# Patient Record
Sex: MALE | Race: OTHER | HISPANIC OR LATINO | URBAN - METROPOLITAN AREA
[De-identification: names, ages, dates, MRNs, and addresses within clinical notes are randomized per-mention and may not be internally consistent; named-entity substitution may affect disease eponyms.]

---

## 2017-02-28 ENCOUNTER — EMERGENCY (EMERGENCY)
Facility: HOSPITAL | Age: 57
LOS: 1 days | Discharge: HOME | End: 2017-02-28
Admitting: EMERGENCY MEDICINE
Payer: SELF-PAY

## 2017-02-28 DIAGNOSIS — Z98.89 OTHER SPECIFIED POSTPROCEDURAL STATES: Chronic | ICD-10-CM

## 2017-02-28 PROCEDURE — 99285 EMERGENCY DEPT VISIT HI MDM: CPT | Mod: 25

## 2017-02-28 PROCEDURE — 99284 EMERGENCY DEPT VISIT MOD MDM: CPT

## 2017-02-28 PROCEDURE — 80307 DRUG TEST PRSMV CHEM ANLYZR: CPT

## 2017-02-28 PROCEDURE — 36415 COLL VENOUS BLD VENIPUNCTURE: CPT

## 2017-06-08 ENCOUNTER — EMERGENCY (EMERGENCY)
Facility: HOSPITAL | Age: 57
LOS: 1 days | Discharge: HOME | End: 2017-06-08
Attending: EMERGENCY MEDICINE | Admitting: EMERGENCY MEDICINE
Payer: SELF-PAY

## 2017-06-08 VITALS
HEART RATE: 61 BPM | SYSTOLIC BLOOD PRESSURE: 133 MMHG | DIASTOLIC BLOOD PRESSURE: 74 MMHG | RESPIRATION RATE: 20 BRPM | OXYGEN SATURATION: 96 %

## 2017-06-08 DIAGNOSIS — Z98.89 OTHER SPECIFIED POSTPROCEDURAL STATES: Chronic | ICD-10-CM

## 2017-06-08 LAB
ALBUMIN SERPL ELPH-MCNC: 4.1 G/DL — SIGNIFICANT CHANGE UP (ref 3.3–5)
ALP SERPL-CCNC: 65 U/L — SIGNIFICANT CHANGE UP (ref 40–120)
ALT FLD-CCNC: 69 U/L RC — HIGH (ref 10–45)
ANION GAP SERPL CALC-SCNC: 16 MMOL/L — SIGNIFICANT CHANGE UP (ref 5–17)
AST SERPL-CCNC: 118 U/L — HIGH (ref 10–40)
BASOPHILS # BLD AUTO: 0.1 K/UL — SIGNIFICANT CHANGE UP (ref 0–0.2)
BASOPHILS NFR BLD AUTO: 1.5 % — SIGNIFICANT CHANGE UP (ref 0–2)
BILIRUB SERPL-MCNC: 0.8 MG/DL — SIGNIFICANT CHANGE UP (ref 0.2–1.2)
BUN SERPL-MCNC: 12 MG/DL — SIGNIFICANT CHANGE UP (ref 7–23)
CALCIUM SERPL-MCNC: 8.4 MG/DL — SIGNIFICANT CHANGE UP (ref 8.4–10.5)
CHLORIDE SERPL-SCNC: 104 MMOL/L — SIGNIFICANT CHANGE UP (ref 96–108)
CO2 SERPL-SCNC: 25 MMOL/L — SIGNIFICANT CHANGE UP (ref 22–31)
CREAT SERPL-MCNC: 0.75 MG/DL — SIGNIFICANT CHANGE UP (ref 0.5–1.3)
EOSINOPHIL # BLD AUTO: 0.1 K/UL — SIGNIFICANT CHANGE UP (ref 0–0.5)
EOSINOPHIL NFR BLD AUTO: 1.2 % — SIGNIFICANT CHANGE UP (ref 0–6)
GLUCOSE SERPL-MCNC: 90 MG/DL — SIGNIFICANT CHANGE UP (ref 70–99)
HCT VFR BLD CALC: 38.5 % — LOW (ref 39–50)
HGB BLD-MCNC: 12.7 G/DL — LOW (ref 13–17)
LIDOCAIN IGE QN: 73 U/L — HIGH (ref 7–60)
LYMPHOCYTES # BLD AUTO: 2 K/UL — SIGNIFICANT CHANGE UP (ref 1–3.3)
LYMPHOCYTES # BLD AUTO: 33.8 % — SIGNIFICANT CHANGE UP (ref 13–44)
MCHC RBC-ENTMCNC: 33 GM/DL — SIGNIFICANT CHANGE UP (ref 32–36)
MCHC RBC-ENTMCNC: 35 PG — HIGH (ref 27–34)
MCV RBC AUTO: 106 FL — HIGH (ref 80–100)
MONOCYTES # BLD AUTO: 0.4 K/UL — SIGNIFICANT CHANGE UP (ref 0–0.9)
MONOCYTES NFR BLD AUTO: 7.2 % — SIGNIFICANT CHANGE UP (ref 2–14)
NEUTROPHILS # BLD AUTO: 3.4 K/UL — SIGNIFICANT CHANGE UP (ref 1.8–7.4)
NEUTROPHILS NFR BLD AUTO: 56.4 % — SIGNIFICANT CHANGE UP (ref 43–77)
PHOSPHATE SERPL-MCNC: 3.4 MG/DL — SIGNIFICANT CHANGE UP (ref 2.5–4.5)
PLATELET # BLD AUTO: 160 K/UL — SIGNIFICANT CHANGE UP (ref 150–400)
POTASSIUM SERPL-MCNC: 3.8 MMOL/L — SIGNIFICANT CHANGE UP (ref 3.5–5.3)
POTASSIUM SERPL-SCNC: 3.8 MMOL/L — SIGNIFICANT CHANGE UP (ref 3.5–5.3)
PROT SERPL-MCNC: 6.9 G/DL — SIGNIFICANT CHANGE UP (ref 6–8.3)
RBC # BLD: 3.63 M/UL — LOW (ref 4.2–5.8)
RBC # FLD: 12.5 % — SIGNIFICANT CHANGE UP (ref 10.3–14.5)
SODIUM SERPL-SCNC: 145 MMOL/L — SIGNIFICANT CHANGE UP (ref 135–145)
WBC # BLD: 6 K/UL — SIGNIFICANT CHANGE UP (ref 3.8–10.5)
WBC # FLD AUTO: 6 K/UL — SIGNIFICANT CHANGE UP (ref 3.8–10.5)

## 2017-06-08 PROCEDURE — 99285 EMERGENCY DEPT VISIT HI MDM: CPT | Mod: 25

## 2017-06-08 PROCEDURE — 80053 COMPREHEN METABOLIC PANEL: CPT

## 2017-06-08 PROCEDURE — 84100 ASSAY OF PHOSPHORUS: CPT

## 2017-06-08 PROCEDURE — 85027 COMPLETE CBC AUTOMATED: CPT

## 2017-06-08 PROCEDURE — 93010 ELECTROCARDIOGRAM REPORT: CPT

## 2017-06-08 PROCEDURE — 93005 ELECTROCARDIOGRAM TRACING: CPT

## 2017-06-08 PROCEDURE — 72125 CT NECK SPINE W/O DYE: CPT

## 2017-06-08 PROCEDURE — 71250 CT THORAX DX C-: CPT

## 2017-06-08 PROCEDURE — 83690 ASSAY OF LIPASE: CPT

## 2017-06-08 PROCEDURE — 70450 CT HEAD/BRAIN W/O DYE: CPT | Mod: 26

## 2017-06-08 PROCEDURE — 72125 CT NECK SPINE W/O DYE: CPT | Mod: 26

## 2017-06-08 PROCEDURE — 71250 CT THORAX DX C-: CPT | Mod: 26

## 2017-06-08 PROCEDURE — 70450 CT HEAD/BRAIN W/O DYE: CPT

## 2017-06-08 RX ORDER — LIDOCAINE 4 G/100G
1 CREAM TOPICAL ONCE
Qty: 0 | Refills: 0 | Status: COMPLETED | OUTPATIENT
Start: 2017-06-08 | End: 2017-06-08

## 2017-06-08 RX ORDER — ACETAMINOPHEN 500 MG
650 TABLET ORAL ONCE
Qty: 0 | Refills: 0 | Status: COMPLETED | OUTPATIENT
Start: 2017-06-08 | End: 2017-06-08

## 2017-06-08 RX ADMIN — LIDOCAINE 1 PATCH: 4 CREAM TOPICAL at 20:43

## 2017-06-08 RX ADMIN — Medication 650 MILLIGRAM(S): at 20:43

## 2017-06-08 NOTE — ED PROVIDER NOTE - PROGRESS NOTE DETAILS
pt tolerating po Pt AAAOX3, ambulating. states he has no money /way to get home. will wait for SW in the am Pt resting comfortably, no acute distress. Pt AAOX3, ambulatory. will d/c home Alfredo: Patient alert and oriented x 3. ambulating with no difficulty. Patient will wait in waiting room.

## 2017-06-08 NOTE — ED PROVIDER NOTE - ATTENDING CONTRIBUTION TO CARE
Agree with resident history  Patient is intoxicated  Denies convulsions, only complains of right sided rib pain  On exam, NAD, AVSS, NC AT head, no facial trauma, no c,t, or lspine ttp, cta bl, s1, s,2 rr, osft nt nd no r/g/r, TTP to right anterior ribs t4-7 with no ecchymosis or deformity, soft, nt nd abdomen,no r/g/t, FROm AROM and PROm of ue and LE, pelcvis stable with no pain on palpation.

## 2017-06-08 NOTE — ED PROVIDER NOTE - OBJECTIVE STATEMENT
56 y.o M intox bibems s/p found on bench  /o right sided rib pain. 56 y.o M intox bibems s/p found on bench  c/o right sided rib pain. As per patient, he reports falling 8 days ago and endores right rib pian. Denies any ha/neck pain, ab pain, n/. last drink was today. 56 y.o M intox bibems s/p found on bench  c/o right sided rib pain. As per patient, he reports falling 8 days ago and endorses right rib pian. Denies any ha/neck pain, ab pain, n/. last drink was today.

## 2017-06-08 NOTE — ED ADULT NURSE NOTE - OBJECTIVE STATEMENT
57 yo male presents to the ED via EMS from [employer] [facility] [employer] in [place]  c/o ETOH intoxication tonight as per EMS. EMS states patient was [place] [facility] [place] with alcohol. patient is AAOx4. lung sounds clear bilaterally. cap refill <3sec. patient states he fell 8days ago and is c/o right sided rib pain 7/10. no bruising, redness or swelling noted. skin is intact. patient has odor. patient denies fevers, chills, N/V/D, HA, cough, abdominal pain, chest pain, SOB. VSS. MD at the bedside.

## 2017-06-09 VITALS
RESPIRATION RATE: 17 BRPM | SYSTOLIC BLOOD PRESSURE: 123 MMHG | TEMPERATURE: 98 F | DIASTOLIC BLOOD PRESSURE: 73 MMHG | OXYGEN SATURATION: 98 % | HEART RATE: 88 BPM

## 2017-06-09 NOTE — ED ADULT NURSE REASSESSMENT NOTE - NS ED NURSE REASSESS COMMENT FT1
patient sleeping comfortably in bed in no acute distress and no s/s of pain. waiting for social work to see patient at 0700.

## 2017-06-09 NOTE — ED ADULT NURSE REASSESSMENT NOTE - NS ED NURSE REASSESS COMMENT FT1
patient sleeping comfortably in bed in no acute distress. no s/s of pain at this time. waiting for social work at 0700.

## 2017-06-28 ENCOUNTER — EMERGENCY (EMERGENCY)
Facility: HOSPITAL | Age: 57
LOS: 1 days | Discharge: HOME | End: 2017-06-28
Attending: PERSONAL EMERGENCY RESPONSE ATTENDANT | Admitting: PERSONAL EMERGENCY RESPONSE ATTENDANT
Payer: SELF-PAY

## 2017-06-28 VITALS
RESPIRATION RATE: 16 BRPM | OXYGEN SATURATION: 95 % | DIASTOLIC BLOOD PRESSURE: 63 MMHG | SYSTOLIC BLOOD PRESSURE: 107 MMHG | TEMPERATURE: 98 F | HEART RATE: 85 BPM

## 2017-06-28 DIAGNOSIS — Z98.89 OTHER SPECIFIED POSTPROCEDURAL STATES: Chronic | ICD-10-CM

## 2017-06-28 LAB
ALBUMIN SERPL ELPH-MCNC: 3.7 G/DL — SIGNIFICANT CHANGE UP (ref 3.3–5)
ALP SERPL-CCNC: 59 U/L — SIGNIFICANT CHANGE UP (ref 40–120)
ALT FLD-CCNC: 59 U/L RC — HIGH (ref 10–45)
ANION GAP SERPL CALC-SCNC: 16 MMOL/L — SIGNIFICANT CHANGE UP (ref 5–17)
AST SERPL-CCNC: 87 U/L — HIGH (ref 10–40)
BASE EXCESS BLDV CALC-SCNC: 2.4 MMOL/L — HIGH (ref -2–2)
BASOPHILS # BLD AUTO: 0.1 K/UL — SIGNIFICANT CHANGE UP (ref 0–0.2)
BASOPHILS NFR BLD AUTO: 1.7 % — SIGNIFICANT CHANGE UP (ref 0–2)
BILIRUB SERPL-MCNC: 0.6 MG/DL — SIGNIFICANT CHANGE UP (ref 0.2–1.2)
BUN SERPL-MCNC: 15 MG/DL — SIGNIFICANT CHANGE UP (ref 7–23)
CA-I SERPL-SCNC: 1.16 MMOL/L — SIGNIFICANT CHANGE UP (ref 1.12–1.3)
CALCIUM SERPL-MCNC: 9.3 MG/DL — SIGNIFICANT CHANGE UP (ref 8.4–10.5)
CHLORIDE BLDV-SCNC: 106 MMOL/L — SIGNIFICANT CHANGE UP (ref 96–108)
CHLORIDE SERPL-SCNC: 105 MMOL/L — SIGNIFICANT CHANGE UP (ref 96–108)
CO2 BLDV-SCNC: 29 MMOL/L — SIGNIFICANT CHANGE UP (ref 22–30)
CO2 SERPL-SCNC: 24 MMOL/L — SIGNIFICANT CHANGE UP (ref 22–31)
CREAT SERPL-MCNC: 0.85 MG/DL — SIGNIFICANT CHANGE UP (ref 0.5–1.3)
EOSINOPHIL # BLD AUTO: 0.2 K/UL — SIGNIFICANT CHANGE UP (ref 0–0.5)
EOSINOPHIL NFR BLD AUTO: 3.7 % — SIGNIFICANT CHANGE UP (ref 0–6)
GAS PNL BLDV: 146 MMOL/L — HIGH (ref 136–145)
GAS PNL BLDV: SIGNIFICANT CHANGE UP
GAS PNL BLDV: SIGNIFICANT CHANGE UP
GLUCOSE BLDV-MCNC: 153 MG/DL — HIGH (ref 70–99)
GLUCOSE SERPL-MCNC: 162 MG/DL — HIGH (ref 70–99)
HCO3 BLDV-SCNC: 27 MMOL/L — SIGNIFICANT CHANGE UP (ref 21–29)
HCT VFR BLD CALC: 33.6 % — LOW (ref 39–50)
HCT VFR BLDA CALC: 37 % — LOW (ref 39–50)
HGB BLD CALC-MCNC: 12 G/DL — LOW (ref 13–17)
HGB BLD-MCNC: 12 G/DL — LOW (ref 13–17)
LACTATE BLDV-MCNC: 2.4 MMOL/L — HIGH (ref 0.7–2)
LIDOCAIN IGE QN: 77 U/L — HIGH (ref 7–60)
LYMPHOCYTES # BLD AUTO: 2.1 K/UL — SIGNIFICANT CHANGE UP (ref 1–3.3)
LYMPHOCYTES # BLD AUTO: 30.9 % — SIGNIFICANT CHANGE UP (ref 13–44)
MCHC RBC-ENTMCNC: 35.5 GM/DL — SIGNIFICANT CHANGE UP (ref 32–36)
MCHC RBC-ENTMCNC: 37.7 PG — HIGH (ref 27–34)
MCV RBC AUTO: 106 FL — HIGH (ref 80–100)
MONOCYTES # BLD AUTO: 0.4 K/UL — SIGNIFICANT CHANGE UP (ref 0–0.9)
MONOCYTES NFR BLD AUTO: 6.2 % — SIGNIFICANT CHANGE UP (ref 2–14)
NEUTROPHILS # BLD AUTO: 3.8 K/UL — SIGNIFICANT CHANGE UP (ref 1.8–7.4)
NEUTROPHILS NFR BLD AUTO: 57.5 % — SIGNIFICANT CHANGE UP (ref 43–77)
PCO2 BLDV: 46 MMHG — SIGNIFICANT CHANGE UP (ref 35–50)
PH BLDV: 7.39 — SIGNIFICANT CHANGE UP (ref 7.35–7.45)
PLATELET # BLD AUTO: 324 K/UL — SIGNIFICANT CHANGE UP (ref 150–400)
PO2 BLDV: 43 MMHG — SIGNIFICANT CHANGE UP (ref 25–45)
POTASSIUM BLDV-SCNC: 3.4 MMOL/L — LOW (ref 3.5–5)
POTASSIUM SERPL-MCNC: 3.6 MMOL/L — SIGNIFICANT CHANGE UP (ref 3.5–5.3)
POTASSIUM SERPL-SCNC: 3.6 MMOL/L — SIGNIFICANT CHANGE UP (ref 3.5–5.3)
PROT SERPL-MCNC: 6.7 G/DL — SIGNIFICANT CHANGE UP (ref 6–8.3)
RBC # BLD: 3.17 M/UL — LOW (ref 4.2–5.8)
RBC # FLD: 12.2 % — SIGNIFICANT CHANGE UP (ref 10.3–14.5)
SAO2 % BLDV: 69 % — SIGNIFICANT CHANGE UP (ref 67–88)
SODIUM SERPL-SCNC: 145 MMOL/L — SIGNIFICANT CHANGE UP (ref 135–145)
WBC # BLD: 6.7 K/UL — SIGNIFICANT CHANGE UP (ref 3.8–10.5)
WBC # FLD AUTO: 6.7 K/UL — SIGNIFICANT CHANGE UP (ref 3.8–10.5)

## 2017-06-28 PROCEDURE — 71100 X-RAY EXAM RIBS UNI 2 VIEWS: CPT | Mod: 26,52

## 2017-06-28 PROCEDURE — 71010: CPT | Mod: 26,59

## 2017-06-28 PROCEDURE — 93010 ELECTROCARDIOGRAM REPORT: CPT

## 2017-06-28 PROCEDURE — 99285 EMERGENCY DEPT VISIT HI MDM: CPT | Mod: 25

## 2017-06-28 PROCEDURE — 99053 MED SERV 10PM-8AM 24 HR FAC: CPT

## 2017-06-28 RX ORDER — SODIUM CHLORIDE 9 MG/ML
1000 INJECTION INTRAMUSCULAR; INTRAVENOUS; SUBCUTANEOUS ONCE
Qty: 0 | Refills: 0 | Status: COMPLETED | OUTPATIENT
Start: 2017-06-28 | End: 2017-06-28

## 2017-06-28 RX ORDER — SODIUM CHLORIDE 9 MG/ML
1000 INJECTION, SOLUTION INTRAVENOUS
Qty: 0 | Refills: 0 | Status: DISCONTINUED | OUTPATIENT
Start: 2017-06-28 | End: 2017-07-02

## 2017-06-28 RX ADMIN — SODIUM CHLORIDE 2000 MILLILITER(S): 9 INJECTION INTRAMUSCULAR; INTRAVENOUS; SUBCUTANEOUS at 22:31

## 2017-06-28 RX ADMIN — SODIUM CHLORIDE 250 MILLILITER(S): 9 INJECTION, SOLUTION INTRAVENOUS at 23:40

## 2017-06-28 NOTE — ED PROVIDER NOTE - OBJECTIVE STATEMENT
Pt is a 56 yr old male presenting to ED with complaint of 'found drunk' seated on curb.  Pt had no apparent trauma.  Pt is intoxicated, malodorous.  Easily rousable.  No evidence of vomiting.  Moves all extremities.  Verbal.  Largely [place]an speaking.  When asked if he has pain he endorses R chest pain.  Holds R anterior chest.  Unclear if + hamilton's or R anterior/lateral CW ttp.  Pt is in no acute distress.  Equal breath sounds bilaterally.  Other than RUQ abdomen non-tender.  Again, unclear if RUQ is tender or R anterior chest wall.  No crepitus or palpable deformity.  Pt denies smoking hx.  Pt unable to give past medical hx but has been seen at this ED previously and records indicate only previous alcohol abuse hx.

## 2017-06-28 NOTE — ED PROVIDER NOTE - PLAN OF CARE
1) Please follow-up with your Primary Medical Doctor in 3-5 days. If you need to find a new physician, please call [phone].  2) Return to the Emergency Department if you experiences: fevers, chills, chest pain, shortness of breath, or symptoms that are new or recurrent.  3) If you have any questions or concerns, do not hesitate to contact us at [phone].  4) Follow-up with a Cardiologist to assess for the right chest pain

## 2017-06-28 NOTE — ED PROVIDER NOTE - MEDICAL DECISION MAKING DETAILS
ARMY:  Pt assessed prior to signout, imaging ordered for eval of anterolateral chest wall pain vs. RUQ pain.  No vomiting. Pt markedly intoxicated.  No report of trauma.  Labs ordered, troponins to be cycled for R chest pain however inconsistent with ACS given significant reproducible TTP over R anterolateral chest wall/RUQ.  Remains rousable and pending labs/imaging at time of signout to incoming team.

## 2017-06-28 NOTE — ED PROVIDER NOTE - ABDOMINAL EXAM
Mild RUQ TTP vs. R anterior/lateral chest wall TTP, no palpable masses/crepitus/equivocal hamilton's sign

## 2017-06-28 NOTE — ED PROVIDER NOTE - PROGRESS NOTE DETAILS
Rito Parsons MD (resident): patient reassessed after imaging and repeat labs. history confirmed with Latvian speaker Marizol Stone (). Pt showed good understanding of verbal discharge instructions. Results discussed w/ him. Pt speech normal, no slurred speech, improved pain of the R chest. However does not have any way to get home because no money for a cab, so will get SW to see the patient to help him get home to [facility].

## 2017-06-28 NOTE — ED PROVIDER NOTE - CARE PLAN
Principal Discharge DX:	Alcohol abuse Principal Discharge DX:	Alcohol abuse  Instructions for follow-up, activity and diet:	1) Please follow-up with your Primary Medical Doctor in 3-5 days. If you need to find a new physician, please call [phone].  2) Return to the Emergency Department if you experiences: fevers, chills, chest pain, shortness of breath, or symptoms that are new or recurrent.  3) If you have any questions or concerns, do not hesitate to contact us at [phone].  4) Follow-up with a Cardiologist to assess for the right chest pain

## 2017-06-29 VITALS
TEMPERATURE: 98 F | SYSTOLIC BLOOD PRESSURE: 132 MMHG | OXYGEN SATURATION: 98 % | RESPIRATION RATE: 18 BRPM | DIASTOLIC BLOOD PRESSURE: 76 MMHG | HEART RATE: 74 BPM

## 2017-06-29 LAB — TROPONIN T SERPL-MCNC: <0.01 NG/ML — SIGNIFICANT CHANGE UP (ref 0–0.06)

## 2017-06-29 PROCEDURE — 82435 ASSAY OF BLOOD CHLORIDE: CPT

## 2017-06-29 PROCEDURE — 84484 ASSAY OF TROPONIN QUANT: CPT

## 2017-06-29 PROCEDURE — 71045 X-RAY EXAM CHEST 1 VIEW: CPT

## 2017-06-29 PROCEDURE — 82947 ASSAY GLUCOSE BLOOD QUANT: CPT

## 2017-06-29 PROCEDURE — 82330 ASSAY OF CALCIUM: CPT

## 2017-06-29 PROCEDURE — 84295 ASSAY OF SERUM SODIUM: CPT

## 2017-06-29 PROCEDURE — 76705 ECHO EXAM OF ABDOMEN: CPT | Mod: 26,RT

## 2017-06-29 PROCEDURE — 76705 ECHO EXAM OF ABDOMEN: CPT

## 2017-06-29 PROCEDURE — 83690 ASSAY OF LIPASE: CPT

## 2017-06-29 PROCEDURE — 85014 HEMATOCRIT: CPT

## 2017-06-29 PROCEDURE — 71100 X-RAY EXAM RIBS UNI 2 VIEWS: CPT

## 2017-06-29 PROCEDURE — 93005 ELECTROCARDIOGRAM TRACING: CPT

## 2017-06-29 PROCEDURE — 82803 BLOOD GASES ANY COMBINATION: CPT

## 2017-06-29 PROCEDURE — 83605 ASSAY OF LACTIC ACID: CPT

## 2017-06-29 PROCEDURE — 84132 ASSAY OF SERUM POTASSIUM: CPT

## 2017-06-29 PROCEDURE — 80053 COMPREHEN METABOLIC PANEL: CPT

## 2017-06-29 PROCEDURE — 99285 EMERGENCY DEPT VISIT HI MDM: CPT | Mod: 25

## 2017-06-29 PROCEDURE — 85027 COMPLETE CBC AUTOMATED: CPT

## 2017-06-29 NOTE — ED ADULT NURSE REASSESSMENT NOTE - NS ED NURSE REASSESS COMMENT FT1
pt out to ultrasound.
0745 Pt reassessed Pt is awake C/O  ride sided Chest discomfort Gabi N/V/D Pt is awaiting for SW consult for possible discharge Pt not in acute distress Continue to monitor
Pt discharged steady gait SW arranged the transport Pt A&OX 3  Pt stable to go home
pt complaining of right sided chest pain. md beltran aware. pt denies sob/left arm pain/back pain/diaphoresis/n/v/numbness/tingling. pt laying comfortably in bed, given warm blankets. will continue to monitor.
pt resting comfortably in bed. pt denies chest pain/n/v/diaphoresis/sob/numbness/tingling at this time. safety maintained. waiting for repeat troponin to be drawn a 0500 as per md silver. will continue to monitor.
repeat troponin drawn as per md beltran. pt resting comfortably in bed. pt opens eyes to voice. pt AOX3. pt states "I am very tired". when asked a question pt answers and goes right back to sleep. md beltran aware. safety maintained. will continue to monitor.

## 2017-06-29 NOTE — ED ADULT NURSE NOTE - CHPI ED SYMPTOMS NEG
no vomiting/no abdominal distension/no chills/no fever/no nausea/no abdominal pain/no confusion/no weakness/no disorientation

## 2017-06-29 NOTE — ED ADULT NURSE NOTE - NS ED NURSE DC INFO COMPLEXITY
Patient asked questions/Returned Demonstration/Verbalized Understanding/Simple: Patient demonstrates quick and easy understanding

## 2017-06-29 NOTE — ED ADULT NURSE NOTE - OBJECTIVE STATEMENT
57 yo male pt presents to ed via ambulance complaining of "found drunk" sitting on a curb. pt complaining of right sided chest pain. pt is intoxicated, malodorous, easily rousable, answering questions appropriately. pt denies n/v/abd pain/sob. pt appears to be in no distress. breath sounds clear and equal bilaterally. no increased work of breathing. no cough present. abd nontender and nondistended. skin warm dry and intact. safety maintained. pt denies recent fall or trauma. no bruising/deformities/sign of trauma noted. pt undressed and placed in gown. pt resting comfortably in bed. safety maintained. pt unable to endorse what he drank/how much he drank/if he used drugs.

## 2017-10-17 ENCOUNTER — EMERGENCY (EMERGENCY)
Facility: HOSPITAL | Age: 57
LOS: 1 days | Discharge: HOME | End: 2017-10-17
Attending: EMERGENCY MEDICINE | Admitting: EMERGENCY MEDICINE
Payer: SELF-PAY

## 2017-10-17 VITALS
TEMPERATURE: 98 F | SYSTOLIC BLOOD PRESSURE: 126 MMHG | HEART RATE: 78 BPM | OXYGEN SATURATION: 98 % | RESPIRATION RATE: 18 BRPM | DIASTOLIC BLOOD PRESSURE: 78 MMHG

## 2017-10-17 DIAGNOSIS — Z98.89 OTHER SPECIFIED POSTPROCEDURAL STATES: Chronic | ICD-10-CM

## 2017-10-17 LAB
APPEARANCE UR: CLEAR — SIGNIFICANT CHANGE UP
APTT BLD: 29.3 SEC — SIGNIFICANT CHANGE UP (ref 27.5–37.4)
BASOPHILS # BLD AUTO: 0.1 K/UL — SIGNIFICANT CHANGE UP (ref 0–0.2)
BASOPHILS NFR BLD AUTO: 0.9 % — SIGNIFICANT CHANGE UP (ref 0–2)
BILIRUB UR-MCNC: NEGATIVE — SIGNIFICANT CHANGE UP
COLOR SPEC: YELLOW — SIGNIFICANT CHANGE UP
DIFF PNL FLD: NEGATIVE — SIGNIFICANT CHANGE UP
EOSINOPHIL # BLD AUTO: 0.1 K/UL — SIGNIFICANT CHANGE UP (ref 0–0.5)
EOSINOPHIL NFR BLD AUTO: 0.8 % — SIGNIFICANT CHANGE UP (ref 0–6)
EPI CELLS # UR: SIGNIFICANT CHANGE UP /HPF
ETHANOL SERPL-MCNC: 235 MG/DL — HIGH (ref 0–10)
GAS PNL BLDV: SIGNIFICANT CHANGE UP
GLUCOSE UR QL: NEGATIVE — SIGNIFICANT CHANGE UP
HCT VFR BLD CALC: 34.5 % — LOW (ref 39–50)
HGB BLD-MCNC: 12.3 G/DL — LOW (ref 13–17)
INR BLD: 0.87 RATIO — LOW (ref 0.88–1.16)
KETONES UR-MCNC: NEGATIVE — SIGNIFICANT CHANGE UP
LEUKOCYTE ESTERASE UR-ACNC: NEGATIVE — SIGNIFICANT CHANGE UP
LIDOCAIN IGE QN: 63 U/L — HIGH (ref 7–60)
LYMPHOCYTES # BLD AUTO: 1.6 K/UL — SIGNIFICANT CHANGE UP (ref 1–3.3)
LYMPHOCYTES # BLD AUTO: 23.6 % — SIGNIFICANT CHANGE UP (ref 13–44)
MAGNESIUM SERPL-MCNC: 1.8 MG/DL — SIGNIFICANT CHANGE UP (ref 1.6–2.6)
MCHC RBC-ENTMCNC: 35.7 GM/DL — SIGNIFICANT CHANGE UP (ref 32–36)
MCHC RBC-ENTMCNC: 37.9 PG — HIGH (ref 27–34)
MCV RBC AUTO: 106 FL — HIGH (ref 80–100)
MONOCYTES # BLD AUTO: 0.7 K/UL — SIGNIFICANT CHANGE UP (ref 0–0.9)
MONOCYTES NFR BLD AUTO: 9.7 % — SIGNIFICANT CHANGE UP (ref 2–14)
NEUTROPHILS # BLD AUTO: 4.5 K/UL — SIGNIFICANT CHANGE UP (ref 1.8–7.4)
NEUTROPHILS NFR BLD AUTO: 65 % — SIGNIFICANT CHANGE UP (ref 43–77)
NITRITE UR-MCNC: NEGATIVE — SIGNIFICANT CHANGE UP
PH UR: 6.5 — SIGNIFICANT CHANGE UP (ref 5–8)
PHOSPHATE SERPL-MCNC: 2 MG/DL — LOW (ref 2.5–4.5)
PLATELET # BLD AUTO: 320 K/UL — SIGNIFICANT CHANGE UP (ref 150–400)
PROT UR-MCNC: 30 MG/DL
PROTHROM AB SERPL-ACNC: 9.4 SEC — LOW (ref 9.8–12.7)
RBC # BLD: 3.25 M/UL — LOW (ref 4.2–5.8)
RBC # FLD: 11.8 % — SIGNIFICANT CHANGE UP (ref 10.3–14.5)
RBC CASTS # UR COMP ASSIST: SIGNIFICANT CHANGE UP /HPF (ref 0–2)
SP GR SPEC: 1.02 — SIGNIFICANT CHANGE UP (ref 1.01–1.02)
UROBILINOGEN FLD QL: 8
WBC # BLD: 6.9 K/UL — SIGNIFICANT CHANGE UP (ref 3.8–10.5)
WBC # FLD AUTO: 6.9 K/UL — SIGNIFICANT CHANGE UP (ref 3.8–10.5)
WBC UR QL: SIGNIFICANT CHANGE UP /HPF (ref 0–5)

## 2017-10-17 PROCEDURE — 83735 ASSAY OF MAGNESIUM: CPT

## 2017-10-17 PROCEDURE — 85730 THROMBOPLASTIN TIME PARTIAL: CPT

## 2017-10-17 PROCEDURE — 84295 ASSAY OF SERUM SODIUM: CPT

## 2017-10-17 PROCEDURE — 96372 THER/PROPH/DIAG INJ SC/IM: CPT | Mod: XU

## 2017-10-17 PROCEDURE — 72125 CT NECK SPINE W/O DYE: CPT

## 2017-10-17 PROCEDURE — 99285 EMERGENCY DEPT VISIT HI MDM: CPT | Mod: 25

## 2017-10-17 PROCEDURE — 80053 COMPREHEN METABOLIC PANEL: CPT

## 2017-10-17 PROCEDURE — 93005 ELECTROCARDIOGRAM TRACING: CPT

## 2017-10-17 PROCEDURE — 82330 ASSAY OF CALCIUM: CPT

## 2017-10-17 PROCEDURE — 85014 HEMATOCRIT: CPT

## 2017-10-17 PROCEDURE — 84132 ASSAY OF SERUM POTASSIUM: CPT

## 2017-10-17 PROCEDURE — 82962 GLUCOSE BLOOD TEST: CPT

## 2017-10-17 PROCEDURE — 82435 ASSAY OF BLOOD CHLORIDE: CPT

## 2017-10-17 PROCEDURE — 81001 URINALYSIS AUTO W/SCOPE: CPT

## 2017-10-17 PROCEDURE — 83605 ASSAY OF LACTIC ACID: CPT

## 2017-10-17 PROCEDURE — 72125 CT NECK SPINE W/O DYE: CPT | Mod: 26

## 2017-10-17 PROCEDURE — 85610 PROTHROMBIN TIME: CPT

## 2017-10-17 PROCEDURE — 70450 CT HEAD/BRAIN W/O DYE: CPT | Mod: 26

## 2017-10-17 PROCEDURE — 93010 ELECTROCARDIOGRAM REPORT: CPT

## 2017-10-17 PROCEDURE — 80307 DRUG TEST PRSMV CHEM ANLYZR: CPT

## 2017-10-17 PROCEDURE — 83690 ASSAY OF LIPASE: CPT

## 2017-10-17 PROCEDURE — 85027 COMPLETE CBC AUTOMATED: CPT

## 2017-10-17 PROCEDURE — 84100 ASSAY OF PHOSPHORUS: CPT

## 2017-10-17 PROCEDURE — 82803 BLOOD GASES ANY COMBINATION: CPT

## 2017-10-17 PROCEDURE — 82947 ASSAY GLUCOSE BLOOD QUANT: CPT

## 2017-10-17 PROCEDURE — 70450 CT HEAD/BRAIN W/O DYE: CPT

## 2017-10-17 PROCEDURE — 96374 THER/PROPH/DIAG INJ IV PUSH: CPT

## 2017-10-17 RX ORDER — THIAMINE MONONITRATE (VIT B1) 100 MG
100 TABLET ORAL ONCE
Qty: 0 | Refills: 0 | Status: COMPLETED | OUTPATIENT
Start: 2017-10-17 | End: 2017-10-17

## 2017-10-17 RX ORDER — SODIUM CHLORIDE 9 MG/ML
1000 INJECTION INTRAMUSCULAR; INTRAVENOUS; SUBCUTANEOUS ONCE
Qty: 0 | Refills: 0 | Status: COMPLETED | OUTPATIENT
Start: 2017-10-17 | End: 2017-10-17

## 2017-10-17 RX ORDER — ACETAMINOPHEN 500 MG
650 TABLET ORAL ONCE
Qty: 0 | Refills: 0 | Status: COMPLETED | OUTPATIENT
Start: 2017-10-17 | End: 2017-10-17

## 2017-10-17 RX ORDER — SODIUM CHLORIDE 9 MG/ML
1000 INJECTION, SOLUTION INTRAVENOUS
Qty: 0 | Refills: 0 | Status: DISCONTINUED | OUTPATIENT
Start: 2017-10-17 | End: 2017-10-21

## 2017-10-17 RX ADMIN — SODIUM CHLORIDE 120 MILLILITER(S): 9 INJECTION, SOLUTION INTRAVENOUS at 21:35

## 2017-10-17 RX ADMIN — SODIUM CHLORIDE 1000 MILLILITER(S): 9 INJECTION INTRAMUSCULAR; INTRAVENOUS; SUBCUTANEOUS at 20:33

## 2017-10-17 RX ADMIN — Medication 100 MILLIGRAM(S): at 21:34

## 2017-10-17 RX ADMIN — Medication 650 MILLIGRAM(S): at 21:35

## 2017-10-17 NOTE — ED ADULT NURSE NOTE - OBJECTIVE STATEMENT
57 yo M NOEMÍ. [place] Pt often comes to ED for Etoh intox.  Appears Intoxicated, 57 yo M BIBEMS. [place] Pt often comes to ED for Etoh intox.  Appears Intoxicated, responding to verbal stimuli. Reports epigastric pain. Denies CP dizziness weakness  Or SOB. Patient placed on 1:1 for safety. No combative behavior noted. No agitation or irritability. Denies Fall  No sign of injury. Labs drawn. FS and EKG performed.

## 2017-10-17 NOTE — ED PROVIDER NOTE - PROGRESS NOTE DETAILS
Patient is clinically sober, ambulating without difficulty, no tongue fasciculations or tremors, no signs of withdrawal. Will DC home. Linda Gregorio DO

## 2017-10-17 NOTE — ED PROVIDER NOTE - PHYSICAL EXAMINATION
Gen: Disheveled, appears intoxicated  HEENT: NCAT, EOMI with horizontal nystagmus, no nasal discharge, mucous membranes moist  CV: RRR, +S1/S2, no M/R/G  Resp: CTAB, no W/R/R  GI: Abdomen soft non-distended, NTTP, no masses  MSK: No open wounds, no bruising, no LE edema  Neuro: A&Ox0, however he is following commands, moving all four extremities spontaneously, PERRL with horizontal nystagmus,

## 2017-10-17 NOTE — ED PROVIDER NOTE - CARE PLAN
Principal Discharge DX:	Alcohol intoxication  Instructions for follow-up, activity and diet:	1. Follow up with your primary care physician within 2-3days for reevaluation.  2.  Return to the Emergency Department for worsening, progressive or any other concerning symptoms.

## 2017-10-17 NOTE — ED PROVIDER NOTE - OBJECTIVE STATEMENT
patient is a 56 year old male with unknown medical hx found down and responsive with no obvious trauma with suspected alcohol intoxication. He was seen on the 29th of june here with a similar presentation and at that time he was endorsing right sided chest pain. Today he is easily arousable but minimally verbally responsive in Ma[place]. He does endorse some left sided chest pain but will not comment on duration or severity. ROS limited 2/2 state.

## 2017-10-17 NOTE — ED PROVIDER NOTE - MEDICAL DECISION MAKING DETAILS
55 y/o male with history of alcohol abuse presents with possible alcohol intoxication. Pt is drowsy, alcohol on breath, positive nystagmus, mild epigastric tenderness. Will obtain bloodwork, IV hydration, reevaluation. ZR

## 2017-10-18 VITALS
SYSTOLIC BLOOD PRESSURE: 152 MMHG | TEMPERATURE: 100 F | RESPIRATION RATE: 18 BRPM | HEART RATE: 86 BPM | DIASTOLIC BLOOD PRESSURE: 86 MMHG | OXYGEN SATURATION: 96 %

## 2017-10-18 NOTE — ED ADULT NURSE REASSESSMENT NOTE - NS ED NURSE REASSESS COMMENT FT1
Pt aaox4 no acute distress. Dc plan reviewed. Food and beverage given. No complaints expressed. Pt ambulates with steady gait.

## 2017-10-24 ENCOUNTER — APPOINTMENT (OUTPATIENT)
Dept: INTERNAL MEDICINE | Facility: CLINIC | Age: 57
End: 2017-10-24

## 2017-12-18 ENCOUNTER — EMERGENCY (EMERGENCY)
Facility: HOSPITAL | Age: 57
LOS: 1 days | Discharge: HOME | End: 2017-12-18
Attending: EMERGENCY MEDICINE | Admitting: EMERGENCY MEDICINE
Payer: SELF-PAY

## 2017-12-18 VITALS
RESPIRATION RATE: 18 BRPM | SYSTOLIC BLOOD PRESSURE: 111 MMHG | OXYGEN SATURATION: 97 % | DIASTOLIC BLOOD PRESSURE: 72 MMHG | TEMPERATURE: 97 F | HEART RATE: 65 BPM

## 2017-12-18 DIAGNOSIS — Z98.89 OTHER SPECIFIED POSTPROCEDURAL STATES: Chronic | ICD-10-CM

## 2017-12-18 LAB
ANION GAP SERPL CALC-SCNC: 15 MMOL/L — SIGNIFICANT CHANGE UP (ref 5–17)
ANISOCYTOSIS BLD QL: SLIGHT — SIGNIFICANT CHANGE UP
BASOPHILS # BLD AUTO: 0.1 K/UL — SIGNIFICANT CHANGE UP (ref 0–0.2)
BASOPHILS NFR BLD AUTO: 1 % — SIGNIFICANT CHANGE UP (ref 0–2)
BUN SERPL-MCNC: 13 MG/DL — SIGNIFICANT CHANGE UP (ref 7–23)
CALCIUM SERPL-MCNC: 8.2 MG/DL — LOW (ref 8.4–10.5)
CHLORIDE SERPL-SCNC: 111 MMOL/L — HIGH (ref 96–108)
CO2 SERPL-SCNC: 24 MMOL/L — SIGNIFICANT CHANGE UP (ref 22–31)
CREAT SERPL-MCNC: 0.69 MG/DL — SIGNIFICANT CHANGE UP (ref 0.5–1.3)
DACRYOCYTES BLD QL SMEAR: SLIGHT — SIGNIFICANT CHANGE UP
EOSINOPHIL # BLD AUTO: 0.1 K/UL — SIGNIFICANT CHANGE UP (ref 0–0.5)
EOSINOPHIL NFR BLD AUTO: 1.8 % — SIGNIFICANT CHANGE UP (ref 0–6)
ETHANOL SERPL-MCNC: 361 MG/DL — HIGH (ref 0–10)
GLUCOSE SERPL-MCNC: 80 MG/DL — SIGNIFICANT CHANGE UP (ref 70–99)
HCT VFR BLD CALC: 36.4 % — LOW (ref 39–50)
HGB BLD-MCNC: 12.2 G/DL — LOW (ref 13–17)
LIDOCAIN IGE QN: 101 U/L — HIGH (ref 7–60)
LYMPHOCYTES # BLD AUTO: 2.2 K/UL — SIGNIFICANT CHANGE UP (ref 1–3.3)
LYMPHOCYTES # BLD AUTO: 36 % — SIGNIFICANT CHANGE UP (ref 13–44)
MACROCYTES BLD QL: SIGNIFICANT CHANGE UP
MCHC RBC-ENTMCNC: 33.5 GM/DL — SIGNIFICANT CHANGE UP (ref 32–36)
MCHC RBC-ENTMCNC: 36.7 PG — HIGH (ref 27–34)
MCV RBC AUTO: 110 FL — HIGH (ref 80–100)
MONOCYTES # BLD AUTO: 0.5 K/UL — SIGNIFICANT CHANGE UP (ref 0–0.9)
MONOCYTES NFR BLD AUTO: 7.4 % — SIGNIFICANT CHANGE UP (ref 2–14)
NEUTROPHILS # BLD AUTO: 3.3 K/UL — SIGNIFICANT CHANGE UP (ref 1.8–7.4)
NEUTROPHILS NFR BLD AUTO: 53.8 % — SIGNIFICANT CHANGE UP (ref 43–77)
PLAT MORPH BLD: NORMAL — SIGNIFICANT CHANGE UP
PLATELET # BLD AUTO: 297 K/UL — SIGNIFICANT CHANGE UP (ref 150–400)
POTASSIUM SERPL-MCNC: 3.4 MMOL/L — LOW (ref 3.5–5.3)
POTASSIUM SERPL-SCNC: 3.4 MMOL/L — LOW (ref 3.5–5.3)
RBC # BLD: 3.32 M/UL — LOW (ref 4.2–5.8)
RBC # FLD: 11.9 % — SIGNIFICANT CHANGE UP (ref 10.3–14.5)
RBC BLD AUTO: ABNORMAL
SODIUM SERPL-SCNC: 150 MMOL/L — HIGH (ref 135–145)
TARGETS BLD QL SMEAR: SLIGHT — SIGNIFICANT CHANGE UP
TROPONIN T SERPL-MCNC: <0.01 NG/ML — SIGNIFICANT CHANGE UP (ref 0–0.06)
WBC # BLD: 6.2 K/UL — SIGNIFICANT CHANGE UP (ref 3.8–10.5)
WBC # FLD AUTO: 6.2 K/UL — SIGNIFICANT CHANGE UP (ref 3.8–10.5)

## 2017-12-18 PROCEDURE — 71010: CPT | Mod: 26

## 2017-12-18 PROCEDURE — 99285 EMERGENCY DEPT VISIT HI MDM: CPT | Mod: 25

## 2017-12-18 PROCEDURE — 93010 ELECTROCARDIOGRAM REPORT: CPT

## 2017-12-18 RX ORDER — SODIUM CHLORIDE 9 MG/ML
1000 INJECTION, SOLUTION INTRAVENOUS
Qty: 0 | Refills: 0 | Status: DISCONTINUED | OUTPATIENT
Start: 2017-12-18 | End: 2017-12-22

## 2017-12-18 RX ADMIN — SODIUM CHLORIDE 200 MILLILITER(S): 9 INJECTION, SOLUTION INTRAVENOUS at 23:28

## 2017-12-18 NOTE — ED PROVIDER NOTE - MEDICAL DECISION MAKING DETAILS
58 y/o M pt with PMHx of ETOH abuse presents to the ED for intoxication. Pt appears to be in-domiciled, currently c/o CP. Unable to get clear hx, will get EKG and cardiac enzymes.

## 2017-12-18 NOTE — ED PROVIDER NOTE - OBJECTIVE STATEMENT
56 y/o M pt with PMHx of ETOH abuse presents to the ED for intoxication. Pt was found [place] the middle of the street. There was no signs of trauma. As per EMS, pt was in the ICU at [facility] weeks recently. Currently c/o of CP

## 2017-12-18 NOTE — ED ADULT NURSE NOTE - OBJECTIVE STATEMENT
57 y.o male bibems found in the street intoxicated, unknown medical history, dishevealed, with multiple layers of clothing and wet pants with urine, no signs of trauma noted, VS wnl. Patient awake and alert, Somali speaking, reports having pain in epigastric area, FSBS wnl.

## 2017-12-19 VITALS
TEMPERATURE: 98 F | HEART RATE: 74 BPM | SYSTOLIC BLOOD PRESSURE: 118 MMHG | OXYGEN SATURATION: 99 % | DIASTOLIC BLOOD PRESSURE: 72 MMHG | RESPIRATION RATE: 16 BRPM

## 2017-12-19 LAB
ANION GAP SERPL CALC-SCNC: 14 MMOL/L — SIGNIFICANT CHANGE UP (ref 5–17)
BUN SERPL-MCNC: 12 MG/DL — SIGNIFICANT CHANGE UP (ref 7–23)
CALCIUM SERPL-MCNC: 7.4 MG/DL — LOW (ref 8.4–10.5)
CHLORIDE SERPL-SCNC: 111 MMOL/L — HIGH (ref 96–108)
CO2 SERPL-SCNC: 24 MMOL/L — SIGNIFICANT CHANGE UP (ref 22–31)
CREAT SERPL-MCNC: 0.69 MG/DL — SIGNIFICANT CHANGE UP (ref 0.5–1.3)
GLUCOSE SERPL-MCNC: 105 MG/DL — HIGH (ref 70–99)
POTASSIUM SERPL-MCNC: 3 MMOL/L — LOW (ref 3.5–5.3)
POTASSIUM SERPL-SCNC: 3 MMOL/L — LOW (ref 3.5–5.3)
SODIUM SERPL-SCNC: 149 MMOL/L — HIGH (ref 135–145)

## 2017-12-19 PROCEDURE — 96374 THER/PROPH/DIAG INJ IV PUSH: CPT

## 2017-12-19 PROCEDURE — 99284 EMERGENCY DEPT VISIT MOD MDM: CPT | Mod: 25

## 2017-12-19 PROCEDURE — 85027 COMPLETE CBC AUTOMATED: CPT

## 2017-12-19 PROCEDURE — 80307 DRUG TEST PRSMV CHEM ANLYZR: CPT

## 2017-12-19 PROCEDURE — 83690 ASSAY OF LIPASE: CPT

## 2017-12-19 PROCEDURE — 71045 X-RAY EXAM CHEST 1 VIEW: CPT

## 2017-12-19 PROCEDURE — 93005 ELECTROCARDIOGRAM TRACING: CPT

## 2017-12-19 PROCEDURE — 82962 GLUCOSE BLOOD TEST: CPT

## 2017-12-19 PROCEDURE — 84484 ASSAY OF TROPONIN QUANT: CPT

## 2017-12-19 PROCEDURE — 96375 TX/PRO/DX INJ NEW DRUG ADDON: CPT

## 2017-12-19 PROCEDURE — 80048 BASIC METABOLIC PNL TOTAL CA: CPT

## 2017-12-19 PROCEDURE — 96376 TX/PRO/DX INJ SAME DRUG ADON: CPT

## 2017-12-19 RX ORDER — POTASSIUM CHLORIDE 20 MEQ
10 PACKET (EA) ORAL
Qty: 0 | Refills: 0 | Status: COMPLETED | OUTPATIENT
Start: 2017-12-19 | End: 2017-12-19

## 2017-12-19 RX ORDER — POTASSIUM CHLORIDE 20 MEQ
40 PACKET (EA) ORAL ONCE
Qty: 0 | Refills: 0 | Status: COMPLETED | OUTPATIENT
Start: 2017-12-19 | End: 2017-12-19

## 2017-12-19 RX ORDER — SODIUM CHLORIDE 9 MG/ML
2000 INJECTION INTRAMUSCULAR; INTRAVENOUS; SUBCUTANEOUS ONCE
Qty: 0 | Refills: 0 | Status: COMPLETED | OUTPATIENT
Start: 2017-12-19 | End: 2017-12-19

## 2017-12-19 RX ADMIN — Medication 40 MILLIEQUIVALENT(S): at 04:56

## 2017-12-19 RX ADMIN — Medication 100 MILLIEQUIVALENT(S): at 04:47

## 2017-12-19 RX ADMIN — Medication 100 MILLIEQUIVALENT(S): at 03:48

## 2017-12-19 RX ADMIN — SODIUM CHLORIDE 2000 MILLILITER(S): 9 INJECTION INTRAMUSCULAR; INTRAVENOUS; SUBCUTANEOUS at 02:35

## 2017-12-19 NOTE — ED ADULT NURSE REASSESSMENT NOTE - COMFORT CARE
po fluids offered/warm blanket provided/assisted to bathroom/wait time explained/meal provided
warm blanket provided/meal provided/po fluids offered/wait time explained

## 2017-12-19 NOTE — ED ADULT NURSE REASSESSMENT NOTE - NS ED NURSE REASSESS COMMENT FT1
Patient awake and alert with stable VS, no signs of distress pending sobriety and MD re-assessment.
Patient stable with normal VS, eat a dinner without any pain or nausea, able to walk steadily. Discharged home, advised to stop drinking alcohol and verbalized understanding.

## 2018-07-31 ENCOUNTER — EMERGENCY (EMERGENCY)
Facility: HOSPITAL | Age: 58
LOS: 1 days | Discharge: HOME | End: 2018-07-31
Attending: EMERGENCY MEDICINE
Payer: SELF-PAY

## 2018-07-31 VITALS
TEMPERATURE: 99 F | OXYGEN SATURATION: 100 % | RESPIRATION RATE: 16 BRPM | SYSTOLIC BLOOD PRESSURE: 111 MMHG | DIASTOLIC BLOOD PRESSURE: 63 MMHG | HEART RATE: 92 BPM

## 2018-07-31 DIAGNOSIS — Z98.89 OTHER SPECIFIED POSTPROCEDURAL STATES: Chronic | ICD-10-CM

## 2018-07-31 LAB
ALBUMIN SERPL ELPH-MCNC: 4 G/DL — SIGNIFICANT CHANGE UP (ref 3.3–5)
ALP SERPL-CCNC: 62 U/L — SIGNIFICANT CHANGE UP (ref 40–120)
ALT FLD-CCNC: 25 U/L — SIGNIFICANT CHANGE UP (ref 10–45)
ANION GAP SERPL CALC-SCNC: 12 MMOL/L — SIGNIFICANT CHANGE UP (ref 5–17)
APPEARANCE UR: CLEAR — SIGNIFICANT CHANGE UP
APTT BLD: 31.3 SEC — SIGNIFICANT CHANGE UP (ref 27.5–37.4)
AST SERPL-CCNC: 35 U/L — SIGNIFICANT CHANGE UP (ref 10–40)
BASOPHILS # BLD AUTO: 0.1 K/UL — SIGNIFICANT CHANGE UP (ref 0–0.2)
BASOPHILS NFR BLD AUTO: 1.4 % — SIGNIFICANT CHANGE UP (ref 0–2)
BILIRUB SERPL-MCNC: 0.6 MG/DL — SIGNIFICANT CHANGE UP (ref 0.2–1.2)
BILIRUB UR-MCNC: NEGATIVE — SIGNIFICANT CHANGE UP
BLD GP AB SCN SERPL QL: NEGATIVE — SIGNIFICANT CHANGE UP
BUN SERPL-MCNC: 13 MG/DL — SIGNIFICANT CHANGE UP (ref 7–23)
CALCIUM SERPL-MCNC: 8.7 MG/DL — SIGNIFICANT CHANGE UP (ref 8.4–10.5)
CHLORIDE SERPL-SCNC: 105 MMOL/L — SIGNIFICANT CHANGE UP (ref 96–108)
CK SERPL-CCNC: 138 U/L — SIGNIFICANT CHANGE UP (ref 30–200)
CO2 SERPL-SCNC: 24 MMOL/L — SIGNIFICANT CHANGE UP (ref 22–31)
COLOR SPEC: SIGNIFICANT CHANGE UP
CREAT SERPL-MCNC: 1.1 MG/DL — SIGNIFICANT CHANGE UP (ref 0.5–1.3)
DIFF PNL FLD: NEGATIVE — SIGNIFICANT CHANGE UP
EOSINOPHIL # BLD AUTO: 0.1 K/UL — SIGNIFICANT CHANGE UP (ref 0–0.5)
EOSINOPHIL NFR BLD AUTO: 1.2 % — SIGNIFICANT CHANGE UP (ref 0–6)
GAS PNL BLDV: SIGNIFICANT CHANGE UP
GLUCOSE SERPL-MCNC: 170 MG/DL — HIGH (ref 70–99)
GLUCOSE UR QL: NEGATIVE — SIGNIFICANT CHANGE UP
HCT VFR BLD CALC: 33.3 % — LOW (ref 39–50)
HGB BLD-MCNC: 10.6 G/DL — LOW (ref 13–17)
INR BLD: 0.93 RATIO — SIGNIFICANT CHANGE UP (ref 0.88–1.16)
KETONES UR-MCNC: NEGATIVE — SIGNIFICANT CHANGE UP
LEUKOCYTE ESTERASE UR-ACNC: NEGATIVE — SIGNIFICANT CHANGE UP
LIDOCAIN IGE QN: 98 U/L — HIGH (ref 7–60)
LYMPHOCYTES # BLD AUTO: 3 K/UL — SIGNIFICANT CHANGE UP (ref 1–3.3)
LYMPHOCYTES # BLD AUTO: 40.1 % — SIGNIFICANT CHANGE UP (ref 13–44)
MCHC RBC-ENTMCNC: 32 GM/DL — SIGNIFICANT CHANGE UP (ref 32–36)
MCHC RBC-ENTMCNC: 32.9 PG — SIGNIFICANT CHANGE UP (ref 27–34)
MCV RBC AUTO: 103 FL — HIGH (ref 80–100)
MONOCYTES # BLD AUTO: 0.6 K/UL — SIGNIFICANT CHANGE UP (ref 0–0.9)
MONOCYTES NFR BLD AUTO: 7.6 % — SIGNIFICANT CHANGE UP (ref 2–14)
NEUTROPHILS # BLD AUTO: 3.8 K/UL — SIGNIFICANT CHANGE UP (ref 1.8–7.4)
NEUTROPHILS NFR BLD AUTO: 49.7 % — SIGNIFICANT CHANGE UP (ref 43–77)
NITRITE UR-MCNC: NEGATIVE — SIGNIFICANT CHANGE UP
PH UR: 6.5 — SIGNIFICANT CHANGE UP (ref 5–8)
PLATELET # BLD AUTO: 407 K/UL — HIGH (ref 150–400)
POTASSIUM SERPL-MCNC: 3.3 MMOL/L — LOW (ref 3.5–5.3)
POTASSIUM SERPL-SCNC: 3.3 MMOL/L — LOW (ref 3.5–5.3)
PROT SERPL-MCNC: 6.9 G/DL — SIGNIFICANT CHANGE UP (ref 6–8.3)
PROT UR-MCNC: NEGATIVE — SIGNIFICANT CHANGE UP
PROTHROM AB SERPL-ACNC: 10 SEC — SIGNIFICANT CHANGE UP (ref 9.8–12.7)
RBC # BLD: 3.23 M/UL — LOW (ref 4.2–5.8)
RBC # FLD: 12.6 % — SIGNIFICANT CHANGE UP (ref 10.3–14.5)
RH IG SCN BLD-IMP: POSITIVE — SIGNIFICANT CHANGE UP
SODIUM SERPL-SCNC: 141 MMOL/L — SIGNIFICANT CHANGE UP (ref 135–145)
SP GR SPEC: 1.02 — SIGNIFICANT CHANGE UP (ref 1.01–1.02)
TROPONIN T, HIGH SENSITIVITY RESULT: 12 NG/L — SIGNIFICANT CHANGE UP (ref 0–51)
UROBILINOGEN FLD QL: NEGATIVE — SIGNIFICANT CHANGE UP
WBC # BLD: 7.6 K/UL — SIGNIFICANT CHANGE UP (ref 3.8–10.5)
WBC # FLD AUTO: 7.6 K/UL — SIGNIFICANT CHANGE UP (ref 3.8–10.5)

## 2018-07-31 PROCEDURE — 74177 CT ABD & PELVIS W/CONTRAST: CPT | Mod: 26

## 2018-07-31 PROCEDURE — 73070 X-RAY EXAM OF ELBOW: CPT | Mod: 26,RT

## 2018-07-31 PROCEDURE — 71045 X-RAY EXAM CHEST 1 VIEW: CPT | Mod: 26

## 2018-07-31 PROCEDURE — 73130 X-RAY EXAM OF HAND: CPT | Mod: 26,RT

## 2018-07-31 PROCEDURE — 73110 X-RAY EXAM OF WRIST: CPT | Mod: 26,RT

## 2018-07-31 PROCEDURE — 73030 X-RAY EXAM OF SHOULDER: CPT | Mod: 26,RT

## 2018-07-31 PROCEDURE — 70450 CT HEAD/BRAIN W/O DYE: CPT | Mod: 26

## 2018-07-31 PROCEDURE — 71260 CT THORAX DX C+: CPT | Mod: 26

## 2018-07-31 PROCEDURE — 73090 X-RAY EXAM OF FOREARM: CPT | Mod: 26,LT

## 2018-07-31 PROCEDURE — 72125 CT NECK SPINE W/O DYE: CPT | Mod: 26

## 2018-07-31 PROCEDURE — 70486 CT MAXILLOFACIAL W/O DYE: CPT | Mod: 26

## 2018-07-31 PROCEDURE — 93010 ELECTROCARDIOGRAM REPORT: CPT

## 2018-07-31 PROCEDURE — 99285 EMERGENCY DEPT VISIT HI MDM: CPT | Mod: 25

## 2018-07-31 PROCEDURE — 73060 X-RAY EXAM OF HUMERUS: CPT | Mod: 26,RT

## 2018-07-31 NOTE — CONSULT NOTE ADULT - SUBJECTIVE AND OBJECTIVE BOX
TRAUMA ACTIVATION LEVEL:     MECHANISM OF INJURY:      [] Blunt  	[] MVC	[] Fall	[] Pedestrian Struck	[] Motorcycle accident      [] Penetrating  	[] Gun Shot Wound 		[] Stab Wound    GCS: 	E: 4	V: 5	M: 6    57y Male       HR: 92 (31 Jul 2018 16:01) (92 - 92)  BP: 111/63 (31 Jul 2018 16:01) (111/63 - 111/63)  BP(mean): --  RR: 16 (31 Jul 2018 16:01) (16 - 16)  SpO2: 100% (31 Jul 2018 16:01) (100% - 100%)    Primary Survey  A -   B -   C -   D -   E - Exposure obtained      Secondary survey          PMH  Alcohol-induced chronic pancreatitis  Macrocytic anemia  ETOH abuse  Alcohol Abuse      PSH  History of left knee surgery      MEDS  MEDICATIONS  (STANDING):    MEDICATIONS  (PRN):      ALLERGIES  Allergies    No Known Allergies    Intolerances        SOCIAL Hx    LABS                        10.6   7.6   )-----------( 407      ( 31 Jul 2018 16:39 )             33.3     07-31    141  |  105  |  13  ----------------------------<  170<H>  3.3<L>   |  24  |  1.10    Ca    8.7      31 Jul 2018 16:39    TPro  6.9  /  Alb  4.0  /  TBili  0.6  /  DBili  x   /  AST  35  /  ALT  25  /  AlkPhos  62  07-31    PT/INR - ( 31 Jul 2018 16:39 )   PT: 10.0 sec;   INR: 0.93 ratio         PTT - ( 31 Jul 2018 16:39 )  PTT:31.3 sec          IMAGING    A&P:  57y Male TRAUMA ACTIVATION LEVEL:     MECHANISM OF INJURY:      [x] Blunt  	[] MVC	[x] Fall - found down	[] Pedestrian Struck	[] Motorcycle accident      [] Penetrating  	[] Gun Shot Wound 		[] Stab Wound    GCS: 	E: 4	V: 4	M: 6    57y Male - h/o longstanding alcohol abuse, with recurrent ED visits - BIBA after being found down for an indeterminate amount of time. Patient unable to ambulate at the scene. On presentation, patient is somnolent, complaining of nothing. Arrived in cervical collar.     T(C): 36.8 (18 @ 04:23), Max: 37.4 (18 @ 00:20)  HR: 75 (18 @ 04:23) (75 - 93)  BP: 163/94 (18 @ 04:23) (111/63 - 163/94)  RR: 18 (18 @ 04:23) (16 - 18)  SpO2: 97% (18 @ 04:23) (97% - 100%)    PRIMARY SURVEY  A - Intact. Vocalizing, oriented. No stridorous breathing or hoarseness. GCS: 14 (-1 for confusion)  B - Bilateral, symmetrical chest rise and breath sounds. SaO2 as above  C - Peripheral pulses palpable. BP as above. PIV access obtained.  D - Moving all extremities, no focal deficits.  E - Exposure obtained       SECONDARY SURVEY  GEN: NAD  HEENT: Small, 2 cm abrasion and induration below R eye. PERRLA, EOMI. No crepitus or tenderness over the facial bones. No blood or discharge in the naso/oropharynx. No scleral icterus. No malocclusion. Trachea is midline. No erythema or crepitus of cervical region.   CHEST: Atraumatic. Symmetrical chest rise, breath sounds bilaterally. No deformity or tenderness.   HEART: RRR, non-muffled heart sounds  ABD: Atraumatic. Soft, non-tender, non-distended  EXT/VASC: R shoulder abrasion and tenderness. Multiple skin tears to the dorsum of the R distal forearm. No edema/erythema or deformity. Warm, cap refill < 2 sec. Motor and sensory function intact. Peripheral pulses palpable.         PMH  Alcohol-induced chronic pancreatitis  Macrocytic anemia  ETOH abuse  Alcohol Abuse      PSH  History of left knee surgery      MEDS  MEDICATIONS  (STANDING):    MEDICATIONS  (PRN):      ALLERGIES  Allergies    No Known Allergies    Intolerances        SOCIAL Hx    LABS                        10.6   7.6   )-----------( 407      ( 2018 16:39 )             33.3         141  |  105  |  13  ----------------------------<  170<H>  3.3<L>   |  24  |  1.10    Ca    8.7      2018 16:39    TPro  6.9  /  Alb  4.0  /  TBili  0.6  /  DBili  x   /  AST  35  /  ALT  25  /  AlkPhos  62      PT/INR - ( 2018 16:39 )   PT: 10.0 sec;   INR: 0.93 ratio         PTT - ( 2018 16:39 )  PTT:31.3 sec  Urinalysis Basic - ( 2018 18:43 )    Color: PL Yellow / Appearance: Clear / S.020 / pH: x  Gluc: x / Ketone: Negative  / Bili: Negative / Urobili: Negative   Blood: x / Protein: Negative / Nitrite: Negative   Leuk Esterase: Negative / RBC: x / WBC x   Sq Epi: x / Non Sq Epi: x / Bacteria: x            IMAGING  < from: CT Head No Cont (18 @ 16:24) >  IMPRESSION: Slightly conspicuous involutional changes at the level of the   brain given the patient's age unchanged from 10/17/2017. Cervical spine   CT without evidence of acute fracture or subluxation.    < end of copied text >      < from: CT Chest w/ IV Cont (18 @ 16:30) >  IMPRESSION:   Acute minimally displaced left anterior sixth rib fracture.    No acute traumatic injury in the abdomen or pelvis.    < end of copied text >      A&P:  57y Male, found down by first responders, with isolated minimally displaced L sixth rib fracture. Recommend:  1) Incentive spirometry testing  2) Check CPK  3) Trial of ambulation and PO    No acute trauma surgery intervention indicated at this time. Disposition per ED.      Discussed with Attending, Harleen Brown  R4, General Surgery

## 2018-07-31 NOTE — ED ADULT NURSE REASSESSMENT NOTE - NS ED NURSE REASSESS COMMENT FT1
Pt now AAOx3, talking, no longer drowsy, following commands at this time, pt in NAD, resp nonlabored, resting comfortably in bed. Pt denies pain at this time. Pt denies headache, dizziness, chest pain, SOB, abd pain, n/v, fevers, chills at this time. Pt awaiting dispo. Safety maintained.

## 2018-07-31 NOTE — ED PROVIDER NOTE - OBJECTIVE STATEMENT
56yo m pmh etoh abuse bibems as level 2 trauma. found [place] park in bushes, intoxicated, unstead gait. more ams than usual per ems who know pt well. not behaving like himself 58yo m pmh etoh abuse bibems as level 2 trauma. found in park in bushes, intoxicated, unsteady gait. ems knows pt well & he was found to be more altered than usual & not behaving like himself. c/o L sided cp. 56yo m pmh etoh abuse bibems as level 2 trauma. found in park in bushes, intoxicated, unsteady gait. ems knows pt well & he was found to be more altered than usual & not behaving like himself. c/o L sided cp.    Attendinyo male well known to EMS presents after being found intoxicated in the bushes and more altered than usual.  pt has abrasions to the right face and right forearm.  per EMS, pt was veering off to one side when trying to ambulate and not as interactive as usual.  the EMS crew knows the patient well and states this is not ususal for him.  Level 2 trauma requested by EMS.

## 2018-07-31 NOTE — ED ADULT NURSE NOTE - NSIMPLEMENTINTERV_GEN_ALL_ED
Implemented All Fall Risk Interventions:  [place] to call system. Call bell, personal items and telephone within reach. Instruct patient to call for assistance. Room bathroom lighting operational. Non-slip footwear when patient is off stretcher. Physically safe environment: no spills, clutter or unnecessary equipment. Stretcher in lowest position, wheels locked, appropriate side rails in place. Provide visual cue, wrist band, yellow gown, etc. Monitor gait and stability. Monitor for mental status changes and reorient to person, place, and time. Review medications for side effects contributing to fall risk. Reinforce activity limits and safety measures with patient and family.

## 2018-07-31 NOTE — ED PROVIDER NOTE - PHYSICAL EXAMINATION
L infraorbital contusion, R shoulder abrasion, R wrist abrasion, full active & passive ROM in all extremeties  Airway intact, B/l wheezing, good proximal & distal pulses w/ normal BP (primary intact)

## 2018-07-31 NOTE — ED PROVIDER NOTE - MUSCULOSKELETAL MINIMAL EXAM
normal range of motion/L infraorbital contusion, R shoulder abrasion, R wrist abrasion, full active & passive ROM in all extremeties/TENDERNESS

## 2018-07-31 NOTE — ED PROVIDER NOTE - MEDICAL DECISION MAKING DETAILS
-pan scan shows L anterior rib 6th fx. xrays grossly wnl. will get repeat xry wrist since initial wasn't best study.   -plan: labs, EKG, UA, pan scan -trauma surgery on board

## 2018-07-31 NOTE — ED PROVIDER NOTE - PROGRESS NOTE DETAILS
GRIS Bennett: Patient was endorsed to me by Dr. Hernandez at the usual hour of signout to follow up results of trauma recs and dispo pending these results and re-evaluation. still awaiting recs . will call back Patient cleared by surgery. He is ambulating well without assistance and has no evidence for withdrawal. He has tolerated Po and stable for discharge.

## 2018-08-01 VITALS
RESPIRATION RATE: 18 BRPM | HEART RATE: 75 BPM | SYSTOLIC BLOOD PRESSURE: 163 MMHG | OXYGEN SATURATION: 97 % | TEMPERATURE: 98 F | DIASTOLIC BLOOD PRESSURE: 94 MMHG

## 2018-08-01 PROCEDURE — 71260 CT THORAX DX C+: CPT

## 2018-08-01 PROCEDURE — 84295 ASSAY OF SERUM SODIUM: CPT

## 2018-08-01 PROCEDURE — 74177 CT ABD & PELVIS W/CONTRAST: CPT

## 2018-08-01 PROCEDURE — 70450 CT HEAD/BRAIN W/O DYE: CPT

## 2018-08-01 PROCEDURE — 82803 BLOOD GASES ANY COMBINATION: CPT

## 2018-08-01 PROCEDURE — 93005 ELECTROCARDIOGRAM TRACING: CPT

## 2018-08-01 PROCEDURE — 82947 ASSAY GLUCOSE BLOOD QUANT: CPT

## 2018-08-01 PROCEDURE — 99285 EMERGENCY DEPT VISIT HI MDM: CPT | Mod: 25

## 2018-08-01 PROCEDURE — 82435 ASSAY OF BLOOD CHLORIDE: CPT

## 2018-08-01 PROCEDURE — 86850 RBC ANTIBODY SCREEN: CPT

## 2018-08-01 PROCEDURE — 73060 X-RAY EXAM OF HUMERUS: CPT

## 2018-08-01 PROCEDURE — 85014 HEMATOCRIT: CPT

## 2018-08-01 PROCEDURE — 85027 COMPLETE CBC AUTOMATED: CPT

## 2018-08-01 PROCEDURE — 82550 ASSAY OF CK (CPK): CPT

## 2018-08-01 PROCEDURE — 80307 DRUG TEST PRSMV CHEM ANLYZR: CPT

## 2018-08-01 PROCEDURE — 81003 URINALYSIS AUTO W/O SCOPE: CPT

## 2018-08-01 PROCEDURE — 70486 CT MAXILLOFACIAL W/O DYE: CPT

## 2018-08-01 PROCEDURE — 83605 ASSAY OF LACTIC ACID: CPT

## 2018-08-01 PROCEDURE — 71045 X-RAY EXAM CHEST 1 VIEW: CPT

## 2018-08-01 PROCEDURE — 84484 ASSAY OF TROPONIN QUANT: CPT

## 2018-08-01 PROCEDURE — 82962 GLUCOSE BLOOD TEST: CPT

## 2018-08-01 PROCEDURE — 73030 X-RAY EXAM OF SHOULDER: CPT

## 2018-08-01 PROCEDURE — 73130 X-RAY EXAM OF HAND: CPT

## 2018-08-01 PROCEDURE — 82330 ASSAY OF CALCIUM: CPT

## 2018-08-01 PROCEDURE — 84132 ASSAY OF SERUM POTASSIUM: CPT

## 2018-08-01 PROCEDURE — 80053 COMPREHEN METABOLIC PANEL: CPT

## 2018-08-01 PROCEDURE — 86900 BLOOD TYPING SEROLOGIC ABO: CPT

## 2018-08-01 PROCEDURE — 72125 CT NECK SPINE W/O DYE: CPT

## 2018-08-01 PROCEDURE — 85610 PROTHROMBIN TIME: CPT

## 2018-08-01 PROCEDURE — 73110 X-RAY EXAM OF WRIST: CPT

## 2018-08-01 PROCEDURE — 73070 X-RAY EXAM OF ELBOW: CPT

## 2018-08-01 PROCEDURE — 73090 X-RAY EXAM OF FOREARM: CPT

## 2018-08-01 PROCEDURE — 73120 X-RAY EXAM OF HAND: CPT

## 2018-08-01 PROCEDURE — 86901 BLOOD TYPING SEROLOGIC RH(D): CPT

## 2018-08-01 PROCEDURE — 83690 ASSAY OF LIPASE: CPT

## 2018-08-01 PROCEDURE — 85730 THROMBOPLASTIN TIME PARTIAL: CPT

## 2018-08-01 RX ORDER — SODIUM CHLORIDE 9 MG/ML
1000 INJECTION INTRAMUSCULAR; INTRAVENOUS; SUBCUTANEOUS ONCE
Qty: 0 | Refills: 0 | Status: COMPLETED | OUTPATIENT
Start: 2018-08-01 | End: 2018-08-01

## 2018-08-01 RX ADMIN — SODIUM CHLORIDE 1000 MILLILITER(S): 9 INJECTION INTRAMUSCULAR; INTRAVENOUS; SUBCUTANEOUS at 01:40

## 2018-08-01 NOTE — CHART NOTE - NSCHARTNOTEFT_GEN_A_CORE
EMERGENCY SOCIAL WORK: Pt is 57 year old, single, , Slovenian speaking, male presents with c/o unresponsive/ alcohol intox. Per ED chart pt BIB EMS [place]. Pt known to social work hx homelessness, etoh abuse. Per ED chart level 2, found in park in bushes, unsteady gait, intox. Pt cleared by surgery, ambulating without assistance no evidence of withdrawals. Pt cleared for discharge.    Pt discharged to ED waiting room since 3 am. Social work consulted by ED brynn Holm for discharge transport assistance. Social work met with pt in ED waiting area utilizing  line  #[id number], [place]. Pt presents disheveled, calm. Pt ambulating in ED waiting area independently. LMSW explored pt current demographics. Pt reports he is going to [place] train station. Pt is undomiciled. SW offered homeless shelter resources pt declined. LMSW explored pt current alcohol abuse. Pt ignored inquires and declined substance treatment resources. Pt reports police took his pants with his money and wallet. SW provided precinct contact phone. Pt with no insurance benefits. SW explored resources for food pantries, pt declined reports he has friends help him get food. [facility] provided pt with Medicaid office contact and provided metro card. Metro card# [id number]. Pt demonstrated understanding how to take public transit -bus to [place]. Social work made security aware. No social barriers to discharge at this time. Social work to remain available.

## 2018-09-28 ENCOUNTER — INPATIENT (INPATIENT)
Facility: HOSPITAL | Age: 58
LOS: 5 days | Discharge: HOME | DRG: 896 | End: 2018-10-04
Attending: INTERNAL MEDICINE | Admitting: INTERNAL MEDICINE
Payer: MEDICAID

## 2018-09-28 VITALS
TEMPERATURE: 98 F | OXYGEN SATURATION: 95 % | HEART RATE: 95 BPM | DIASTOLIC BLOOD PRESSURE: 67 MMHG | SYSTOLIC BLOOD PRESSURE: 116 MMHG | RESPIRATION RATE: 18 BRPM

## 2018-09-28 DIAGNOSIS — Z98.89 OTHER SPECIFIED POSTPROCEDURAL STATES: Chronic | ICD-10-CM

## 2018-09-28 PROCEDURE — 71045 X-RAY EXAM CHEST 1 VIEW: CPT | Mod: 26

## 2018-09-28 PROCEDURE — 99285 EMERGENCY DEPT VISIT HI MDM: CPT

## 2018-09-28 RX ORDER — DIAZEPAM 5 MG
10 TABLET ORAL ONCE
Qty: 0 | Refills: 0 | Status: DISCONTINUED | OUTPATIENT
Start: 2018-09-28 | End: 2018-09-28

## 2018-09-28 RX ORDER — THIAMINE MONONITRATE (VIT B1) 100 MG
100 TABLET ORAL ONCE
Qty: 0 | Refills: 0 | Status: COMPLETED | OUTPATIENT
Start: 2018-09-28 | End: 2018-09-28

## 2018-09-28 RX ORDER — FOLIC ACID 0.8 MG
1 TABLET ORAL ONCE
Qty: 0 | Refills: 0 | Status: COMPLETED | OUTPATIENT
Start: 2018-09-28 | End: 2018-09-28

## 2018-09-28 RX ORDER — DIAZEPAM 5 MG
20 TABLET ORAL ONCE
Qty: 0 | Refills: 0 | Status: DISCONTINUED | OUTPATIENT
Start: 2018-09-28 | End: 2018-09-28

## 2018-09-28 RX ORDER — ONDANSETRON 8 MG/1
4 TABLET, FILM COATED ORAL ONCE
Qty: 0 | Refills: 0 | Status: COMPLETED | OUTPATIENT
Start: 2018-09-28 | End: 2018-09-28

## 2018-09-28 RX ORDER — SODIUM CHLORIDE 9 MG/ML
1000 INJECTION INTRAMUSCULAR; INTRAVENOUS; SUBCUTANEOUS ONCE
Qty: 0 | Refills: 0 | Status: COMPLETED | OUTPATIENT
Start: 2018-09-28 | End: 2018-09-28

## 2018-09-28 RX ADMIN — Medication 10 MILLIGRAM(S): at 22:58

## 2018-09-28 RX ADMIN — ONDANSETRON 4 MILLIGRAM(S): 8 TABLET, FILM COATED ORAL at 22:46

## 2018-09-28 RX ADMIN — SODIUM CHLORIDE 1000 MILLILITER(S): 9 INJECTION INTRAMUSCULAR; INTRAVENOUS; SUBCUTANEOUS at 22:59

## 2018-09-28 RX ADMIN — Medication 2 MILLIGRAM(S): at 23:02

## 2018-09-28 NOTE — ED PROVIDER NOTE - PHYSICAL EXAMINATION
Pt's seen moving all extremities - putting blanket over his body and going to sleep.   Airway protected.   No obvious trauma or abrasion on face Pt's seen moving all extremities - putting blanket over his body and going to sleep.   Airway protected.   No obvious trauma or abrasion on face.  No bump or lac on the skull  No focal deficits.   Reproducible pain on the rt. chest. No bruise over skin.

## 2018-09-28 NOTE — ED PROVIDER NOTE - MEDICAL DECISION MAKING DETAILS
Impression:  ETOH Intox w/o evidence of trauma or other metabolic pathology.  Plan:  observe - no indication for labs, ETOH level, or CT head at this time.  If patient does not sober up clinically over the next several hours, then the patient will need labs/CT head.

## 2018-09-28 NOTE — ED ADULT NURSE NOTE - NSIMPLEMENTINTERV_GEN_ALL_ED
Implemented All Fall Risk Interventions:  [place] to call system. Call bell, personal items and telephone within reach. Instruct patient to call for assistance. Room bathroom lighting operational. Non-slip footwear when patient is off stretcher. Physically safe environment: no spills, clutter or unnecessary equipment. Stretcher in lowest position, wheels locked, appropriate side rails in place. Provide visual cue, wrist band, yellow gown, etc. Monitor gait and stability. Monitor for mental status changes and reorient to person, place, and time. Review medications for side effects contributing to fall risk. Reinforce activity limits and safety measures with patient and family.

## 2018-09-28 NOTE — ED ADULT NURSE NOTE - CAS TRG GEN SKIN COLOR
Spoke with pt's , she only had some mild myalgia with the Lipitor.  Would like to try the pravastatin. Advised him to call if she has any problems. Pravastatin 20 mg QHS sent to [facility] pharmacy per 's request.    Normal for race

## 2018-09-28 NOTE — ED PROVIDER NOTE - PROGRESS NOTE DETAILS
Attending MD Army.  Pt signed out to me in stable condition pending benzo, reas, TBDC  vs TBA - intox brought in drunk, no reported trauma, unclear why CXR was ordered, pt now denies falls/injuries, pt now tremulous, planned tx for withdrawal, opacity on CXR c/w prev poss mild worsening, no clinical ev of PNA currently. ATTENDING MD Althea: Pt sleeping comfortable, arousable. Denies trauma, denies pain. minimal R-chest wall tenderness. lungs clear, breathing unlabored. Unclear source of lucency on left lung, unlikely pneumonia, will refer patient for f/u ATTENDING MD Althea: Pt waking up, now showing some signs of withdrwal with tachycardia and nausea. will give zofran, benzos, fluids, vitamins, reassess. signed out to oncomign team. Attending MD Wright.  PT reassessed and altered despite Andorran translation.  PT endorses 'upper chest pain'. Will obtain labs, administer fluids, will also obtain EtOH level to correlate with mental status.  CP is R sided and reproducible on palpation. Attending MD Wright. Pt remains somnolent/confused, has R sided chest wall pain, negative imaging.  His CIWA score before benzos was 7.  Pt is not well enough to d/c and continue EtOH at this time.  Warrants admission to medicine.  Hospitalist paged.

## 2018-09-28 NOTE — ED PROVIDER NOTE - CARE PLAN
Principal Discharge DX:	ETOH abuse  Secondary Diagnosis:	Alcohol withdrawal syndrome without complication Principal Discharge DX:	ETOH abuse  Secondary Diagnosis:	Alcohol withdrawal syndrome without complication  Secondary Diagnosis:	Chest pain

## 2018-09-28 NOTE — ED PROVIDER NOTE - ATTENDING CONTRIBUTION TO CARE
MD Roman:  patient seen and evaluated with the resident.  I was present for key portions of the History & Physical, and I agree with the Impression & Plan.  MD Roman:  56 yo M, bib EMS for "ETOH intox."  Context: well known to [facility]; frequently BIB EMS ETOH intoxicated.  Associated Sx: unable to provide.  Better/worse: unable to provide.  VS  Physical Exam: adult M, disheveled appearing, smells of ETOH, NCAT, scalp thoroughly palpated w/o evidence of laceration or skull contour abnormality, PERRL, neck supple, RRR, CTA B, Abd: s/nd/nt, Ext: no gross deformities.  Impression:  ETOH Intox w/o evidence of trauma or other metabolic pathology.  Plan:  observe - no indication for labs, ETOH level, or CT head at this time.  If patient does not sober up clinically over the next several hours, then the patient will need labs/CT head.

## 2018-09-28 NOTE — ED PROVIDER NOTE - OBJECTIVE STATEMENT
57M BIBE for EtOH intoxication. Pt's well known to EMS, found on the street intoxicated. No report of fall, or trauma. No chest pain, back pain or abd pain. No fall endorsed.

## 2018-09-28 NOTE — ED ADULT NURSE NOTE - OBJECTIVE STATEMENT
57 year old male presents to the ED via EMS for ETOH intoxication. Pt. is well known to EMS. Dr. Roman at bedside assessing patient. Pt. is intoxicated, smells of alcohol and is a poor historian to events leading up to coming to hospital. Pt. shows no sign of trauma/hitting head.

## 2018-09-29 DIAGNOSIS — F10.239 ALCOHOL DEPENDENCE WITH WITHDRAWAL, UNSPECIFIED: ICD-10-CM

## 2018-09-29 DIAGNOSIS — F10.10 ALCOHOL ABUSE, UNCOMPLICATED: ICD-10-CM

## 2018-09-29 DIAGNOSIS — Z29.9 ENCOUNTER FOR PROPHYLACTIC MEASURES, UNSPECIFIED: ICD-10-CM

## 2018-09-29 DIAGNOSIS — D53.9 NUTRITIONAL ANEMIA, UNSPECIFIED: ICD-10-CM

## 2018-09-29 DIAGNOSIS — R07.81 PLEURODYNIA: ICD-10-CM

## 2018-09-29 DIAGNOSIS — E87.1 HYPO-OSMOLALITY AND HYPONATREMIA: ICD-10-CM

## 2018-09-29 DIAGNOSIS — G92 TOXIC ENCEPHALOPATHY: ICD-10-CM

## 2018-09-29 DIAGNOSIS — R63.8 OTHER SYMPTOMS AND SIGNS CONCERNING FOOD AND FLUID INTAKE: ICD-10-CM

## 2018-09-29 LAB
ALBUMIN SERPL ELPH-MCNC: 3.7 G/DL — SIGNIFICANT CHANGE UP (ref 3.3–5)
ALP SERPL-CCNC: 65 U/L — SIGNIFICANT CHANGE UP (ref 40–120)
ALT FLD-CCNC: 41 U/L — SIGNIFICANT CHANGE UP (ref 10–45)
ANION GAP SERPL CALC-SCNC: 14 MMOL/L — SIGNIFICANT CHANGE UP (ref 5–17)
APTT BLD: 26.8 SEC — LOW (ref 27.5–37.4)
AST SERPL-CCNC: 55 U/L — HIGH (ref 10–40)
BASOPHILS # BLD AUTO: 0 K/UL — SIGNIFICANT CHANGE UP (ref 0–0.2)
BASOPHILS NFR BLD AUTO: 0.3 % — SIGNIFICANT CHANGE UP (ref 0–2)
BILIRUB SERPL-MCNC: 1.5 MG/DL — HIGH (ref 0.2–1.2)
BUN SERPL-MCNC: 14 MG/DL — SIGNIFICANT CHANGE UP (ref 7–23)
CALCIUM SERPL-MCNC: 8.4 MG/DL — SIGNIFICANT CHANGE UP (ref 8.4–10.5)
CHLORIDE SERPL-SCNC: 98 MMOL/L — SIGNIFICANT CHANGE UP (ref 96–108)
CO2 SERPL-SCNC: 21 MMOL/L — LOW (ref 22–31)
CREAT SERPL-MCNC: 0.75 MG/DL — SIGNIFICANT CHANGE UP (ref 0.5–1.3)
EOSINOPHIL # BLD AUTO: 0 K/UL — SIGNIFICANT CHANGE UP (ref 0–0.5)
EOSINOPHIL NFR BLD AUTO: 0.5 % — SIGNIFICANT CHANGE UP (ref 0–6)
GLUCOSE SERPL-MCNC: 87 MG/DL — SIGNIFICANT CHANGE UP (ref 70–99)
HCT VFR BLD CALC: 35.7 % — LOW (ref 39–50)
HGB BLD-MCNC: 11.7 G/DL — LOW (ref 13–17)
INR BLD: 0.91 RATIO — SIGNIFICANT CHANGE UP (ref 0.88–1.16)
LYMPHOCYTES # BLD AUTO: 1.2 K/UL — SIGNIFICANT CHANGE UP (ref 1–3.3)
LYMPHOCYTES # BLD AUTO: 14.3 % — SIGNIFICANT CHANGE UP (ref 13–44)
MCHC RBC-ENTMCNC: 32.8 GM/DL — SIGNIFICANT CHANGE UP (ref 32–36)
MCHC RBC-ENTMCNC: 33.3 PG — SIGNIFICANT CHANGE UP (ref 27–34)
MCV RBC AUTO: 102 FL — HIGH (ref 80–100)
MONOCYTES # BLD AUTO: 0.6 K/UL — SIGNIFICANT CHANGE UP (ref 0–0.9)
MONOCYTES NFR BLD AUTO: 7.3 % — SIGNIFICANT CHANGE UP (ref 2–14)
NEUTROPHILS # BLD AUTO: 6.4 K/UL — SIGNIFICANT CHANGE UP (ref 1.8–7.4)
NEUTROPHILS NFR BLD AUTO: 77.6 % — HIGH (ref 43–77)
PLATELET # BLD AUTO: 325 K/UL — SIGNIFICANT CHANGE UP (ref 150–400)
POTASSIUM SERPL-MCNC: 4.6 MMOL/L — SIGNIFICANT CHANGE UP (ref 3.5–5.3)
POTASSIUM SERPL-SCNC: 4.6 MMOL/L — SIGNIFICANT CHANGE UP (ref 3.5–5.3)
PROT SERPL-MCNC: 6.5 G/DL — SIGNIFICANT CHANGE UP (ref 6–8.3)
PROTHROM AB SERPL-ACNC: 9.9 SEC — SIGNIFICANT CHANGE UP (ref 9.8–12.7)
RBC # BLD: 3.51 M/UL — LOW (ref 4.2–5.8)
RBC # FLD: 12.8 % — SIGNIFICANT CHANGE UP (ref 10.3–14.5)
SODIUM SERPL-SCNC: 133 MMOL/L — LOW (ref 135–145)
TROPONIN T, HIGH SENSITIVITY RESULT: 10 NG/L — SIGNIFICANT CHANGE UP (ref 0–51)
TROPONIN T, HIGH SENSITIVITY RESULT: 10 NG/L — SIGNIFICANT CHANGE UP (ref 0–51)
WBC # BLD: 8.2 K/UL — SIGNIFICANT CHANGE UP (ref 3.8–10.5)
WBC # FLD AUTO: 8.2 K/UL — SIGNIFICANT CHANGE UP (ref 3.8–10.5)

## 2018-09-29 PROCEDURE — 70450 CT HEAD/BRAIN W/O DYE: CPT | Mod: 26

## 2018-09-29 PROCEDURE — 72125 CT NECK SPINE W/O DYE: CPT | Mod: 26

## 2018-09-29 PROCEDURE — 99223 1ST HOSP IP/OBS HIGH 75: CPT

## 2018-09-29 RX ORDER — THIAMINE MONONITRATE (VIT B1) 100 MG
500 TABLET ORAL ONCE
Qty: 0 | Refills: 0 | Status: COMPLETED | OUTPATIENT
Start: 2018-09-29 | End: 2018-09-29

## 2018-09-29 RX ORDER — ACETAMINOPHEN 500 MG
650 TABLET ORAL EVERY 6 HOURS
Qty: 0 | Refills: 0 | Status: DISCONTINUED | OUTPATIENT
Start: 2018-09-29 | End: 2018-10-04

## 2018-09-29 RX ORDER — SODIUM CHLORIDE 9 MG/ML
1000 INJECTION, SOLUTION INTRAVENOUS
Qty: 0 | Refills: 0 | Status: DISCONTINUED | OUTPATIENT
Start: 2018-09-29 | End: 2018-09-30

## 2018-09-29 RX ORDER — ONDANSETRON 8 MG/1
4 TABLET, FILM COATED ORAL EVERY 8 HOURS
Qty: 0 | Refills: 0 | Status: DISCONTINUED | OUTPATIENT
Start: 2018-09-29 | End: 2018-10-04

## 2018-09-29 RX ORDER — SODIUM CHLORIDE 9 MG/ML
1000 INJECTION INTRAMUSCULAR; INTRAVENOUS; SUBCUTANEOUS ONCE
Qty: 0 | Refills: 0 | Status: COMPLETED | OUTPATIENT
Start: 2018-09-29 | End: 2018-09-29

## 2018-09-29 RX ORDER — INFLUENZA VIRUS VACCINE 15; 15; 15; 15 UG/.5ML; UG/.5ML; UG/.5ML; UG/.5ML
0.5 SUSPENSION INTRAMUSCULAR ONCE
Qty: 0 | Refills: 0 | Status: COMPLETED | OUTPATIENT
Start: 2018-09-29 | End: 2018-10-04

## 2018-09-29 RX ORDER — KETOROLAC TROMETHAMINE 30 MG/ML
15 SYRINGE (ML) INJECTION ONCE
Qty: 0 | Refills: 0 | Status: DISCONTINUED | OUTPATIENT
Start: 2018-09-29 | End: 2018-09-29

## 2018-09-29 RX ORDER — THIAMINE MONONITRATE (VIT B1) 100 MG
500 TABLET ORAL ONCE
Qty: 0 | Refills: 0 | Status: DISCONTINUED | OUTPATIENT
Start: 2018-09-29 | End: 2018-09-29

## 2018-09-29 RX ORDER — MORPHINE SULFATE 50 MG/1
4 CAPSULE, EXTENDED RELEASE ORAL ONCE
Qty: 0 | Refills: 0 | Status: DISCONTINUED | OUTPATIENT
Start: 2018-09-29 | End: 2018-09-29

## 2018-09-29 RX ORDER — HEPARIN SODIUM 5000 [USP'U]/ML
5000 INJECTION INTRAVENOUS; SUBCUTANEOUS EVERY 8 HOURS
Qty: 0 | Refills: 0 | Status: DISCONTINUED | OUTPATIENT
Start: 2018-09-29 | End: 2018-10-04

## 2018-09-29 RX ADMIN — HEPARIN SODIUM 5000 UNIT(S): 5000 INJECTION INTRAVENOUS; SUBCUTANEOUS at 21:45

## 2018-09-29 RX ADMIN — Medication 25 MILLIGRAM(S): at 12:19

## 2018-09-29 RX ADMIN — SODIUM CHLORIDE 50 MILLILITER(S): 9 INJECTION, SOLUTION INTRAVENOUS at 11:10

## 2018-09-29 RX ADMIN — HEPARIN SODIUM 5000 UNIT(S): 5000 INJECTION INTRAVENOUS; SUBCUTANEOUS at 15:56

## 2018-09-29 RX ADMIN — Medication 105 MILLIGRAM(S): at 05:27

## 2018-09-29 RX ADMIN — Medication 15 MILLIGRAM(S): at 11:30

## 2018-09-29 RX ADMIN — Medication 100 MILLIGRAM(S): at 00:27

## 2018-09-29 RX ADMIN — Medication 15 MILLIGRAM(S): at 11:08

## 2018-09-29 RX ADMIN — Medication 25 MILLIGRAM(S): at 22:23

## 2018-09-29 RX ADMIN — Medication 1 MILLIGRAM(S): at 00:28

## 2018-09-29 RX ADMIN — MORPHINE SULFATE 4 MILLIGRAM(S): 50 CAPSULE, EXTENDED RELEASE ORAL at 05:27

## 2018-09-29 RX ADMIN — SODIUM CHLORIDE 1000 MILLILITER(S): 9 INJECTION INTRAMUSCULAR; INTRAVENOUS; SUBCUTANEOUS at 01:52

## 2018-09-29 NOTE — H&P ADULT - MUSCULOSKELETAL COMMENTS
right rib pain pain on palpation of the right rib cage and 2 small bruises w/o abrasion on posterior aspect of rib cage overlying the area of pain

## 2018-09-29 NOTE — ED ADULT NURSE REASSESSMENT NOTE - NS ED NURSE REASSESS COMMENT FT1
1900: report received from aspen carpio. pt sleeping comfortably in bed, finger stick done 60 md potts aware states "give him apple juice and feed him." pt woken up, fed, pt tolerating po intake. pt denies n/v/abd pain/pain of any kind. pt now sleeping in bed. safety maintained. will continue to monitor.
Pt admitted. Will continue to monitor closely.
blood work drawn and sent to lab as per md fritz order. pt states "I have chest pain on my left side." pt states "it is from when I fell the other day." md frizt aware states "we will admit this patient."
pt assisted to bathroom, pt ambulated with steady gait. pt resting comfortably in bed. safety maintained. will continue to monitor.
pt placed in position of comfort, given blankets. repeat blood work drawn and sent to lab as per md fritz order. md fritz and md laughlin aware of ciwa and vital signs. safety maintained.
pt throwing up, pt given medication as per md fritz order.
pt throwing up, sheets changed, pt getting agitated ciwa score 7, md potts aware. md potts and md fritz made aware and at bedside. pt medicated as per md potts order.
Pt. resting in bed. ED Tech provided patient apple juice.

## 2018-09-29 NOTE — H&P ADULT - PROBLEM SELECTOR PLAN 3
CIWA was 3 for tremor when I saw him- only received 2mg IV ativan overnight 10 hour prior. However, per ED charts was intoxicated so now may be entering W/D phase  Librium 25mg PO Q8hrs  Ativan 2mg IV Q2hrs PRN CIWA > 8

## 2018-09-29 NOTE — H&P ADULT - NSHPLABSRESULTS_GEN_ALL_CORE
09-29    133<L>  |  98  |  14  ----------------------------<  87  4.6   |  21<L>  |  0.75    Ca    8.4      29 Sep 2018 02:14    TPro  6.5  /  Alb  3.7  /  TBili  1.5<H>  /  DBili  x   /  AST  55<H>  /  ALT  41  /  AlkPhos  65  09-29                            11.7   8.2   )-----------( 325      ( 29 Sep 2018 02:14 )             35.7             LIVER FUNCTIONS - ( 29 Sep 2018 02:14 )  Alb: 3.7 g/dL / Pro: 6.5 g/dL / ALK PHOS: 65 U/L / ALT: 41 U/L / AST: 55 U/L / GGT: x             PT/INR - ( 29 Sep 2018 02:14 )   PT: 9.9 sec;   INR: 0.91 ratio         PTT - ( 29 Sep 2018 02:14 )  PTT:26.8 sec      CXR: NO acute rib fractures, several old rib fractures.     IMPRESSION:    There is an old fracture of the right clavicle. There are old right-sided   rib fractures. No displaced acute rib fractures are identified on this   exam; however, the lower ribs were not imaged in their entirety.    The cardiomediastinal silhouette is stable. There is no pneumothorax.    There are opacities in the projection of the left lower lobe, progressed   since the prior study, which may represent atelectasis versus pneumonia.    Correlation with PA and lateral views of the chest is advised.    IMPRESSION:   CT Head:  Mild to moderate head motion and streak artifact.  No CT   evidence of acute intracranial hemorrhage, extra-axial collection, mass   effect or calvarial fracture.    CT Cervical Spine: Mild motion artifact at the skull base through C2 and   moderate to severe motion artifact at C3-C7.  No gross CT evidence of   displaced cervical spine fracture or traumatic malalignment however   subtle findings may not be detected due to motion artifact.  Repeat CT   scanning can be performed as clinically warranted.

## 2018-09-29 NOTE — H&P ADULT - ATTENDING COMMENTS
Yuliya Red DO  [facility] Hospitalist   [phone] Yuliya Red DO  Hospitalist   [phone]    Discussed plan with NP Beverly

## 2018-09-29 NOTE — H&P ADULT - PROBLEM SELECTOR PLAN 1
ETOH vs. underlying psychiatric disorder vs. delerium   No signs of infection- CXR shows old opacification but no coughing, fevers or chills. This could be potential source (aspiration) if pt spikes fever however  UA negative   Continue ETOH w/d ativan and librium taper and reassess mental status  Not aggressive.

## 2018-09-29 NOTE — H&P ADULT - PROBLEM SELECTOR PLAN 2
Xray shows old rib fractures and exam shows brusing that is mild, pt says he fell several days ago.  Toradol x 1 now for pain  Pantoprazole 40mg PO BID and Tylenol 650mg PO Q6hrs PRn pain. Can use Percocet 5/325mg Q6hrs PRN breakthrough pain

## 2018-09-29 NOTE — H&P ADULT - HISTORY OF PRESENT ILLNESS
57M pmhx of ETOH w/d and pancreatitis who presents yesterday after being found outside intoxicated.  He was in ED for several hours and prior to D/C was noted to have confusion and ETOH w/d.  He says that he is in pain on his right ribs and that he fell the other day.  His last drink was "day before yesterday" and when asked if he drank yesterday he says "only a little" but stilll says his last drink was day before yesterday on further questioning with a  phone.  He denies any headache, anxiety, nausea, halluciations at this time and says he does not w/d from ETOH but does have rib pain.  He is partially corporative but seems confused and unreliable in his history.  He denies any fevers or chills, says that he lives in [place], does not have any nearby family.  Only complaint at this time is rib pain on the right.     In Ed:  T 98.5  /70 RR 16 O2 100%

## 2018-09-29 NOTE — H&P ADULT - NSHPPHYSICALEXAM_GEN_ALL_CORE
ICU Vital Signs Last 24 Hrs  T(C): 36.6 (29 Sep 2018 08:08), Max: 37.2 (29 Sep 2018 05:25)  T(F): 97.9 (29 Sep 2018 08:08), Max: 98.9 (29 Sep 2018 05:25)  HR: 105 (29 Sep 2018 08:37) (95 - 120)  BP: 140/79 (29 Sep 2018 08:08) (116/67 - 148/83)  BP(mean): --  ABP: --  ABP(mean): --  RR: 16 (29 Sep 2018 08:08) (16 - 18)  SpO2: 100% (29 Sep 2018 07:46) (95% - 100%)

## 2018-09-30 LAB
ANION GAP SERPL CALC-SCNC: 8 MMOL/L — SIGNIFICANT CHANGE UP (ref 5–17)
BUN SERPL-MCNC: 10 MG/DL — SIGNIFICANT CHANGE UP (ref 7–23)
CALCIUM SERPL-MCNC: 8.8 MG/DL — SIGNIFICANT CHANGE UP (ref 8.4–10.5)
CHLORIDE SERPL-SCNC: 99 MMOL/L — SIGNIFICANT CHANGE UP (ref 96–108)
CO2 SERPL-SCNC: 27 MMOL/L — SIGNIFICANT CHANGE UP (ref 22–31)
CREAT SERPL-MCNC: 0.77 MG/DL — SIGNIFICANT CHANGE UP (ref 0.5–1.3)
GLUCOSE SERPL-MCNC: 95 MG/DL — SIGNIFICANT CHANGE UP (ref 70–99)
POTASSIUM SERPL-MCNC: 3.9 MMOL/L — SIGNIFICANT CHANGE UP (ref 3.5–5.3)
POTASSIUM SERPL-SCNC: 3.9 MMOL/L — SIGNIFICANT CHANGE UP (ref 3.5–5.3)
SODIUM SERPL-SCNC: 134 MMOL/L — LOW (ref 135–145)

## 2018-09-30 PROCEDURE — 99233 SBSQ HOSP IP/OBS HIGH 50: CPT

## 2018-09-30 RX ORDER — THIAMINE MONONITRATE (VIT B1) 100 MG
100 TABLET ORAL DAILY
Qty: 0 | Refills: 0 | Status: DISCONTINUED | OUTPATIENT
Start: 2018-09-30 | End: 2018-10-04

## 2018-09-30 RX ORDER — LIDOCAINE 4 G/100G
1 CREAM TOPICAL DAILY
Qty: 0 | Refills: 0 | Status: DISCONTINUED | OUTPATIENT
Start: 2018-09-30 | End: 2018-10-04

## 2018-09-30 RX ORDER — SODIUM CHLORIDE 9 MG/ML
1000 INJECTION, SOLUTION INTRAVENOUS
Qty: 0 | Refills: 0 | Status: COMPLETED | OUTPATIENT
Start: 2018-09-30 | End: 2018-10-01

## 2018-09-30 RX ORDER — MULTIVIT-MIN/FERROUS GLUCONATE 9 MG/15 ML
1 LIQUID (ML) ORAL DAILY
Qty: 0 | Refills: 0 | Status: DISCONTINUED | OUTPATIENT
Start: 2018-09-30 | End: 2018-10-04

## 2018-09-30 RX ORDER — FOLIC ACID 0.8 MG
1 TABLET ORAL DAILY
Qty: 0 | Refills: 0 | Status: DISCONTINUED | OUTPATIENT
Start: 2018-09-30 | End: 2018-10-04

## 2018-09-30 RX ADMIN — Medication 25 MILLIGRAM(S): at 06:21

## 2018-09-30 RX ADMIN — Medication 100 MILLIGRAM(S): at 18:00

## 2018-09-30 RX ADMIN — SODIUM CHLORIDE 50 MILLILITER(S): 9 INJECTION, SOLUTION INTRAVENOUS at 12:26

## 2018-09-30 RX ADMIN — Medication 650 MILLIGRAM(S): at 02:10

## 2018-09-30 RX ADMIN — HEPARIN SODIUM 5000 UNIT(S): 5000 INJECTION INTRAVENOUS; SUBCUTANEOUS at 06:21

## 2018-09-30 RX ADMIN — Medication 25 MILLIGRAM(S): at 14:47

## 2018-09-30 RX ADMIN — HEPARIN SODIUM 5000 UNIT(S): 5000 INJECTION INTRAVENOUS; SUBCUTANEOUS at 21:15

## 2018-09-30 RX ADMIN — Medication 650 MILLIGRAM(S): at 21:46

## 2018-09-30 RX ADMIN — Medication 650 MILLIGRAM(S): at 01:25

## 2018-09-30 RX ADMIN — Medication 1 MILLIGRAM(S): at 14:47

## 2018-09-30 RX ADMIN — Medication 650 MILLIGRAM(S): at 12:26

## 2018-09-30 RX ADMIN — Medication 650 MILLIGRAM(S): at 21:16

## 2018-09-30 RX ADMIN — HEPARIN SODIUM 5000 UNIT(S): 5000 INJECTION INTRAVENOUS; SUBCUTANEOUS at 14:51

## 2018-09-30 RX ADMIN — Medication 1 TABLET(S): at 18:00

## 2018-09-30 RX ADMIN — LIDOCAINE 1 PATCH: 4 CREAM TOPICAL at 12:25

## 2018-09-30 NOTE — PROGRESS NOTE ADULT - PROBLEM SELECTOR PLAN 3
CIWA was 2 Has not required ativan PRN. Per ED charts was intoxicated so currently not out of window  c/w Librium taper  Ativan 2mg IV Q2hrs PRN CIWA > 8  Banana bag, MVI, folate, thiamine  Social work consult

## 2018-10-01 LAB
ANION GAP SERPL CALC-SCNC: 8 MMOL/L — SIGNIFICANT CHANGE UP (ref 5–17)
BUN SERPL-MCNC: 12 MG/DL — SIGNIFICANT CHANGE UP (ref 7–23)
CALCIUM SERPL-MCNC: 8.7 MG/DL — SIGNIFICANT CHANGE UP (ref 8.4–10.5)
CHLORIDE SERPL-SCNC: 100 MMOL/L — SIGNIFICANT CHANGE UP (ref 96–108)
CO2 SERPL-SCNC: 28 MMOL/L — SIGNIFICANT CHANGE UP (ref 22–31)
CREAT SERPL-MCNC: 0.85 MG/DL — SIGNIFICANT CHANGE UP (ref 0.5–1.3)
GLUCOSE SERPL-MCNC: 138 MG/DL — HIGH (ref 70–99)
PHOSPHATE SERPL-MCNC: 4 MG/DL — SIGNIFICANT CHANGE UP (ref 2.5–4.5)
POTASSIUM SERPL-MCNC: 4 MMOL/L — SIGNIFICANT CHANGE UP (ref 3.5–5.3)
POTASSIUM SERPL-SCNC: 4 MMOL/L — SIGNIFICANT CHANGE UP (ref 3.5–5.3)
SODIUM SERPL-SCNC: 136 MMOL/L — SIGNIFICANT CHANGE UP (ref 135–145)

## 2018-10-01 PROCEDURE — 99232 SBSQ HOSP IP/OBS MODERATE 35: CPT

## 2018-10-01 RX ADMIN — Medication 1 MILLIGRAM(S): at 12:59

## 2018-10-01 RX ADMIN — Medication 25 MILLIGRAM(S): at 05:14

## 2018-10-01 RX ADMIN — Medication 650 MILLIGRAM(S): at 09:44

## 2018-10-01 RX ADMIN — HEPARIN SODIUM 5000 UNIT(S): 5000 INJECTION INTRAVENOUS; SUBCUTANEOUS at 05:15

## 2018-10-01 RX ADMIN — SODIUM CHLORIDE 50 MILLILITER(S): 9 INJECTION, SOLUTION INTRAVENOUS at 21:32

## 2018-10-01 RX ADMIN — HEPARIN SODIUM 5000 UNIT(S): 5000 INJECTION INTRAVENOUS; SUBCUTANEOUS at 21:31

## 2018-10-01 RX ADMIN — LIDOCAINE 1 PATCH: 4 CREAM TOPICAL at 13:00

## 2018-10-01 RX ADMIN — LIDOCAINE 1 PATCH: 4 CREAM TOPICAL at 00:00

## 2018-10-01 RX ADMIN — Medication 1 TABLET(S): at 13:00

## 2018-10-01 RX ADMIN — Medication 650 MILLIGRAM(S): at 10:45

## 2018-10-01 RX ADMIN — Medication 25 MILLIGRAM(S): at 18:09

## 2018-10-01 RX ADMIN — Medication 100 MILLIGRAM(S): at 12:59

## 2018-10-01 NOTE — PROGRESS NOTE ADULT - PROBLEM SELECTOR PLAN 3
CIWA of 0 at present. Has not required ativan PRN. Per ED charts was intoxicated so currently not out of window  c/w Librium taper  Ativan 2mg IV Q2hrs PRN CIWA > 8  Banana bag, MVI, folate, thiamine  Social work consult

## 2018-10-02 ENCOUNTER — TRANSCRIPTION ENCOUNTER (OUTPATIENT)
Age: 58
End: 2018-10-02

## 2018-10-02 DIAGNOSIS — M25.511 PAIN IN RIGHT SHOULDER: ICD-10-CM

## 2018-10-02 LAB
HCT VFR BLD CALC: 31.6 % — LOW (ref 39–50)
HGB BLD-MCNC: 10.7 G/DL — LOW (ref 13–17)
MCHC RBC-ENTMCNC: 33.2 PG — SIGNIFICANT CHANGE UP (ref 27–34)
MCHC RBC-ENTMCNC: 33.9 GM/DL — SIGNIFICANT CHANGE UP (ref 32–36)
MCV RBC AUTO: 98.1 FL — SIGNIFICANT CHANGE UP (ref 80–100)
PLATELET # BLD AUTO: 249 K/UL — SIGNIFICANT CHANGE UP (ref 150–400)
RBC # BLD: 3.22 M/UL — LOW (ref 4.2–5.8)
RBC # FLD: 13.5 % — SIGNIFICANT CHANGE UP (ref 10.3–14.5)
WBC # BLD: 4.31 K/UL — SIGNIFICANT CHANGE UP (ref 3.8–10.5)
WBC # FLD AUTO: 4.31 K/UL — SIGNIFICANT CHANGE UP (ref 3.8–10.5)

## 2018-10-02 PROCEDURE — 99232 SBSQ HOSP IP/OBS MODERATE 35: CPT

## 2018-10-02 RX ADMIN — Medication 650 MILLIGRAM(S): at 02:24

## 2018-10-02 RX ADMIN — Medication 1 TABLET(S): at 12:49

## 2018-10-02 RX ADMIN — HEPARIN SODIUM 5000 UNIT(S): 5000 INJECTION INTRAVENOUS; SUBCUTANEOUS at 14:03

## 2018-10-02 RX ADMIN — HEPARIN SODIUM 5000 UNIT(S): 5000 INJECTION INTRAVENOUS; SUBCUTANEOUS at 05:50

## 2018-10-02 RX ADMIN — LIDOCAINE 1 PATCH: 4 CREAM TOPICAL at 12:46

## 2018-10-02 RX ADMIN — Medication 25 MILLIGRAM(S): at 05:51

## 2018-10-02 RX ADMIN — HEPARIN SODIUM 5000 UNIT(S): 5000 INJECTION INTRAVENOUS; SUBCUTANEOUS at 21:19

## 2018-10-02 RX ADMIN — Medication 650 MILLIGRAM(S): at 21:18

## 2018-10-02 RX ADMIN — LIDOCAINE 1 PATCH: 4 CREAM TOPICAL at 00:38

## 2018-10-02 RX ADMIN — Medication 650 MILLIGRAM(S): at 22:18

## 2018-10-02 RX ADMIN — Medication 100 MILLIGRAM(S): at 12:49

## 2018-10-02 RX ADMIN — Medication 650 MILLIGRAM(S): at 01:00

## 2018-10-02 RX ADMIN — Medication 1 MILLIGRAM(S): at 12:49

## 2018-10-02 NOTE — DISCHARGE NOTE ADULT - PATIENT PORTAL LINK FT
You can access the [facility] Patient Portal, offered by [facility], by registering with the following website: [email or web address]

## 2018-10-02 NOTE — DISCHARGE NOTE ADULT - PROVIDER TOKENS
FREE:[LAST:[PCP /CLINIC],PHONE:[phone]],FAX:[(   [phone]    -],ADDRESS:[[address] [place] [address]]]

## 2018-10-02 NOTE — DISCHARGE NOTE ADULT - CARE PLAN
Principal Discharge DX:	Alcohol withdrawal syndrome without complication  Goal:	stable  Assessment and plan of treatment:	CIWA of 0 at present. Has not required ativan PRN.  - s/p librium taper  Secondary Diagnosis:	Hyponatremia  Assessment and plan of treatment:	likely related to decreased PO intake  Secondary Diagnosis:	Right shoulder pain, unspecified chronicity  Assessment and plan of treatment:	2/2 recent fall. Has full range of motion. Low suspicion for fracture or dislocation.   - continue tylenol 650mg po q6 hours prn  - would avoid nsaids due to risk of gastrointestinal effects with concomitant etoh abuse.  Secondary Diagnosis:	Toxic metabolic encephalopathy  Assessment and plan of treatment:	Likely 2/2 ETOH vs. underlying psychiatric disorder vs. delirium. Improved  No signs of infection- CXR shows old opacification but no coughing, fevers or chills. UA negative  Secondary Diagnosis:	Macrocytic anemia  Assessment and plan of treatment:	continue with folic acid   follow up with your PMD Principal Discharge DX:	Alcohol withdrawal syndrome without complication  Goal:	stable  Assessment and plan of treatment:	CIWA of 0 at present. Has not required ativan PRN.  - s/p librium taper  Secondary Diagnosis:	Hyponatremia  Assessment and plan of treatment:	likely related to decreased PO intake  Secondary Diagnosis:	Right shoulder pain, unspecified chronicity  Assessment and plan of treatment:	2/2 recent fall. Has full range of motion. Low suspicion for fracture or dislocation.   - continue tylenol 650mg po q6 hours prn  - would avoid nsaids due to risk of gastrointestinal effects with concomitant etoh abuse.  Secondary Diagnosis:	Toxic metabolic encephalopathy  Assessment and plan of treatment:	Likely 2/2 ETOH vs. underlying psychiatric disorder vs. delirium. Improved  No signs of infection- CXR shows old opacification but no coughing, fevers or chills. UA negative  Secondary Diagnosis:	Macrocytic anemia  Assessment and plan of treatment:	continue with folic acid   follow up with your PMD  Secondary Diagnosis:	Rib pain  Assessment and plan of treatment:	Xray shows old rib fractures and exam shows bruising that is mild, pt says he fell several days ago- likely 2/2 recent fall. However, CXR did not catch bottom ribs, so will order L rib xray to r/o acute fracture  - continue Tylenol 650mg PO Q6hrs PRn pain. Would refrain from using opioids for now  - NSAIDs if xray positive for fracture. - xray of rib revealed : Incompletely healed fracture of the lateral right 10th rib  can apply Lidoderm patch- over the counter( ex: Salonpas, icy-hot) Principal Discharge DX:	Alcohol withdrawal syndrome without complication  Goal:	stable  Assessment and plan of treatment:	CIWA of 0 at present. Has not required ativan PRN.  - s/p librium taper  Secondary Diagnosis:	Hyponatremia  Assessment and plan of treatment:	likely related to decreased PO intake  Secondary Diagnosis:	Right shoulder pain, unspecified chronicity  Assessment and plan of treatment:	2/2 recent fall. Has full range of motion. Low suspicion for fracture or dislocation.   - continue tylenol 650mg po q6 hours prn  Secondary Diagnosis:	Toxic metabolic encephalopathy  Assessment and plan of treatment:	Likely 2/2 ETOH vs. underlying psychiatric disorder vs. delirium. Improved  No signs of infection- CXR shows old opacification but no coughing, fevers or chills. UA negative  Secondary Diagnosis:	Macrocytic anemia  Assessment and plan of treatment:	continue with folic acid   follow up with your PMD  Secondary Diagnosis:	Rib pain  Assessment and plan of treatment:	Xray shows old rib fractures and exam shows bruising that is mild, pt says he fell several days ago- likely 2/2 recent fall. However, CXR did not catch bottom ribs, so will order L rib xray to r/o acute fracture  - continue Tylenol 650mg PO Q6hrs PRn pain. Would refrain from using opioids for now  - NSAIDs if xray positive for fracture. - xray of rib revealed : Incompletely healed fracture of the lateral right 10th rib  can apply Lidoderm patch- over the counter( ex: Salonpas, icy-hot)

## 2018-10-02 NOTE — DISCHARGE NOTE ADULT - SECONDARY DIAGNOSIS.
Hyponatremia Right shoulder pain, unspecified chronicity Toxic metabolic encephalopathy Macrocytic anemia Rib pain

## 2018-10-02 NOTE — DISCHARGE NOTE ADULT - MEDICATION SUMMARY - MEDICATIONS TO TAKE
I will START or STAY ON the medications listed below when I get home from the hospital:    rolling walker   -- Indication: For Ambulation     Motrin  mg oral tablet  -- 2 tab(s) by mouth 2 times a day   -- Do not take this drug if you are pregnant.  It is very important that you take or use this exactly as directed.  Do not skip doses or discontinue unless directed by your doctor.  May cause drowsiness or dizziness.  Obtain medical advice before taking any non-prescription drugs as some may affect the action of this medication.  Take with food or milk.    -- Indication: For Rib pain    acetaminophen 325 mg oral tablet  -- 2 tab(s) by mouth every 6 hours, As needed, Mild Pain (1 - 3), Moderate Pain (4 - 6)  -- Indication: For Rib pain    Multiple Vitamins with Minerals oral tablet  -- 1 tab(s) by mouth once a day  -- Indication: For Supplement     folic acid 1 mg oral tablet  -- 1 tab(s) by mouth once a day  -- Indication: For Supplement     thiamine 100 mg oral tablet  -- 1 tab(s) by mouth once a day  -- Indication: For Supplement

## 2018-10-02 NOTE — PROGRESS NOTE ADULT - PROBLEM SELECTOR PLAN 3
2/2 recent fall. Has full ROM without any TTP. Low suspicion for fracture or dislocation.   - continue tylenol 650mg po q6 hours prn  - would add ibuprofen 400mg po q8 hours prn pain x 5 days. 2/2 recent fall. Has full ROM without any TTP. Low suspicion for fracture or dislocation.   - continue tylenol 650mg po q6 hours prn  - would avoid nsaids due to risk of gastropathy with concomitant etoh abuse

## 2018-10-02 NOTE — PROGRESS NOTE ADULT - PROBLEM SELECTOR PLAN 8
Regular diet  Keep mg > 2 K >4  - dispo: to shelter in [place]. SW following.    Total discharge time: 36 minutes.

## 2018-10-02 NOTE — DISCHARGE NOTE ADULT - PLAN OF CARE
stable CIWA of 0 at present. Has not required ativan PRN.  - s/p librium taper likely related to decreased PO intake 2/2 recent fall. Has full range of motion. Low suspicion for fracture or dislocation.   - continue tylenol 650mg po q6 hours prn  - would avoid nsaids due to risk of gastrointestinal effects with concomitant etoh abuse. Likely 2/2 ETOH vs. underlying psychiatric disorder vs. delirium. Improved  No signs of infection- CXR shows old opacification but no coughing, fevers or chills. UA negative continue with folic acid   follow up with your PMD Xray shows old rib fractures and exam shows bruising that is mild, pt says he fell several days ago- likely 2/2 recent fall. However, CXR did not catch bottom ribs, so will order L rib xray to r/o acute fracture  - continue Tylenol 650mg PO Q6hrs PRn pain. Would refrain from using opioids for now  - NSAIDs if xray positive for fracture. - xray of rib revealed : Incompletely healed fracture of the lateral right 10th rib  can apply Lidoderm patch- over the counter( ex: Salonpas, icy-hot) 2/2 recent fall. Has full range of motion. Low suspicion for fracture or dislocation.   - continue tylenol 650mg po q6 hours prn

## 2018-10-02 NOTE — DISCHARGE NOTE ADULT - ADDITIONAL INSTRUCTIONS
Outpatient [facility]  for ETOH abuse;  Follow up with PMD with 5 days of discharge [facility]  for ETOH abuse;  Follow up with PMD/clinic  with 5 days of discharge

## 2018-10-02 NOTE — DISCHARGE NOTE ADULT - HOSPITAL COURSE
54M pmhx of ETOH abuse admitted for toxic metabolic encephalopathy 2/2 etoh intoxication, now s/p librium taper for alcohol withdrawal. CXR shows old opacification but no coughing, fevers or chills. UA negative . Xray shows old rib fractures and right Shoulder pain -Lidocaine patch and Tylenol 650mg PO Q6hrs PRn pain. Outpatient [facility]  for ETOH abuse; Patient stable for discharge  to shelter in [place].

## 2018-10-03 PROCEDURE — 71100 X-RAY EXAM RIBS UNI 2 VIEWS: CPT | Mod: 26

## 2018-10-03 RX ADMIN — Medication 250 MILLIGRAM(S): at 05:54

## 2018-10-03 RX ADMIN — HEPARIN SODIUM 5000 UNIT(S): 5000 INJECTION INTRAVENOUS; SUBCUTANEOUS at 13:21

## 2018-10-03 RX ADMIN — LIDOCAINE 1 PATCH: 4 CREAM TOPICAL at 13:21

## 2018-10-03 RX ADMIN — Medication 1 MILLIGRAM(S): at 13:20

## 2018-10-03 RX ADMIN — Medication 1 TABLET(S): at 13:20

## 2018-10-03 RX ADMIN — LIDOCAINE 1 PATCH: 4 CREAM TOPICAL at 02:00

## 2018-10-03 RX ADMIN — Medication 250 MILLIGRAM(S): at 04:54

## 2018-10-03 RX ADMIN — HEPARIN SODIUM 5000 UNIT(S): 5000 INJECTION INTRAVENOUS; SUBCUTANEOUS at 04:54

## 2018-10-03 RX ADMIN — Medication 100 MILLIGRAM(S): at 13:21

## 2018-10-03 NOTE — PHYSICAL THERAPY INITIAL EVALUATION ADULT - ACTIVE RANGE OF MOTION EXAMINATION, REHAB EVAL
Left UE Active ROM was WFL (within functional limits)/Right UE Active ROM was WFL (within functional limits)/R UE AROM limited 2/2 c/o rib pain/Right LE Active ROM was WFL (within functional limits)/Left LE Active ROM was WFL (within functional limits)

## 2018-10-03 NOTE — PHYSICAL THERAPY INITIAL EVALUATION ADULT - STRENGTHENING, PT EVAL
GOAL: Pt will improve bilateral LE strength by 1/2 grade on MMT, for increased limb stability, to improve gait and facilitate stair negotiation in 4 weeks.

## 2018-10-03 NOTE — PHYSICAL THERAPY INITIAL EVALUATION ADULT - ADDITIONAL COMMENTS
Pt is homeless, lives in [place]. Resides in a shelter from Dec-April. Was ambulating independently without use of an AD

## 2018-10-03 NOTE — PHYSICAL THERAPY INITIAL EVALUATION ADULT - PRECAUTIONS/LIMITATIONS, REHAB EVAL
fall precautions/seen in Left lower lung may represent atelectasis vs pneumonia. UA negative, Pt a/w toxic metabolic encephalopathy 2/2 ETOH intoxication

## 2018-10-03 NOTE — PHYSICAL THERAPY INITIAL EVALUATION ADULT - PERTINENT HX OF CURRENT PROBLEM, REHAB EVAL
57M pmhx of ETOH w/d and pancreatitis who presents after being found outside intoxicated.  He was in ED for several hours and prior to D/C was noted to have confusion and ETOH w/d.  He says that he is in pain on his right ribs and that he fell the other day.  His last drink was "day before yesterday" and when asked if he drank yesterday he says "only a little" CXr: (-) +old right clavicle fx, (-) acute rib fractures, several old rib fractures., however lower ribs not seen in entirety. +opacities

## 2018-10-03 NOTE — PHYSICAL THERAPY INITIAL EVALUATION ADULT - TRANSFER TRAINING, PT EVAL
GOAL: Pt will perform ALL transfers (I), w/use of appropriate assistive device as needed, in 4 weeks.

## 2018-10-03 NOTE — PROGRESS NOTE ADULT - PROBLEM SELECTOR PLAN 3
2/2 recent fall. Has full ROM without any TTP. Low suspicion for fracture or dislocation.   - continue tylenol 650mg po q6 hours prn  - would avoid nsaids due to risk of gastropathy with concomitant etoh abuse

## 2018-10-03 NOTE — PHYSICAL THERAPY INITIAL EVALUATION ADULT - GENERAL OBSERVATIONS, REHAB EVAL
Pt rec'd sitting in bedside chair in NAD, +chair alarm activated, A&Ox3, primarily Armenian speaking ( #[id number] used) agreeable to PT with encouragement

## 2018-10-04 VITALS
SYSTOLIC BLOOD PRESSURE: 134 MMHG | TEMPERATURE: 98 F | RESPIRATION RATE: 18 BRPM | OXYGEN SATURATION: 99 % | DIASTOLIC BLOOD PRESSURE: 72 MMHG | HEART RATE: 99 BPM

## 2018-10-04 PROCEDURE — 93005 ELECTROCARDIOGRAM TRACING: CPT

## 2018-10-04 PROCEDURE — 85610 PROTHROMBIN TIME: CPT

## 2018-10-04 PROCEDURE — 84484 ASSAY OF TROPONIN QUANT: CPT

## 2018-10-04 PROCEDURE — 70450 CT HEAD/BRAIN W/O DYE: CPT

## 2018-10-04 PROCEDURE — 85730 THROMBOPLASTIN TIME PARTIAL: CPT

## 2018-10-04 PROCEDURE — 71100 X-RAY EXAM RIBS UNI 2 VIEWS: CPT

## 2018-10-04 PROCEDURE — 80053 COMPREHEN METABOLIC PANEL: CPT

## 2018-10-04 PROCEDURE — 85027 COMPLETE CBC AUTOMATED: CPT

## 2018-10-04 PROCEDURE — 84100 ASSAY OF PHOSPHORUS: CPT

## 2018-10-04 PROCEDURE — 97161 PT EVAL LOW COMPLEX 20 MIN: CPT

## 2018-10-04 PROCEDURE — 99285 EMERGENCY DEPT VISIT HI MDM: CPT

## 2018-10-04 PROCEDURE — 82962 GLUCOSE BLOOD TEST: CPT

## 2018-10-04 PROCEDURE — 96375 TX/PRO/DX INJ NEW DRUG ADDON: CPT

## 2018-10-04 PROCEDURE — 71045 X-RAY EXAM CHEST 1 VIEW: CPT

## 2018-10-04 PROCEDURE — 80048 BASIC METABOLIC PNL TOTAL CA: CPT

## 2018-10-04 PROCEDURE — 90686 IIV4 VACC NO PRSV 0.5 ML IM: CPT

## 2018-10-04 PROCEDURE — 97530 THERAPEUTIC ACTIVITIES: CPT

## 2018-10-04 PROCEDURE — 96376 TX/PRO/DX INJ SAME DRUG ADON: CPT

## 2018-10-04 PROCEDURE — 96374 THER/PROPH/DIAG INJ IV PUSH: CPT

## 2018-10-04 PROCEDURE — 97116 GAIT TRAINING THERAPY: CPT

## 2018-10-04 PROCEDURE — 72125 CT NECK SPINE W/O DYE: CPT

## 2018-10-04 PROCEDURE — 99232 SBSQ HOSP IP/OBS MODERATE 35: CPT

## 2018-10-04 RX ORDER — IBUPROFEN 200 MG
2 TABLET ORAL
Qty: 20 | Refills: 0
Start: 2018-10-04 | End: 2018-10-08

## 2018-10-04 RX ORDER — ACETAMINOPHEN 500 MG
2 TABLET ORAL
Qty: 0 | Refills: 0 | DISCHARGE
Start: 2018-10-04

## 2018-10-04 RX ORDER — FOLIC ACID 0.8 MG
1 TABLET ORAL
Qty: 30 | Refills: 0
Start: 2018-10-04 | End: 2018-11-02

## 2018-10-04 RX ORDER — THIAMINE MONONITRATE (VIT B1) 100 MG
1 TABLET ORAL
Qty: 0 | Refills: 0 | DISCHARGE
Start: 2018-10-04

## 2018-10-04 RX ORDER — MULTIVIT-MIN/FERROUS GLUCONATE 9 MG/15 ML
1 LIQUID (ML) ORAL
Qty: 0 | Refills: 0 | DISCHARGE
Start: 2018-10-04

## 2018-10-04 RX ADMIN — LIDOCAINE 1 PATCH: 4 CREAM TOPICAL at 01:04

## 2018-10-04 RX ADMIN — Medication 100 MILLIGRAM(S): at 12:22

## 2018-10-04 RX ADMIN — LIDOCAINE 1 PATCH: 4 CREAM TOPICAL at 12:22

## 2018-10-04 RX ADMIN — INFLUENZA VIRUS VACCINE 0.5 MILLILITER(S): 15; 15; 15; 15 SUSPENSION INTRAMUSCULAR at 16:10

## 2018-10-04 RX ADMIN — Medication 1 TABLET(S): at 12:22

## 2018-10-04 RX ADMIN — Medication 1 MILLIGRAM(S): at 12:22

## 2018-10-04 NOTE — PROGRESS NOTE ADULT - SUBJECTIVE AND OBJECTIVE BOX
Patient is a 57y old  Male who presents with a chief complaint of ETOH w/d (30 Sep 2018 14:10)  Subjective: no acute events overnight. Pt reports R sided rib pain, stable. No nausea/vomiting, no tactile/auditory/visual hallucinations, no HA, no tremor or anxiety.    VITAL SIGNS:  Vital Signs Last 24 Hrs  T(C): 36.7 (01 Oct 2018 07:54), Max: 37.2 (01 Oct 2018 02:07)  T(F): 98.1 (01 Oct 2018 07:54), Max: 99 (01 Oct 2018 02:07)  HR: 72 (01 Oct 2018 07:54) (72 - 85)  BP: 136/81 (01 Oct 2018 07:54) (125/74 - 149/80)  BP(mean): --  RR: 18 (01 Oct 2018 07:54) (18 - 18)  SpO2: 98% (01 Oct 2018 07:54) (97% - 99%)      PHYSICAL EXAM:     GENERAL: no acute distress  HEENT: PERRLA, EOMI, moist oropharynx   RESPIRATORY: CTAB, no w/c   CARDIOVASCULAR: RRR, no murmurs, gallops, rubs  ABDOMINAL: soft, non-tender, non-distended, positive bowel sounds   EXTREMITIES: no clubbing, cyanosis, or edema  NEUROLOGICAL: alert and oriented x 3, non-focal  SKIN: no rashes or lesions   MUSCULOSKELETAL: no gross joint deformity  PSYCH: CIWA of 0      09-30    134<L>  |  99  |  10  ----------------------------<  95  3.9   |  27  |  0.77    Ca    8.8      30 Sep 2018 07:18    CAPILLARY BLOOD GLUCOSE    MEDICATIONS  (STANDING):  chlordiazePOXIDE 25 milliGRAM(s) Oral every 12 hours  chlordiazePOXIDE 25 milliGRAM(s) Oral once  chlordiazePOXIDE   Oral   folic acid 1 milliGRAM(s) Oral daily  heparin  Injectable 5000 Unit(s) SubCutaneous every 8 hours  influenza   Vaccine 0.5 milliLiter(s) IntraMuscular once  lidocaine   Patch 1 Patch Transdermal daily  multivitamin/minerals 1 Tablet(s) Oral daily  multivitamin/thiamine/folic acid in sodium chloride 0.9% 1000 milliLiter(s) (50 mL/Hr) IV Continuous <Continuous>  thiamine 100 milliGRAM(s) Oral daily    MEDICATIONS  (PRN):  acetaminophen   Tablet .. 650 milliGRAM(s) Oral every 6 hours PRN Mild Pain (1 - 3), Moderate Pain (4 - 6)  LORazepam   Injectable 2 milliGRAM(s) IV Push every 1 hour PRN Symptom-triggered: each CIWA -Ar score 8 or GREATER  ondansetron Injectable 4 milliGRAM(s) IV Push every 8 hours PRN Nausea and/or Vomiting
Brendon Wheeler MD  Division of Hospital Medicine  Pager [phone]      MADHAVI RAMOS  57y  Male      Patient is a 57y old  Male who presents with a chief complaint of ETOH w/d (29 Sep 2018 09:58)      INTERVAL HPI/OVERNIGHT EVENTS:  Seen at bedside. Only reporting right sided rib pain. No nausea/vomiting, diaphoresis, visual/tactile hallucinations      REVIEW OF SYSTEMS: 14 point ROS negative unless listed above    T(C): 36.7 (09-29-18 @ 22:00), Max: 37.1 (09-29-18 @ 15:46)  HR: 86 (09-29-18 @ 22:00) (86 - 96)  BP: 137/76 (09-29-18 @ 22:00) (134/76 - 137/76)  RR: 18 (09-29-18 @ 22:00) (18 - 18)  SpO2: 98% (09-29-18 @ 22:00) (96% - 98%)  Wt(kg): --Vital Signs Last 24 Hrs  T(C): 36.7 (29 Sep 2018 22:00), Max: 37.1 (29 Sep 2018 15:46)  T(F): 98 (29 Sep 2018 22:00), Max: 98.7 (29 Sep 2018 15:46)  HR: 86 (29 Sep 2018 22:00) (86 - 96)  BP: 137/76 (29 Sep 2018 22:00) (134/76 - 137/76)  BP(mean): --  RR: 18 (29 Sep 2018 22:00) (18 - 18)  SpO2: 98% (29 Sep 2018 22:00) (96% - 98%)    PHYSICAL EXAM:  GENERAL: NAD, well-groomed, well-developed  ENMT: No tongue fasiculations  NECK: Supple, No JVD, Normal thyroid  CHEST/LUNG: Clear to percussion bilaterally; No rales, rhonchi, wheezing, or rubs. Pain on palpation of the right rib cage and 2 small bruises w/o abrasion on posterior aspect of rib cage   HEART: Regular rate and rhythm; No murmurs, rubs, or gallops  ABDOMEN: Soft, Nontender, Nondistended; Bowel sounds present.  EXTREMITIES:  2+ Peripheral Pulses, No clubbing, cyanosis, or edema  PSYCH: Alert & Oriented x2    Consultant(s) Notes Reviewed:  [x ] YES  [ ] NO  Care Discussed with Consultants/Other Providers [ x] YES  [ ] NO    LABS:                        11.7   8.2   )-----------( 325      ( 29 Sep 2018 02:14 )             35.7     09-30    134<L>  |  99  |  10  ----------------------------<  95  3.9   |  27  |  0.77    Ca    8.8      30 Sep 2018 07:18    TPro  6.5  /  Alb  3.7  /  TBili  1.5<H>  /  DBili  x   /  AST  55<H>  /  ALT  41  /  AlkPhos  65  09-29    PT/INR - ( 29 Sep 2018 02:14 )   PT: 9.9 sec;   INR: 0.91 ratio         PTT - ( 29 Sep 2018 02:14 )  PTT:26.8 sec    CAPILLARY BLOOD GLUCOSE                RADIOLOGY & ADDITIONAL TESTS:    Imaging Personally Reviewed:  [ ] YES  [ ] NO
Patient is a 57y old  Male who presents with a chief complaint of ETOH w/d (01 Oct 2018 12:06)  Subjective: No acute events overnight. Pt reports ongoing R shoulder pain from recent fall, no weakness, no limitations to ROM. No anxiety, N/V, no HA/visual problems, no tactile/visual/auditory hallucinations.    VITAL SIGNS:  Vital Signs Last 24 Hrs  T(C): 36.9 (02 Oct 2018 06:32), Max: 36.9 (02 Oct 2018 06:32)  T(F): 98.4 (02 Oct 2018 06:32), Max: 98.4 (02 Oct 2018 06:32)  HR: 70 (02 Oct 2018 06:32) (70 - 90)  BP: 123/73 (02 Oct 2018 06:32) (112/72 - 148/84)  BP(mean): --  RR: 18 (02 Oct 2018 06:32) (18 - 18)  SpO2: 97% (02 Oct 2018 06:32) (96% - 98%)      PHYSICAL EXAM:     GENERAL: no acute distress  HEENT: PERRLA, EOMI, moist oropharynx   RESPIRATORY: CTAB, no w/c   CARDIOVASCULAR: RRR, no murmurs, gallops, rubs  ABDOMINAL: soft, non-tender, non-distended, positive bowel sounds   EXTREMITIES: no clubbing, cyanosis, or edema  NEUROLOGICAL: alert and oriented x 3, non-focal  SKIN: no rashes or lesions   MUSCULOSKELETAL: +good shoulder active/passive ROM bilaterally without pain, no R shoulder TTP, no joint deformity.                        10.7   4.31  )-----------( 249      ( 02 Oct 2018 07:55 )             31.6     10-01    136  |  100  |  12  ----------------------------<  138<H>  4.0   |  28  |  0.85    Ca    8.7      01 Oct 2018 14:53  Phos  4.0     10-01        CAPILLARY BLOOD GLUCOSE    MEDICATIONS  (STANDING):  folic acid 1 milliGRAM(s) Oral daily  heparin  Injectable 5000 Unit(s) SubCutaneous every 8 hours  influenza   Vaccine 0.5 milliLiter(s) IntraMuscular once  lidocaine   Patch 1 Patch Transdermal daily  multivitamin/minerals 1 Tablet(s) Oral daily  thiamine 100 milliGRAM(s) Oral daily    MEDICATIONS  (PRN):  acetaminophen   Tablet .. 650 milliGRAM(s) Oral every 6 hours PRN Mild Pain (1 - 3), Moderate Pain (4 - 6)  LORazepam   Injectable 2 milliGRAM(s) IV Push every 1 hour PRN Symptom-triggered: each CIWA -Ar score 8 or GREATER  ondansetron Injectable 4 milliGRAM(s) IV Push every 8 hours PRN Nausea and/or Vomiting
Patient is a 57y old  Male who presents with a chief complaint of ETOH w/d (02 Oct 2018 15:25)  Subjective: no acute events overnight. Today, patient reports ongoing R rib pain. No anxiety, headache. States he would not want to go to rehab if recommended by PT as he needs to look for his belongings, which were left in [place]. He feels that now that he has rested in the hospital, he will not fall. He also promises to not drink alcohol anymore so that he doesn't fall frequently.     VITAL SIGNS:  Vital Signs Last 24 Hrs  T(C): 36.9 (03 Oct 2018 08:30), Max: 36.9 (02 Oct 2018 15:41)  T(F): 98.4 (03 Oct 2018 08:30), Max: 98.5 (02 Oct 2018 15:41)  HR: 72 (03 Oct 2018 08:30) (72 - 96)  BP: 105/73 (03 Oct 2018 08:30) (105/68 - 134/83)  BP(mean): --  RR: 18 (03 Oct 2018 08:30) (18 - 18)  SpO2: 97% (03 Oct 2018 08:30) (96% - 97%)      PHYSICAL EXAM:     GENERAL: no acute distress  HEENT: PERRLA, EOMI, moist oropharynx   RESPIRATORY: CTAB, no w/c   CARDIOVASCULAR: RRR, no murmurs, gallops, rubs  ABDOMINAL: soft, non-tender, non-distended, positive bowel sounds   EXTREMITIES: no clubbing, cyanosis, or edema  NEUROLOGICAL: alert and oriented x 3, non-focal  SKIN: no rashes or lesions   MUSCULOSKELETAL: +R rib TTP                        10.7   4.31  )-----------( 249      ( 02 Oct 2018 07:55 )             31.6     10-01    136  |  100  |  12  ----------------------------<  138<H>  4.0   |  28  |  0.85    Ca    8.7      01 Oct 2018 14:53  Phos  4.0     10-01        CAPILLARY BLOOD GLUCOSE          MEDICATIONS  (STANDING):  folic acid 1 milliGRAM(s) Oral daily  heparin  Injectable 5000 Unit(s) SubCutaneous every 8 hours  influenza   Vaccine 0.5 milliLiter(s) IntraMuscular once  lidocaine   Patch 1 Patch Transdermal daily  multivitamin/minerals 1 Tablet(s) Oral daily  thiamine 100 milliGRAM(s) Oral daily    MEDICATIONS  (PRN):  acetaminophen   Tablet .. 650 milliGRAM(s) Oral every 6 hours PRN Mild Pain (1 - 3), Moderate Pain (4 - 6)  LORazepam   Injectable 2 milliGRAM(s) IV Push every 1 hour PRN Symptom-triggered: each CIWA -Ar score 8 or GREATER  ondansetron Injectable 4 milliGRAM(s) IV Push every 8 hours PRN Nausea and/or Vomiting
Patient is a 57y old  Male who presents with a chief complaint of ETOH w/d (03 Oct 2018 12:27)  Subjective: no acute events overnight. Patient feels well today. Amenable to using walker to aid in ambulation.    VITAL SIGNS:  Vital Signs Last 24 Hrs  T(C): 36.4 (04 Oct 2018 15:50), Max: 36.7 (04 Oct 2018 07:45)  T(F): 97.6 (04 Oct 2018 15:50), Max: 98 (04 Oct 2018 07:45)  HR: 99 (04 Oct 2018 15:50) (67 - 99)  BP: 134/72 (04 Oct 2018 15:50) (109/62 - 151/72)  BP(mean): --  RR: 18 (04 Oct 2018 15:50) (18 - 18)  SpO2: 99% (04 Oct 2018 15:50) (95% - 99%)    PHYSICAL EXAM:     GENERAL: no acute distress  HEENT: PERRLA, EOMI, moist oropharynx   RESPIRATORY: CTAB, no w/c   CARDIOVASCULAR: RRR, no murmurs, gallops, rubs  ABDOMINAL: soft, non-tender, non-distended, positive bowel sounds   EXTREMITIES: no clubbing, cyanosis, or edema  NEUROLOGICAL: alert and oriented x 3, non-focal  SKIN: no rashes or lesions   MUSCULOSKELETAL: +R rib pain on palpation    CAPILLARY BLOOD GLUCOSE    MEDICATIONS  (STANDING):  folic acid 1 milliGRAM(s) Oral daily  heparin  Injectable 5000 Unit(s) SubCutaneous every 8 hours  influenza   Vaccine 0.5 milliLiter(s) IntraMuscular once  lidocaine   Patch 1 Patch Transdermal daily  multivitamin/minerals 1 Tablet(s) Oral daily  thiamine 100 milliGRAM(s) Oral daily    MEDICATIONS  (PRN):  acetaminophen   Tablet .. 650 milliGRAM(s) Oral every 6 hours PRN Mild Pain (1 - 3), Moderate Pain (4 - 6)  LORazepam   Injectable 2 milliGRAM(s) IV Push every 1 hour PRN Symptom-triggered: each CIWA -Ar score 8 or GREATER  ondansetron Injectable 4 milliGRAM(s) IV Push every 8 hours PRN Nausea and/or Vomiting

## 2018-10-04 NOTE — PROGRESS NOTE ADULT - PROBLEM SELECTOR PROBLEM 1
Toxic metabolic encephalopathy

## 2018-10-04 NOTE — PROGRESS NOTE ADULT - PROBLEM SELECTOR PLAN 2
Xray shows old rib fractures and exam shows bruising that is mild, pt says he fell several days ago.  Continue Lidocaine patch  Tylenol 650mg PO Q6hrs PRn pain. Would refrain from using opioids for now
Xray shows old rib fractures and exam shows bruising that is mild, pt says he fell several days ago- likely 2/2 recent fall. However, CXR did not catch bottom ribs, so will order L rib xray to r/o acute fracture  - continue Tylenol 650mg PO Q6hrs PRn pain. Would refrain from using opioids for now  - NSAIDs if xray positive for fracture
Xray shows old rib fractures and exam shows bruising that is mild, pt says he fell several days ago- likely 2/2 recent fall. Rib xray positive for incompletely healed 10th R rib fracture  - continue Tylenol 650mg PO Q6hrs PRn pain. Would refrain from using opioids for now  - would give short course of ibuprofen 400mg po q12 hours prn pain- patient advised to not take it without food and to stay well hydrated (not with alcohol)
Xray shows old rib fractures and exam shows bruising that is mild, pt says he fell several days ago.  Continue Lidocaine patch  Tylenol 650mg PO Q6hrs PRn pain. Would refrain from using opioids for now
Xray shows old rib fractures and exam shows bruising that is mild, pt says he fell several days ago.  Start Lidocaine patch  Tylenol 650mg PO Q6hrs PRn pain. Would refrain from using opioids for now

## 2018-10-04 NOTE — PROGRESS NOTE ADULT - PROBLEM SELECTOR PLAN 1
Likely 2/2 ETOH vs. underlying psychiatric disorder vs. delirium. Improved  No signs of infection- CXR shows old opacification but no coughing, fevers or chills. UA negative   Continue ETOH ativan and librium taper and reassess mental status
Likely 2/2 ETOH vs. underlying psychiatric disorder vs. delirium   No signs of infection- CXR shows old opacification but no coughing, fevers or chills. UA negative   Continue ETOH ativan and librium taper and reassess mental status
Likely 2/2 ETOH vs. underlying psychiatric disorder vs. delirium. Improved  No signs of infection- CXR shows old opacification but no coughing, fevers or chills. UA negative   - s/p librium taper, no ativan PRNs required.
Likely 2/2 ETOH vs. underlying psychiatric disorder vs. delirium. Improved  No signs of infection- CXR shows old opacification but no coughing, fevers or chills. UA negative   - s/p librium taper, no ativan PRNs required.
Likely 2/2 ETOH vs. underlying psychiatric disorder vs. delirium. Improved  No signs of infection- CXR shows old opacification but no coughing, fevers or chills. UA negative   - s/p librium taper, no ativan PRNs required.  - alcohol cessation counseling provided with

## 2018-10-04 NOTE — PROGRESS NOTE ADULT - PROBLEM SELECTOR PROBLEM 3
Alcohol withdrawal
Alcohol withdrawal
Shoulder pain, right

## 2018-10-04 NOTE — PROGRESS NOTE ADULT - ASSESSMENT
54M pmhx of ETOH abuse admitted for toxic metabolic encephalopathy 2/2 etoh intoxication, now in alcohol withdrawal
54M pmhx of ETOH abuse admitted for toxic metabolic encephalopathy 2/2 etoh intoxication complicated by mechanical fall, now s/p librium taper for alcohol withdrawal
54M pmhx of ETOH abuse admitted for toxic metabolic encephalopathy 2/2 etoh intoxication, now s/p librium taper for alcohol withdrawal
54M pmhx of ETOH abuse admitted for toxic metabolic encephalopathy 2/2 etoh intoxication, now s/p librium taper for alcohol withdrawal
54M pmhx of ETOH abuse admitted for toxic metabolic encephalopathy.

## 2018-10-04 NOTE — PROGRESS NOTE ADULT - PROBLEM SELECTOR PLAN 3
2/2 recent fall. Has full ROM without any TTP. Low suspicion for fracture or dislocation.   - continue tylenol 650mg po q6 hours prn  - nsaids as above

## 2018-10-04 NOTE — PROGRESS NOTE ADULT - PROBLEM SELECTOR PLAN 5
Continue Folic acid 1mg PO daily and Thiamine and MV
Continue Folic acid 1mg PO daily and Thiamine and MV
Likely 2/2 poor solute intake. Encourage PO intake, improved.

## 2018-10-04 NOTE — PROGRESS NOTE ADULT - PROBLEM SELECTOR PROBLEM 6
Need for prophylactic measure
Macrocytic anemia
Need for prophylactic measure

## 2018-10-04 NOTE — PROGRESS NOTE ADULT - PROBLEM SELECTOR PLAN 4
Likely 2/2 poor solute intake. Encourage PO intake
CIWA of 0 at present. Has not required ativan PRN.  - s/p librium taper  Social work consult
Likely 2/2 poor solute intake. Encourage PO intake

## 2018-10-04 NOTE — PROGRESS NOTE ADULT - PROBLEM SELECTOR PLAN 8
Regular diet  Keep mg > 2 K >4  - dispo: Pt recommended [facility]. Patient refused, understands risk of falls but prefers to go back to [place] to retrive his things. Amenable to using walker to aid in ambulation. Outpatient PCP follow up provided at medicine resident clinic.    Total discharge time: 37 minutes.

## 2018-10-25 NOTE — ED PROVIDER NOTE - NS ED MD DISPO DISCHARGE
DATE OF SERVICE:  10/25/2018    SURGEON:  Jeffry Christopher MD    ASSISTANT:  Radha Kelsey PA-C    ANESTHESIOLOGIST:  Ranulfo Amaya MD    PREOPERATIVE DIAGNOSIS:  Right knee osteoarthritis.    POSTOPERATIVE DIAGNOSIS:  Right knee osteoarthritis.    PROCEDURE PERFORMED:  1.  Right total knee arthroplasty.  2.  Left knee intra-articular local anesthetic and corticosteroid injections.    INDICATIONS:  Treat right knee arthritis, improve pain, improve function.    ANESTHESIA:  General anesthesia with single shot adductor canal block.    HISTORY OF PRESENT ILLNESS:  The patient is a very pleasant and active   55-year-old male with progressively worsening right knee pain.  He was   diagnosed with osteoarthritis and exhausted nonoperative management.  We   discussed surgical intervention.  The patient has been n.p.o. since midnight.    He is medically cleared for surgery by the anesthesia team.    INFORMED CONSENT:  Patient was informed of the risks, benefits, alternatives   of planned operation.  Risks include but not limited to bleeding, infection,   neurovascular damage, pain, stiffness, DVT, PE, MI, stroke, and death.    Advanced directives were reviewed.  After answering all questions, the patient   agreed to proceed with planned operation and informed consent form was   signed.    IMPLANTS:  1.  Smith and Nephew size 7 posterior stabilized Oxinium Legion component.  2.  Smith and Nephew size 6 right Legion nonporous tibial baseplate.  3.  Gonzalez and Nephew size 6 Legion XLPE 9 mm polyethylene posterior stabilized   insert.  4.  Gonzalez and Nephew 35x7.5 mm patellar component.    DESCRIPTION OF PROCEDURE:  Patient was identified in the preoperative holding.    The correct procedural side and site were identified and marked.  Patient   was then brought to the operating room and transferred to the operating room   table where all bony prominences were well padded.  He received a single shot   adductor canal  block under ultrasound guidance by the anesthesia team followed   by general anesthesia.  Tourniquet was applied to the right upper thigh.    Examination of the right lower extremity demonstrated overall varus alignment,   the deformity was flexible, range of motion was 0-135.    I first cleaned the left knee with several alcohol-soaked gauzes and performed   an injection of local anesthetic and corticosteroid via lateral parapatellar   injection site.  The right lower extremity was then prepped and draped in the   normal standard sterile fashion.    Procedural pause was then performed by the operating room team.  The   procedure, the patient's identity, operative side, surgical site were all   verified.  Patient was given IV antibiotics prior to incision as well as   tranexamic acid.    The right lower extremity was elevated and exsanguinated and the tourniquet   inflated to 250 mmHg.  Anterior midline incision and medial parapatellar   arthrotomy was then performed.  Inspection of the knee joint demonstrated some   diffuse grade IV changes within the medial and patellofemoral compartments.    There were some grade III changes laterally.  The fat pad meniscus,   osteophytes and both cruciate ligaments were then excised.  I then turned my   attention to the distal femur.  Using an intramedullary guide set to 5 degrees   of valgus, the distal femoral resection was performed.  I then proceeded to   set the femoral rotation.  I double checked with the transepicondylar access   as well as Patricio's line.  It was essentially equivalent to the posterior   condylar axis with external rotation to about 3 degrees.  The femoral   component was then sized to a size 7.  The femoral cutting block was then   applied and additional chamfer cuts were performed.  Following the femoral   resection, the remaining posterior horn of the meniscus and posterior   osteophytes were then resected.  The tibia was then subluxed anteriorly  and   externally rotated.  The proximal tibia resection was performed using the   extramedullary guide perpendicular to the mechanical axis.  The tibial side   was sized to a size 6 right.  The tibia was then repaired in a standard   fashion.  All trial components were then placed into the joint and a full   range of motion and excellent stability.  I then finished repairing the femur   with a box cut in anticipation of posterior stabilized component.  I then   turned my attention to the patella.  The initial thickness was only was about   21 mm.  I then resected to a thickness of about 14 mm.  I then placed a 35x7.5   mm patellar component with a trial component in place, the patellar tracked   nicely.  There was no need for formal lateral release.    All instruments and components were then removed.  The proximal tibia was   again exposed.  The knee was copiously irrigated with pulse saline lavage and   dried.  Prior to placing the implant, I then injected the capsular tissue with   a solution of ropivacaine, Toradol, and epinephrine.  The real components   were then carefully cemented into position using antibiotic impregnated   cement.  The knee was then brought into full extension and a Betadine solution   used to bathe all components during hardening.  The tourniquet was then   released with a total tourniquet time of 71 minutes.  Meticulous hemostasis   obtained.  I then trialed the polyethylene and noted excellent stability with   a 9 mm implant.  I noted range of motion was from 0-135 and excellent   stability of varus and valgus at 0 and 30.  There was also excellent anterior   and posterior stability at 90 degrees of flexion.  The real 9 mm posterior   stabilized polyethylene was then inserted.    Wound was then copiously irrigated and the arthrotomy was closed with   interrupted 2-0 Vicryl suture.  The remaining local anesthetic was then   injected in the soft tissues including the extensor mechanism.   The skin was   then closed in a layered fashion with interrupted 2-0 Monocryl, followed by   staples and a sterile silver dressing.    Needle and sponge counts were correct at the end of the procedure by the   circulating nurse.  The patient tolerated this without complications.  The   patient was then transferred off the operating room table onto a regular   hospital bed.  He was extubated by the anesthesia team.    ESTIMATED BLOOD LOSS:  100 mL.    COMPLICATIONS:  None.    TOURNIQUET TIME:  71 minutes.    SPECIMENS:  None.    WOUND TYPE:  Type 1 clean.    POSTOPERATIVE PLAN:  The patient will be transferred back to the postoperative   unit.  I expect he will be admitted to the hospital for overnight   observation.  He will continue to receive perioperative antibiotics.  Patient   will be on mechanical and pharmacological DVT prophylaxis -- enteric-coated   aspirin 325 mg twice daily.  We will begin physical therapy later today.  I   expect patient will be discharged also later tomorrow morning once mobilizing   safely and tolerating all medications.       ____________________________________     MD HARISH Rust / RUY    DD:  10/25/2018 09:11:52  DT:  10/25/2018 10:17:20    D#:  [id number]  Job#:  [id number]     Home

## 2018-11-19 ENCOUNTER — EMERGENCY (EMERGENCY)
Facility: HOSPITAL | Age: 58
LOS: 1 days | Discharge: HOME | End: 2018-11-19
Attending: EMERGENCY MEDICINE
Payer: MEDICAID

## 2018-11-19 VITALS
SYSTOLIC BLOOD PRESSURE: 132 MMHG | HEART RATE: 92 BPM | DIASTOLIC BLOOD PRESSURE: 88 MMHG | RESPIRATION RATE: 16 BRPM | OXYGEN SATURATION: 96 %

## 2018-11-19 DIAGNOSIS — Z98.89 OTHER SPECIFIED POSTPROCEDURAL STATES: Chronic | ICD-10-CM

## 2018-11-19 LAB
ALBUMIN SERPL ELPH-MCNC: 4.1 G/DL — SIGNIFICANT CHANGE UP (ref 3.3–5)
ALP SERPL-CCNC: 64 U/L — SIGNIFICANT CHANGE UP (ref 40–120)
ALT FLD-CCNC: 46 U/L — HIGH (ref 10–45)
ANION GAP SERPL CALC-SCNC: 18 MMOL/L — HIGH (ref 5–17)
AST SERPL-CCNC: 67 U/L — HIGH (ref 10–40)
BASOPHILS # BLD AUTO: 0.1 K/UL — SIGNIFICANT CHANGE UP (ref 0–0.2)
BASOPHILS NFR BLD AUTO: 1.2 % — SIGNIFICANT CHANGE UP (ref 0–2)
BILIRUB SERPL-MCNC: 1.2 MG/DL — SIGNIFICANT CHANGE UP (ref 0.2–1.2)
BUN SERPL-MCNC: 16 MG/DL — SIGNIFICANT CHANGE UP (ref 7–23)
CALCIUM SERPL-MCNC: 8.4 MG/DL — SIGNIFICANT CHANGE UP (ref 8.4–10.5)
CHLORIDE SERPL-SCNC: 99 MMOL/L — SIGNIFICANT CHANGE UP (ref 96–108)
CO2 SERPL-SCNC: 22 MMOL/L — SIGNIFICANT CHANGE UP (ref 22–31)
CREAT SERPL-MCNC: 0.93 MG/DL — SIGNIFICANT CHANGE UP (ref 0.5–1.3)
EOSINOPHIL # BLD AUTO: 0 K/UL — SIGNIFICANT CHANGE UP (ref 0–0.5)
EOSINOPHIL NFR BLD AUTO: 0 % — SIGNIFICANT CHANGE UP (ref 0–6)
GLUCOSE SERPL-MCNC: 96 MG/DL — SIGNIFICANT CHANGE UP (ref 70–99)
HCT VFR BLD CALC: 34.5 % — LOW (ref 39–50)
HGB BLD-MCNC: 11.6 G/DL — LOW (ref 13–17)
LIDOCAIN IGE QN: 28 U/L — SIGNIFICANT CHANGE UP (ref 7–60)
LYMPHOCYTES # BLD AUTO: 1.6 K/UL — SIGNIFICANT CHANGE UP (ref 1–3.3)
LYMPHOCYTES # BLD AUTO: 24.7 % — SIGNIFICANT CHANGE UP (ref 13–44)
MCHC RBC-ENTMCNC: 33.8 GM/DL — SIGNIFICANT CHANGE UP (ref 32–36)
MCHC RBC-ENTMCNC: 34 PG — SIGNIFICANT CHANGE UP (ref 27–34)
MCV RBC AUTO: 101 FL — HIGH (ref 80–100)
MONOCYTES # BLD AUTO: 0.6 K/UL — SIGNIFICANT CHANGE UP (ref 0–0.9)
MONOCYTES NFR BLD AUTO: 9.2 % — SIGNIFICANT CHANGE UP (ref 2–14)
NEUTROPHILS # BLD AUTO: 4.1 K/UL — SIGNIFICANT CHANGE UP (ref 1.8–7.4)
NEUTROPHILS NFR BLD AUTO: 64.9 % — SIGNIFICANT CHANGE UP (ref 43–77)
PLATELET # BLD AUTO: 307 K/UL — SIGNIFICANT CHANGE UP (ref 150–400)
POTASSIUM SERPL-MCNC: 3.9 MMOL/L — SIGNIFICANT CHANGE UP (ref 3.5–5.3)
POTASSIUM SERPL-SCNC: 3.9 MMOL/L — SIGNIFICANT CHANGE UP (ref 3.5–5.3)
PROT SERPL-MCNC: 6.7 G/DL — SIGNIFICANT CHANGE UP (ref 6–8.3)
RBC # BLD: 3.42 M/UL — LOW (ref 4.2–5.8)
RBC # FLD: 12.6 % — SIGNIFICANT CHANGE UP (ref 10.3–14.5)
SODIUM SERPL-SCNC: 139 MMOL/L — SIGNIFICANT CHANGE UP (ref 135–145)
WBC # BLD: 6.3 K/UL — SIGNIFICANT CHANGE UP (ref 3.8–10.5)
WBC # FLD AUTO: 6.3 K/UL — SIGNIFICANT CHANGE UP (ref 3.8–10.5)

## 2018-11-19 PROCEDURE — 99284 EMERGENCY DEPT VISIT MOD MDM: CPT

## 2018-11-19 RX ORDER — SODIUM CHLORIDE 9 MG/ML
3 INJECTION INTRAMUSCULAR; INTRAVENOUS; SUBCUTANEOUS ONCE
Qty: 0 | Refills: 0 | Status: COMPLETED | OUTPATIENT
Start: 2018-11-19 | End: 2018-11-19

## 2018-11-19 RX ADMIN — SODIUM CHLORIDE 3 MILLILITER(S): 9 INJECTION INTRAMUSCULAR; INTRAVENOUS; SUBCUTANEOUS at 23:11

## 2018-11-19 NOTE — ED PROVIDER NOTE - OBJECTIVE STATEMENT
Pt BIBEMS for intoxication.  Denying complaints, requesting something to eat.  Known history of EtOH abuse. Pt BIBEMS for intoxication.  Denying complaints, requesting something to eat.  Known history of EtOH abuse.  History very limited secondary to intoxication

## 2018-11-19 NOTE — ED PROVIDER NOTE - MEDICAL DECISION MAKING DETAILS
Patient presenting with EtOH intoxication, no noted trauma on exa Patient presenting with EtOH intoxication, no noted trauma on exam, will monitor for sobriety/withdrawl symptoms

## 2018-11-19 NOTE — ED PROVIDER NOTE - CARE PLAN
Principal Discharge DX:	Alcohol abuse  Assessment and plan of treatment:	1) Please follow-up with your primary care doctor within the next 2-3 days.  Please call today for an appointment.   2) If you have any worsening of symptoms or any other concerns please return to the ED immediately.

## 2018-11-19 NOTE — ED ADULT NURSE REASSESSMENT NOTE - NS ED NURSE REASSESS COMMENT FT1
19:15. Received report from DERIK Palma. Pt sleeping comfortably in stretcher at this time. MD Sumit Menendez aware of heart rate of 107. 19:15. Received report from DERIK Plama. Pt sleeping comfortably in stretcher at this time. MD Sumit Menendez aware of heart rate of 107. Pt provided with water as per MD Sumit Menendez.

## 2018-11-19 NOTE — ED PROVIDER NOTE - PLAN OF CARE
1) Please follow-up with your primary care doctor within the next 2-3 days.  Please call today for an appointment.   2) If you have any worsening of symptoms or any other concerns please return to the ED immediately.
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
## 2018-11-19 NOTE — ED ADULT NURSE NOTE - NSIMPLEMENTINTERV_GEN_ALL_ED
Implemented All Fall with Harm Risk Interventions:  [place] to call system. Call bell, personal items and telephone within reach. Instruct patient to call for assistance. Room bathroom lighting operational. Non-slip footwear when patient is off stretcher. Physically safe environment: no spills, clutter or unnecessary equipment. Stretcher in lowest position, wheels locked, appropriate side rails in place. Provide visual cue, wrist band, yellow gown, etc. Monitor gait and stability. Monitor for mental status changes and reorient to person, place, and time. Review medications for side effects contributing to fall risk. Reinforce activity limits and safety measures with patient and family. Provide visual clues: red socks.

## 2018-11-20 VITALS
RESPIRATION RATE: 16 BRPM | DIASTOLIC BLOOD PRESSURE: 81 MMHG | OXYGEN SATURATION: 96 % | HEART RATE: 88 BPM | TEMPERATURE: 98 F | SYSTOLIC BLOOD PRESSURE: 149 MMHG

## 2018-11-20 PROCEDURE — 85027 COMPLETE CBC AUTOMATED: CPT

## 2018-11-20 PROCEDURE — 99285 EMERGENCY DEPT VISIT HI MDM: CPT

## 2018-11-20 PROCEDURE — 80053 COMPREHEN METABOLIC PANEL: CPT

## 2018-11-20 PROCEDURE — 83690 ASSAY OF LIPASE: CPT

## 2018-11-20 RX ORDER — ONDANSETRON 8 MG/1
4 TABLET, FILM COATED ORAL ONCE
Qty: 0 | Refills: 0 | Status: COMPLETED | OUTPATIENT
Start: 2018-11-20 | End: 2018-11-20

## 2018-11-20 RX ADMIN — Medication 25 MILLIGRAM(S): at 04:29

## 2018-11-20 RX ADMIN — ONDANSETRON 4 MILLIGRAM(S): 8 TABLET, FILM COATED ORAL at 04:29

## 2018-11-20 NOTE — ED ADULT NURSE REASSESSMENT NOTE - NS ED NURSE REASSESS COMMENT FT1
02:15. Pt resting comfortably in stretcher at this time. pt denies CP, SOB, n/v, weakness, abdominal pain, auditory/tactile/visual hallucinations, tremors, or headache at this time. Safety & comfort measures maintained. Will continue to reassess.

## 2018-11-20 NOTE — ED ADULT NURSE REASSESSMENT NOTE - NS ED NURSE REASSESS COMMENT FT1
00:00. Pt sleeping comfortably in stretcher at this time. NAD. Safety & comfort measures maintained.

## 2018-12-11 ENCOUNTER — EMERGENCY (EMERGENCY)
Facility: HOSPITAL | Age: 58
LOS: 1 days | Discharge: HOME | End: 2018-12-11
Attending: EMERGENCY MEDICINE
Payer: MEDICAID

## 2018-12-11 VITALS — DIASTOLIC BLOOD PRESSURE: 88 MMHG | HEART RATE: 92 BPM | RESPIRATION RATE: 16 BRPM | SYSTOLIC BLOOD PRESSURE: 134 MMHG

## 2018-12-11 DIAGNOSIS — Z98.89 OTHER SPECIFIED POSTPROCEDURAL STATES: Chronic | ICD-10-CM

## 2018-12-11 PROCEDURE — 99285 EMERGENCY DEPT VISIT HI MDM: CPT

## 2018-12-11 PROCEDURE — 99284 EMERGENCY DEPT VISIT MOD MDM: CPT

## 2018-12-11 PROCEDURE — 71045 X-RAY EXAM CHEST 1 VIEW: CPT

## 2018-12-11 PROCEDURE — 71045 X-RAY EXAM CHEST 1 VIEW: CPT | Mod: 26

## 2018-12-11 PROCEDURE — 82962 GLUCOSE BLOOD TEST: CPT

## 2018-12-11 NOTE — ED ADULT TRIAGE NOTE - NS ED NURSE BANDS TYPE
Name band; difficulty chewing/difficulty swallowing/Pending speech and swallow eval/difficulty feeding self

## 2018-12-11 NOTE — ED PROVIDER NOTE - OBJECTIVE STATEMENT
57 y/o Burkinan speaking male with hx of etoh abuse and chronic pancreatitis BIBEMS for intoxication. Well known to ED. Patient denies alcohol use today. Will not say when his last alcoholic beverage was. Endorses chest pain and nausea. Endorses diffuse headache and bilateral neck soreness. Denies fevers, chills. Denies cough. Denies abdominal pain. Patient alert and intermittently agitated.

## 2018-12-11 NOTE — ED PROVIDER NOTE - MEDICAL DECISION MAKING DETAILS
59 y/o Lao speaking male with hx of etoh abuse and chronic pancreatitis BIBEMS for intoxication. No concern for acute pathology at this time. With monitor patient and re-assess.

## 2018-12-11 NOTE — ED PROVIDER NOTE - PROGRESS NOTE DETAILS
Alfredo: Patient signed out to f/u reassessment. patient ambulating and awake and alert. tolerating po. Patient has stable gait. will d/c

## 2018-12-11 NOTE — ED PROVIDER NOTE - ATTENDING CONTRIBUTION TO CARE
59 y/o Norwegian speaking male with hx of etoh abuse and chronic pancreatitis BIBEMS for intoxication. Well known to ED. denying etoh but smells like alcohol, slightly tachy, has no complaints, awaiting clinical sobriety.
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
## 2018-12-12 VITALS
RESPIRATION RATE: 18 BRPM | TEMPERATURE: 99 F | DIASTOLIC BLOOD PRESSURE: 84 MMHG | SYSTOLIC BLOOD PRESSURE: 150 MMHG | HEART RATE: 94 BPM | OXYGEN SATURATION: 97 %

## 2019-06-13 ENCOUNTER — INPATIENT (INPATIENT)
Facility: HOSPITAL | Age: 59
LOS: 18 days | Discharge: HOME | DRG: 897 | End: 2019-07-02
Attending: HOSPITALIST | Admitting: STUDENT IN AN ORGANIZED HEALTH CARE EDUCATION/TRAINING PROGRAM
Payer: MEDICAID

## 2019-06-13 VITALS
SYSTOLIC BLOOD PRESSURE: 103 MMHG | DIASTOLIC BLOOD PRESSURE: 70 MMHG | RESPIRATION RATE: 16 BRPM | HEART RATE: 70 BPM | OXYGEN SATURATION: 96 % | TEMPERATURE: 98 F

## 2019-06-13 DIAGNOSIS — Z98.89 OTHER SPECIFIED POSTPROCEDURAL STATES: Chronic | ICD-10-CM

## 2019-06-13 LAB
ALBUMIN SERPL ELPH-MCNC: 4.1 G/DL — SIGNIFICANT CHANGE UP (ref 3.3–5)
ALP SERPL-CCNC: 76 U/L — SIGNIFICANT CHANGE UP (ref 40–120)
ALT FLD-CCNC: 64 U/L — HIGH (ref 10–45)
ANION GAP SERPL CALC-SCNC: 14 MMOL/L — SIGNIFICANT CHANGE UP (ref 5–17)
ANION GAP SERPL CALC-SCNC: 15 MMOL/L — SIGNIFICANT CHANGE UP (ref 5–17)
ANION GAP SERPL CALC-SCNC: 19 MMOL/L — HIGH (ref 5–17)
APAP SERPL-MCNC: <15 UG/ML — SIGNIFICANT CHANGE UP (ref 10–30)
AST SERPL-CCNC: 160 U/L — HIGH (ref 10–40)
BASOPHILS # BLD AUTO: 0 K/UL — SIGNIFICANT CHANGE UP (ref 0–0.2)
BASOPHILS NFR BLD AUTO: 0.4 % — SIGNIFICANT CHANGE UP (ref 0–2)
BILIRUB SERPL-MCNC: 0.9 MG/DL — SIGNIFICANT CHANGE UP (ref 0.2–1.2)
BUN SERPL-MCNC: 10 MG/DL — SIGNIFICANT CHANGE UP (ref 7–23)
BUN SERPL-MCNC: 10 MG/DL — SIGNIFICANT CHANGE UP (ref 7–23)
BUN SERPL-MCNC: 9 MG/DL — SIGNIFICANT CHANGE UP (ref 7–23)
CALCIUM SERPL-MCNC: 8.1 MG/DL — LOW (ref 8.4–10.5)
CALCIUM SERPL-MCNC: 8.5 MG/DL — SIGNIFICANT CHANGE UP (ref 8.4–10.5)
CALCIUM SERPL-MCNC: 8.8 MG/DL — SIGNIFICANT CHANGE UP (ref 8.4–10.5)
CHLORIDE SERPL-SCNC: 102 MMOL/L — SIGNIFICANT CHANGE UP (ref 96–108)
CHLORIDE SERPL-SCNC: 102 MMOL/L — SIGNIFICANT CHANGE UP (ref 96–108)
CHLORIDE SERPL-SCNC: 99 MMOL/L — SIGNIFICANT CHANGE UP (ref 96–108)
CO2 SERPL-SCNC: 21 MMOL/L — LOW (ref 22–31)
CO2 SERPL-SCNC: 21 MMOL/L — LOW (ref 22–31)
CO2 SERPL-SCNC: 23 MMOL/L — SIGNIFICANT CHANGE UP (ref 22–31)
CREAT SERPL-MCNC: 0.81 MG/DL — SIGNIFICANT CHANGE UP (ref 0.5–1.3)
EOSINOPHIL # BLD AUTO: 0.1 K/UL — SIGNIFICANT CHANGE UP (ref 0–0.5)
EOSINOPHIL NFR BLD AUTO: 1 % — SIGNIFICANT CHANGE UP (ref 0–6)
ETHANOL SERPL-MCNC: 379 MG/DL — HIGH (ref 0–10)
GLUCOSE SERPL-MCNC: 111 MG/DL — HIGH (ref 70–99)
GLUCOSE SERPL-MCNC: 93 MG/DL — SIGNIFICANT CHANGE UP (ref 70–99)
GLUCOSE SERPL-MCNC: 96 MG/DL — SIGNIFICANT CHANGE UP (ref 70–99)
HCT VFR BLD CALC: 37.8 % — LOW (ref 39–50)
HGB BLD-MCNC: 12.5 G/DL — LOW (ref 13–17)
LYMPHOCYTES # BLD AUTO: 1.6 K/UL — SIGNIFICANT CHANGE UP (ref 1–3.3)
LYMPHOCYTES # BLD AUTO: 29.1 % — SIGNIFICANT CHANGE UP (ref 13–44)
MCHC RBC-ENTMCNC: 33.2 GM/DL — SIGNIFICANT CHANGE UP (ref 32–36)
MCHC RBC-ENTMCNC: 34.2 PG — HIGH (ref 27–34)
MCV RBC AUTO: 103 FL — HIGH (ref 80–100)
MONOCYTES # BLD AUTO: 0.3 K/UL — SIGNIFICANT CHANGE UP (ref 0–0.9)
MONOCYTES NFR BLD AUTO: 4.8 % — SIGNIFICANT CHANGE UP (ref 2–14)
NEUTROPHILS # BLD AUTO: 3.5 K/UL — SIGNIFICANT CHANGE UP (ref 1.8–7.4)
NEUTROPHILS NFR BLD AUTO: 64.7 % — SIGNIFICANT CHANGE UP (ref 43–77)
PLATELET # BLD AUTO: 271 K/UL — SIGNIFICANT CHANGE UP (ref 150–400)
POTASSIUM SERPL-MCNC: 4.1 MMOL/L — SIGNIFICANT CHANGE UP (ref 3.5–5.3)
POTASSIUM SERPL-MCNC: 6.5 MMOL/L — CRITICAL HIGH (ref 3.5–5.3)
POTASSIUM SERPL-MCNC: 6.8 MMOL/L — CRITICAL HIGH (ref 3.5–5.3)
POTASSIUM SERPL-SCNC: 4.1 MMOL/L — SIGNIFICANT CHANGE UP (ref 3.5–5.3)
POTASSIUM SERPL-SCNC: 6.5 MMOL/L — CRITICAL HIGH (ref 3.5–5.3)
POTASSIUM SERPL-SCNC: 6.8 MMOL/L — CRITICAL HIGH (ref 3.5–5.3)
PROT SERPL-MCNC: 8.2 G/DL — SIGNIFICANT CHANGE UP (ref 6–8.3)
RBC # BLD: 3.66 M/UL — LOW (ref 4.2–5.8)
RBC # FLD: 12.4 % — SIGNIFICANT CHANGE UP (ref 10.3–14.5)
SODIUM SERPL-SCNC: 137 MMOL/L — SIGNIFICANT CHANGE UP (ref 135–145)
SODIUM SERPL-SCNC: 139 MMOL/L — SIGNIFICANT CHANGE UP (ref 135–145)
SODIUM SERPL-SCNC: 140 MMOL/L — SIGNIFICANT CHANGE UP (ref 135–145)
WBC # BLD: 5.4 K/UL — SIGNIFICANT CHANGE UP (ref 3.8–10.5)
WBC # FLD AUTO: 5.4 K/UL — SIGNIFICANT CHANGE UP (ref 3.8–10.5)

## 2019-06-13 PROCEDURE — 99284 EMERGENCY DEPT VISIT MOD MDM: CPT

## 2019-06-13 PROCEDURE — 70450 CT HEAD/BRAIN W/O DYE: CPT | Mod: 26

## 2019-06-13 PROCEDURE — 70486 CT MAXILLOFACIAL W/O DYE: CPT | Mod: 26

## 2019-06-13 PROCEDURE — 72100 X-RAY EXAM L-S SPINE 2/3 VWS: CPT | Mod: 26

## 2019-06-13 PROCEDURE — 72125 CT NECK SPINE W/O DYE: CPT | Mod: 26

## 2019-06-13 PROCEDURE — 71045 X-RAY EXAM CHEST 1 VIEW: CPT | Mod: 26

## 2019-06-13 RX ORDER — SODIUM CHLORIDE 9 MG/ML
1000 INJECTION, SOLUTION INTRAVENOUS
Refills: 0 | Status: DISCONTINUED | OUTPATIENT
Start: 2019-06-13 | End: 2019-06-14

## 2019-06-13 RX ORDER — SODIUM CHLORIDE 9 MG/ML
1000 INJECTION INTRAMUSCULAR; INTRAVENOUS; SUBCUTANEOUS ONCE
Refills: 0 | Status: COMPLETED | OUTPATIENT
Start: 2019-06-13 | End: 2019-06-13

## 2019-06-13 RX ADMIN — SODIUM CHLORIDE 1000 MILLILITER(S): 9 INJECTION INTRAMUSCULAR; INTRAVENOUS; SUBCUTANEOUS at 19:52

## 2019-06-13 RX ADMIN — SODIUM CHLORIDE 300 MILLILITER(S): 9 INJECTION, SOLUTION INTRAVENOUS at 20:18
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
## 2019-06-13 NOTE — ED PROVIDER NOTE - PROGRESS NOTE DETAILS
patient heart rate increasing throughout the night, now with tongue fasciculations. will admit for withdrawal. - resident Christofer Ellsworth

## 2019-06-13 NOTE — ED PROVIDER NOTE - PHYSICAL EXAMINATION
General: intoxicated appearing male, no acute distress   HeENt: PERRL, dried blood below right orbit  respiratory: normal work of breathing, lungs clear to auscultation bilaterally   cardiac: regular rate and rhythm   abdomen: soft, non-tender   msk: no midline spinal tenderness to palpation   skin: no rashes   neuro: awake, alert

## 2019-06-13 NOTE — ED PROVIDER NOTE - CROS ED CONS ALL NEG
CHIEF COMPLAINT    Chief Complaint   Patient presents with   • Cough    Cough      HISTORY    The patient is a 57 year old male who is here with complaint of cough past 3 weeks. Cough is nonproductive. He said wheezing. His been hoarse. Said some pleuritic chest pain at times with coughing bilateral chest no palpitation no edema no leg pain. He ran out of his Qvar 3 weeks ago. He said some decrease in cough with Ventolin HFA. No fever chills. There is been some throat clearing. Said nasal congestion with yellow rhinorrhea. No posterior pharyngeal drainage. No myalgias no sore throat no facial pain no vomiting no diarrhea no abdominal pain. He still smoking 12 cigarettes per day down from 1 pack a day. No hemoptysis. No palpitations. No peripheral edema. He has been otherwise compliant with her other medications.      MEDICATIONS  Current Outpatient Prescriptions   Medication Sig   • gabapentin (NEURONTIN) 300 MG capsule Take 1 capsule by mouth three times daily   • sildenafil (VIAGRA) 100 MG tablet Take one-half to one tablet by mouth one-half hour prior to sex   • beclomethasone diprop (QVAR) 80 MCG/ACT inhaler Inhale 1 puff into the lungs 2 times daily.   • albuterol (VENTOLIN HFA) 108 (90 Base) MCG/ACT inhaler Inhale 2 puffs into the lungs every 4 hours as needed for Shortness of Breath or Wheezing.   • cefdinir (OMNICEF) 300 MG capsule Take 1 capsule by mouth 2 times daily. X 10 days     No current facility-administered medications for this visit.        ALLERGIES  Allergies as of 08/10/2017 - Reviewed 08/10/2017   Allergen Reaction Noted   • Codeine PRURITUS 05/21/2014       HISTORIES  Past Medical History:   Diagnosis Date   • Anxiety state, unspecified 5/21/2014   • Benign neoplasm of skin, site unspecified 5/21/2014   • ED (erectile dysfunction) 5/21/2014   • Nasal septal deviation 5/21/2014   • Personal history of tobacco use, presenting hazards to health 5/21/2014       Past Surgical History:   Procedure  Laterality Date   • COLONOSCOPY W/ POLYPECTOMY  2/5/2013       Family history positive heart disease that his mellitus precancer hyperlipidemia    Social History     Occupational History   • Not on file.     Social History Main Topics   • Smoking status: Current Every Day Smoker     Packs/day: 1.00     Years: 25.00     Types: Cigarettes   • Smokeless tobacco: Never Used      Comment: patient has quit smoking information   • Alcohol use Not on file   • Drug use: Unknown   • Sexual activity: Not on file       REVIEW OF SYSTEMS    Constitutional: No change in weight no night sweats  Eyes: No eye redness no eye drainage  HENT: See history of present illness  Respiratory: See history of present illness  Cardiovascular:  See history of present illness  GI:  See history of present illness  Musculoskeletal: No myalgia  Neurologic: No headache   Lymphatic: No adenopathy       PHYSICAL EXAM  Vitals:  Blood pressure 112/78, pulse 62, temperature 98.1 °F (36.7 °C), temperature source Oral, resp. rate 18, height 5' 9\" (1.753 m), weight 94.2 kg, SpO2 96 %.   Pleasant overweight coughing white male alert oriented ×3 no respiratory distress  Skin: No suspicious lesions: No rash: No pallor: No cyanosis no jaundice  HEENT: TMs normal bilaterally. sclera nonicteric, conjunctiva without pallor no lid lag or proptosis ; ethmoid sinus tenderness present with pain anterior head flexion PERRLA EOMI. septal deviation present.. nares with congestion with yellow rhinorrhea , . Oropharynx well hydrated clear without lesions or hyperemia. Tongue midline. Palate and uvula elevates symmetrically. Smile and forehead wrinkle symmetric. Normal parotids bilaterally.   Neck: Supple a few small anterior cervical nontender adenopathy without thyromegaly, no thyroid tenderness no thyroid nodule, no carotid bruits no JVD, nontender   Lymph nodes: No supraclavicular nodes no axillary nodes   Lungs: Scattered rhonchi and rales at right lower lobe to  auscultation ,equal unlabored breath sounds, without , wheeze or retraction   Heart: Regular rate and rhythm normal S1-S2 without murmur; PMI normal   Abdomen: Bowel sounds normoactive soft nontender nondistended; no guarding, no rebound , no mass, no hepatosplenomegaly, no abdominal aortic enlargement, no hernia, no bruit. Overweight    Extremities: No clubbing ,cyanosis, edema or deformity, no synovial thickening; pulses are 2+ diffusely ; no lesions no coldness. Homans sign negative.   Neurological: Cranial nerves II through XII intact; no tremor; motor strength 5 / 5 diffusely;  sensory intact , DTRs 2+ diffusely;  gait normal   Mental status: Not anxious, not depressed      peak flow 500  O2 sat 96% room air    ASSESSMENT/PLAN      ICD-10-CM   1. Cough. Rule out pneumonia. Check chest x-ray. Omnicef 300 mg b.i.d. for 10 days. Use Ricola lozenges as needed  R05   2. Acute rhinosinusitis. Treat with Afrin nose spray, saline nose spray as needed as directed  J01.90   3. Mild intermittent asthma without complication restart Qvar 80 µg 1 inhalation b.i.d. Use albuterol HFA q.4 hours as needed   J45.20   4. Personal history of tobacco use, presenting hazards to health risk strongly urge he wean off tobacco. Nicorette lozenges as directed  Z87.891   5. Nasal septal deviation J34.2     Tylenol for pain or fever  Call if not improved within 5 days           negative...

## 2019-06-13 NOTE — ED ADULT NURSE NOTE - CHPI ED NUR SYMPTOMS NEG
no vomiting/no disorientation/no fever/no abdominal distension/no weakness/no abdominal pain/no chills/no nausea

## 2019-06-13 NOTE — ED ADULT NURSE NOTE - NSIMPLEMENTINTERV_GEN_ALL_ED
Implemented All Fall Risk Interventions:  [place] to call system. Call bell, personal items and telephone within reach. Instruct patient to call for assistance. Room bathroom lighting operational. Non-slip footwear when patient is off stretcher. Physically safe environment: no spills, clutter or unnecessary equipment. Stretcher in lowest position, wheels locked, appropriate side rails in place. Provide visual cue, wrist band, yellow gown, etc. Monitor gait and stability. Monitor for mental status changes and reorient to person, place, and time. Review medications for side effects contributing to fall risk. Reinforce activity limits and safety measures with patient and family.

## 2019-06-13 NOTE — ED PROVIDER NOTE - CLINICAL SUMMARY MEDICAL DECISION MAKING FREE TEXT BOX
58M brought in for intoxication. known h/o alcohol abuse. patient giving minimal history. dried blood on face. concern for trauma. plan for labs, ct head/neck, cxr, xray lumbar spine. will reassess. 58M brought in for intoxication. known h/o alcohol abuse. patient giving minimal history. dried blood on face. concern for trauma. plan for labs, ct head/neck, cxr, xray lumbar spine. will reassess.  Attending Statement: Agree with the above.  Intox, h/o chronic pancreatitis and etoh abuse/withdrawal.  Unable to provide meaninful hx 2/2 presumptive intoxication.  Evidence of trauma to R side of face necessitates imaging.  Labs, fluids, reassess.  --BMM

## 2019-06-14 DIAGNOSIS — F10.10 ALCOHOL ABUSE, UNCOMPLICATED: ICD-10-CM

## 2019-06-14 DIAGNOSIS — F10.232 ALCOHOL DEPENDENCE WITH WITHDRAWAL WITH PERCEPTUAL DISTURBANCE: ICD-10-CM

## 2019-06-14 DIAGNOSIS — M54.5 LOW BACK PAIN: ICD-10-CM

## 2019-06-14 DIAGNOSIS — F10.239 ALCOHOL DEPENDENCE WITH WITHDRAWAL, UNSPECIFIED: ICD-10-CM

## 2019-06-14 LAB
ALBUMIN SERPL ELPH-MCNC: 3.7 G/DL — SIGNIFICANT CHANGE UP (ref 3.3–5)
ALP SERPL-CCNC: 69 U/L — SIGNIFICANT CHANGE UP (ref 40–120)
ALT FLD-CCNC: 49 U/L — HIGH (ref 10–45)
ANION GAP SERPL CALC-SCNC: 15 MMOL/L — SIGNIFICANT CHANGE UP (ref 5–17)
AST SERPL-CCNC: 95 U/L — HIGH (ref 10–40)
BILIRUB DIRECT SERPL-MCNC: 0.3 MG/DL — HIGH (ref 0–0.2)
BILIRUB INDIRECT FLD-MCNC: 0.7 MG/DL — SIGNIFICANT CHANGE UP (ref 0.2–1)
BILIRUB SERPL-MCNC: 1 MG/DL — SIGNIFICANT CHANGE UP (ref 0.2–1.2)
BUN SERPL-MCNC: 11 MG/DL — SIGNIFICANT CHANGE UP (ref 7–23)
CALCIUM SERPL-MCNC: 7.9 MG/DL — LOW (ref 8.4–10.5)
CHLORIDE SERPL-SCNC: 104 MMOL/L — SIGNIFICANT CHANGE UP (ref 96–108)
CO2 SERPL-SCNC: 21 MMOL/L — LOW (ref 22–31)
CREAT SERPL-MCNC: 0.9 MG/DL — SIGNIFICANT CHANGE UP (ref 0.5–1.3)
GLUCOSE SERPL-MCNC: 165 MG/DL — HIGH (ref 70–99)
MAGNESIUM SERPL-MCNC: 1.2 MG/DL — LOW (ref 1.6–2.6)
PHOSPHATE SERPL-MCNC: 3.6 MG/DL — SIGNIFICANT CHANGE UP (ref 2.5–4.5)
POTASSIUM SERPL-MCNC: 3.9 MMOL/L — SIGNIFICANT CHANGE UP (ref 3.5–5.3)
POTASSIUM SERPL-SCNC: 3.9 MMOL/L — SIGNIFICANT CHANGE UP (ref 3.5–5.3)
PROT SERPL-MCNC: 6.6 G/DL — SIGNIFICANT CHANGE UP (ref 6–8.3)
SODIUM SERPL-SCNC: 140 MMOL/L — SIGNIFICANT CHANGE UP (ref 135–145)

## 2019-06-14 PROCEDURE — 76377 3D RENDER W/INTRP POSTPROCES: CPT | Mod: 26

## 2019-06-14 PROCEDURE — 99223 1ST HOSP IP/OBS HIGH 75: CPT

## 2019-06-14 PROCEDURE — 72131 CT LUMBAR SPINE W/O DYE: CPT | Mod: 26

## 2019-06-14 RX ORDER — MAGNESIUM SULFATE 500 MG/ML
1 VIAL (ML) INJECTION ONCE
Refills: 0 | Status: COMPLETED | OUTPATIENT
Start: 2019-06-14 | End: 2019-06-14

## 2019-06-14 RX ORDER — SODIUM CHLORIDE 9 MG/ML
1000 INJECTION, SOLUTION INTRAVENOUS
Refills: 0 | Status: DISCONTINUED | OUTPATIENT
Start: 2019-06-14 | End: 2019-06-17

## 2019-06-14 RX ORDER — ACETAMINOPHEN 500 MG
650 TABLET ORAL EVERY 6 HOURS
Refills: 0 | Status: DISCONTINUED | OUTPATIENT
Start: 2019-06-14 | End: 2019-07-02

## 2019-06-14 RX ORDER — HEPARIN SODIUM 5000 [USP'U]/ML
5000 INJECTION INTRAVENOUS; SUBCUTANEOUS EVERY 8 HOURS
Refills: 0 | Status: DISCONTINUED | OUTPATIENT
Start: 2019-06-14 | End: 2019-07-02

## 2019-06-14 RX ORDER — PANTOPRAZOLE SODIUM 20 MG/1
40 TABLET, DELAYED RELEASE ORAL
Refills: 0 | Status: DISCONTINUED | OUTPATIENT
Start: 2019-06-14 | End: 2019-07-02

## 2019-06-14 RX ORDER — IBUPROFEN 200 MG
400 TABLET ORAL THREE TIMES A DAY
Refills: 0 | Status: COMPLETED | OUTPATIENT
Start: 2019-06-14 | End: 2019-06-16

## 2019-06-14 RX ADMIN — Medication 400 MILLIGRAM(S): at 17:23

## 2019-06-14 RX ADMIN — Medication 100 GRAM(S): at 08:42

## 2019-06-14 RX ADMIN — Medication 2 MILLIGRAM(S): at 07:04

## 2019-06-14 RX ADMIN — SODIUM CHLORIDE 100 MILLILITER(S): 9 INJECTION, SOLUTION INTRAVENOUS at 13:12

## 2019-06-14 RX ADMIN — Medication 400 MILLIGRAM(S): at 22:35

## 2019-06-14 RX ADMIN — Medication 50 MILLIGRAM(S): at 23:36

## 2019-06-14 RX ADMIN — Medication 50 MILLIGRAM(S): at 00:26

## 2019-06-14 RX ADMIN — Medication 50 MILLIGRAM(S): at 11:54

## 2019-06-14 RX ADMIN — Medication 400 MILLIGRAM(S): at 21:43

## 2019-06-14 RX ADMIN — Medication 50 MILLIGRAM(S): at 17:43

## 2019-06-14 RX ADMIN — HEPARIN SODIUM 5000 UNIT(S): 5000 INJECTION INTRAVENOUS; SUBCUTANEOUS at 13:11

## 2019-06-14 RX ADMIN — Medication 50 MILLIGRAM(S): at 08:42

## 2019-06-14 RX ADMIN — HEPARIN SODIUM 5000 UNIT(S): 5000 INJECTION INTRAVENOUS; SUBCUTANEOUS at 21:42

## 2019-06-14 RX ADMIN — Medication 400 MILLIGRAM(S): at 13:11

## 2019-06-14 NOTE — H&P ADULT - PROBLEM SELECTOR PLAN 2
BAL was 379 on arrival to ED  - Benzodiazepine taper in CIWA protocol  - DVT prophylaxis  - SW eval for possible inpatient Alcohol Rehab

## 2019-06-14 NOTE — H&P ADULT - ATTENDING COMMENTS
As he gets more lucid from ETOH intoxication will get more info regarding his social aspect, medications, alcohol abuse and treatment plan
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
## 2019-06-14 NOTE — H&P ADULT - PROBLEM SELECTOR PLAN 3
Fall precautions  - prn analgesics with PPI  - PT eval X-ray L/S spine showed ag indeterminate L1, L2, L5 compression deformities  - Will do CT L/S spine  - prn analgesics with PPI  - fall precautions  - PT eval
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
## 2019-06-14 NOTE — H&P ADULT - PROBLEM SELECTOR PLAN 1
Vitals are stable on Tele now, Not tachycardic. No N/V or abdominal pain.  on arrival, intoxicated. He received Librium and Ativan in ED.  - Given his high risk of ETOH withdrawal will c/w Tele for 24 hrs, IV Banana Bag ( IVF with NS mixed with thiamine folate, MVI), Librium taper with CIWA protocol- prn ativan  - SW eval

## 2019-06-14 NOTE — H&P ADULT - GASTROINTESTINAL DETAILS
nontender/no organomegaly/no rigidity/soft/no guarding/no masses palpable/bowel sounds normal/no distention

## 2019-06-14 NOTE — H&P ADULT - NSICDXPASTMEDICALHX_GEN_ALL_CORE_FT
PAST MEDICAL HISTORY:  Alcohol Abuse     Alcohol-induced chronic pancreatitis     ETOH abuse     Macrocytic anemia

## 2019-06-15 LAB
ALBUMIN SERPL ELPH-MCNC: 3.7 G/DL — SIGNIFICANT CHANGE UP (ref 3.3–5)
ALP SERPL-CCNC: 69 U/L — SIGNIFICANT CHANGE UP (ref 40–120)
ALT FLD-CCNC: 39 U/L — SIGNIFICANT CHANGE UP (ref 10–45)
ANION GAP SERPL CALC-SCNC: 15 MMOL/L — SIGNIFICANT CHANGE UP (ref 5–17)
AST SERPL-CCNC: 54 U/L — HIGH (ref 10–40)
BASOPHILS # BLD AUTO: 0.1 K/UL — SIGNIFICANT CHANGE UP (ref 0–0.2)
BASOPHILS NFR BLD AUTO: 1 % — SIGNIFICANT CHANGE UP (ref 0–2)
BILIRUB SERPL-MCNC: 1.5 MG/DL — HIGH (ref 0.2–1.2)
BUN SERPL-MCNC: 20 MG/DL — SIGNIFICANT CHANGE UP (ref 7–23)
CALCIUM SERPL-MCNC: 8.6 MG/DL — SIGNIFICANT CHANGE UP (ref 8.4–10.5)
CHLORIDE SERPL-SCNC: 101 MMOL/L — SIGNIFICANT CHANGE UP (ref 96–108)
CO2 SERPL-SCNC: 21 MMOL/L — LOW (ref 22–31)
CREAT SERPL-MCNC: 0.92 MG/DL — SIGNIFICANT CHANGE UP (ref 0.5–1.3)
EOSINOPHIL # BLD AUTO: 0.2 K/UL — SIGNIFICANT CHANGE UP (ref 0–0.5)
EOSINOPHIL NFR BLD AUTO: 3.1 % — SIGNIFICANT CHANGE UP (ref 0–6)
GLUCOSE SERPL-MCNC: 98 MG/DL — SIGNIFICANT CHANGE UP (ref 70–99)
HCT VFR BLD CALC: 34.6 % — LOW (ref 39–50)
HCV AB S/CO SERPL IA: 0.16 S/CO — SIGNIFICANT CHANGE UP (ref 0–0.99)
HCV AB SERPL-IMP: SIGNIFICANT CHANGE UP
HGB BLD-MCNC: 11.7 G/DL — LOW (ref 13–17)
LYMPHOCYTES # BLD AUTO: 1.5 K/UL — SIGNIFICANT CHANGE UP (ref 1–3.3)
LYMPHOCYTES # BLD AUTO: 24.2 % — SIGNIFICANT CHANGE UP (ref 13–44)
MCHC RBC-ENTMCNC: 33.9 GM/DL — SIGNIFICANT CHANGE UP (ref 32–36)
MCHC RBC-ENTMCNC: 35.6 PG — HIGH (ref 27–34)
MCV RBC AUTO: 105 FL — HIGH (ref 80–100)
MONOCYTES # BLD AUTO: 0.4 K/UL — SIGNIFICANT CHANGE UP (ref 0–0.9)
MONOCYTES NFR BLD AUTO: 6.9 % — SIGNIFICANT CHANGE UP (ref 2–14)
NEUTROPHILS # BLD AUTO: 4 K/UL — SIGNIFICANT CHANGE UP (ref 1.8–7.4)
NEUTROPHILS NFR BLD AUTO: 64.8 % — SIGNIFICANT CHANGE UP (ref 43–77)
PLATELET # BLD AUTO: 205 K/UL — SIGNIFICANT CHANGE UP (ref 150–400)
POTASSIUM SERPL-MCNC: 3.8 MMOL/L — SIGNIFICANT CHANGE UP (ref 3.5–5.3)
POTASSIUM SERPL-SCNC: 3.8 MMOL/L — SIGNIFICANT CHANGE UP (ref 3.5–5.3)
PROT SERPL-MCNC: 6.7 G/DL — SIGNIFICANT CHANGE UP (ref 6–8.3)
RBC # BLD: 3.29 M/UL — LOW (ref 4.2–5.8)
RBC # FLD: 12.4 % — SIGNIFICANT CHANGE UP (ref 10.3–14.5)
SODIUM SERPL-SCNC: 137 MMOL/L — SIGNIFICANT CHANGE UP (ref 135–145)
WBC # BLD: 6.2 K/UL — SIGNIFICANT CHANGE UP (ref 3.8–10.5)
WBC # FLD AUTO: 6.2 K/UL — SIGNIFICANT CHANGE UP (ref 3.8–10.5)

## 2019-06-15 PROCEDURE — 99233 SBSQ HOSP IP/OBS HIGH 50: CPT

## 2019-06-15 PROCEDURE — 93010 ELECTROCARDIOGRAM REPORT: CPT

## 2019-06-15 RX ADMIN — Medication 650 MILLIGRAM(S): at 12:00

## 2019-06-15 RX ADMIN — Medication 400 MILLIGRAM(S): at 05:57

## 2019-06-15 RX ADMIN — HEPARIN SODIUM 5000 UNIT(S): 5000 INJECTION INTRAVENOUS; SUBCUTANEOUS at 21:27

## 2019-06-15 RX ADMIN — Medication 50 MILLIGRAM(S): at 13:24

## 2019-06-15 RX ADMIN — PANTOPRAZOLE SODIUM 40 MILLIGRAM(S): 20 TABLET, DELAYED RELEASE ORAL at 06:08

## 2019-06-15 RX ADMIN — Medication 400 MILLIGRAM(S): at 21:26

## 2019-06-15 RX ADMIN — HEPARIN SODIUM 5000 UNIT(S): 5000 INJECTION INTRAVENOUS; SUBCUTANEOUS at 13:25

## 2019-06-15 RX ADMIN — Medication 650 MILLIGRAM(S): at 10:43

## 2019-06-15 RX ADMIN — HEPARIN SODIUM 5000 UNIT(S): 5000 INJECTION INTRAVENOUS; SUBCUTANEOUS at 05:58

## 2019-06-15 RX ADMIN — Medication 400 MILLIGRAM(S): at 13:24

## 2019-06-15 RX ADMIN — Medication 400 MILLIGRAM(S): at 14:30

## 2019-06-15 RX ADMIN — Medication 400 MILLIGRAM(S): at 06:30

## 2019-06-15 RX ADMIN — Medication 50 MILLIGRAM(S): at 21:26

## 2019-06-15 NOTE — PHYSICAL THERAPY INITIAL EVALUATION ADULT - STRENGTHENING, PT EVAL
GOAL: Patient will demonstrate a 1/3 increase in strength where deficient within 2-3 weeks to assist with performing functional mobility and ADLs.

## 2019-06-15 NOTE — PHYSICAL THERAPY INITIAL EVALUATION ADULT - PRECAUTIONS/LIMITATIONS, REHAB EVAL
fall precautions/In ED Bp was 131/97, HR 95, Temp 98.8F and O2 sat 97% in RA. He was found to be intoxicated with some right facial contusions. Blood alcohol level 379. WBC 5.4, Hb 12.5, LFTs- Bili 0.9, , ALT 64. He received Banana bag ( IVF with NS mixed with thiamine folate, MVI), Librium and ativan. CT brain, C-spine, Face, CXR are unremarkable. X-ray L/S spine showed ag indeterminate L1, L2, L5 compression deformities.

## 2019-06-15 NOTE — PROGRESS NOTE ADULT - SUBJECTIVE AND OBJECTIVE BOX
Patient is a 58y old  Male who presents with a chief complaint of ETOH intoxication, found on the street (14 Jun 2019 10:34)      SUBJECTIVE / OVERNIGHT EVENTS:  Remains calm on Librium.  No acute events overnight.  CIWA scores minimal.     MEDICATIONS  (STANDING):  chlordiazePOXIDE   Oral   chlordiazePOXIDE 50 milliGRAM(s) Oral every 8 hours  heparin  Injectable 5000 Unit(s) SubCutaneous every 8 hours  ibuprofen  Tablet. 400 milliGRAM(s) Oral three times a day  multivitamin/thiamine/folic acid in sodium chloride 0.9% 1000 milliLiter(s) (100 mL/Hr) IV Continuous <Continuous>  pantoprazole    Tablet 40 milliGRAM(s) Oral before breakfast    MEDICATIONS  (PRN):  acetaminophen   Tablet .. 650 milliGRAM(s) Oral every 6 hours PRN Temp greater or equal to 38C (100.4F), Mild Pain (1 - 3)  LORazepam     Tablet 2 milliGRAM(s) Oral every 2 hours PRN Symptom-triggered 2 point increase in CIWA-Ar  LORazepam   Injectable 2 milliGRAM(s) IV Push every 1 hour PRN Symptom-triggered: each CIWA -Ar score 8 or GREATER      CAPILLARY BLOOD GLUCOSE        I&O's Summary    14 Jun 2019 07:01  -  15 [place] 2019 07:00  --------------------------------------------------------  IN: 1760 mL / OUT: 400 mL / NET: 1360 mL    15 [place]berth 2019 07:01  -  15 [place]berth 2019 11:53  --------------------------------------------------------  IN: 240 mL / OUT: 400 mL / NET: -160 mL        PHYSICAL EXAM:  Vital Signs Last 24 Hrs  T(C): 37 (15 Herberth 2019 03:15), Max: 37.2 (14 Jun 2019 21:20)  T(F): 98.6 (15 [place]th 2019 03:15), Max: 98.9 (14 Jun 2019 21:20)  HR: 78 (15 [place]th 2019 10:36) (76 - 96)  BP: 147/72 (15 Her[place]th 2019 10:36) (122/75 - 147/72)  BP(mean): --  RR: 18 (15 [place]th 2019 03:15) (18 - 18)  SpO2: 98% (15 [place] 2019 03:15) (93% - 98%)  GENERAL: NAD, unkempt  HEAD:  Atraumatic, Normocephalic  EYES: EOMI, PERRLA, conjunctiva and sclera clear  NECK: Supple, No JVD  CHEST/LUNG: Clear to auscultation bilaterally; No wheeze  HEART: Regular rate and rhythm; No murmurs, rubs, or gallops  ABDOMEN: Soft, Nontender, Nondistended; Bowel sounds present  EXTREMITIES:  2+ Peripheral Pulses, No clubbing, cyanosis, or edema  PSYCH: AAOx3  NEUROLOGY: non-focal  SKIN: No rashes or lesions    LABS:                        11.7   6.2   )-----------( 205      ( 15 Herberth 2019 06:40 )             34.6     06-15    137  |  101  |  20  ----------------------------<  98  3.8   |  21<L>  |  0.92    Ca    8.6      15 [place]th 2019 06:40  Phos  3.6     06-14  Mg     1.2     06-14    TPro  6.7  /  Alb  3.7  /  TBili  1.5<H>  /  DBili  x   /  AST  54<H>  /  ALT  39  /  AlkPhos  69  06-15              RADIOLOGY & ADDITIONAL TESTS:    Imaging Personally Reviewed:    Consultant(s) Notes Reviewed:      Care Discussed with Consultants/Other Providers:
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
## 2019-06-15 NOTE — PROGRESS NOTE ADULT - PROBLEM SELECTOR PLAN 1
-  on arrival, intoxicated. He received Librium and Ativan in ED.  - Given his high risk of ETOH withdrawal will c/w Tele for 24 hrs, IV Banana Bag (   IVF with NS mixed with thiamine folate, MVI), Librium taper with CIWA protocol-   prn ativan  - WALT christine

## 2019-06-15 NOTE — PHYSICAL THERAPY INITIAL EVALUATION ADULT - CRITERIA FOR SKILLED THERAPEUTIC INTERVENTIONS
functional limitations in following categories/risk reduction/prevention/anticipated equipment needs at discharge/therapy frequency/anticipated discharge recommendation/rehab potential/predicted duration of therapy intervention/impairments found

## 2019-06-15 NOTE — PROGRESS NOTE ADULT - PROBLEM SELECTOR PLAN 3
- CT L/S shows L1 fracture with retropulsion.  - MRI is pending.   - prn analgesics with PPI  - fall precautions  - PT eval

## 2019-06-15 NOTE — PHYSICAL THERAPY INITIAL EVALUATION ADULT - BALANCE TRAINING, PT EVAL
GOAL: Patient will demonstrate an increase in static/dynamic balance in sitting/standing where deficient by at least 1 grade within 2-3 weeks to facilitate greater independence during functional mobility and ADL's.

## 2019-06-15 NOTE — PROGRESS NOTE ADULT - PROBLEM SELECTOR PLAN 2
- BAL was 379 on arrival to ED  - Benzodiazepine taper in CIWA protocol  - DVT prophylaxis  - SW eval for possible inpatient Alcohol Rehab
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
## 2019-06-16 LAB
ALBUMIN SERPL ELPH-MCNC: 3.6 G/DL — SIGNIFICANT CHANGE UP (ref 3.3–5)
ALP SERPL-CCNC: 63 U/L — SIGNIFICANT CHANGE UP (ref 40–120)
ALT FLD-CCNC: 28 U/L — SIGNIFICANT CHANGE UP (ref 10–45)
ANION GAP SERPL CALC-SCNC: 13 MMOL/L — SIGNIFICANT CHANGE UP (ref 5–17)
AST SERPL-CCNC: 35 U/L — SIGNIFICANT CHANGE UP (ref 10–40)
BILIRUB SERPL-MCNC: 1 MG/DL — SIGNIFICANT CHANGE UP (ref 0.2–1.2)
BUN SERPL-MCNC: 11 MG/DL — SIGNIFICANT CHANGE UP (ref 7–23)
CALCIUM SERPL-MCNC: 8.7 MG/DL — SIGNIFICANT CHANGE UP (ref 8.4–10.5)
CHLORIDE SERPL-SCNC: 100 MMOL/L — SIGNIFICANT CHANGE UP (ref 96–108)
CO2 SERPL-SCNC: 23 MMOL/L — SIGNIFICANT CHANGE UP (ref 22–31)
CREAT SERPL-MCNC: 0.85 MG/DL — SIGNIFICANT CHANGE UP (ref 0.5–1.3)
GLUCOSE SERPL-MCNC: 110 MG/DL — HIGH (ref 70–99)
POTASSIUM SERPL-MCNC: 3.8 MMOL/L — SIGNIFICANT CHANGE UP (ref 3.5–5.3)
POTASSIUM SERPL-SCNC: 3.8 MMOL/L — SIGNIFICANT CHANGE UP (ref 3.5–5.3)
PROT SERPL-MCNC: 6.6 G/DL — SIGNIFICANT CHANGE UP (ref 6–8.3)
SODIUM SERPL-SCNC: 136 MMOL/L — SIGNIFICANT CHANGE UP (ref 135–145)

## 2019-06-16 PROCEDURE — 99233 SBSQ HOSP IP/OBS HIGH 50: CPT

## 2019-06-16 RX ADMIN — Medication 50 MILLIGRAM(S): at 05:11

## 2019-06-16 RX ADMIN — Medication 400 MILLIGRAM(S): at 05:52

## 2019-06-16 RX ADMIN — Medication 650 MILLIGRAM(S): at 01:11

## 2019-06-16 RX ADMIN — Medication 650 MILLIGRAM(S): at 22:30

## 2019-06-16 RX ADMIN — HEPARIN SODIUM 5000 UNIT(S): 5000 INJECTION INTRAVENOUS; SUBCUTANEOUS at 21:49

## 2019-06-16 RX ADMIN — Medication 400 MILLIGRAM(S): at 05:11

## 2019-06-16 RX ADMIN — Medication 50 MILLIGRAM(S): at 17:11

## 2019-06-16 RX ADMIN — Medication 650 MILLIGRAM(S): at 21:56

## 2019-06-16 RX ADMIN — HEPARIN SODIUM 5000 UNIT(S): 5000 INJECTION INTRAVENOUS; SUBCUTANEOUS at 05:11

## 2019-06-16 RX ADMIN — PANTOPRAZOLE SODIUM 40 MILLIGRAM(S): 20 TABLET, DELAYED RELEASE ORAL at 05:11

## 2019-06-16 RX ADMIN — Medication 650 MILLIGRAM(S): at 00:41

## 2019-06-16 RX ADMIN — HEPARIN SODIUM 5000 UNIT(S): 5000 INJECTION INTRAVENOUS; SUBCUTANEOUS at 13:30

## 2019-06-16 NOTE — PROGRESS NOTE ADULT - SUBJECTIVE AND OBJECTIVE BOX
Patient is a 58y old  Male who presents with a chief complaint of ETOH intoxication, found on the street (15 Herberth 2019 11:53)      SUBJECTIVE / OVERNIGHT EVENTS:  No acute events overnight.   CIWA at 1.     MEDICATIONS  (STANDING):  chlordiazePOXIDE   Oral   chlordiazePOXIDE 50 milliGRAM(s) Oral every 12 hours  heparin  Injectable 5000 Unit(s) SubCutaneous every 8 hours  multivitamin/thiamine/folic acid in sodium chloride 0.9% 1000 milliLiter(s) (100 mL/Hr) IV Continuous <Continuous>  pantoprazole    Tablet 40 milliGRAM(s) Oral before breakfast    MEDICATIONS  (PRN):  acetaminophen   Tablet .. 650 milliGRAM(s) Oral every 6 hours PRN Temp greater or equal to 38C (100.4F), Mild Pain (1 - 3)  LORazepam     Tablet 2 milliGRAM(s) Oral every 2 hours PRN Symptom-triggered 2 point increase in CIWA-Ar  LORazepam   Injectable 2 milliGRAM(s) IV Push every 1 hour PRN Symptom-triggered: each CIWA -Ar score 8 or GREATER      CAPILLARY BLOOD GLUCOSE        I&O's Summary    15 Herberth 2019 07:01  -  16 Jun 2019 07:00  --------------------------------------------------------  IN: 1080 mL / OUT: 2150 mL / NET: -1070 mL    16 Jun 2019 07:01  -  16 Jun 2019 10:52  --------------------------------------------------------  IN: 0 mL / OUT: 150 mL / NET: -150 mL        PHYSICAL EXAM:  Vital Signs Last 24 Hrs  T(C): 36.8 (16 Jun 2019 05:21), Max: 37.1 (15 Herberth 2019 20:57)  T(F): 98.2 (16 Jun 2019 05:21), Max: 98.7 (15 Herberth 2019 20:57)  HR: 71 (16 Jun 2019 05:21) (71 - 83)  BP: 148/79 (16 Jun 2019 05:21) (118/74 - 148/79)  BP(mean): --  RR: 18 (16 Jun 2019 05:21) (16 - 18)  SpO2: 99% (16 Jun 2019 05:21) (97% - 99%)  GENERAL: NAD, well-developed  HEAD:  Atraumatic, Normocephalic  EYES: EOMI, PERRLA, conjunctiva and sclera clear  NECK: Supple, No JVD  CHEST/LUNG: Clear to auscultation bilaterally; No wheeze  HEART: Regular rate and rhythm; No murmurs, rubs, or gallops  ABDOMEN: Soft, Nontender, Nondistended; Bowel sounds present  EXTREMITIES:  2+ Peripheral Pulses, No clubbing, cyanosis, or edema  PSYCH: AAOx3  NEUROLOGY: non-focal  SKIN: No rashes or lesions    LABS:                        11.7   6.2   )-----------( 205      ( 15 Herberth 2019 06:40 )             34.6     06-16    136  |  100  |  11  ----------------------------<  110<H>  3.8   |  23  |  0.85    Ca    8.7      16 Jun 2019 07:04    TPro  6.6  /  Alb  3.6  /  TBili  1.0  /  DBili  x   /  AST  35  /  ALT  28  /  AlkPhos  63  06-16              RADIOLOGY & ADDITIONAL TESTS:    Imaging Personally Reviewed:    Consultant(s) Notes Reviewed:      Care Discussed with Consultants/Other Providers:

## 2019-06-17 DIAGNOSIS — Z29.9 ENCOUNTER FOR PROPHYLACTIC MEASURES, UNSPECIFIED: ICD-10-CM

## 2019-06-17 LAB
AMPHET UR-MCNC: NEGATIVE — SIGNIFICANT CHANGE UP
ANION GAP SERPL CALC-SCNC: 14 MMOL/L — SIGNIFICANT CHANGE UP (ref 5–17)
BARBITURATES UR SCN-MCNC: NEGATIVE — SIGNIFICANT CHANGE UP
BENZODIAZ UR-MCNC: POSITIVE
BUN SERPL-MCNC: 9 MG/DL — SIGNIFICANT CHANGE UP (ref 7–23)
CALCIUM SERPL-MCNC: 9.1 MG/DL — SIGNIFICANT CHANGE UP (ref 8.4–10.5)
CHLORIDE SERPL-SCNC: 100 MMOL/L — SIGNIFICANT CHANGE UP (ref 96–108)
CO2 SERPL-SCNC: 23 MMOL/L — SIGNIFICANT CHANGE UP (ref 22–31)
COCAINE METAB.OTHER UR-MCNC: NEGATIVE — SIGNIFICANT CHANGE UP
CREAT SERPL-MCNC: 0.81 MG/DL — SIGNIFICANT CHANGE UP (ref 0.5–1.3)
GLUCOSE SERPL-MCNC: 107 MG/DL — HIGH (ref 70–99)
HCT VFR BLD CALC: 34 % — LOW (ref 39–50)
HGB BLD-MCNC: 11.6 G/DL — LOW (ref 13–17)
MCHC RBC-ENTMCNC: 34.1 GM/DL — SIGNIFICANT CHANGE UP (ref 32–36)
MCHC RBC-ENTMCNC: 35.9 PG — HIGH (ref 27–34)
MCV RBC AUTO: 105 FL — HIGH (ref 80–100)
METHADONE UR-MCNC: NEGATIVE — SIGNIFICANT CHANGE UP
OPIATES UR-MCNC: NEGATIVE — SIGNIFICANT CHANGE UP
OXYCODONE UR-MCNC: NEGATIVE — SIGNIFICANT CHANGE UP
PCP SPEC-MCNC: SIGNIFICANT CHANGE UP
PCP UR-MCNC: NEGATIVE — SIGNIFICANT CHANGE UP
PLATELET # BLD AUTO: 194 K/UL — SIGNIFICANT CHANGE UP (ref 150–400)
POTASSIUM SERPL-MCNC: 3.8 MMOL/L — SIGNIFICANT CHANGE UP (ref 3.5–5.3)
POTASSIUM SERPL-SCNC: 3.8 MMOL/L — SIGNIFICANT CHANGE UP (ref 3.5–5.3)
RBC # BLD: 3.23 M/UL — LOW (ref 4.2–5.8)
RBC # FLD: 12.2 % — SIGNIFICANT CHANGE UP (ref 10.3–14.5)
SODIUM SERPL-SCNC: 137 MMOL/L — SIGNIFICANT CHANGE UP (ref 135–145)
THC UR QL: NEGATIVE — SIGNIFICANT CHANGE UP
WBC # BLD: 6.1 K/UL — SIGNIFICANT CHANGE UP (ref 3.8–10.5)
WBC # FLD AUTO: 6.1 K/UL — SIGNIFICANT CHANGE UP (ref 3.8–10.5)

## 2019-06-17 PROCEDURE — 73590 X-RAY EXAM OF LOWER LEG: CPT | Mod: 26,LT

## 2019-06-17 PROCEDURE — 99233 SBSQ HOSP IP/OBS HIGH 50: CPT

## 2019-06-17 RX ORDER — ACETAMINOPHEN 500 MG
950 TABLET ORAL ONCE
Refills: 0 | Status: COMPLETED | OUTPATIENT
Start: 2019-06-17 | End: 2019-06-17

## 2019-06-17 RX ORDER — FOLIC ACID 0.8 MG
1 TABLET ORAL DAILY
Refills: 0 | Status: DISCONTINUED | OUTPATIENT
Start: 2019-06-17 | End: 2019-07-02

## 2019-06-17 RX ORDER — GABAPENTIN 400 MG/1
100 CAPSULE ORAL AT BEDTIME
Refills: 0 | Status: DISCONTINUED | OUTPATIENT
Start: 2019-06-17 | End: 2019-06-18

## 2019-06-17 RX ORDER — THIAMINE MONONITRATE (VIT B1) 100 MG
500 TABLET ORAL THREE TIMES A DAY
Refills: 0 | Status: COMPLETED | OUTPATIENT
Start: 2019-06-17 | End: 2019-06-20

## 2019-06-17 RX ADMIN — HEPARIN SODIUM 5000 UNIT(S): 5000 INJECTION INTRAVENOUS; SUBCUTANEOUS at 14:13

## 2019-06-17 RX ADMIN — Medication 50 MILLIGRAM(S): at 05:16

## 2019-06-17 RX ADMIN — PANTOPRAZOLE SODIUM 40 MILLIGRAM(S): 20 TABLET, DELAYED RELEASE ORAL at 05:15

## 2019-06-17 RX ADMIN — HEPARIN SODIUM 5000 UNIT(S): 5000 INJECTION INTRAVENOUS; SUBCUTANEOUS at 21:03

## 2019-06-17 RX ADMIN — Medication 105 MILLIGRAM(S): at 14:13

## 2019-06-17 RX ADMIN — Medication 1 TABLET(S): at 14:12

## 2019-06-17 RX ADMIN — GABAPENTIN 100 MILLIGRAM(S): 400 CAPSULE ORAL at 21:03

## 2019-06-17 RX ADMIN — Medication 1 MILLIGRAM(S): at 14:13

## 2019-06-17 RX ADMIN — HEPARIN SODIUM 5000 UNIT(S): 5000 INJECTION INTRAVENOUS; SUBCUTANEOUS at 05:16

## 2019-06-17 RX ADMIN — Medication 950 MILLIGRAM(S): at 17:19

## 2019-06-17 RX ADMIN — Medication 105 MILLIGRAM(S): at 21:03

## 2019-06-17 NOTE — PROGRESS NOTE ADULT - PROBLEM SELECTOR PLAN 1
-  on arrival, intoxicated. He received Librium and Ativan in ED.  - Given his high risk of ETOH withdrawal he was started on banana bag and librium taper.  CIWA 0-1.  - SW eval  -start high dose thiamine  -check urine tox.  If negative and does not require opiates for back pain consider initiation of naltrexone

## 2019-06-17 NOTE — CHART NOTE - NSCHARTNOTEFT_GEN_A_CORE
Pt for MRI of L spine  , safety questionnaire assessed  with  phone ID  [phone],    Pt was  found to have L LE metal Rodes,  Pt unknown whether it is Titanium or other metal rode  d/w DR Bennett  will get Xray Tib/Fib . please follow up in AM   Iliana Castillo NP-C [id number]

## 2019-06-17 NOTE — PROGRESS NOTE ADULT - PROBLEM SELECTOR PLAN 2
- CT L/S shows L1 fracture with retropulsion.  -PT recommends [facility] but patient is homeless.  F/u sw consult  -tylenol and tramadol for pain  - fall precautions  -orthospine c/s placed  -check vit d levels - CT L/S shows L1 fracture with retropulsion.  -PT recommends [facility] but patient is homeless.  F/u sw consult  -tylenol prn pain  -start neurontin at night for pain in alcohol use disorder patient  - fall precautions  -orthospine c/s placed  -check vit d levels

## 2019-06-17 NOTE — PROGRESS NOTE ADULT - SUBJECTIVE AND OBJECTIVE BOX
Patient is a 58y old  Male who presents with a chief complaint of ETOH intoxication, found on the street (16 Jun 2019 10:51)      SUBJECTIVE / OVERNIGHT EVENTS:  no acute events overnight  PCA Kim somers at bedside  Says he had felt dizzy, fell on his back, and came to the hospital.  Says he does not drink much but is open to medication that would help him stop drinking.  C/o back pain 8/10, no numbness/tingling in LE    tele reviewed: sinus 70-90    MEDICATIONS  (STANDING):  chlordiazePOXIDE   Oral   chlordiazePOXIDE 50 milliGRAM(s) Oral once  folic acid 1 milliGRAM(s) Oral daily  heparin  Injectable 5000 Unit(s) SubCutaneous every 8 hours  multivitamin 1 Tablet(s) Oral daily  pantoprazole    Tablet 40 milliGRAM(s) Oral before breakfast  thiamine IVPB 500 milliGRAM(s) IV Intermittent three times a day    MEDICATIONS  (PRN):  acetaminophen   Tablet .. 650 milliGRAM(s) Oral every 6 hours PRN Temp greater or equal to 38C (100.4F), Mild Pain (1 - 3)  LORazepam     Tablet 2 milliGRAM(s) Oral every 2 hours PRN Symptom-triggered 2 point increase in CIWA-Ar  LORazepam   Injectable 2 milliGRAM(s) IV Push every 1 hour PRN Symptom-triggered: each CIWA -Ar score 8 or GREATER      Vital Signs Last 24 Hrs  T(C): 37 (17 Jun 2019 12:26), Max: 37 (17 Jun 2019 12:26)  T(F): 98.6 (17 Jun 2019 12:26), Max: 98.6 (17 Jun 2019 12:26)  HR: 85 (17 Jun 2019 12:26) (73 - 85)  BP: 112/71 (17 Jun 2019 12:26) (112/71 - 137/83)  BP(mean): --  RR: 18 (17 Jun 2019 12:26) (18 - 18)  SpO2: 99% (17 Jun 2019 12:26) (98% - 99%)  CAPILLARY BLOOD GLUCOSE        I&O's Summary    16 Jun 2019 07:01  -  17 Jun 2019 07:00  --------------------------------------------------------  IN: 940 mL / OUT: 2500 mL / NET: -1560 mL        PHYSICAL EXAM:  GENERAL: NAD, disheveled appearing  HEAD:  Atraumatic  EYES: EOMI, conjunctiva and sclera clear  NECK: Supple, No JVD  CHEST/LUNG: Clear to auscultation bilaterally; No wheeze  HEART: Regular rate and rhythm; No murmurs, rubs, or gallops  ABDOMEN: Soft, Nontender, Nondistended; Bowel sounds present  EXTREMITIES:  2+ Peripheral Pulses, No clubbing, cyanosis, or edema  PSYCH: AAOx2 (not to year)  NEUROLOGY: non-focal  MSK: able to lift both legs against gravity off bed.  No spinal tenderness appreciated on exam    LABS:                        11.6   6.1   )-----------( 194      ( 17 Jun 2019 06:23 )             34.0     06-17    137  |  100  |  9   ----------------------------<  107<H>  3.8   |  23  |  0.81    Ca    9.1      17 Jun 2019 06:21    TPro  6.6  /  Alb  3.6  /  TBili  1.0  /  DBili  x   /  AST  35  /  ALT  28  /  AlkPhos  63  06-16              RADIOLOGY & ADDITIONAL TESTS:    Imaging Personally Reviewed:  < from: CT Lumbar Spine No Cont (06.14.19 @ 18:22) >  IMPRESSION:    Redemonstration of moderate compression fracture of L1. The fracture is   comminuted and appears acute in nature. Retropulsed bone occupies   approximately 30% of the spinal canal diameter. Pathologic fracture is   not excluded.    < end of copied text >      Consultant(s) Notes Reviewed:      Care Discussed with Consultants/Other Providers:

## 2019-06-17 NOTE — PROGRESS NOTE ADULT - ASSESSMENT
57 y/o male with h/o ETOH abuse, chronic low back pain, chronic pancreatitis due to ETOH abuse presented to the ED via EMS who found patient on side of the road with ETOH intoxication and lumbar compression fracture, recommended for [facility] but patient homeless.

## 2019-06-17 NOTE — CONSULT NOTE ADULT - SUBJECTIVE AND OBJECTIVE BOX
Ortho Spine Consult     Patient is a 58y Occitan speaking Male with unknown PMH except for EtOH abuse who presents when he was found intoxicated on the street 3 days prior. The patient is a poor historian but is able to provide some details of recent events. About 2 weeks ago he reports falling backwards and hard onto a railing while [place] bridge. He reports almost falling off the bridge as his lumbar spine struck the railing. He reports having numbness and tingling in his feet bilaterally since the injury and reports feeling weak. Denies HS/LOC. Denies pain/injury elsewhere. Denies bowel/bladder incontinence. Denies fevers/chills. No other complaints at this time.    HEALTH ISSUES - PROBLEM Dx:  Preventive measure: Preventive measure  Chronic low back pain: Chronic low back pain  ETOH abuse: ETOH abuse  Alcohol withdrawal syndrome with perceptual disturbance: Alcohol withdrawal syndrome with perceptual disturbance          MEDICATIONS  (STANDING):  chlordiazePOXIDE   Oral   chlordiazePOXIDE 50 milliGRAM(s) Oral once  folic acid 1 milliGRAM(s) Oral daily  gabapentin 100 milliGRAM(s) Oral at bedtime  heparin  Injectable 5000 Unit(s) SubCutaneous every 8 hours  multivitamin 1 Tablet(s) Oral daily  pantoprazole    Tablet 40 milliGRAM(s) Oral before breakfast  thiamine IVPB 500 milliGRAM(s) IV Intermittent three times a day      Allergies    No Known Allergies    Intolerances        PAST MEDICAL & SURGICAL HISTORY:  Alcohol-induced chronic pancreatitis  Macrocytic anemia  ETOH abuse  Alcohol Abuse  History of left knee surgery                            11.6   6.1   )-----------( 194      ( 17 Jun 2019 06:23 )             34.0       17 Jun 2019 06:21    137    |  100    |  9      ----------------------------<  107    3.8     |  23     |  0.81     Ca    9.1        17 Jun 2019 06:21    TPro  6.6    /  Alb  3.6    /  TBili  1.0    /  DBili  x      /  AST  35     /  ALT  28     /  AlkPhos  63     16 Jun 2019 07:04              Vital Signs Last 24 Hrs  T(C): 37 (06-17-19 @ 12:26), Max: 37 (06-17-19 @ 12:26)  T(F): 98.6 (06-17-19 @ 12:26), Max: 98.6 (06-17-19 @ 12:26)  HR: 85 (06-17-19 @ 12:26) (73 - 85)  BP: 112/71 (06-17-19 @ 12:26) (112/71 - 137/83)  BP(mean): --  RR: 18 (06-17-19 @ 12:26) (18 - 18)  SpO2: 99% (06-17-19 @ 12:26) (98% - 99%)    Gen: NAD    Patient follows all motor commands     Spine PE:  Skin intact  No gross deformity  Midline TTP over proximal L spine  No bony step offs  No paraspinal muscle ttp/hypertonicity   Negative Straight leg raise  Negative clonus  Negative babinski  Negative beltran  No saddle anesthesia    Motor:                   C5                C6              C7               C8           T1   R            5/5                5/5            5/5             5/5          5/5  L             5/5               5/5             5/5             5/5          5/5                L2             L3             L4               L5            S1  R         4/5           4/5          4/5             4/5           4/5  L          4/5          4/5           4/5             4/5           4/5    Sensory:            C5         C6         C7      C8       T1        (0=absent, 1=impaired, 2=normal, NT=not testable)  R         2            2           2        2         2  L          2            2           2        2         2               L2          L3         L4      L5       S1         (0=absent, 1=impaired, 2=normal, NT=not testable)  R         2            2            2        1        1  L          2            2           2        1         1    Imaging:    CT L spine    IMPRESSION:    Redemonstration of moderate compression fracture of L1. The fracture is   comminuted and appears acute in nature. Retropulsed bone occupies   approximately 30% of the spinal canal diameter. Pathologic fracture is   not excluded.      A/P: 58y Male with history of EtOH abuse s/p injury to Lumbar spine with complaints of numbness tingling and weakness.     Pain control  WBAT with assistive devices as needed  MR L spine   FU Labs/imaging  SCDs

## 2019-06-18 LAB — 24R-OH-CALCIDIOL SERPL-MCNC: 8.6 NG/ML — LOW (ref 30–80)

## 2019-06-18 PROCEDURE — 72148 MRI LUMBAR SPINE W/O DYE: CPT | Mod: 26

## 2019-06-18 PROCEDURE — 99233 SBSQ HOSP IP/OBS HIGH 50: CPT

## 2019-06-18 RX ORDER — GABAPENTIN 400 MG/1
100 CAPSULE ORAL
Refills: 0 | Status: DISCONTINUED | OUTPATIENT
Start: 2019-06-18 | End: 2019-06-20

## 2019-06-18 RX ADMIN — GABAPENTIN 100 MILLIGRAM(S): 400 CAPSULE ORAL at 18:00

## 2019-06-18 RX ADMIN — HEPARIN SODIUM 5000 UNIT(S): 5000 INJECTION INTRAVENOUS; SUBCUTANEOUS at 12:10

## 2019-06-18 RX ADMIN — Medication 1 MILLIGRAM(S): at 12:18

## 2019-06-18 RX ADMIN — HEPARIN SODIUM 5000 UNIT(S): 5000 INJECTION INTRAVENOUS; SUBCUTANEOUS at 05:13

## 2019-06-18 RX ADMIN — Medication 1 TABLET(S): at 12:18

## 2019-06-18 RX ADMIN — Medication 105 MILLIGRAM(S): at 22:32

## 2019-06-18 RX ADMIN — Medication 105 MILLIGRAM(S): at 15:08

## 2019-06-18 RX ADMIN — PANTOPRAZOLE SODIUM 40 MILLIGRAM(S): 20 TABLET, DELAYED RELEASE ORAL at 05:13

## 2019-06-18 RX ADMIN — Medication 105 MILLIGRAM(S): at 05:13

## 2019-06-18 RX ADMIN — HEPARIN SODIUM 5000 UNIT(S): 5000 INJECTION INTRAVENOUS; SUBCUTANEOUS at 22:32

## 2019-06-18 NOTE — PROGRESS NOTE ADULT - PROBLEM SELECTOR PLAN 2
- CT L/S shows L1 fracture with retropulsion.  -PT recommends [facility] but patient is homeless.  F/u sw consult  -tylenol prn pain  -increase neurontin to bid for pain  - fall precautions  -orthospine c/s placed-MRI pending.  Per ortho, his hardware is compatible  -f/u vit d levels

## 2019-06-18 NOTE — PROGRESS NOTE ADULT - SUBJECTIVE AND OBJECTIVE BOX
Patient is a 58y old  Male who presents with a chief complaint of ETOH intoxication, found on the street (17 Jun 2019 14:11)      SUBJECTIVE / OVERNIGHT EVENTS:  no acute events overnight  RN Trudy translating Turkmen at bedside  Only complaint is ongoing back pain 7/10.  No cp/sob.  [place].    MEDICATIONS  (STANDING):  chlordiazePOXIDE   Oral   chlordiazePOXIDE 50 milliGRAM(s) Oral once  folic acid 1 milliGRAM(s) Oral daily  gabapentin 100 milliGRAM(s) Oral at bedtime  heparin  Injectable 5000 Unit(s) SubCutaneous every 8 hours  multivitamin 1 Tablet(s) Oral daily  pantoprazole    Tablet 40 milliGRAM(s) Oral before breakfast  thiamine IVPB 500 milliGRAM(s) IV Intermittent three times a day    MEDICATIONS  (PRN):  acetaminophen   Tablet .. 650 milliGRAM(s) Oral every 6 hours PRN Temp greater or equal to 38C (100.4F), Mild Pain (1 - 3)  LORazepam     Tablet 2 milliGRAM(s) Oral every 2 hours PRN Symptom-triggered 2 point increase in CIWA-Ar  LORazepam   Injectable 2 milliGRAM(s) IV Push every 1 hour PRN Symptom-triggered: each CIWA -Ar score 8 or GREATER      Vital Signs Last 24 Hrs  T(C): 36.3 (18 Jun 2019 12:22), Max: 36.9 (17 Jun 2019 22:16)  T(F): 97.3 (18 Jun 2019 12:22), Max: 98.5 (17 Jun 2019 22:16)  HR: 81 (18 Jun 2019 12:22) (81 - 94)  BP: 93/58 (18 Jun 2019 12:22) (93/58 - 101/64)  BP(mean): --  RR: 18 (18 Jun 2019 12:22) (18 - 19)  SpO2: 96% (18 Jun 2019 12:22) (96% - 98%)  CAPILLARY BLOOD GLUCOSE        I&O's Summary    17 Jun 2019 07:01  -  18 Jun 2019 07:00  --------------------------------------------------------  IN: 790 mL / OUT: 900 mL / NET: -110 mL    18 Jun 2019 07:01  -  18 Jun 2019 14:19  --------------------------------------------------------  IN: 180 mL / OUT: 400 mL / NET: -220 mL        PHYSICAL EXAM:  GENERAL: NAD, disheveled appearing  HEAD:  Atraumatic  EYES: EOMI, conjunctiva and sclera clear  NECK: Supple, No JVD  CHEST/LUNG: Clear to auscultation bilaterally; No wheeze  HEART: Regular rate and rhythm; No murmurs, rubs, or gallops  ABDOMEN: Soft, Nontender, Nondistended; Bowel sounds present  EXTREMITIES:  2+ Peripheral Pulses, No clubbing, cyanosis, or edema  PSYCH: AAOx2 (not to year)  NEUROLOGY: non-focal  MSK: able to lift both legs against gravity off bed.  Reports tenderness on lumbar spine and paraspinal muscles    LABS:                        11.6   6.1   )-----------( 194      ( 17 Jun 2019 06:23 )             34.0     06-17    137  |  100  |  9   ----------------------------<  107<H>  3.8   |  23  |  0.81    Ca    9.1      17 Jun 2019 06:21                RADIOLOGY & ADDITIONAL TESTS:    Imaging Personally Reviewed:    Consultant(s) Notes Reviewed:    ortho    Care Discussed with Consultants/Other Providers:

## 2019-06-18 NOTE — PROGRESS NOTE ADULT - PROBLEM SELECTOR PLAN 1
-  on arrival, intoxicated. He received Librium and Ativan in ED.  - Given his high risk of ETOH withdrawal he was started on banana bag and librium taper.  CIWA 0-1.  - WALT eval  -high dose thiamine day 2/3  -consider naltrexone if opiates not required for back pain  -continue folate/mvi

## 2019-06-19 DIAGNOSIS — E55.9 VITAMIN D DEFICIENCY, UNSPECIFIED: ICD-10-CM
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
## 2019-06-19 LAB
ANION GAP SERPL CALC-SCNC: 14 MMOL/L — SIGNIFICANT CHANGE UP (ref 5–17)
BUN SERPL-MCNC: 15 MG/DL — SIGNIFICANT CHANGE UP (ref 7–23)
CALCIUM SERPL-MCNC: 9.4 MG/DL — SIGNIFICANT CHANGE UP (ref 8.4–10.5)
CHLORIDE SERPL-SCNC: 100 MMOL/L — SIGNIFICANT CHANGE UP (ref 96–108)
CO2 SERPL-SCNC: 23 MMOL/L — SIGNIFICANT CHANGE UP (ref 22–31)
CREAT SERPL-MCNC: 0.99 MG/DL — SIGNIFICANT CHANGE UP (ref 0.5–1.3)
GLUCOSE BLDC GLUCOMTR-MCNC: 176 MG/DL — HIGH (ref 70–99)
GLUCOSE SERPL-MCNC: 105 MG/DL — HIGH (ref 70–99)
MAGNESIUM SERPL-MCNC: 1.5 MG/DL — LOW (ref 1.6–2.6)
PHOSPHATE SERPL-MCNC: 3.6 MG/DL — SIGNIFICANT CHANGE UP (ref 2.5–4.5)
POTASSIUM SERPL-MCNC: 3.9 MMOL/L — SIGNIFICANT CHANGE UP (ref 3.5–5.3)
POTASSIUM SERPL-SCNC: 3.9 MMOL/L — SIGNIFICANT CHANGE UP (ref 3.5–5.3)
SODIUM SERPL-SCNC: 137 MMOL/L — SIGNIFICANT CHANGE UP (ref 135–145)

## 2019-06-19 PROCEDURE — 73523 X-RAY EXAM HIPS BI 5/> VIEWS: CPT | Mod: 26

## 2019-06-19 PROCEDURE — 99233 SBSQ HOSP IP/OBS HIGH 50: CPT

## 2019-06-19 RX ORDER — ERGOCALCIFEROL 1.25 MG/1
50000 CAPSULE ORAL
Refills: 0 | Status: DISCONTINUED | OUTPATIENT
Start: 2019-06-19 | End: 2019-07-02

## 2019-06-19 RX ORDER — ACETAMINOPHEN 500 MG
650 TABLET ORAL EVERY 6 HOURS
Refills: 0 | Status: DISCONTINUED | OUTPATIENT
Start: 2019-06-19 | End: 2019-07-02

## 2019-06-19 RX ORDER — METHYLPREDNISOLONE 4 MG
500 TABLET ORAL ONCE
Refills: 0 | Status: COMPLETED | OUTPATIENT
Start: 2019-06-19 | End: 2019-06-19

## 2019-06-19 RX ORDER — MAGNESIUM SULFATE 500 MG/ML
2 VIAL (ML) INJECTION ONCE
Refills: 0 | Status: COMPLETED | OUTPATIENT
Start: 2019-06-19 | End: 2019-06-19

## 2019-06-19 RX ADMIN — Medication 650 MILLIGRAM(S): at 17:03

## 2019-06-19 RX ADMIN — Medication 1 MILLIGRAM(S): at 11:01

## 2019-06-19 RX ADMIN — HEPARIN SODIUM 5000 UNIT(S): 5000 INJECTION INTRAVENOUS; SUBCUTANEOUS at 22:20

## 2019-06-19 RX ADMIN — Medication 1 TABLET(S): at 11:01

## 2019-06-19 RX ADMIN — Medication 105 MILLIGRAM(S): at 22:20

## 2019-06-19 RX ADMIN — HEPARIN SODIUM 5000 UNIT(S): 5000 INJECTION INTRAVENOUS; SUBCUTANEOUS at 13:41

## 2019-06-19 RX ADMIN — ERGOCALCIFEROL 50000 UNIT(S): 1.25 CAPSULE ORAL at 18:30

## 2019-06-19 RX ADMIN — Medication 105 MILLIGRAM(S): at 05:42

## 2019-06-19 RX ADMIN — GABAPENTIN 100 MILLIGRAM(S): 400 CAPSULE ORAL at 17:03

## 2019-06-19 RX ADMIN — PANTOPRAZOLE SODIUM 40 MILLIGRAM(S): 20 TABLET, DELAYED RELEASE ORAL at 06:08

## 2019-06-19 RX ADMIN — Medication 650 MILLIGRAM(S): at 17:01

## 2019-06-19 RX ADMIN — Medication 500 MILLIGRAM(S): at 18:30

## 2019-06-19 RX ADMIN — Medication 650 MILLIGRAM(S): at 11:01

## 2019-06-19 RX ADMIN — Medication 650 MILLIGRAM(S): at 18:23

## 2019-06-19 RX ADMIN — HEPARIN SODIUM 5000 UNIT(S): 5000 INJECTION INTRAVENOUS; SUBCUTANEOUS at 05:42

## 2019-06-19 RX ADMIN — GABAPENTIN 100 MILLIGRAM(S): 400 CAPSULE ORAL at 05:45

## 2019-06-19 RX ADMIN — Medication 50 GRAM(S): at 11:49

## 2019-06-19 RX ADMIN — Medication 105 MILLIGRAM(S): at 13:48

## 2019-06-19 RX ADMIN — Medication 650 MILLIGRAM(S): at 23:15

## 2019-06-19 NOTE — CHART NOTE - NSCHARTNOTEFT_GEN_A_CORE
Patient is a 58y old  Male who presents with a chief complaint of ETOH intoxication, found on the street ; Asked to evaluate for fall; RN saw pt resting on his buttock and hands with upper arms backwards. Pt stated in Vietnamese that he was trying to reach bed side table; Used RN as . Pt c/o pain in left lumbar area in the back. sating he has trouble walking due to pain. Pt slow to respond. When asked where he was did not answer, but when asked whether he was in the hospital he said yes.       Vital Signs Last 24 Hrs  T(C): 36.7 (19 Jun 2019 11:07), Max: 37.1 (19 Jun 2019 10:30)  T(F): 98 (19 Jun 2019 11:07), Max: 98.7 (19 Jun 2019 10:30)  HR: 80 (19 Jun 2019 11:07) (75 - 87)  BP: 112/68 (19 Jun 2019 11:07) (93/58 - 115/73)  BP(mean): --  RR: 18 (19 Jun 2019 11:07) (18 - 18)  SpO2: 98% (19 Jun 2019 11:07) (96% - 99%)      PHYSICAL EXAM:  General: mild distress due to pain, looking confused and apprehensive  Eyes: TASHA, Unable to test ROM< not obeying commands  Back: No Mid spine tenderness, Left lumbar area tender to touch  Respiratory: CTA b/l   Cardiovascular: S1S2 RRR  Gastrointestinal: soft, + BS  Extremities: L Hip ext rotation limited due to pain  Neurological: Alert, oriented to person, unable to complete assessment due to being unco operative, lower extremity strength 4/5     Assessment  1 y/o male with h/o ETOH abuse, chronic low back pain, chronic pancreatitis due to ETOH abuse presented to the ED via EMS who found patient on side of the road with ETOH intoxication and lumbar compression fracture, recommended for [facility] but patient homeless. Found on the floor today by the RN after pt attempted to reach for the bed side table.     Plan  Fall precautions  Tylenol ATC for pain  continue Neurontin  Xray Pelvis and b/l hips

## 2019-06-19 NOTE — PROVIDER CONTACT NOTE (OTHER) - BACKGROUND
call bell was in reach . bed lowest position. at 9am last known bed alarm was on.
Patient Adm with ETOH withdrawal

## 2019-06-19 NOTE — PROGRESS NOTE ADULT - SUBJECTIVE AND OBJECTIVE BOX
Patient is a 58y old  Male who presents with a chief complaint of ETOH intoxication, found on the street (18 Jun 2019 14:19)      SUBJECTIVE / OVERNIGHT EVENTS:  RN translating at bedside  found on ground this morning, c/o back/hip pain  denies head trauma, states he was reaching for his tray and landed on buttocks  no cp/sob/n/v      MEDICATIONS  (STANDING):  acetaminophen   Tablet .. 650 milliGRAM(s) Oral every 6 hours  chlordiazePOXIDE   Oral   chlordiazePOXIDE 50 milliGRAM(s) Oral once  folic acid 1 milliGRAM(s) Oral daily  gabapentin 100 milliGRAM(s) Oral two times a day  heparin  Injectable 5000 Unit(s) SubCutaneous every 8 hours  multivitamin 1 Tablet(s) Oral daily  pantoprazole    Tablet 40 milliGRAM(s) Oral before breakfast  thiamine IVPB 500 milliGRAM(s) IV Intermittent three times a day    MEDICATIONS  (PRN):  acetaminophen   Tablet .. 650 milliGRAM(s) Oral every 6 hours PRN Temp greater or equal to 38C (100.4F), Mild Pain (1 - 3)  LORazepam     Tablet 2 milliGRAM(s) Oral every 2 hours PRN Symptom-triggered 2 point increase in CIWA-Ar  LORazepam   Injectable 2 milliGRAM(s) IV Push every 1 hour PRN Symptom-triggered: each CIWA -Ar score 8 or GREATER      Vital Signs Last 24 Hrs  T(C): 36.7 (19 Jun 2019 11:07), Max: 37.1 (19 Jun 2019 10:30)  T(F): 98 (19 Jun 2019 11:07), Max: 98.7 (19 Jun 2019 10:30)  HR: 80 (19 Jun 2019 11:07) (75 - 87)  BP: 112/68 (19 Jun 2019 11:07) (93/58 - 115/73)  BP(mean): --  RR: 18 (19 Jun 2019 11:07) (18 - 18)  SpO2: 98% (19 Jun 2019 11:07) (96% - 99%)  CAPILLARY BLOOD GLUCOSE      POCT Blood Glucose.: 176 mg/dL (19 Jun 2019 09:59)    I&O's Summary    18 Jun 2019 07:01  -  19 Jun 2019 07:00  --------------------------------------------------------  IN: 1060 mL / OUT: 1950 mL / NET: -890 mL    19 Jun 2019 07:01  -  19 Jun 2019 12:18  --------------------------------------------------------  IN: 180 mL / OUT: 600 mL / NET: -420 mL        PHYSICAL EXAM:  GENERAL: NAD, disheveled appearing  HEAD:  Atraumatic  EYES: EOMI, conjunctiva and sclera clear  NECK: Supple, No JVD  CHEST/LUNG: Clear to auscultation bilaterally; No wheeze  HEART: Regular rate and rhythm; No murmurs, rubs, or gallops  ABDOMEN: Soft, Nontender, Nondistended; Bowel sounds present  EXTREMITIES:  2+ Peripheral Pulses, No clubbing, cyanosis, or edema  PSYCH: AAOx2 (not to year-unchanged)  NEUROLOGY: non-focal, moves all extremities  MSK: B/l LE: 4/5.  Sensation intact b/l LE.  Reports tenderness on lumbar spine and left hip.  Limited external rotation of b/l hips due to pain.      LABS:    06-19    137  |  100  |  15  ----------------------------<  105<H>  3.9   |  23  |  0.99    Ca    9.4      19 Jun 2019 06:29  Phos  3.6     06-19  Mg     1.5     06-19                RADIOLOGY & ADDITIONAL TESTS:    Imaging Personally Reviewed:  < from: MR Lumbar Spine No Cont (06.18.19 @ 21:17) >  Impression:    Acute L1 vertebral body compression fracture with mild retropulsion of   the posterior cortex. No compression of the conus.  Multilevel degenerative changes. L4-5 left parasagittal disc herniation.          < end of copied text >      Consultant(s) Notes Reviewed:      Care Discussed with Consultants/Other Providers:

## 2019-06-19 NOTE — PROGRESS NOTE ADULT - PROBLEM SELECTOR PLAN 1
-  on arrival, intoxicated. He received Librium and Ativan in ED.  - Given his high risk of ETOH withdrawal he completed librium taper.  CIWA 0.  - SW eval  -high dose thiamine day 3/3  -consider naltrexone if opiates not required for back pain  -continue folate/mvi

## 2019-06-19 NOTE — PROGRESS NOTE ADULT - PROBLEM SELECTOR PLAN 2
- CT L/S shows L1 fracture with retropulsion.  -PT recommends [facility] but patient is homeless.  F/u sw consult  -tylenol standing  -continue neurontin bid for pain  - fall precautions  -needs daily PT as he cannot go to rehab

## 2019-06-19 NOTE — PROGRESS NOTE ADULT - ASSESSMENT
59 y/o male with h/o ETOH abuse, chronic low back pain, chronic pancreatitis due to ETOH abuse presented to the ED via EMS who found patient on side of the road with ETOH intoxication and lumbar compression fracture, recommended for [facility] but patient homeless.

## 2019-06-20 LAB
ANION GAP SERPL CALC-SCNC: 11 MMOL/L — SIGNIFICANT CHANGE UP (ref 5–17)
BUN SERPL-MCNC: 14 MG/DL — SIGNIFICANT CHANGE UP (ref 7–23)
CALCIUM SERPL-MCNC: 9.2 MG/DL — SIGNIFICANT CHANGE UP (ref 8.4–10.5)
CHLORIDE SERPL-SCNC: 101 MMOL/L — SIGNIFICANT CHANGE UP (ref 96–108)
CO2 SERPL-SCNC: 25 MMOL/L — SIGNIFICANT CHANGE UP (ref 22–31)
CREAT SERPL-MCNC: 0.99 MG/DL — SIGNIFICANT CHANGE UP (ref 0.5–1.3)
FOLATE SERPL-MCNC: 15.9 NG/ML — SIGNIFICANT CHANGE UP
GLUCOSE SERPL-MCNC: 122 MG/DL — HIGH (ref 70–99)
MAGNESIUM SERPL-MCNC: 1.8 MG/DL — SIGNIFICANT CHANGE UP (ref 1.6–2.6)
PHOSPHATE SERPL-MCNC: 3.6 MG/DL — SIGNIFICANT CHANGE UP (ref 2.5–4.5)
POTASSIUM SERPL-MCNC: 4.2 MMOL/L — SIGNIFICANT CHANGE UP (ref 3.5–5.3)
POTASSIUM SERPL-SCNC: 4.2 MMOL/L — SIGNIFICANT CHANGE UP (ref 3.5–5.3)
SODIUM SERPL-SCNC: 137 MMOL/L — SIGNIFICANT CHANGE UP (ref 135–145)
VIT B12 SERPL-MCNC: 670 PG/ML — SIGNIFICANT CHANGE UP (ref 232–1245)

## 2019-06-20 PROCEDURE — 99232 SBSQ HOSP IP/OBS MODERATE 35: CPT

## 2019-06-20 RX ORDER — SODIUM CHLORIDE 9 MG/ML
1000 INJECTION INTRAMUSCULAR; INTRAVENOUS; SUBCUTANEOUS ONCE
Refills: 0 | Status: COMPLETED | OUTPATIENT
Start: 2019-06-20 | End: 2019-06-20

## 2019-06-20 RX ORDER — IBUPROFEN 200 MG
600 TABLET ORAL EVERY 6 HOURS
Refills: 0 | Status: DISCONTINUED | OUTPATIENT
Start: 2019-06-20 | End: 2019-06-24

## 2019-06-20 RX ORDER — GABAPENTIN 400 MG/1
200 CAPSULE ORAL
Refills: 0 | Status: DISCONTINUED | OUTPATIENT
Start: 2019-06-20 | End: 2019-06-21

## 2019-06-20 RX ADMIN — Medication 1 TABLET(S): at 11:42

## 2019-06-20 RX ADMIN — GABAPENTIN 200 MILLIGRAM(S): 400 CAPSULE ORAL at 18:11

## 2019-06-20 RX ADMIN — Medication 600 MILLIGRAM(S): at 18:12

## 2019-06-20 RX ADMIN — Medication 650 MILLIGRAM(S): at 11:42

## 2019-06-20 RX ADMIN — Medication 650 MILLIGRAM(S): at 05:01

## 2019-06-20 RX ADMIN — Medication 650 MILLIGRAM(S): at 07:51

## 2019-06-20 RX ADMIN — Medication 650 MILLIGRAM(S): at 18:42

## 2019-06-20 RX ADMIN — HEPARIN SODIUM 5000 UNIT(S): 5000 INJECTION INTRAVENOUS; SUBCUTANEOUS at 21:11

## 2019-06-20 RX ADMIN — PANTOPRAZOLE SODIUM 40 MILLIGRAM(S): 20 TABLET, DELAYED RELEASE ORAL at 06:23

## 2019-06-20 RX ADMIN — SODIUM CHLORIDE 500 MILLILITER(S): 9 INJECTION INTRAMUSCULAR; INTRAVENOUS; SUBCUTANEOUS at 15:35

## 2019-06-20 RX ADMIN — Medication 600 MILLIGRAM(S): at 18:42

## 2019-06-20 RX ADMIN — Medication 105 MILLIGRAM(S): at 05:01

## 2019-06-20 RX ADMIN — Medication 650 MILLIGRAM(S): at 18:12

## 2019-06-20 RX ADMIN — Medication 650 MILLIGRAM(S): at 08:34

## 2019-06-20 RX ADMIN — Medication 650 MILLIGRAM(S): at 06:00

## 2019-06-20 RX ADMIN — Medication 650 MILLIGRAM(S): at 00:00

## 2019-06-20 RX ADMIN — GABAPENTIN 100 MILLIGRAM(S): 400 CAPSULE ORAL at 05:01

## 2019-06-20 RX ADMIN — HEPARIN SODIUM 5000 UNIT(S): 5000 INJECTION INTRAVENOUS; SUBCUTANEOUS at 05:01

## 2019-06-20 RX ADMIN — Medication 600 MILLIGRAM(S): at 10:03

## 2019-06-20 RX ADMIN — Medication 1 MILLIGRAM(S): at 11:42

## 2019-06-20 NOTE — CDI QUERY NOTE - NSCDIOTHERTXTBX_GEN_ALL_CORE_HH
Documentation in the medical record indicates that this patient was admitted 2/2 alcohol intoxication with withdrawal.    The following lab values were noted:  On admission patient with K level: 6.5- 6.8   Calcium level: 8.1 (6/13) and 7.9 (6/14)  Magnesium level 1.2 (6/14) and 1.5 (6/19)    Could you please clarify and document the diagnosis associated with the above laboratory findings.    Thank you  Florian

## 2019-06-20 NOTE — PROGRESS NOTE ADULT - PROBLEM SELECTOR PLAN 2
- CT L/S and MRI show L1 fracture with retropulsion.  -PT recommends [facility] but patient is homeless.  F/u sw consult  -tylenol standing.  Start ibuprofen standing  -increase neurontin bid for pain  - fall precautions  -needs daily PT as he cannot go to rehab  -f/u TLSO brace (consult placed by ortho)  -check b12/folate

## 2019-06-20 NOTE — DIETITIAN INITIAL EVALUATION ADULT. - PROBLEM SELECTOR PLAN 3
X-ray L/S spine showed ag indeterminate L1, L2, L5 compression deformities  - Will do CT L/S spine  - prn analgesics with PPI  - fall precautions  - PT eval

## 2019-06-20 NOTE — DIETITIAN INITIAL EVALUATION ADULT. - PERTINENT MEDS FT
acetaminophen   Tablet .. 650 PRN  acetaminophen   Tablet .. 650  ergocalciferol 55544  folic acid 1  gabapentin 100  heparin  Injectable 5000  ibuprofen  Tablet. 600  LORazepam     Tablet 2 PRN  LORazepam   Injectable 2 PRN  multivitamin 1  pantoprazole    Tablet 40

## 2019-06-20 NOTE — PROGRESS NOTE ADULT - SUBJECTIVE AND OBJECTIVE BOX
Patient is a 58y old  Male who presents with a chief complaint of ETOH intoxication, found on the street (20 Jun 2019 09:31)      SUBJECTIVE / OVERNIGHT EVENTS:  no acute events overnight  RN Yuliya somers at bedside.  Complains of ongoing pain in his back 7/10.  Cant ambulate due to the pain.  Also reports dizziness with standing.  No n/v.  Last BM yesterday.  No cp.  +sob because he says back pain is worse if he takes a deep breath.  Reports numbness on back.  Refusing all services offered by     MEDICATIONS  (STANDING):  acetaminophen   Tablet .. 650 milliGRAM(s) Oral every 6 hours  ergocalciferol 47897 Unit(s) Oral every week  folic acid 1 milliGRAM(s) Oral daily  gabapentin 200 milliGRAM(s) Oral two times a day  heparin  Injectable 5000 Unit(s) SubCutaneous every 8 hours  ibuprofen  Tablet. 600 milliGRAM(s) Oral every 6 hours  multivitamin 1 Tablet(s) Oral daily  pantoprazole    Tablet 40 milliGRAM(s) Oral before breakfast    MEDICATIONS  (PRN):  acetaminophen   Tablet .. 650 milliGRAM(s) Oral every 6 hours PRN Temp greater or equal to 38C (100.4F), Mild Pain (1 - 3)      Vital Signs Last 24 Hrs  T(C): 36.7 (20 Jun 2019 04:48), Max: 36.8 (19 Jun 2019 12:20)  T(F): 98 (20 Jun 2019 04:48), Max: 98.2 (19 Jun 2019 12:20)  HR: 70 (20 Jun 2019 04:48) (70 - 80)  BP: 117/71 (20 Jun 2019 04:48) (93/59 - 117/71)  BP(mean): --  RR: 18 (20 Jun 2019 04:48) (18 - 18)  SpO2: 99% (20 Jun 2019 04:48) (97% - 99%)  CAPILLARY BLOOD GLUCOSE        I&O's Summary    19 Jun 2019 07:01  -  20 Jun 2019 07:00  --------------------------------------------------------  IN: 1200 mL / OUT: 2100 mL / NET: -900 mL    20 Jun 2019 07:01  -  20 Jun 2019 12:13  --------------------------------------------------------  IN: 180 mL / OUT: 400 mL / NET: -220 mL        PHYSICAL EXAM:  GENERAL: NAD, disheveled appearing  HEAD:  Atraumatic  EYES: EOMI, conjunctiva and sclera clear  NECK: Supple, No JVD  CHEST/LUNG: Clear to auscultation bilaterally; No wheeze  HEART: Regular rate and rhythm; No murmurs, rubs, or gallops  ABDOMEN: Soft, Nontender, Nondistended; Bowel sounds present  EXTREMITIES:  2+ Peripheral Pulses, No clubbing, cyanosis, or edema  PSYCH: AAOx2 (not to year-unchanged)  NEUROLOGY: non-focal, moves all extremities.  Reports decreased sensation on mid/lower back.  No sensation deficits on abdomen  MSK: B/l LE: 4/5.  Sensation intact b/l LE.  No tenderness on lumbar spine/paraspinal palpation      LABS:    06-20    137  |  101  |  14  ----------------------------<  122<H>  4.2   |  25  |  0.99    Ca    9.2      20 Jun 2019 06:07  Phos  3.6     06-20  Mg     1.8     06-20                RADIOLOGY & ADDITIONAL TESTS:    Imaging Personally Reviewed:    Consultant(s) Notes Reviewed:      Care Discussed with Consultants/Other Providers: ortho Dr Taylor-aware of sensory deficits.  Placed eval for TLSO brace

## 2019-06-20 NOTE — PROGRESS NOTE ADULT - PROBLEM SELECTOR PLAN 1
-  on arrival, intoxicated. He received Librium and Ativan in ED.  - Given his high risk of ETOH withdrawal he completed librium taper.  CIWA 0.  D/c ciwa protocol  - [facility] christine appreciated-declining all services  -Completed 3 days of high dose thiamine.  Change to po thiamine.  -continue folate/mvi  -psych consult.  He is declining all services but we are unable to discharge him to street not ambulatory.  ? capacity.

## 2019-06-20 NOTE — DIETITIAN INITIAL EVALUATION ADULT. - OTHER INFO
seen for length of stay, Reason for Admission: ETOH intoxication, found on the street, consumed 100% of breakfast today-observed at time of visit. denies difficulty chewing /swallow. last BM yesterday. NKFA. IBW +/- 10%= 136pounds. pt does not weight himself PTA because he is homeless do he would not say if he had any weight changes PTA.

## 2019-06-20 NOTE — CHART NOTE - NSCHARTNOTEFT_GEN_A_CORE
Measure and deliver [place] custom fit LSO. Left bedside to be worn when out of bed. Reviewed application skin precautions and care. Written instructions and contact information given. To notify office with any issues questions or concerns.  Kadeem LOWE  [facility]  [phone]

## 2019-06-20 NOTE — PROGRESS NOTE ADULT - SUBJECTIVE AND OBJECTIVE BOX
Patient seen and examined.  No acute events overnight.  Reportedly having difficult ambulating due to back pain.     Vital Signs Last 24 Hrs  T(C): 36.7 (20 Jun 2019 04:48), Max: 37.1 (19 Jun 2019 10:30)  T(F): 98 (20 Jun 2019 04:48), Max: 98.7 (19 Jun 2019 10:30)  HR: 70 (20 Jun 2019 04:48) (70 - 87)  BP: 117/71 (20 Jun 2019 04:48) (93/59 - 117/71)  BP(mean): --  RR: 18 (20 Jun 2019 04:48) (18 - 18)  SpO2: 99% (20 Jun 2019 04:48) (97% - 99%)    06-19 @ 07:01  -  06-20 @ 07:00  --------------------------------------------------------  IN: 1200 mL / OUT: 2100 mL / NET: -900 mL      06-20    137  |  101  |  14  ----------------------------<  122<H>  4.2   |  25  |  0.99    Ca    9.2      20 Jun 2019 06:07  Phos  3.6     06-20  Mg     1.8     06-20        Spine PE:  Gen: NAD    Spine PE:  Skin intact  No gross deformity  midline TTP L spine  No bony step offs  No paraspinal muscle ttp/hypertonicity   Negative Straight leg raise  Negative clonus  Negative babinski  Negative beltran  No saddle anesthesia      Motor:                   C5                C6              C7               C8           T1   R            5/5                5/5            5/5             5/5          5/5  L             5/5               5/5             5/5             5/5          5/5                L2             L3             L4               L5            S1  R         4/5           4/5          4/5             4/5           4/5  L          4/5          4/5           4/5             4/5           4/5    Sensory:            C5         C6         C7      C8       T1        (0=absent, 1=impaired, 2=normal, NT=not testable)  R         2            2           2        2         2  L          2            2           2        2         2               L2          L3         L4      L5       S1         (0=absent, 1=impaired, 2=normal, NT=not testable)  R         2            2            2        2        2  L          2            2           2        2         2        Imaging:  MRI L spine  Impression:    Acute L1 vertebral body compression fracture with mild retropulsion of   the posterior cortex. No compression of the conus.  Multilevel degenerative changes. L4-5 left parasagittal disc herniation.          57 yo M admitted with EtOH intoxication found to have L1 VCF. Low concern for cord compression at this time.     Pain control  WBAT with assistive devices as needed  MR L spine   FU Labs/imaging  TLSO ordered  SCDs  No acute orthopedic intervention at this time

## 2019-06-21 DIAGNOSIS — I95.1 ORTHOSTATIC HYPOTENSION: ICD-10-CM

## 2019-06-21 DIAGNOSIS — F10.10 ALCOHOL ABUSE, UNCOMPLICATED: ICD-10-CM

## 2019-06-21 LAB
HIV 1+2 AB+HIV1 P24 AG SERPL QL IA: SIGNIFICANT CHANGE UP
T PALLIDUM AB TITR SER: NEGATIVE — SIGNIFICANT CHANGE UP
TSH SERPL-MCNC: 4.56 UIU/ML — HIGH (ref 0.27–4.2)

## 2019-06-21 PROCEDURE — 99232 SBSQ HOSP IP/OBS MODERATE 35: CPT

## 2019-06-21 PROCEDURE — 99222 1ST HOSP IP/OBS MODERATE 55: CPT

## 2019-06-21 RX ORDER — THIAMINE MONONITRATE (VIT B1) 100 MG
100 TABLET ORAL DAILY
Refills: 0 | Status: DISCONTINUED | OUTPATIENT
Start: 2019-06-21 | End: 2019-07-02

## 2019-06-21 RX ORDER — GABAPENTIN 400 MG/1
200 CAPSULE ORAL THREE TIMES A DAY
Refills: 0 | Status: DISCONTINUED | OUTPATIENT
Start: 2019-06-21 | End: 2019-07-02

## 2019-06-21 RX ADMIN — Medication 650 MILLIGRAM(S): at 13:15

## 2019-06-21 RX ADMIN — Medication 650 MILLIGRAM(S): at 23:37

## 2019-06-21 RX ADMIN — HEPARIN SODIUM 5000 UNIT(S): 5000 INJECTION INTRAVENOUS; SUBCUTANEOUS at 05:14

## 2019-06-21 RX ADMIN — Medication 600 MILLIGRAM(S): at 13:15

## 2019-06-21 RX ADMIN — Medication 650 MILLIGRAM(S): at 12:45

## 2019-06-21 RX ADMIN — Medication 600 MILLIGRAM(S): at 05:13

## 2019-06-21 RX ADMIN — GABAPENTIN 200 MILLIGRAM(S): 400 CAPSULE ORAL at 05:14

## 2019-06-21 RX ADMIN — Medication 650 MILLIGRAM(S): at 17:36

## 2019-06-21 RX ADMIN — Medication 600 MILLIGRAM(S): at 00:56

## 2019-06-21 RX ADMIN — Medication 1 TABLET(S): at 12:45

## 2019-06-21 RX ADMIN — Medication 650 MILLIGRAM(S): at 05:14

## 2019-06-21 RX ADMIN — Medication 600 MILLIGRAM(S): at 23:36

## 2019-06-21 RX ADMIN — GABAPENTIN 200 MILLIGRAM(S): 400 CAPSULE ORAL at 14:10

## 2019-06-21 RX ADMIN — Medication 600 MILLIGRAM(S): at 19:00

## 2019-06-21 RX ADMIN — GABAPENTIN 200 MILLIGRAM(S): 400 CAPSULE ORAL at 21:15

## 2019-06-21 RX ADMIN — Medication 600 MILLIGRAM(S): at 17:36

## 2019-06-21 RX ADMIN — Medication 1 MILLIGRAM(S): at 12:45

## 2019-06-21 RX ADMIN — HEPARIN SODIUM 5000 UNIT(S): 5000 INJECTION INTRAVENOUS; SUBCUTANEOUS at 14:10

## 2019-06-21 RX ADMIN — PANTOPRAZOLE SODIUM 40 MILLIGRAM(S): 20 TABLET, DELAYED RELEASE ORAL at 05:14

## 2019-06-21 RX ADMIN — Medication 650 MILLIGRAM(S): at 00:00

## 2019-06-21 RX ADMIN — Medication 650 MILLIGRAM(S): at 23:07

## 2019-06-21 RX ADMIN — Medication 600 MILLIGRAM(S): at 23:06

## 2019-06-21 RX ADMIN — Medication 650 MILLIGRAM(S): at 00:56

## 2019-06-21 RX ADMIN — Medication 600 MILLIGRAM(S): at 12:44

## 2019-06-21 RX ADMIN — HEPARIN SODIUM 5000 UNIT(S): 5000 INJECTION INTRAVENOUS; SUBCUTANEOUS at 21:15

## 2019-06-21 RX ADMIN — Medication 100 MILLIGRAM(S): at 17:37

## 2019-06-21 NOTE — BEHAVIORAL HEALTH ASSESSMENT NOTE - SUMMARY
This is a 59 y/o homeless Tristanian-speaking  man with a history of EtOH abuse, PMH includes alcohol intoxication (s/p completed taper in hospital), L1 vertebral compression fracture, and chronic pancreatitis who was brought to the ED on 14JUN2019 by EMS after being found on the side of the road. Psychiatry was consulted for evaluation of capacity for d/c planning as the patient was reportedly previously refusing all services but is in need for rehabilitation due to inability to ambulate.    Pt has a hx of EtOH abuse. He states he is having difficulty eating, trouble sleeping, and is unable to walk due to the pain in his back. He also has pain in his arm. He states that he understands he cannot walk, understands the need to go to rehabilitation in order to improve his ambulatory function. He understands this will involve multiple hours a day of PT for several weeks. He denies consumption of any type alcohol, and denies use of any other substance. He denies previous history of psychiatric illness. Denies depression, cathi, psychosis, substance use, SI/HI, access to weapons. Pt demonstrated understanding of situation and is amenable to receiving rehabilitation services in order to regain ambulatory ability.

## 2019-06-21 NOTE — PROGRESS NOTE ADULT - PROBLEM SELECTOR PLAN 1
-  on arrival, intoxicated. He received Librium and Ativan in ED.  - Given his high risk of ETOH withdrawal he completed librium taper.    -seen by psychiatry, now agreeable to shelter for PT services  - [facility] christine appreciated-f/u regarding shelter placement  -Completed 3 days of high dose thiamine  -continue folate/mvi/thiamine

## 2019-06-21 NOTE — BEHAVIORAL HEALTH ASSESSMENT NOTE - NSBHCHARTREVIEWVS_PSY_A_CORE FT
Vital Signs Last 24 Hrs  T(C): 37 (21 Jun 2019 04:00), Max: 37 (21 Jun 2019 04:00)  T(F): 98.6 (21 Jun 2019 04:00), Max: 98.6 (21 Jun 2019 04:00)  HR: 75 (21 Jun 2019 04:00) (75 - 94)  BP: 117/67 (21 Jun 2019 04:00) (92/61 - 117/67)  BP(mean): --  RR: 18 (21 Jun 2019 04:00) (18 - 18)  SpO2: 97% (21 Jun 2019 04:00) (96% - 98%)

## 2019-06-21 NOTE — BEHAVIORAL HEALTH ASSESSMENT NOTE - NSBHCHARTREVIEWIMAGING_PSY_A_CORE FT
< from: CT Head No Cont (06.13.19 @ 20:49) >    FINDINGS:  Evaluation at the skull base and of the posterior fossa is slightly   limited by overlying streak artifacts.    No acute intracranial hemorrhage, mass effect or midline shift.  No CT evidence of acute large territory vascular infarct.  The ventricles and cortical sulci are prominent reflecting parenchymal   volume loss.  Scattered hypodensities in the periventricular white matter are   nonspecific, but likely sequela of small vessel ischemic disease.    Moderate mucosal thickening bilateral maxillary sinuses. The mastoid air   cells are well aerated.  No displaced calvarial fracture.    IMPRESSION:  No acute intracranial hemorrhage or mass effect.      < end of copied text >

## 2019-06-21 NOTE — PROGRESS NOTE ADULT - PROBLEM SELECTOR PLAN 2
- CT L/S and MRI show L1 fracture with retropulsion.  -PT recommends [facility] but patient is homeless.  F/u sw consult  -tylenol/ibuprofen standing  -increase neurontin to tid for pain  - fall precautions  -needs daily PT as he cannot go to rehab  -place TLSO brace

## 2019-06-21 NOTE — PROGRESS NOTE ADULT - ATTENDING COMMENTS
Beeper: [phone]    discharge pending ability to walk with PT for d/c to shelter    for CDI query: Patient was NOT hyperkalemic.  Elevated potassium was due to hemolysis.  He did have hypomagnesium due to ETOH abuse, now resolved.

## 2019-06-21 NOTE — PROGRESS NOTE ADULT - SUBJECTIVE AND OBJECTIVE BOX
Patient is a 58y old  Male who presents with a chief complaint of ETOH intoxication, found on the street (20 Jun 2019 12:13)      SUBJECTIVE / OVERNIGHT EVENTS:  DERIK somers at bedside  reports improved pain with ibuprofen  no n/v  less lightheaded after fluid bolus for orthostatics yesterday    MEDICATIONS  (STANDING):  acetaminophen   Tablet .. 650 milliGRAM(s) Oral every 6 hours  ergocalciferol 22989 Unit(s) Oral every week  folic acid 1 milliGRAM(s) Oral daily  gabapentin 200 milliGRAM(s) Oral two times a day  heparin  Injectable 5000 Unit(s) SubCutaneous every 8 hours  ibuprofen  Tablet. 600 milliGRAM(s) Oral every 6 hours  multivitamin 1 Tablet(s) Oral daily  pantoprazole    Tablet 40 milliGRAM(s) Oral before breakfast    MEDICATIONS  (PRN):  acetaminophen   Tablet .. 650 milliGRAM(s) Oral every 6 hours PRN Temp greater or equal to 38C (100.4F), Mild Pain (1 - 3)      Vital Signs Last 24 Hrs  T(C): 37 (21 Jun 2019 04:00), Max: 37 (21 Jun 2019 04:00)  T(F): 98.6 (21 Jun 2019 04:00), Max: 98.6 (21 Jun 2019 04:00)  HR: 75 (21 Jun 2019 04:00) (75 - 94)  BP: 117/67 (21 Jun 2019 04:00) (92/61 - 117/67)  BP(mean): --  RR: 18 (21 Jun 2019 04:00) (18 - 18)  SpO2: 97% (21 Jun 2019 04:00) (96% - 98%)  CAPILLARY BLOOD GLUCOSE        I&O's Summary    20 Jun 2019 07:01  -  21 Jun 2019 07:00  --------------------------------------------------------  IN: 780 mL / OUT: 1825 mL / NET: -1045 mL    21 Jun 2019 07:01  -  21 Jun 2019 12:22  --------------------------------------------------------  IN: 240 mL / OUT: 100 mL / NET: 140 mL        PHYSICAL EXAM:  GENERAL: NAD, disheveled appearing  HEAD:  Atraumatic  EYES: EOMI, conjunctiva and sclera clear  NECK: Supple, No JVD  CHEST/LUNG: Clear to auscultation bilaterally; No wheeze  HEART: Regular rate and rhythm; No murmurs, rubs, or gallops  ABDOMEN: Soft, Nontender, Nondistended; Bowel sounds present  EXTREMITIES:  2+ Peripheral Pulses, No clubbing, cyanosis, or edema  PSYCH: AAOx2 (not to year-unchanged)  NEUROLOGY: non-focal, moves all extremities.  Reports decreased sensation on mid/lower back.  No sensation deficits on abdomen  MSK: B/l LE: 4/5.  Sensation intact b/l LE.  No tenderness on lumbar spine/paraspinal palpation    LABS:    06-20    137  |  101  |  14  ----------------------------<  122<H>  4.2   |  25  |  0.99    Ca    9.2      20 Jun 2019 06:07  Phos  3.6     06-20  Mg     1.8     06-20                RADIOLOGY & ADDITIONAL TESTS:    Imaging Personally Reviewed:    Consultant(s) Notes Reviewed:  psychiatry    Care Discussed with Consultants/Other Providers:

## 2019-06-21 NOTE — BEHAVIORAL HEALTH ASSESSMENT NOTE - NSBHCHARTREVIEWINVESTIGATE_PSY_A_CORE FT
< from: 12 Lead ECG (06.15.19 @ 07:21) >      Ventricular Rate 72 BPM    Atrial Rate 72 BPM    P-R Interval 140 ms    QRS Duration 92 ms    Q-T Interval 416 ms    QTC Calculation(Bezet) 455 ms    P Axis 30 degrees    R Axis 3 degrees    T Axis 18 degrees    Diagnosis Line NORMAL SINUS RHYTHM  NORMAL ECG    Confirmed by JUAN IBARRA MD ([id number]) on 6/15/2019 10:57:52 AM    < end of copied text >

## 2019-06-21 NOTE — BEHAVIORAL HEALTH ASSESSMENT NOTE - NSBHCHARTREVIEWLAB_PSY_A_CORE FT
06-20    137  |  101  |  14  ----------------------------<  122<H>  4.2   |  25  |  0.99    Ca    9.2      20 Jun 2019 06:07  Phos  3.6     06-20  Mg     1.8     06-20

## 2019-06-21 NOTE — BEHAVIORAL HEALTH ASSESSMENT NOTE - HPI (INCLUDE ILLNESS QUALITY, SEVERITY, DURATION, TIMING, CONTEXT, MODIFYING FACTORS, ASSOCIATED SIGNS AND SYMPTOMS)
This is a 59 y/o homeless Frisian-speaking  man with a history of EtOH abuse, PMH includes alcohol intoxication (s/p completed taper in hospital), L1 vertebral compression fracture, and chronic pancreatitis who was brought to the ED on 14JUN2019 by EMS after being found on the side of the road. Psychiatry was consulted for evaluation of capacity for d/c planning as the patient was reportedly previously refusing all services but is in need for rehabilitation due to inability to ambulate.    On evaluation, patient appears slightly sluggish, but is awake and alert, cooperative and able to answer and ask questions. He knows that he is in the hospital for his back problem which renders him incapabl;e of walking. He states that he is homeless, living on the street in [place]. He is originally from [place] but [place] the [place]. He does not have any family in the [place] or in [place]. He states he is having difficulty eating, trouble sleeping, and is unable to walk due to the pain in his back. He also has pain in his arm. He states that he understands he cannot walk, understands the need to go to rehabilitation for several weeks where he will receive PT in order to improve his ambulatory function. He understands this will involve multiple hours a day of PT for several weeks. He denies consumption of any type alcohol, and denies use of any other substance. He denies previous history of psychiatric illness. Denies depression, cathi, psychosis, substance use, SI/HI, access to weapons.

## 2019-06-21 NOTE — PROGRESS NOTE ADULT - ASSESSMENT
59 y/o male with h/o ETOH abuse, chronic low back pain, chronic pancreatitis due to ETOH abuse presented to the ED via EMS who found patient on side of the road with ETOH intoxication and lumbar compression fracture, recommended for [facility] but patient homeless, plan for d/c to shelter with PT services.

## 2019-06-21 NOTE — BEHAVIORAL HEALTH ASSESSMENT NOTE - RISK ASSESSMENT
Risk factors include patient's homelessness and substance use disorder, while protective factor is future oriented toward regaining walking ability. Overall moderately elevated risk for dangerous behaviors.
Yes

## 2019-06-21 NOTE — BEHAVIORAL HEALTH ASSESSMENT NOTE - NSBHCONSULTRECOMMENDOTHER_PSY_A_CORE FT
At present time, patient appears to have a basic understanding of his condition and the need for PT (Rehab). He is not opposing this plan, appears to have decisional capacity to participate in disposition planning at this time.

## 2019-06-21 NOTE — BEHAVIORAL HEALTH ASSESSMENT NOTE - NSBHREFERDETAILS_PSY_A_CORE_FT
Homeless, alcohol intox - finished taper, L1 fracture  Capacity for d/c planning, refusing rehab for ambulation

## 2019-06-22 LAB — GLUCOSE BLDC GLUCOMTR-MCNC: 104 MG/DL — HIGH (ref 70–99)

## 2019-06-22 PROCEDURE — 99232 SBSQ HOSP IP/OBS MODERATE 35: CPT

## 2019-06-22 RX ADMIN — Medication 650 MILLIGRAM(S): at 05:03

## 2019-06-22 RX ADMIN — Medication 600 MILLIGRAM(S): at 19:00

## 2019-06-22 RX ADMIN — GABAPENTIN 200 MILLIGRAM(S): 400 CAPSULE ORAL at 14:22

## 2019-06-22 RX ADMIN — Medication 1 MILLIGRAM(S): at 12:41

## 2019-06-22 RX ADMIN — Medication 650 MILLIGRAM(S): at 17:10

## 2019-06-22 RX ADMIN — GABAPENTIN 200 MILLIGRAM(S): 400 CAPSULE ORAL at 05:03

## 2019-06-22 RX ADMIN — Medication 650 MILLIGRAM(S): at 23:02

## 2019-06-22 RX ADMIN — Medication 1 TABLET(S): at 12:41

## 2019-06-22 RX ADMIN — Medication 600 MILLIGRAM(S): at 23:32

## 2019-06-22 RX ADMIN — HEPARIN SODIUM 5000 UNIT(S): 5000 INJECTION INTRAVENOUS; SUBCUTANEOUS at 21:14

## 2019-06-22 RX ADMIN — Medication 100 MILLIGRAM(S): at 12:41

## 2019-06-22 RX ADMIN — Medication 600 MILLIGRAM(S): at 13:10

## 2019-06-22 RX ADMIN — HEPARIN SODIUM 5000 UNIT(S): 5000 INJECTION INTRAVENOUS; SUBCUTANEOUS at 05:02

## 2019-06-22 RX ADMIN — PANTOPRAZOLE SODIUM 40 MILLIGRAM(S): 20 TABLET, DELAYED RELEASE ORAL at 05:03

## 2019-06-22 RX ADMIN — Medication 600 MILLIGRAM(S): at 17:10

## 2019-06-22 RX ADMIN — Medication 600 MILLIGRAM(S): at 12:40

## 2019-06-22 RX ADMIN — Medication 600 MILLIGRAM(S): at 05:03

## 2019-06-22 RX ADMIN — Medication 600 MILLIGRAM(S): at 05:33

## 2019-06-22 RX ADMIN — HEPARIN SODIUM 5000 UNIT(S): 5000 INJECTION INTRAVENOUS; SUBCUTANEOUS at 14:22

## 2019-06-22 RX ADMIN — Medication 650 MILLIGRAM(S): at 12:41

## 2019-06-22 RX ADMIN — Medication 650 MILLIGRAM(S): at 19:00

## 2019-06-22 RX ADMIN — Medication 600 MILLIGRAM(S): at 23:02

## 2019-06-22 RX ADMIN — GABAPENTIN 200 MILLIGRAM(S): 400 CAPSULE ORAL at 21:13

## 2019-06-22 RX ADMIN — Medication 650 MILLIGRAM(S): at 13:11

## 2019-06-22 RX ADMIN — Medication 650 MILLIGRAM(S): at 05:33

## 2019-06-22 NOTE — PROGRESS NOTE ADULT - ATTENDING COMMENTS
Andre Reyes, M.D.  [facility] Attending  Office: [phone]   Pager: [phone]

## 2019-06-22 NOTE — PROGRESS NOTE ADULT - PROBLEM SELECTOR PLAN 1
- resolved  - Given his high risk of ETOH withdrawal he completed librium taper.    -seen by psychiatry, now agreeable to shelter for PT services  - WALT christine appreciated-f/u regarding shelter placement  -Completed 3 days of high dose thiamine  -continue folate/mvi/thiamine

## 2019-06-22 NOTE — PROGRESS NOTE ADULT - SUBJECTIVE AND OBJECTIVE BOX
Patient is a 58y old  Male who presents with a chief complaint of ETOH intoxication, found on the street (22 Jun 2019 10:14)      SUBJECTIVE / OVERNIGHT EVENTS:    I spoke to the patient in our mutually shared language of Mauritanian  No acute overnight events.  pt states that he was able to walk a longer distance today but is still having significant back pain.    ROS: ( - ) Fever, ( - )Chills,  ( - )Nausea/Vomiting, ( - ) Cough, ( - )Shortness of breath, ( - )Chest Pain    MEDICATIONS  (STANDING):  acetaminophen   Tablet .. 650 milliGRAM(s) Oral every 6 hours  ergocalciferol 08243 Unit(s) Oral every week  folic acid 1 milliGRAM(s) Oral daily  gabapentin 200 milliGRAM(s) Oral three times a day  heparin  Injectable 5000 Unit(s) SubCutaneous every 8 hours  ibuprofen  Tablet. 600 milliGRAM(s) Oral every 6 hours  multivitamin 1 Tablet(s) Oral daily  pantoprazole    Tablet 40 milliGRAM(s) Oral before breakfast  thiamine 100 milliGRAM(s) Oral daily    MEDICATIONS  (PRN):  acetaminophen   Tablet .. 650 milliGRAM(s) Oral every 6 hours PRN Temp greater or equal to 38C (100.4F), Mild Pain (1 - 3)      T(C): 36.7 (06-22 @ 12:29), Max: 36.7 (06-21 @ 20:29)   HR: 82   BP: 90/61   RR: 17   SpO2: 97%    PHYSICAL EXAM:  GENERAL: NAD, disheveled appearing  EYES: EOMI, conjunctiva and sclera clear  CHEST/LUNG: Clear to auscultation bilaterally; No wheeze  HEART: Regular rate and rhythm; No murmurs, rubs, or gallops  ABDOMEN: Soft, Nontender, Nondistended; Bowel sounds present. TLSO brace in place  EXTREMITIES:  2+ Peripheral Pulses, No clubbing, cyanosis, or edema  NEUROLOGY: non-focal, moves all extremities.   MSK: B/l LE: 4/5.  Sensation intact b/l LE.  No tenderness on lumbar spine/paraspinal palpation    LABS:            CAPILLARY BLOOD GLUCOSE      POCT Blood Glucose.: 104 mg/dL (21 Jun 2019 23:54)      RADIOLOGY & ADDITIONAL TESTS:    Imaging Personally Reviewed:  Consultant(s) Notes Reviewed:    Care Discussed with Consultants/Other Providers:

## 2019-06-22 NOTE — PROGRESS NOTE ADULT - PROBLEM SELECTOR PLAN 2
- CT L/S and MRI show L1 fracture with retropulsion.  -PT recommends [facility] but patient is homeless.  F/u sw consult  -tylenol/ibuprofen standing  -increase neurontin to tid for pain  - fall precautions  -needs daily PT as he cannot go to rehab  -c/w TLSO brace

## 2019-06-23 PROCEDURE — 99232 SBSQ HOSP IP/OBS MODERATE 35: CPT

## 2019-06-23 RX ADMIN — GABAPENTIN 200 MILLIGRAM(S): 400 CAPSULE ORAL at 05:12

## 2019-06-23 RX ADMIN — Medication 650 MILLIGRAM(S): at 19:00

## 2019-06-23 RX ADMIN — Medication 600 MILLIGRAM(S): at 23:33

## 2019-06-23 RX ADMIN — Medication 100 MILLIGRAM(S): at 13:21

## 2019-06-23 RX ADMIN — HEPARIN SODIUM 5000 UNIT(S): 5000 INJECTION INTRAVENOUS; SUBCUTANEOUS at 21:05

## 2019-06-23 RX ADMIN — PANTOPRAZOLE SODIUM 40 MILLIGRAM(S): 20 TABLET, DELAYED RELEASE ORAL at 05:13

## 2019-06-23 RX ADMIN — Medication 650 MILLIGRAM(S): at 13:22

## 2019-06-23 RX ADMIN — Medication 650 MILLIGRAM(S): at 15:35

## 2019-06-23 RX ADMIN — Medication 650 MILLIGRAM(S): at 05:43

## 2019-06-23 RX ADMIN — HEPARIN SODIUM 5000 UNIT(S): 5000 INJECTION INTRAVENOUS; SUBCUTANEOUS at 05:13

## 2019-06-23 RX ADMIN — Medication 600 MILLIGRAM(S): at 05:43

## 2019-06-23 RX ADMIN — GABAPENTIN 200 MILLIGRAM(S): 400 CAPSULE ORAL at 13:21

## 2019-06-23 RX ADMIN — Medication 650 MILLIGRAM(S): at 05:13

## 2019-06-23 RX ADMIN — Medication 650 MILLIGRAM(S): at 23:33

## 2019-06-23 RX ADMIN — Medication 1 TABLET(S): at 13:21

## 2019-06-23 RX ADMIN — Medication 650 MILLIGRAM(S): at 17:40

## 2019-06-23 RX ADMIN — Medication 600 MILLIGRAM(S): at 19:00

## 2019-06-23 RX ADMIN — GABAPENTIN 200 MILLIGRAM(S): 400 CAPSULE ORAL at 21:05

## 2019-06-23 RX ADMIN — HEPARIN SODIUM 5000 UNIT(S): 5000 INJECTION INTRAVENOUS; SUBCUTANEOUS at 13:22

## 2019-06-23 RX ADMIN — Medication 600 MILLIGRAM(S): at 05:13

## 2019-06-23 RX ADMIN — Medication 600 MILLIGRAM(S): at 13:21

## 2019-06-23 RX ADMIN — Medication 600 MILLIGRAM(S): at 17:41

## 2019-06-23 RX ADMIN — Medication 1 MILLIGRAM(S): at 13:21

## 2019-06-23 RX ADMIN — Medication 600 MILLIGRAM(S): at 15:35

## 2019-06-23 NOTE — PROGRESS NOTE ADULT - ATTENDING COMMENTS
Andre Reyes, M.D.  [facility] Medicine Attending  Office: [phone]   Pager: [phone]

## 2019-06-23 NOTE — PROGRESS NOTE ADULT - SUBJECTIVE AND OBJECTIVE BOX
Patient is a 58y old  Male who presents with a chief complaint of ETOH intoxication, found on the street (22 Jun 2019 10:14)      SUBJECTIVE / OVERNIGHT EVENTS:    I spoke with patient in our mutually shared language of Finnish  No acute overnight events.  pt states that is back pain is a 4/10 with his current pain regimen.    ROS: ( - ) Fever, ( - )Chills,  ( - )Nausea/Vomiting, ( - ) Cough, ( - )Shortness of breath, ( - )Chest Pain    MEDICATIONS  (STANDING):  acetaminophen   Tablet .. 650 milliGRAM(s) Oral every 6 hours  ergocalciferol 63300 Unit(s) Oral every week  folic acid 1 milliGRAM(s) Oral daily  gabapentin 200 milliGRAM(s) Oral three times a day  heparin  Injectable 5000 Unit(s) SubCutaneous every 8 hours  ibuprofen  Tablet. 600 milliGRAM(s) Oral every 6 hours  multivitamin 1 Tablet(s) Oral daily  pantoprazole    Tablet 40 milliGRAM(s) Oral before breakfast  thiamine 100 milliGRAM(s) Oral daily    MEDICATIONS  (PRN):  acetaminophen   Tablet .. 650 milliGRAM(s) Oral every 6 hours PRN Temp greater or equal to 38C (100.4F), Mild Pain (1 - 3)      T(C): 36.4 (06-23 @ 12:45), Max: 36.9 (06-22 @ 19:52)   HR: 66   BP: 103/66   RR: 18   SpO2: 97%    PHYSICAL EXAM:  GENERAL: NAD, clean shaven today  EYES: EOMI, conjunctiva and sclera clear  CHEST/LUNG: Clear to auscultation bilaterally; No wheeze  HEART: Regular rate and rhythm; No murmurs, rubs, or gallops  ABDOMEN: Soft, Nontender, Nondistended; Bowel sounds present. TLSO brace in place  EXTREMITIES:  2+ Peripheral Pulses, No clubbing, cyanosis, or edema  NEUROLOGY: non-focal, moves all extremities.   MSK: B/l LE: 4/5.  Sensation intact b/l LE.  No tenderness on lumbar spine/paraspinal palpation      LABS:              CAPILLARY BLOOD GLUCOSE          RADIOLOGY & ADDITIONAL TESTS:    Imaging Personally Reviewed:  Consultant(s) Notes Reviewed:    Care Discussed with Consultants/Other Providers:

## 2019-06-24 ENCOUNTER — TRANSCRIPTION ENCOUNTER (OUTPATIENT)
Age: 59
End: 2019-06-24

## 2019-06-24 LAB — T4 FREE SERPL-MCNC: 0.8 NG/DL — LOW (ref 0.9–1.8)

## 2019-06-24 PROCEDURE — 99232 SBSQ HOSP IP/OBS MODERATE 35: CPT

## 2019-06-24 RX ORDER — GABAPENTIN 400 MG/1
2 CAPSULE ORAL
Qty: 180 | Refills: 0
Start: 2019-06-24 | End: 2019-07-23

## 2019-06-24 RX ORDER — IBUPROFEN 200 MG
1 TABLET ORAL
Qty: 20 | Refills: 0
Start: 2019-06-24 | End: 2019-06-28

## 2019-06-24 RX ORDER — THIAMINE MONONITRATE (VIT B1) 100 MG
1 TABLET ORAL
Qty: 30 | Refills: 0
Start: 2019-06-24 | End: 2019-07-23

## 2019-06-24 RX ORDER — FOLIC ACID 0.8 MG
1 TABLET ORAL
Qty: 30 | Refills: 0
Start: 2019-06-24 | End: 2019-07-23

## 2019-06-24 RX ORDER — PANTOPRAZOLE SODIUM 20 MG/1
1 TABLET, DELAYED RELEASE ORAL
Qty: 30 | Refills: 0
Start: 2019-06-24 | End: 2019-07-23

## 2019-06-24 RX ORDER — ERGOCALCIFEROL 1.25 MG/1
3 CAPSULE ORAL
Qty: 90 | Refills: 0
Start: 2019-06-24 | End: 2019-07-23

## 2019-06-24 RX ORDER — IBUPROFEN 200 MG
400 TABLET ORAL THREE TIMES A DAY
Refills: 0 | Status: DISCONTINUED | OUTPATIENT
Start: 2019-06-24 | End: 2019-07-02

## 2019-06-24 RX ADMIN — HEPARIN SODIUM 5000 UNIT(S): 5000 INJECTION INTRAVENOUS; SUBCUTANEOUS at 21:16

## 2019-06-24 RX ADMIN — Medication 650 MILLIGRAM(S): at 23:42

## 2019-06-24 RX ADMIN — Medication 600 MILLIGRAM(S): at 13:02

## 2019-06-24 RX ADMIN — Medication 650 MILLIGRAM(S): at 17:43

## 2019-06-24 RX ADMIN — HEPARIN SODIUM 5000 UNIT(S): 5000 INJECTION INTRAVENOUS; SUBCUTANEOUS at 17:43

## 2019-06-24 RX ADMIN — PANTOPRAZOLE SODIUM 40 MILLIGRAM(S): 20 TABLET, DELAYED RELEASE ORAL at 05:39

## 2019-06-24 RX ADMIN — Medication 650 MILLIGRAM(S): at 12:56

## 2019-06-24 RX ADMIN — GABAPENTIN 200 MILLIGRAM(S): 400 CAPSULE ORAL at 21:16

## 2019-06-24 RX ADMIN — Medication 1 MILLIGRAM(S): at 12:56

## 2019-06-24 RX ADMIN — HEPARIN SODIUM 5000 UNIT(S): 5000 INJECTION INTRAVENOUS; SUBCUTANEOUS at 05:39

## 2019-06-24 RX ADMIN — Medication 1 TABLET(S): at 12:57

## 2019-06-24 RX ADMIN — Medication 650 MILLIGRAM(S): at 05:38

## 2019-06-24 RX ADMIN — Medication 600 MILLIGRAM(S): at 12:57

## 2019-06-24 RX ADMIN — Medication 650 MILLIGRAM(S): at 13:02

## 2019-06-24 RX ADMIN — GABAPENTIN 200 MILLIGRAM(S): 400 CAPSULE ORAL at 05:38

## 2019-06-24 RX ADMIN — Medication 600 MILLIGRAM(S): at 00:03

## 2019-06-24 RX ADMIN — Medication 600 MILLIGRAM(S): at 06:08

## 2019-06-24 RX ADMIN — Medication 100 MILLIGRAM(S): at 12:57

## 2019-06-24 RX ADMIN — GABAPENTIN 200 MILLIGRAM(S): 400 CAPSULE ORAL at 13:02

## 2019-06-24 RX ADMIN — Medication 600 MILLIGRAM(S): at 17:45

## 2019-06-24 RX ADMIN — Medication 650 MILLIGRAM(S): at 17:45

## 2019-06-24 RX ADMIN — Medication 600 MILLIGRAM(S): at 05:38

## 2019-06-24 RX ADMIN — Medication 650 MILLIGRAM(S): at 00:03

## 2019-06-24 RX ADMIN — Medication 650 MILLIGRAM(S): at 06:08

## 2019-06-24 RX ADMIN — Medication 600 MILLIGRAM(S): at 17:43

## 2019-06-24 NOTE — DISCHARGE NOTE PROVIDER - NSDCFUADDAPPT_GEN_ALL_CORE_FT
Follow Up in the [facility] on July 24, 2019 8:30am.  The clinic is located at [address] [place] [address], [address] [place] [address] - [phone].

## 2019-06-24 NOTE — DISCHARGE NOTE PROVIDER - HOSPITAL COURSE
To be done. 59 y/o/o male with h/o ETOH abuse, chronic low back pain, chronic pancreatitis due to ETOH abuse presented to the ED via EMS who found patient on side of the road with ETOH intoxication and lumbar compression fracture, recommended for [facility] but patient homeless, plan for d/c to shelter with PT services.     Chronic low back pain: - CT L/S and MRI show L1 fracture with retropulsion.    -PT recommends [facility] but patient is homeless.  F/u sw consult    -tylenol/ibuprofen standing    -increase neurontin to tid for pain    - fall precautions    -needs daily PT as he cannot go to rehab    -c/w TLSO brace.      Alcohol withdrawal syndrome with perceptual disturbance: - resolved    - Given his high risk of ETOH withdrawal he completed librium taper.      -seen by psychiatry, now agreeable to shelter for PT services    - WALT christine appreciated-f/u regarding shelter placement    -Completed 3 days of high dose thiamine    -continue folate/mvi/thiamine.      Fall, initial encounte: hip xrays negative for fracture    OOB to chair    daily PT.      Vitamin D deficiency: vitamin d supplementation.      Orthostatic hypotension. Plan: resolved with IVF.    Pt seen by CSW and Psych.  He is refusing to go to a medical shelter.  He want to remain in the streets.  He has been instructed to follow up in the Medical Clinic in 2 weeks. 57 y/o male with h/o ETOH abuse, chronic low back pain, chronic pancreatitis due to ETOH abuse presented to the ED via EMS who found patient on side of the road with ETOH intoxication and lumbar compression fracture (CT L/S and MRI show L1 fracture with retropulsion), completed a librium taper.  PT recommended [facility] but patient homeless and refusing placement in shelter functional status improved, [place].  Patient does not want to go to a shelter until the winter.  His family is all in [facility].  WALT Pop reassessed him as well.  Has baseline back pain but able to ambulate and per PT even did stairs.  F/u appointment made and meds provided.

## 2019-06-24 NOTE — DISCHARGE NOTE NURSING/CASE MANAGEMENT/SOCIAL WORK - NSDCFUADDAPPT_GEN_ALL_CORE_FT
[facility] at [address], [address], [place] [address]- [phone]. Appointment July 24, 2019 8:30am. [facility] at [address], [address], [place] [facility] [address]- [phone]. Appointment July 15, @ 3:30pm with Dr Briggs.

## 2019-06-24 NOTE — PROGRESS NOTE ADULT - PROBLEM SELECTOR PLAN 4
hip xrays negative for fracture  OOB to chair  daily PT  tsh mildly elevated - poor follow up - will send Free T4 to evaluate for hypothyroidism.

## 2019-06-24 NOTE — DISCHARGE NOTE NURSING/CASE MANAGEMENT/SOCIAL WORK - SOCIAL WORKER'S NAME
Hallie Rust, [facility] [phone] Janice Quintero, [facility] [phone]

## 2019-06-24 NOTE — PROGRESS NOTE ADULT - ATTENDING COMMENTS
discharge time - 40 minutes  Dr. M. Luke  [facility] Hospitalist  [phone]

## 2019-06-24 NOTE — DISCHARGE NOTE PROVIDER - NSDCCPCAREPLAN_GEN_ALL_CORE_FT
PRINCIPAL DISCHARGE DIAGNOSIS  Diagnosis: Withdrawal symptoms, alcohol  Assessment and Plan of Treatment: Refrain from Alcohol.   Follow up in the Medical Clinic in 1 week.      SECONDARY DISCHARGE DIAGNOSES  Diagnosis: Chronic low back pain  Assessment and Plan of Treatment: Chronic low back pain  Continue Neurontin as needed.   Continue Motrin as needed.    Diagnosis: Orthostatic hypotension  Assessment and Plan of Treatment: Orthostatic hypotension  Follow up in the medical clinic in 1 week. PRINCIPAL DISCHARGE DIAGNOSIS  Diagnosis: Withdrawal symptoms, alcohol  Assessment and Plan of Treatment: Refrain from Alcohol.   Follow up in the Medical Clinic in 1 week.      SECONDARY DISCHARGE DIAGNOSES  Diagnosis: Vitamin D deficiency  Assessment and Plan of Treatment: Vitamin D deficiency  Vitamin D level 8.6  Take Vitamin D 6000 iu daily.    Diagnosis: Chronic low back pain  Assessment and Plan of Treatment: Chronic low back pain  Continue Neurontin as needed.   Continue Motrin as needed.    Diagnosis: Orthostatic hypotension  Assessment and Plan of Treatment: Orthostatic hypotension  Follow up in the medical clinic in 1 week. PRINCIPAL DISCHARGE DIAGNOSIS  Diagnosis: Chronic low back pain  Assessment and Plan of Treatment: Wear TLSO brace at all times when you are sitting up or walking.  Continue taking Neurontin as prescribed and Motrin as needed for pain.  Follow up regularly in the Medicine Clinic.      SECONDARY DISCHARGE DIAGNOSES  Diagnosis: Alcohol withdrawal syndrome with perceptual disturbance  Assessment and Plan of Treatment: With treatment and care, your alcohol dependence and withdrawal may be controlled, and your quality of life improved.  Keep all follow up appointments. Write down any questions you may have. This way you will remember to ask these questions during your next visit.  Follow up regularly in the Medicine Clinic.    Diagnosis: Orthostatic hypotension  Assessment and Plan of Treatment: Orthostatic hypotension has resolved.  Ensure that you are eating and drinking enough to stay well hydrated.  Follow up regularly in the Medicine Clinic.    Diagnosis: Acquired hypothyroidism  Assessment and Plan of Treatment: Take synthroid as prescribed.  Follow up regularly in the Medicine Clinic.    Diagnosis: Vitamin D deficiency  Assessment and Plan of Treatment: Vitamin D level 8.6  Take Vitamin D 6000 iu daily.    Diagnosis: Chronic low back pain  Assessment and Plan of Treatment: Continue Neurontin as needed.   Continue Motrin as needed.

## 2019-06-24 NOTE — PROGRESS NOTE ADULT - PROBLEM SELECTOR PLAN 2
- resolved  - Given his high risk of ETOH withdrawal he completed librium taper.    -seen by psychiatry, refusing placement in shelter for PT services - information given to patient in [place] but patient still refusing  - WALT christine appreciated  -Completed 3 days of high dose thiamine  -continue folate/mvi/thiamine

## 2019-06-24 NOTE — DISCHARGE NOTE NURSING/CASE MANAGEMENT/SOCIAL WORK - NSDCDPATPORTLINK_GEN_ALL_CORE
You can access the [facility] Patient Portal, offered by [facility], by registering with the following website: [email or web address]

## 2019-06-24 NOTE — DISCHARGE NOTE NURSING/CASE MANAGEMENT/SOCIAL WORK - NSDCVIVACCINE_GEN_ALL_CORE_FT
Influenza , 2018/10/4 16:10 , Saniya Hodges (RN)  Td , 2016/2/1 06:50 , Melaine Moralez (RN)  Tdap , 2016/3/30 13:53 , Chrystal Jung (RN) Influenza , 2018/10/4 16:10 , Saniya Hodges (RN)  Td , 2016/2/1 06:50 , Melanie Moralez (RN)  Tdap , 2016/3/30 13:53 , Chrystal Jung (RN)

## 2019-06-24 NOTE — PROGRESS NOTE ADULT - SUBJECTIVE AND OBJECTIVE BOX
Patient is a 58y old  Male who presents with a chief complaint of ETOH intoxication, found on the street (24 Jun 2019 14:05)        SUBJECTIVE / OVERNIGHT EVENTS: discussed with patient need to wear brace. patient refusing shelter placement at this time      MEDICATIONS  (STANDING):  acetaminophen   Tablet .. 650 milliGRAM(s) Oral every 6 hours  ergocalciferol 59789 Unit(s) Oral every week  folic acid 1 milliGRAM(s) Oral daily  gabapentin 200 milliGRAM(s) Oral three times a day  heparin  Injectable 5000 Unit(s) SubCutaneous every 8 hours  ibuprofen  Tablet. 600 milliGRAM(s) Oral every 6 hours  multivitamin 1 Tablet(s) Oral daily  pantoprazole    Tablet 40 milliGRAM(s) Oral before breakfast  thiamine 100 milliGRAM(s) Oral daily    MEDICATIONS  (PRN):  acetaminophen   Tablet .. 650 milliGRAM(s) Oral every 6 hours PRN Temp greater or equal to 38C (100.4F), Mild Pain (1 - 3)      Vital Signs Last 24 Hrs  T(C): 36.4 (24 Jun 2019 12:22), Max: 36.6 (23 Jun 2019 21:04)  T(F): 97.6 (24 Jun 2019 12:22), Max: 97.9 (23 Jun 2019 21:04)  HR: 68 (24 Jun 2019 12:22) (61 - 68)  BP: 120/73 (24 Jun 2019 12:22) (120/73 - 134/70)  BP(mean): --  RR: 18 (24 Jun 2019 12:22) (18 - 18)  SpO2: 99% (24 Jun 2019 12:22) (97% - 99%)  CAPILLARY BLOOD GLUCOSE        I&O's Summary    23 Jun 2019 07:01  -  24 Jun 2019 07:00  --------------------------------------------------------  IN: 1160 mL / OUT: 2800 mL / NET: -1640 mL    24 Jun 2019 07:01  -  24 Jun 2019 16:19  --------------------------------------------------------  IN: 660 mL / OUT: 600 mL / NET: 60 mL      PHYSICAL EXAM:  GENERAL: NAD, clean shaven today  EYES: EOMI, conjunctiva and sclera clear  CHEST/LUNG: Clear to auscultation bilaterally; No wheeze  HEART: Regular rate and rhythm; No murmurs, rubs, or gallops  ABDOMEN: Soft, Nontender, Nondistended; Bowel sounds present. TLSO brace at bedside   EXTREMITIES:  2+ Peripheral Pulses, No clubbing, cyanosis, or edema  NEUROLOGY: non-focal, moves all extremities.       LABS:      TSH 4.56              RADIOLOGY & ADDITIONAL TESTS:    Imaging Personally Reviewed:   Consultant(s) Notes Reviewed:    Care Discussed with Consultants/Other Providers: d/w WALT Sterling regarding discharge planning

## 2019-06-25 PROCEDURE — 99232 SBSQ HOSP IP/OBS MODERATE 35: CPT

## 2019-06-25 RX ORDER — LEVOTHYROXINE SODIUM 125 MCG
50 TABLET ORAL DAILY
Refills: 0 | Status: DISCONTINUED | OUTPATIENT
Start: 2019-06-25 | End: 2019-07-02

## 2019-06-25 RX ADMIN — Medication 1 MILLIGRAM(S): at 12:55

## 2019-06-25 RX ADMIN — HEPARIN SODIUM 5000 UNIT(S): 5000 INJECTION INTRAVENOUS; SUBCUTANEOUS at 21:00

## 2019-06-25 RX ADMIN — GABAPENTIN 200 MILLIGRAM(S): 400 CAPSULE ORAL at 21:00

## 2019-06-25 RX ADMIN — Medication 1 TABLET(S): at 12:55

## 2019-06-25 RX ADMIN — Medication 400 MILLIGRAM(S): at 06:22

## 2019-06-25 RX ADMIN — Medication 650 MILLIGRAM(S): at 10:23

## 2019-06-25 RX ADMIN — Medication 650 MILLIGRAM(S): at 06:52

## 2019-06-25 RX ADMIN — Medication 400 MILLIGRAM(S): at 06:53

## 2019-06-25 RX ADMIN — HEPARIN SODIUM 5000 UNIT(S): 5000 INJECTION INTRAVENOUS; SUBCUTANEOUS at 12:56

## 2019-06-25 RX ADMIN — Medication 650 MILLIGRAM(S): at 09:53

## 2019-06-25 RX ADMIN — HEPARIN SODIUM 5000 UNIT(S): 5000 INJECTION INTRAVENOUS; SUBCUTANEOUS at 06:20

## 2019-06-25 RX ADMIN — PANTOPRAZOLE SODIUM 40 MILLIGRAM(S): 20 TABLET, DELAYED RELEASE ORAL at 06:20

## 2019-06-25 RX ADMIN — Medication 650 MILLIGRAM(S): at 17:34

## 2019-06-25 RX ADMIN — GABAPENTIN 200 MILLIGRAM(S): 400 CAPSULE ORAL at 06:19

## 2019-06-25 RX ADMIN — GABAPENTIN 200 MILLIGRAM(S): 400 CAPSULE ORAL at 12:56

## 2019-06-25 RX ADMIN — Medication 650 MILLIGRAM(S): at 06:19

## 2019-06-25 RX ADMIN — Medication 650 MILLIGRAM(S): at 00:20

## 2019-06-25 RX ADMIN — Medication 400 MILLIGRAM(S): at 21:30

## 2019-06-25 RX ADMIN — Medication 650 MILLIGRAM(S): at 13:26

## 2019-06-25 RX ADMIN — Medication 400 MILLIGRAM(S): at 12:55

## 2019-06-25 RX ADMIN — Medication 400 MILLIGRAM(S): at 13:25

## 2019-06-25 RX ADMIN — Medication 100 MILLIGRAM(S): at 12:55

## 2019-06-25 RX ADMIN — Medication 650 MILLIGRAM(S): at 12:54

## 2019-06-25 RX ADMIN — Medication 400 MILLIGRAM(S): at 21:00

## 2019-06-25 RX ADMIN — Medication 50 MICROGRAM(S): at 09:53

## 2019-06-25 RX ADMIN — Medication 650 MILLIGRAM(S): at 17:06

## 2019-06-25 NOTE — PROGRESS NOTE ADULT - PROBLEM SELECTOR PLAN 1
- CT L/S and MRI show L1 fracture with retropulsion.  -PT recommends [facility] but patient is homeless.  F/u sw consult  -tylenol/ibuprofen standing  -increase neurontin to tid for pain  - fall precautions  -needs daily PT as he cannot go to rehab  -c/w TLSO brace - CT L/S and MRI show L1 fracture with retropulsion  no surgical intervention per ortho   TLSO brace with ambulation  -PT recommends [facility] but patient is homeless and refusing shelter placement. patient understands he will not get home PT services if refusing shelter placement.   -ibuprofen Q8 with food  -neurontin to tid for pain  - fall precautions  -needs daily PT as he cannot go to rehab
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
## 2019-06-25 NOTE — PROGRESS NOTE ADULT - SUBJECTIVE AND OBJECTIVE BOX
Patient is a 58y old  Male who presents with a chief complaint of ETOH intoxication, found on the street (24 Jun 2019 16:19)        SUBJECTIVE / OVERNIGHT EVENTS:      MEDICATIONS  (STANDING):  acetaminophen   Tablet .. 650 milliGRAM(s) Oral every 6 hours  ergocalciferol 07794 Unit(s) Oral every week  folic acid 1 milliGRAM(s) Oral daily  gabapentin 200 milliGRAM(s) Oral three times a day  heparin  Injectable 5000 Unit(s) SubCutaneous every 8 hours  ibuprofen  Tablet. 400 milliGRAM(s) Oral three times a day  levothyroxine 50 MICROGram(s) Oral daily  multivitamin 1 Tablet(s) Oral daily  pantoprazole    Tablet 40 milliGRAM(s) Oral before breakfast  thiamine 100 milliGRAM(s) Oral daily    MEDICATIONS  (PRN):  acetaminophen   Tablet .. 650 milliGRAM(s) Oral every 6 hours PRN Temp greater or equal to 38C (100.4F), Mild Pain (1 - 3)      Vital Signs Last 24 Hrs  T(C): 36.9 (25 Jun 2019 14:05), Max: 36.9 (24 Jun 2019 20:09)  T(F): 98.4 (25 Jun 2019 14:05), Max: 98.5 (24 Jun 2019 20:09)  HR: 58 (25 Jun 2019 14:05) (58 - 74)  BP: 111/68 (25 Jun 2019 14:05) (111/68 - 134/71)  BP(mean): --  RR: 19 (25 Jun 2019 14:05) (18 - 19)  SpO2: 97% (25 Jun 2019 14:05) (97% - 99%)  CAPILLARY BLOOD GLUCOSE        I&O's Summary    24 Jun 2019 07:01  -  25 Jun 2019 07:00  --------------------------------------------------------  IN: 900 mL / OUT: 2400 mL / NET: -1500 mL    25 Jun 2019 07:01  -  25 Jun 2019 16:10  --------------------------------------------------------  IN: 960 mL / OUT: 300 mL / NET: 660 mL        PHYSICAL EXAM:  GENERAL: NAD  HEAD:  Atraumatic, Normocephalic  EYES: conjunctiva and sclera clear  NECK: No JVD  CHEST/LUNG: CTA b/l  HEART: S1 S2 RRR  ABDOMEN: +BS Soft, NT/ND  EXTREMITIES:  2+ DP Pulses, No c/c/e  NEUROLOGY: AAOx3, no focal deficits   SKIN: No rashes or lesions    LABS:                    RADIOLOGY & ADDITIONAL TESTS:    Imaging Personally Reviewed:  Consultant(s) Notes Reviewed:    Care Discussed with Consultants/Other Providers: Patient is a 58y old  Male who presents with a chief complaint of ETOH intoxication, found on the street (24 Jun 2019 16:19)        SUBJECTIVE / OVERNIGHT EVENTS: c/o back pain    MEDICATIONS  (STANDING):  acetaminophen   Tablet .. 650 milliGRAM(s) Oral every 6 hours  ergocalciferol 09235 Unit(s) Oral every week  folic acid 1 milliGRAM(s) Oral daily  gabapentin 200 milliGRAM(s) Oral three times a day  heparin  Injectable 5000 Unit(s) SubCutaneous every 8 hours  ibuprofen  Tablet. 400 milliGRAM(s) Oral three times a day  levothyroxine 50 MICROGram(s) Oral daily  multivitamin 1 Tablet(s) Oral daily  pantoprazole    Tablet 40 milliGRAM(s) Oral before breakfast  thiamine 100 milliGRAM(s) Oral daily    MEDICATIONS  (PRN):  acetaminophen   Tablet .. 650 milliGRAM(s) Oral every 6 hours PRN Temp greater or equal to 38C (100.4F), Mild Pain (1 - 3)      Vital Signs Last 24 Hrs  T(C): 36.9 (25 Jun 2019 14:05), Max: 36.9 (24 Jun 2019 20:09)  T(F): 98.4 (25 Jun 2019 14:05), Max: 98.5 (24 Jun 2019 20:09)  HR: 58 (25 Jun 2019 14:05) (58 - 74)  BP: 111/68 (25 Jun 2019 14:05) (111/68 - 134/71)  BP(mean): --  RR: 19 (25 Jun 2019 14:05) (18 - 19)  SpO2: 97% (25 Jun 2019 14:05) (97% - 99%)  CAPILLARY BLOOD GLUCOSE        I&O's Summary    24 Jun 2019 07:01  -  25 Jun 2019 07:00  --------------------------------------------------------  IN: 900 mL / OUT: 2400 mL / NET: -1500 mL    25 Jun 2019 07:01  -  25 Jun 2019 16:10  --------------------------------------------------------  IN: 960 mL / OUT: 300 mL / NET: 660 mL        PHYSICAL EXAM:  GENERAL: NAD  HEAD:  Atraumatic, Normocephalic  EYES: conjunctiva and sclera clear  NECK: No JVD  CHEST/LUNG: CTA b/l  HEART: S1 S2 RRR  ABDOMEN: +BS Soft, NT/ND  EXTREMITIES:  2+ DP Pulses, No c/c/e  NEUROLOGY: AAOx3, no focal deficits   SKIN: No rashes or lesions    LABS:                    RADIOLOGY & ADDITIONAL TESTS:    Imaging Personally Reviewed:  Consultant(s) Notes Reviewed:    Care Discussed with Consultants/Other Providers:

## 2019-06-25 NOTE — PROGRESS NOTE ADULT - PROBLEM SELECTOR PLAN 3
sqh  check hiv/rpr per psychiatry --> both are negative sqh  hiv/rpr neg per psychiatry --> both are negative

## 2019-06-25 NOTE — PROGRESS NOTE ADULT - PROBLEM SELECTOR PLAN 2
- resolved  - Given his high risk of ETOH withdrawal he completed librium taper.    -seen by psychiatry, refusing placement in shelter for PT services - information given to patient in [place] but patient still refusing  - WALT christine appreciated  -Completed 3 days of high dose thiamine  -continue folate/mvi/thiamine

## 2019-06-25 NOTE — PROGRESS NOTE ADULT - ATTENDING COMMENTS
discharge time - 40 minutes  Dr. M. Luke  [facility] Hospitalist  [phone] Dr. [facility]  [phone]

## 2019-06-25 NOTE — PROGRESS NOTE ADULT - PROBLEM SELECTOR PLAN 4
hip xrays negative for fracture  OOB to chair  daily PT  tsh mildly elevated - poor follow up - will send Free T4 to evaluate for hypothyroidism. hip xrays negative for fracture  OOB to chair  daily PT  tsh mildly elevated - slightly low FT4 - will start low dose synthroid for hypothyroidism. will make follow up clinic appointment on discharge  patient still unsteady on feet and cannot be discharged at this time.

## 2019-06-26 DIAGNOSIS — E03.9 HYPOTHYROIDISM, UNSPECIFIED: ICD-10-CM

## 2019-06-26 PROCEDURE — 99232 SBSQ HOSP IP/OBS MODERATE 35: CPT

## 2019-06-26 RX ADMIN — Medication 400 MILLIGRAM(S): at 22:38

## 2019-06-26 RX ADMIN — Medication 1 TABLET(S): at 12:07

## 2019-06-26 RX ADMIN — Medication 400 MILLIGRAM(S): at 22:08

## 2019-06-26 RX ADMIN — ERGOCALCIFEROL 50000 UNIT(S): 1.25 CAPSULE ORAL at 12:06

## 2019-06-26 RX ADMIN — HEPARIN SODIUM 5000 UNIT(S): 5000 INJECTION INTRAVENOUS; SUBCUTANEOUS at 05:28

## 2019-06-26 RX ADMIN — Medication 650 MILLIGRAM(S): at 17:41

## 2019-06-26 RX ADMIN — Medication 400 MILLIGRAM(S): at 06:31

## 2019-06-26 RX ADMIN — HEPARIN SODIUM 5000 UNIT(S): 5000 INJECTION INTRAVENOUS; SUBCUTANEOUS at 22:09

## 2019-06-26 RX ADMIN — Medication 400 MILLIGRAM(S): at 15:28

## 2019-06-26 RX ADMIN — HEPARIN SODIUM 5000 UNIT(S): 5000 INJECTION INTRAVENOUS; SUBCUTANEOUS at 12:07

## 2019-06-26 RX ADMIN — Medication 650 MILLIGRAM(S): at 06:32

## 2019-06-26 RX ADMIN — Medication 400 MILLIGRAM(S): at 15:23

## 2019-06-26 RX ADMIN — Medication 650 MILLIGRAM(S): at 07:03

## 2019-06-26 RX ADMIN — Medication 650 MILLIGRAM(S): at 17:50

## 2019-06-26 RX ADMIN — Medication 100 MILLIGRAM(S): at 12:07

## 2019-06-26 RX ADMIN — GABAPENTIN 200 MILLIGRAM(S): 400 CAPSULE ORAL at 12:06

## 2019-06-26 RX ADMIN — PANTOPRAZOLE SODIUM 40 MILLIGRAM(S): 20 TABLET, DELAYED RELEASE ORAL at 06:31

## 2019-06-26 RX ADMIN — Medication 650 MILLIGRAM(S): at 12:36

## 2019-06-26 RX ADMIN — Medication 50 MICROGRAM(S): at 05:27

## 2019-06-26 RX ADMIN — GABAPENTIN 200 MILLIGRAM(S): 400 CAPSULE ORAL at 22:08

## 2019-06-26 RX ADMIN — Medication 400 MILLIGRAM(S): at 07:04

## 2019-06-26 RX ADMIN — GABAPENTIN 200 MILLIGRAM(S): 400 CAPSULE ORAL at 06:31

## 2019-06-26 RX ADMIN — Medication 1 MILLIGRAM(S): at 12:06

## 2019-06-26 RX ADMIN — Medication 650 MILLIGRAM(S): at 12:06
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
## 2019-06-26 NOTE — PROGRESS NOTE ADULT - SUBJECTIVE AND OBJECTIVE BOX
Patient is a 58y old  Male who presents with a chief complaint of ETOH intoxication, found on the street (25 Jun 2019 16:10)        SUBJECTIVE / OVERNIGHT EVENTS:      MEDICATIONS  (STANDING):  acetaminophen   Tablet .. 650 milliGRAM(s) Oral every 6 hours  ergocalciferol 34546 Unit(s) Oral every week  folic acid 1 milliGRAM(s) Oral daily  gabapentin 200 milliGRAM(s) Oral three times a day  heparin  Injectable 5000 Unit(s) SubCutaneous every 8 hours  ibuprofen  Tablet. 400 milliGRAM(s) Oral three times a day  levothyroxine 50 MICROGram(s) Oral daily  multivitamin 1 Tablet(s) Oral daily  pantoprazole    Tablet 40 milliGRAM(s) Oral before breakfast  thiamine 100 milliGRAM(s) Oral daily    MEDICATIONS  (PRN):  acetaminophen   Tablet .. 650 milliGRAM(s) Oral every 6 hours PRN Temp greater or equal to 38C (100.4F), Mild Pain (1 - 3)      Vital Signs Last 24 Hrs  T(C): 36.9 (26 Jun 2019 12:55), Max: 36.9 (26 Jun 2019 12:55)  T(F): 98.5 (26 Jun 2019 12:55), Max: 98.5 (26 Jun 2019 12:55)  HR: 81 (26 Jun 2019 12:55) (59 - 81)  BP: 103/67 (26 Jun 2019 12:55) (103/67 - 132/78)  BP(mean): --  RR: 18 (26 Jun 2019 12:55) (18 - 18)  SpO2: 98% (26 Jun 2019 12:55) (96% - 98%)  CAPILLARY BLOOD GLUCOSE        I&O's Summary    25 Jun 2019 07:01  -  26 Jun 2019 07:00  --------------------------------------------------------  IN: 1440 mL / OUT: 2200 mL / NET: -760 mL    26 Jun 2019 07:01  -  26 Jun 2019 18:04  --------------------------------------------------------  IN: 660 mL / OUT: 550 mL / NET: 110 mL          PHYSICAL EXAM:  GENERAL: NAD, clean shaven today  EYES: EOMI, conjunctiva and sclera clear  CHEST/LUNG: Clear to auscultation bilaterally; No wheeze  HEART: Regular rate and rhythm; No murmurs, rubs, or gallops  ABDOMEN: Soft, Nontender, Nondistended; Bowel sounds present. TLSO brace at bedside   EXTREMITIES:  2+ Peripheral Pulses, No clubbing, cyanosis, or edema  NEUROLOGY: non-focal, moves all extremities.     LABS:                    RADIOLOGY & ADDITIONAL TESTS:    Imaging Personally Reviewed:  Consultant(s) Notes Reviewed:    Care Discussed with Consultants/Other Providers: Patient is a 58y old  Male who presents with a chief complaint of ETOH intoxication, found on the street (25 Jun 2019 16:10)        SUBJECTIVE / OVERNIGHT EVENTS: c/o persistent back pain. again reiterated importance of wearing back brace with movement.       MEDICATIONS  (STANDING):  acetaminophen   Tablet .. 650 milliGRAM(s) Oral every 6 hours  ergocalciferol 96539 Unit(s) Oral every week  folic acid 1 milliGRAM(s) Oral daily  gabapentin 200 milliGRAM(s) Oral three times a day  heparin  Injectable 5000 Unit(s) SubCutaneous every 8 hours  ibuprofen  Tablet. 400 milliGRAM(s) Oral three times a day  levothyroxine 50 MICROGram(s) Oral daily  multivitamin 1 Tablet(s) Oral daily  pantoprazole    Tablet 40 milliGRAM(s) Oral before breakfast  thiamine 100 milliGRAM(s) Oral daily    MEDICATIONS  (PRN):  acetaminophen   Tablet .. 650 milliGRAM(s) Oral every 6 hours PRN Temp greater or equal to 38C (100.4F), Mild Pain (1 - 3)      Vital Signs Last 24 Hrs  T(C): 36.9 (26 Jun 2019 12:55), Max: 36.9 (26 Jun 2019 12:55)  T(F): 98.5 (26 Jun 2019 12:55), Max: 98.5 (26 Jun 2019 12:55)  HR: 81 (26 Jun 2019 12:55) (59 - 81)  BP: 103/67 (26 Jun 2019 12:55) (103/67 - 132/78)  BP(mean): --  RR: 18 (26 Jun 2019 12:55) (18 - 18)  SpO2: 98% (26 Jun 2019 12:55) (96% - 98%)  CAPILLARY BLOOD GLUCOSE        I&O's Summary    25 Jun 2019 07:01  -  26 Jun 2019 07:00  --------------------------------------------------------  IN: 1440 mL / OUT: 2200 mL / NET: -760 mL    26 Jun 2019 07:01  -  26 Jun 2019 18:04  --------------------------------------------------------  IN: 660 mL / OUT: 550 mL / NET: 110 mL          PHYSICAL EXAM:  GENERAL: NAD, clean shaven today  EYES: EOMI, conjunctiva and sclera clear  CHEST/LUNG: Clear to auscultation bilaterally; No wheeze  HEART: Regular rate and rhythm; No murmurs, rubs, or gallops  ABDOMEN: Soft, Nontender, Nondistended; Bowel sounds present. TLSO brace at bedside   EXTREMITIES:  2+ Peripheral Pulses, No clubbing, cyanosis, or edema  NEUROLOGY: non-focal, moves all extremities.     LABS:                    RADIOLOGY & ADDITIONAL TESTS:    Imaging Personally Reviewed:   Consultant(s) Notes Reviewed:    Care Discussed with Consultants/Other Providers:

## 2019-06-26 NOTE — PROGRESS NOTE ADULT - PROBLEM SELECTOR PLAN 1
- CT L/S and MRI show L1 fracture with retropulsion  no surgical intervention per ortho   TLSO brace with ambulation  -PT recommends [facility] but patient is homeless and refusing shelter placement. patient understands he will not get home PT services if refusing shelter placement.   -ibuprofen Q8 with food  -neurontin to tid for pain  - fall precautions  -needs daily PT as he cannot go to rehab

## 2019-06-26 NOTE — PROGRESS NOTE ADULT - ATTENDING COMMENTS
[facility]  [phone] discharge planning when functional status improves for safe discharge  Dr. ELISE Cedeno  [facility] Hospitalist  [phone]

## 2019-06-26 NOTE — PROGRESS NOTE ADULT - PROBLEM SELECTOR PLAN 5
vitamin d supplementation hip xrays negative for fracture  OOB to chair  daily PT  tsh mildly elevated - slightly low FT4 - started low dose synthroid  patient still unsteady on feet and cannot be discharged at this time.

## 2019-06-26 NOTE — PROGRESS NOTE ADULT - PROBLEM SELECTOR PLAN 3
sqh  hiv/rpr neg per psychiatry --> both are negative tsh mildly elevated - slightly low FT4 - started low dose synthroid for hypothyroidism. will make follow up clinic appointment on discharge - pt will need repeat TSH in 4-6 weeks.

## 2019-06-26 NOTE — PROGRESS NOTE ADULT - PROBLEM SELECTOR PLAN 4
hip xrays negative for fracture  OOB to chair  daily PT  tsh mildly elevated - slightly low FT4 - will start low dose synthroid for hypothyroidism. will make follow up clinic appointment on discharge  patient still unsteady on feet and cannot be discharged at this time. sqh  hiv/rpr neg per psychiatry --> both are negative

## 2019-06-27 PROCEDURE — 99232 SBSQ HOSP IP/OBS MODERATE 35: CPT

## 2019-06-27 RX ADMIN — Medication 650 MILLIGRAM(S): at 00:12

## 2019-06-27 RX ADMIN — Medication 650 MILLIGRAM(S): at 05:19

## 2019-06-27 RX ADMIN — HEPARIN SODIUM 5000 UNIT(S): 5000 INJECTION INTRAVENOUS; SUBCUTANEOUS at 21:44

## 2019-06-27 RX ADMIN — Medication 650 MILLIGRAM(S): at 00:42

## 2019-06-27 RX ADMIN — Medication 1 TABLET(S): at 11:56

## 2019-06-27 RX ADMIN — GABAPENTIN 200 MILLIGRAM(S): 400 CAPSULE ORAL at 13:30

## 2019-06-27 RX ADMIN — HEPARIN SODIUM 5000 UNIT(S): 5000 INJECTION INTRAVENOUS; SUBCUTANEOUS at 13:31

## 2019-06-27 RX ADMIN — Medication 400 MILLIGRAM(S): at 05:19

## 2019-06-27 RX ADMIN — Medication 650 MILLIGRAM(S): at 07:05

## 2019-06-27 RX ADMIN — PANTOPRAZOLE SODIUM 40 MILLIGRAM(S): 20 TABLET, DELAYED RELEASE ORAL at 05:23

## 2019-06-27 RX ADMIN — Medication 1 MILLIGRAM(S): at 11:56

## 2019-06-27 RX ADMIN — Medication 100 MILLIGRAM(S): at 11:56

## 2019-06-27 RX ADMIN — Medication 650 MILLIGRAM(S): at 17:56

## 2019-06-27 RX ADMIN — Medication 400 MILLIGRAM(S): at 21:44

## 2019-06-27 RX ADMIN — Medication 650 MILLIGRAM(S): at 12:40

## 2019-06-27 RX ADMIN — GABAPENTIN 200 MILLIGRAM(S): 400 CAPSULE ORAL at 05:19

## 2019-06-27 RX ADMIN — Medication 400 MILLIGRAM(S): at 22:15

## 2019-06-27 RX ADMIN — GABAPENTIN 200 MILLIGRAM(S): 400 CAPSULE ORAL at 21:45

## 2019-06-27 RX ADMIN — Medication 650 MILLIGRAM(S): at 11:55

## 2019-06-27 RX ADMIN — HEPARIN SODIUM 5000 UNIT(S): 5000 INJECTION INTRAVENOUS; SUBCUTANEOUS at 05:18

## 2019-06-27 RX ADMIN — Medication 400 MILLIGRAM(S): at 14:20

## 2019-06-27 RX ADMIN — Medication 400 MILLIGRAM(S): at 13:31

## 2019-06-27 RX ADMIN — Medication 50 MICROGRAM(S): at 05:18

## 2019-06-27 RX ADMIN — Medication 650 MILLIGRAM(S): at 18:42

## 2019-06-27 RX ADMIN — Medication 400 MILLIGRAM(S): at 07:05

## 2019-06-27 NOTE — PROGRESS NOTE ADULT - PROBLEM SELECTOR PLAN 5
hip xrays negative for fracture  OOB to chair  daily PT  tsh mildly elevated - slightly low FT4 - started low dose synthroid  patient still unsteady on feet and cannot be discharged at this time.

## 2019-06-27 NOTE — PROGRESS NOTE ADULT - SUBJECTIVE AND OBJECTIVE BOX
Patient is a 58y old  Male who presents with a chief complaint of ETOH intoxication, found on the street (26 Jun 2019 18:04)        SUBJECTIVE / OVERNIGHT EVENTS: back pain - improved      MEDICATIONS  (STANDING):  acetaminophen   Tablet .. 650 milliGRAM(s) Oral every 6 hours  ergocalciferol 01248 Unit(s) Oral every week  folic acid 1 milliGRAM(s) Oral daily  gabapentin 200 milliGRAM(s) Oral three times a day  heparin  Injectable 5000 Unit(s) SubCutaneous every 8 hours  ibuprofen  Tablet. 400 milliGRAM(s) Oral three times a day  levothyroxine 50 MICROGram(s) Oral daily  multivitamin 1 Tablet(s) Oral daily  pantoprazole    Tablet 40 milliGRAM(s) Oral before breakfast  thiamine 100 milliGRAM(s) Oral daily    MEDICATIONS  (PRN):  acetaminophen   Tablet .. 650 milliGRAM(s) Oral every 6 hours PRN Temp greater or equal to 38C (100.4F), Mild Pain (1 - 3)      Vital Signs Last 24 Hrs  T(C): 36.3 (27 Jun 2019 12:51), Max: 36.8 (26 Jun 2019 20:49)  T(F): 97.3 (27 Jun 2019 12:51), Max: 98.2 (26 Jun 2019 20:49)  HR: 61 (27 Jun 2019 12:51) (61 - 68)  BP: 122/73 (27 Jun 2019 12:51) (105/65 - 122/73)  BP(mean): --  RR: 18 (27 Jun 2019 12:51) (18 - 18)  SpO2: 97% (27 Jun 2019 12:51) (97% - 97%)  CAPILLARY BLOOD GLUCOSE        I&O's Summary    26 Jun 2019 07:01  -  27 Jun 2019 07:00  --------------------------------------------------------  IN: 1020 mL / OUT: 1450 mL / NET: -430 mL    27 Jun 2019 07:01  -  27 Jun 2019 13:42  --------------------------------------------------------  IN: 240 mL / OUT: 1025 mL / NET: -785 mL        PHYSICAL EXAM:  GENERAL: NAD, clean shaven today  EYES: EOMI, conjunctiva and sclera clear  CHEST/LUNG: Clear to auscultation bilaterally; No wheeze  HEART: Regular rate and rhythm; No murmurs, rubs, or gallops  ABDOMEN: Soft, Nontender, Nondistended; Bowel sounds present. TLSO brace at bedside   EXTREMITIES:  2+ Peripheral Pulses, No clubbing, cyanosis, or edema  NEUROLOGY: non-focal, moves all extremities.     LABS:                    RADIOLOGY & ADDITIONAL TESTS:    Imaging Personally Reviewed:  Consultant(s) Notes Reviewed:    Care Discussed with Consultants/Other Providers:
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
## 2019-06-27 NOTE — PROGRESS NOTE ADULT - PROBLEM SELECTOR PLAN 2
- resolved  - Given his high risk of ETOH withdrawal he completed librium taper.    -seen by psychiatry, refusing placement in shelter for PT services - information given to patient in Dutch but patient still refusing  - WALT christine appreciated  -Completed 3 days of high dose thiamine  -continue folate/mvi/thiamine

## 2019-06-27 NOTE — PROGRESS NOTE ADULT - PROBLEM SELECTOR PLAN 3
tsh mildly elevated - slightly low FT4 - started low dose synthroid for hypothyroidism. will make follow up clinic appointment on discharge - pt will need repeat TSH in 4-6 weeks.

## 2019-06-27 NOTE — PROGRESS NOTE ADULT - ATTENDING COMMENTS
discharge planning when functional status improves for safe discharge  Dr. ELISE Cedeno  [facility] Hospitalist  [phone]

## 2019-06-28 PROCEDURE — 99232 SBSQ HOSP IP/OBS MODERATE 35: CPT

## 2019-06-28 RX ADMIN — Medication 650 MILLIGRAM(S): at 18:00

## 2019-06-28 RX ADMIN — Medication 650 MILLIGRAM(S): at 05:27

## 2019-06-28 RX ADMIN — GABAPENTIN 200 MILLIGRAM(S): 400 CAPSULE ORAL at 13:42

## 2019-06-28 RX ADMIN — Medication 400 MILLIGRAM(S): at 06:00

## 2019-06-28 RX ADMIN — Medication 400 MILLIGRAM(S): at 13:42

## 2019-06-28 RX ADMIN — Medication 650 MILLIGRAM(S): at 20:54

## 2019-06-28 RX ADMIN — Medication 650 MILLIGRAM(S): at 23:49

## 2019-06-28 RX ADMIN — HEPARIN SODIUM 5000 UNIT(S): 5000 INJECTION INTRAVENOUS; SUBCUTANEOUS at 21:29

## 2019-06-28 RX ADMIN — Medication 650 MILLIGRAM(S): at 17:16

## 2019-06-28 RX ADMIN — HEPARIN SODIUM 5000 UNIT(S): 5000 INJECTION INTRAVENOUS; SUBCUTANEOUS at 13:42

## 2019-06-28 RX ADMIN — Medication 650 MILLIGRAM(S): at 00:35

## 2019-06-28 RX ADMIN — HEPARIN SODIUM 5000 UNIT(S): 5000 INJECTION INTRAVENOUS; SUBCUTANEOUS at 05:26

## 2019-06-28 RX ADMIN — Medication 400 MILLIGRAM(S): at 05:29

## 2019-06-28 RX ADMIN — Medication 1 MILLIGRAM(S): at 12:32

## 2019-06-28 RX ADMIN — Medication 650 MILLIGRAM(S): at 00:05

## 2019-06-28 RX ADMIN — Medication 1 TABLET(S): at 12:32

## 2019-06-28 RX ADMIN — Medication 650 MILLIGRAM(S): at 06:00

## 2019-06-28 RX ADMIN — Medication 400 MILLIGRAM(S): at 21:59

## 2019-06-28 RX ADMIN — Medication 650 MILLIGRAM(S): at 12:32

## 2019-06-28 RX ADMIN — Medication 400 MILLIGRAM(S): at 14:50

## 2019-06-28 RX ADMIN — Medication 400 MILLIGRAM(S): at 21:29

## 2019-06-28 RX ADMIN — Medication 50 MICROGRAM(S): at 05:29

## 2019-06-28 RX ADMIN — PANTOPRAZOLE SODIUM 40 MILLIGRAM(S): 20 TABLET, DELAYED RELEASE ORAL at 05:28

## 2019-06-28 RX ADMIN — GABAPENTIN 200 MILLIGRAM(S): 400 CAPSULE ORAL at 05:29

## 2019-06-28 RX ADMIN — Medication 650 MILLIGRAM(S): at 20:24

## 2019-06-28 RX ADMIN — Medication 100 MILLIGRAM(S): at 12:32

## 2019-06-28 RX ADMIN — GABAPENTIN 200 MILLIGRAM(S): 400 CAPSULE ORAL at 21:29

## 2019-06-28 RX ADMIN — Medication 650 MILLIGRAM(S): at 13:40

## 2019-06-28 NOTE — PROGRESS NOTE ADULT - PROBLEM SELECTOR PLAN 2
- resolved  - Given his high risk of ETOH withdrawal he completed librium taper.    -seen by psychiatry, refusing placement in shelter for PT services - information given to patient in Finnish but patient still refusing  - WALT christine appreciated  -Completed 3 days of high dose thiamine  -continue folate/mvi/thiamine

## 2019-06-28 NOTE — PROGRESS NOTE ADULT - ATTENDING COMMENTS
discharge planning when functional status improves for safe discharge  Dr. ELISE Cedeno  [facility] Hospitalist  [phone]

## 2019-06-28 NOTE — PROGRESS NOTE ADULT - SUBJECTIVE AND OBJECTIVE BOX
Patient is a 58y old  Male who presents with a chief complaint of ETOH intoxication, found on the street (27 Jun 2019 13:34)        SUBJECTIVE / OVERNIGHT EVENTS: patient denies pain while wearing brace      MEDICATIONS  (STANDING):  acetaminophen   Tablet .. 650 milliGRAM(s) Oral every 6 hours  ergocalciferol 77817 Unit(s) Oral every week  folic acid 1 milliGRAM(s) Oral daily  gabapentin 200 milliGRAM(s) Oral three times a day  heparin  Injectable 5000 Unit(s) SubCutaneous every 8 hours  ibuprofen  Tablet. 400 milliGRAM(s) Oral three times a day  levothyroxine 50 MICROGram(s) Oral daily  multivitamin 1 Tablet(s) Oral daily  pantoprazole    Tablet 40 milliGRAM(s) Oral before breakfast  thiamine 100 milliGRAM(s) Oral daily    MEDICATIONS  (PRN):  acetaminophen   Tablet .. 650 milliGRAM(s) Oral every 6 hours PRN Temp greater or equal to 38C (100.4F), Mild Pain (1 - 3)      Vital Signs Last 24 Hrs  T(C): 36.4 (28 Jun 2019 12:26), Max: 36.9 (27 Jun 2019 21:31)  T(F): 97.5 (28 Jun 2019 12:26), Max: 98.4 (27 Jun 2019 21:31)  HR: 67 (28 Jun 2019 12:26) (58 - 67)  BP: 111/73 (28 Jun 2019 12:26) (107/63 - 116/68)  BP(mean): --  RR: 16 (28 Jun 2019 12:26) (16 - 18)  SpO2: 97% (28 Jun 2019 12:26) (97% - 98%)  CAPILLARY BLOOD GLUCOSE        I&O's Summary    27 Jun 2019 07:01  -  28 Jun 2019 07:00  --------------------------------------------------------  IN: 820 mL / OUT: 3975 mL / NET: -3155 mL    28 Jun 2019 07:01  -  28 Jun 2019 15:10  --------------------------------------------------------  IN: 840 mL / OUT: 300 mL / NET: 540 mL        PHYSICAL EXAM:  GENERAL: NAD, clean shaven today  EYES: EOMI, conjunctiva and sclera clear  CHEST/LUNG: Clear to auscultation bilaterally; No wheeze  HEART: Regular rate and rhythm; No murmurs, rubs, or gallops  ABDOMEN: Soft, Nontender, Nondistended; Bowel sounds present. TLSO brace at bedside   back: tenderness lower back midline  EXTREMITIES:  2+ Peripheral Pulses, No clubbing, cyanosis, or edema  NEUROLOGY: non-focal, moves all extremities.     LABS:                    RADIOLOGY & ADDITIONAL TESTS:    Imaging Personally Reviewed:  Consultant(s) Notes Reviewed:    Care Discussed with Consultants/Other Providers:

## 2019-06-28 NOTE — PROGRESS NOTE ADULT - ASSESSMENT
59 y/o male with h/o ETOH abuse, chronic low back pain, chronic pancreatitis due to ETOH abuse presented to the ED via EMS who found patient on side of the road with ETOH intoxication and lumbar compression fracture, recommended for [facility] but patient homeless and refusing placement in shelter awaiting improved functional status for discharge being seen by PT daily.

## 2019-06-28 NOTE — CHART NOTE - NSCHARTNOTEFT_GEN_A_CORE
Nutrition Follow Up Note  Patient seen for: follow up    Source: chart, nurse    Diet : Regular    Patient reports:      PO intake :     Source for PO intake:     Enteral /Parenteral Nutrition:       Daily Weight in k.4 (-28), Weight in k.4 (-27), Weight in k.3 (-26), Weight in k.4 (-25), Weight in k.5 (-24), Weight in k.5 (-22)  % Weight Change    Pertinent Medications: MEDICATIONS  (STANDING):  acetaminophen   Tablet .. 650 milliGRAM(s) Oral every 6 hours  ergocalciferol 57056 Unit(s) Oral every week  folic acid 1 milliGRAM(s) Oral daily  gabapentin 200 milliGRAM(s) Oral three times a day  heparin  Injectable 5000 Unit(s) SubCutaneous every 8 hours  ibuprofen  Tablet. 400 milliGRAM(s) Oral three times a day  levothyroxine 50 MICROGram(s) Oral daily  multivitamin 1 Tablet(s) Oral daily  pantoprazole    Tablet 40 milliGRAM(s) Oral before breakfast  thiamine 100 milliGRAM(s) Oral daily    MEDICATIONS  (PRN):  acetaminophen   Tablet .. 650 milliGRAM(s) Oral every 6 hours PRN Temp greater or equal to 38C (100.4F), Mild Pain (1 - 3)    Pertinent Labs:   Finger Sticks:      Skin per nursing documentation:   Edema:    Estimated Needs:   [ ] no change since previous assessment  [ ] recalculated:     Previous Nutrition Diagnosis:   Nutrition Diagnosis is:    New Nutrition Diagnosis:  Related to:    As evidenced by:      Interventions:     Recommend  1)    Monitoring and Evaluation:     Continue to monitor Nutritional intake, Tolerance to diet prescription, weights, labs, skin integrity    RD remains available upon request and will follow up per protocol Nutrition Follow Up Note  Patient seen for: follow up          · Reason for Admission	ETOH intoxication, found on the street    59 y/o male with h/o ETOH abuse, chronic low back pain, chronic pancreatitis due to ETOH abuse presented to the ED via EMS who found patient on side of the road with ETOH intoxication and lumbar compression fracture, recommended for [facility] but patient homeless and refusing placement in shelter awaiting improved functional status for discharge being seen by PT daily.       Source: chart, nurse, pt via  phone-pt speaks Maori    Diet : Regular    Patient reports: when offered pt double entrees with meals he accepted, he complained of being in pain which interferes with his ability to eat, when asked pt if he reports pain to nursing, he replied inappropriately and started to talk about a shelter as per .      PO intake : >75% of meals     Source for PO intake: nurse, pt          Daily Weight in k.4 (-28), Weight in k.4 (-27), Weight in k.3 (-26), Weight in k.4 (-25), Weight in k.5 (-24), Weight in k.5 (-22)  % Weight Change: 3% gain since     Pertinent Medications: MEDICATIONS  (STANDING):  acetaminophen   Tablet .. 650 milliGRAM(s) Oral every 6 hours  ergocalciferol 26985 Unit(s) Oral every week  folic acid 1 milliGRAM(s) Oral daily  gabapentin 200 milliGRAM(s) Oral three times a day  heparin  Injectable 5000 Unit(s) SubCutaneous every 8 hours  ibuprofen  Tablet. 400 milliGRAM(s) Oral three times a day  levothyroxine 50 MICROGram(s) Oral daily  multivitamin 1 Tablet(s) Oral daily  pantoprazole    Tablet 40 milliGRAM(s) Oral before breakfast  thiamine 100 milliGRAM(s) Oral daily    MEDICATIONS  (PRN):  acetaminophen   Tablet .. 650 milliGRAM(s) Oral every 6 hours PRN Temp greater or equal to 38C (100.4F), Mild Pain (1 - 3)    Pertinent Labs: POCT: 104,176        Skin per nursing documentation: no pressure injuries  Edema: no edema    Estimated Needs:   [ x] no change since previous assessment  [ ] recalculated:     Previous Nutrition Diagnosis:  Excessive alcohol intake  Nutrition Diagnosis is: ongoing-being addressed with supplements and po diet         Interventions: provided double entrees with meals    Recommend  · Meals and Snacks: continue Regular diet  · Vitamin: continue VitD, folic Acid, multivitamin      Monitoring and Evaluation:     Continue to monitor Nutritional intake, Tolerance to diet prescription, weights, labs, skin integrity    RD remains available upon request and will follow up per protocol  Leigh Ann Bowman MA, RD, CDN #[phone]

## 2019-06-28 NOTE — PROGRESS NOTE ADULT - PROBLEM SELECTOR PLAN 3
tsh mildly elevated - slightly low FT4 - started low dose synthroid for hypothyroidism. will make follow up clinic appointment prior on discharge - pt will need repeat TSH in 4-6 weeks.

## 2019-06-29 PROCEDURE — 99232 SBSQ HOSP IP/OBS MODERATE 35: CPT

## 2019-06-29 RX ORDER — SODIUM CHLORIDE 9 MG/ML
1000 INJECTION INTRAMUSCULAR; INTRAVENOUS; SUBCUTANEOUS ONCE
Refills: 0 | Status: COMPLETED | OUTPATIENT
Start: 2019-06-29 | End: 2019-06-29

## 2019-06-29 RX ADMIN — Medication 100 MILLIGRAM(S): at 13:39

## 2019-06-29 RX ADMIN — HEPARIN SODIUM 5000 UNIT(S): 5000 INJECTION INTRAVENOUS; SUBCUTANEOUS at 21:36

## 2019-06-29 RX ADMIN — Medication 400 MILLIGRAM(S): at 22:06

## 2019-06-29 RX ADMIN — Medication 650 MILLIGRAM(S): at 07:15

## 2019-06-29 RX ADMIN — Medication 1 MILLIGRAM(S): at 13:36

## 2019-06-29 RX ADMIN — GABAPENTIN 200 MILLIGRAM(S): 400 CAPSULE ORAL at 13:27

## 2019-06-29 RX ADMIN — GABAPENTIN 200 MILLIGRAM(S): 400 CAPSULE ORAL at 21:36

## 2019-06-29 RX ADMIN — Medication 400 MILLIGRAM(S): at 13:28

## 2019-06-29 RX ADMIN — HEPARIN SODIUM 5000 UNIT(S): 5000 INJECTION INTRAVENOUS; SUBCUTANEOUS at 05:42

## 2019-06-29 RX ADMIN — Medication 400 MILLIGRAM(S): at 05:39

## 2019-06-29 RX ADMIN — HEPARIN SODIUM 5000 UNIT(S): 5000 INJECTION INTRAVENOUS; SUBCUTANEOUS at 13:36

## 2019-06-29 RX ADMIN — Medication 50 MICROGRAM(S): at 05:39

## 2019-06-29 RX ADMIN — SODIUM CHLORIDE 500 MILLILITER(S): 9 INJECTION INTRAMUSCULAR; INTRAVENOUS; SUBCUTANEOUS at 15:57

## 2019-06-29 RX ADMIN — Medication 400 MILLIGRAM(S): at 13:58

## 2019-06-29 RX ADMIN — Medication 400 MILLIGRAM(S): at 07:15

## 2019-06-29 RX ADMIN — PANTOPRAZOLE SODIUM 40 MILLIGRAM(S): 20 TABLET, DELAYED RELEASE ORAL at 05:39

## 2019-06-29 RX ADMIN — Medication 1 TABLET(S): at 13:28

## 2019-06-29 RX ADMIN — Medication 650 MILLIGRAM(S): at 13:58

## 2019-06-29 RX ADMIN — Medication 650 MILLIGRAM(S): at 18:51

## 2019-06-29 RX ADMIN — Medication 650 MILLIGRAM(S): at 00:19

## 2019-06-29 RX ADMIN — GABAPENTIN 200 MILLIGRAM(S): 400 CAPSULE ORAL at 05:39

## 2019-06-29 RX ADMIN — Medication 650 MILLIGRAM(S): at 13:27

## 2019-06-29 RX ADMIN — Medication 650 MILLIGRAM(S): at 18:21

## 2019-06-29 RX ADMIN — Medication 400 MILLIGRAM(S): at 21:36

## 2019-06-29 RX ADMIN — Medication 650 MILLIGRAM(S): at 05:39

## 2019-06-29 NOTE — PROGRESS NOTE ADULT - PROBLEM SELECTOR PLAN 2
- resolved  - Given his high risk of ETOH withdrawal he completed librium taper.    -seen by psychiatry, refusing placement in shelter for PT services - information given to patient in Micronesian but patient still refusing  - WALT christine appreciated  -Completed 3 days of high dose thiamine  -continue folate/mvi/thiamine

## 2019-06-29 NOTE — PROGRESS NOTE ADULT - SUBJECTIVE AND OBJECTIVE BOX
Patient is a 58y old  Male who presents with a chief complaint of ETOH intoxication, found on the street (28 Jun 2019 15:09)      SUBJECTIVE / OVERNIGHT EVENTS:  no acute events overnight  nurse Melinda somers at bedside  reports dizziness upon standing  pain improved with nsaids    MEDICATIONS  (STANDING):  acetaminophen   Tablet .. 650 milliGRAM(s) Oral every 6 hours  ergocalciferol 21903 Unit(s) Oral every week  folic acid 1 milliGRAM(s) Oral daily  gabapentin 200 milliGRAM(s) Oral three times a day  heparin  Injectable 5000 Unit(s) SubCutaneous every 8 hours  ibuprofen  Tablet. 400 milliGRAM(s) Oral three times a day  levothyroxine 50 MICROGram(s) Oral daily  multivitamin 1 Tablet(s) Oral daily  pantoprazole    Tablet 40 milliGRAM(s) Oral before breakfast  sodium chloride 0.9% Bolus 1000 milliLiter(s) IV Bolus once  thiamine 100 milliGRAM(s) Oral daily    MEDICATIONS  (PRN):  acetaminophen   Tablet .. 650 milliGRAM(s) Oral every 6 hours PRN Temp greater or equal to 38C (100.4F), Mild Pain (1 - 3)      Vital Signs Last 24 Hrs  T(C): 36.7 (29 Jun 2019 13:31), Max: 36.7 (29 Jun 2019 04:18)  T(F): 98 (29 Jun 2019 13:31), Max: 98.1 (29 Jun 2019 04:18)  HR: 87 (29 Jun 2019 13:31) (62 - 87)  BP: 109/71 (29 Jun 2019 13:31) (109/67 - 112/71)  BP(mean): --  RR: 18 (29 Jun 2019 13:31) (16 - 18)  SpO2: 97% (29 Jun 2019 13:31) (96% - 97%)  CAPILLARY BLOOD GLUCOSE        I&O's Summary    28 Jun 2019 07:01  -  29 Jun 2019 07:00  --------------------------------------------------------  IN: 1160 mL / OUT: 2580 mL / NET: -1420 mL    29 Jun 2019 07:01  -  29 Jun 2019 14:20  --------------------------------------------------------  IN: 760 mL / OUT: 550 mL / NET: 210 mL        PHYSICAL EXAM:  GENERAL: NAD, clean shaven today  EYES: EOMI, conjunctiva and sclera clear  CHEST/LUNG: Clear to auscultation bilaterally; No wheeze  HEART: Regular rate and rhythm; No murmurs, rubs, or gallops  ABDOMEN: Soft, Nontender, Nondistended; Bowel sounds present. TLSO brace at bedside   back: tenderness lower back midline  EXTREMITIES:  2+ Peripheral Pulses, No clubbing, cyanosis, or edema  NEUROLOGY: non-focal, moves all extremities.       LABS:                    RADIOLOGY & ADDITIONAL TESTS:    Imaging Personally Reviewed:    Consultant(s) Notes Reviewed:      Care Discussed with Consultants/Other Providers:

## 2019-06-30 LAB
ANION GAP SERPL CALC-SCNC: 13 MMOL/L — SIGNIFICANT CHANGE UP (ref 5–17)
BUN SERPL-MCNC: 24 MG/DL — HIGH (ref 7–23)
CALCIUM SERPL-MCNC: 9.9 MG/DL — SIGNIFICANT CHANGE UP (ref 8.4–10.5)
CHLORIDE SERPL-SCNC: 104 MMOL/L — SIGNIFICANT CHANGE UP (ref 96–108)
CO2 SERPL-SCNC: 23 MMOL/L — SIGNIFICANT CHANGE UP (ref 22–31)
CORTIS AM PEAK SERPL-MCNC: 6.8 UG/DL — SIGNIFICANT CHANGE UP (ref 6–18.4)
CORTIS SP2 SERPL-MCNC: 17.3 UG/DL — SIGNIFICANT CHANGE UP
CORTIS SP2 SERPL-MCNC: 19.8 UG/DL — SIGNIFICANT CHANGE UP
CORTIS SP2 SERPL-MCNC: 5.9 UG/DL — SIGNIFICANT CHANGE UP
CREAT SERPL-MCNC: 1.23 MG/DL — SIGNIFICANT CHANGE UP (ref 0.5–1.3)
GLUCOSE SERPL-MCNC: 85 MG/DL — SIGNIFICANT CHANGE UP (ref 70–99)
HCT VFR BLD CALC: 34.9 % — LOW (ref 39–50)
HGB BLD-MCNC: 11.6 G/DL — LOW (ref 13–17)
MAGNESIUM SERPL-MCNC: 1.8 MG/DL — SIGNIFICANT CHANGE UP (ref 1.6–2.6)
MCHC RBC-ENTMCNC: 33.3 GM/DL — SIGNIFICANT CHANGE UP (ref 32–36)
MCHC RBC-ENTMCNC: 35.6 PG — HIGH (ref 27–34)
MCV RBC AUTO: 107 FL — HIGH (ref 80–100)
PHOSPHATE SERPL-MCNC: 4.6 MG/DL — HIGH (ref 2.5–4.5)
PLATELET # BLD AUTO: 474 K/UL — HIGH (ref 150–400)
POTASSIUM SERPL-MCNC: 4.9 MMOL/L — SIGNIFICANT CHANGE UP (ref 3.5–5.3)
POTASSIUM SERPL-SCNC: 4.9 MMOL/L — SIGNIFICANT CHANGE UP (ref 3.5–5.3)
RBC # BLD: 3.26 M/UL — LOW (ref 4.2–5.8)
RBC # FLD: 12.1 % — SIGNIFICANT CHANGE UP (ref 10.3–14.5)
SODIUM SERPL-SCNC: 140 MMOL/L — SIGNIFICANT CHANGE UP (ref 135–145)
WBC # BLD: 7.2 K/UL — SIGNIFICANT CHANGE UP (ref 3.8–10.5)
WBC # FLD AUTO: 7.2 K/UL — SIGNIFICANT CHANGE UP (ref 3.8–10.5)

## 2019-06-30 PROCEDURE — 99232 SBSQ HOSP IP/OBS MODERATE 35: CPT

## 2019-06-30 RX ORDER — COSYNTROPIN 0.25 MG/ML
0.25 INJECTION, SOLUTION INTRAVENOUS ONCE
Refills: 0 | Status: COMPLETED | OUTPATIENT
Start: 2019-06-30 | End: 2019-06-30

## 2019-06-30 RX ADMIN — Medication 1 TABLET(S): at 14:23

## 2019-06-30 RX ADMIN — Medication 1 MILLIGRAM(S): at 14:24

## 2019-06-30 RX ADMIN — HEPARIN SODIUM 5000 UNIT(S): 5000 INJECTION INTRAVENOUS; SUBCUTANEOUS at 21:19

## 2019-06-30 RX ADMIN — Medication 50 MICROGRAM(S): at 05:24

## 2019-06-30 RX ADMIN — Medication 400 MILLIGRAM(S): at 05:24

## 2019-06-30 RX ADMIN — Medication 650 MILLIGRAM(S): at 14:21

## 2019-06-30 RX ADMIN — GABAPENTIN 200 MILLIGRAM(S): 400 CAPSULE ORAL at 05:24

## 2019-06-30 RX ADMIN — Medication 650 MILLIGRAM(S): at 19:59

## 2019-06-30 RX ADMIN — Medication 650 MILLIGRAM(S): at 05:53

## 2019-06-30 RX ADMIN — Medication 400 MILLIGRAM(S): at 14:24

## 2019-06-30 RX ADMIN — Medication 650 MILLIGRAM(S): at 05:23

## 2019-06-30 RX ADMIN — Medication 100 MILLIGRAM(S): at 14:23

## 2019-06-30 RX ADMIN — Medication 400 MILLIGRAM(S): at 16:14

## 2019-06-30 RX ADMIN — PANTOPRAZOLE SODIUM 40 MILLIGRAM(S): 20 TABLET, DELAYED RELEASE ORAL at 05:24

## 2019-06-30 RX ADMIN — GABAPENTIN 200 MILLIGRAM(S): 400 CAPSULE ORAL at 14:23

## 2019-06-30 RX ADMIN — Medication 650 MILLIGRAM(S): at 15:00

## 2019-06-30 RX ADMIN — Medication 400 MILLIGRAM(S): at 05:53

## 2019-06-30 RX ADMIN — HEPARIN SODIUM 5000 UNIT(S): 5000 INJECTION INTRAVENOUS; SUBCUTANEOUS at 14:24

## 2019-06-30 RX ADMIN — GABAPENTIN 200 MILLIGRAM(S): 400 CAPSULE ORAL at 21:18

## 2019-06-30 RX ADMIN — COSYNTROPIN 0.25 MILLIGRAM(S): 0.25 INJECTION, SOLUTION INTRAVENOUS at 14:12

## 2019-06-30 RX ADMIN — Medication 400 MILLIGRAM(S): at 21:48

## 2019-06-30 RX ADMIN — HEPARIN SODIUM 5000 UNIT(S): 5000 INJECTION INTRAVENOUS; SUBCUTANEOUS at 05:24

## 2019-06-30 RX ADMIN — Medication 650 MILLIGRAM(S): at 18:34

## 2019-06-30 RX ADMIN — Medication 400 MILLIGRAM(S): at 21:18

## 2019-06-30 NOTE — PROGRESS NOTE ADULT - ASSESSMENT
59 y/o male with h/o ETOH abuse, chronic low back pain, chronic pancreatitis due to ETOH abuse presented to the ED via EMS who found patient on side of the road with ETOH intoxication and lumbar compression fracture, recommended for [facility] but patient homeless and refusing placement in shelter awaiting improved functional status for discharge being seen by PT daily, for cortisol stimulation test today.

## 2019-06-30 NOTE — PROGRESS NOTE ADULT - PROBLEM SELECTOR PROBLEM 2
Alcohol withdrawal syndrome with perceptual disturbance
Chronic low back pain
Alcohol withdrawal syndrome with perceptual disturbance
Chronic low back pain
ETOH abuse
ETOH abuse
Chronic low back pain
Alcohol withdrawal syndrome with perceptual disturbance

## 2019-06-30 NOTE — PROGRESS NOTE ADULT - PROBLEM SELECTOR PLAN 7
symptomatic today  NS 1L bolus and repeat orthostatics  check am cortisol
repeat orthostatics negative  AM cortisol indeterminate  check cortisol stim test today
resolved with IVF

## 2019-06-30 NOTE — PROGRESS NOTE ADULT - SUBJECTIVE AND OBJECTIVE BOX
Patient is a 58y old  Male who presents with a chief complaint of ETOH intoxication, found on the street (29 Jun 2019 14:19)      SUBJECTIVE / OVERNIGHT EVENTS:  no acute events overnight  DERIK Lawrence translating at bedside  still complains of lightheadedness with standing  no n/v  ambulating to bathroom  still reports back pain    MEDICATIONS  (STANDING):  acetaminophen   Tablet .. 650 milliGRAM(s) Oral every 6 hours  cosyntropin Injectable 0.25 milliGRAM(s) IV Push once  ergocalciferol 60601 Unit(s) Oral every week  folic acid 1 milliGRAM(s) Oral daily  gabapentin 200 milliGRAM(s) Oral three times a day  heparin  Injectable 5000 Unit(s) SubCutaneous every 8 hours  ibuprofen  Tablet. 400 milliGRAM(s) Oral three times a day  levothyroxine 50 MICROGram(s) Oral daily  multivitamin 1 Tablet(s) Oral daily  pantoprazole    Tablet 40 milliGRAM(s) Oral before breakfast  thiamine 100 milliGRAM(s) Oral daily    MEDICATIONS  (PRN):  acetaminophen   Tablet .. 650 milliGRAM(s) Oral every 6 hours PRN Temp greater or equal to 38C (100.4F), Mild Pain (1 - 3)      Vital Signs Last 24 Hrs  T(C): 36.7 (30 Jun 2019 09:32), Max: 36.8 (29 Jun 2019 20:29)  T(F): 98 (30 Jun 2019 09:32), Max: 98.2 (29 Jun 2019 20:29)  HR: 62 (30 Jun 2019 09:32) (62 - 87)  BP: 119/70 (30 Jun 2019 09:32) (109/71 - 119/70)  BP(mean): --  RR: 18 (30 Jun 2019 09:32) (18 - 18)  SpO2: 98% (30 Jun 2019 09:32) (97% - 99%)  CAPILLARY BLOOD GLUCOSE        I&O's Summary    29 Jun 2019 07:01  -  30 Jun 2019 07:00  --------------------------------------------------------  IN: 2200 mL / OUT: 2075 mL / NET: 125 mL    30 Jun 2019 07:01  -  30 Jun 2019 12:24  --------------------------------------------------------  IN: 0 mL / OUT: 750 mL / NET: -750 mL        PHYSICAL EXAM:  GENERAL: NAD, clean shaven today  EYES: EOMI, conjunctiva and sclera clear  CHEST/LUNG: Clear to auscultation bilaterally; No wheeze  HEART: Regular rate and rhythm; No murmurs, rubs, or gallops  ABDOMEN: Soft, Nontender, Nondistended; Bowel sounds present. TLSO brace on   back: tenderness lower back midline  EXTREMITIES:  2+ Peripheral Pulses, No clubbing, cyanosis, or edema  NEUROLOGY: non-focal, moves all extremities. Gait-stable with use of walker, observed in hallway      LABS:                        11.6   7.2   )-----------( 474      ( 30 Jun 2019 06:52 )             34.9     06-30    140  |  104  |  24<H>  ----------------------------<  85  4.9   |  23  |  1.23    Ca    9.9      30 Jun 2019 06:52  Phos  4.6     06-30  Mg     1.8     06-30                RADIOLOGY & ADDITIONAL TESTS:    Imaging Personally Reviewed:    Consultant(s) Notes Reviewed:      Care Discussed with Consultants/Other Providers:

## 2019-06-30 NOTE — PROGRESS NOTE ADULT - PROVIDER SPECIALTY LIST ADULT
Hospitalist
Orthopedics
Hospitalist

## 2019-06-30 NOTE — PROGRESS NOTE ADULT - PROBLEM SELECTOR PROBLEM 1
Alcohol withdrawal syndrome with perceptual disturbance
Chronic low back pain
Alcohol withdrawal syndrome with perceptual disturbance
Chronic low back pain
Alcohol withdrawal syndrome with perceptual disturbance
Chronic low back pain
Chronic low back pain

## 2019-06-30 NOTE — PROGRESS NOTE ADULT - PROBLEM SELECTOR PLAN 2
- resolved  - Given his high risk of ETOH withdrawal he completed librium taper.    -seen by psychiatry, refusing placement in shelter for PT services - information given to patient in Korean but patient still refusing.  Says all his family is in [facility]   - WALT christine appreciated  -Completed 3 days of high dose thiamine  -continue folate/mvi/thiamine

## 2019-06-30 NOTE — PROGRESS NOTE ADULT - REASON FOR ADMISSION
ETOH intoxication, found on the street
L1 lumbar compression fracture
ETOH intoxication, found on the street

## 2019-07-01 LAB
ACTH SER-ACNC: 12.9 PG/ML — SIGNIFICANT CHANGE UP (ref 7.2–63.3)
CORTIS PRE/P CHAL SERPL-SCNC: SIGNIFICANT CHANGE UP

## 2019-07-01 PROCEDURE — 99239 HOSP IP/OBS DSCHRG MGMT >30: CPT

## 2019-07-01 RX ORDER — IBUPROFEN 200 MG
1 TABLET ORAL
Qty: 30 | Refills: 0
Start: 2019-07-01 | End: 2019-07-10

## 2019-07-01 RX ORDER — LEVOTHYROXINE SODIUM 125 MCG
1 TABLET ORAL
Qty: 30 | Refills: 0
Start: 2019-07-01 | End: 2019-07-30

## 2019-07-01 RX ADMIN — Medication 400 MILLIGRAM(S): at 05:38

## 2019-07-01 RX ADMIN — Medication 400 MILLIGRAM(S): at 21:45

## 2019-07-01 RX ADMIN — HEPARIN SODIUM 5000 UNIT(S): 5000 INJECTION INTRAVENOUS; SUBCUTANEOUS at 05:38

## 2019-07-01 RX ADMIN — Medication 650 MILLIGRAM(S): at 13:45

## 2019-07-01 RX ADMIN — GABAPENTIN 200 MILLIGRAM(S): 400 CAPSULE ORAL at 13:46

## 2019-07-01 RX ADMIN — Medication 650 MILLIGRAM(S): at 18:10

## 2019-07-01 RX ADMIN — Medication 1 MILLIGRAM(S): at 13:46

## 2019-07-01 RX ADMIN — Medication 100 MILLIGRAM(S): at 13:46

## 2019-07-01 RX ADMIN — Medication 400 MILLIGRAM(S): at 21:15

## 2019-07-01 RX ADMIN — Medication 650 MILLIGRAM(S): at 17:21

## 2019-07-01 RX ADMIN — Medication 1 TABLET(S): at 13:46

## 2019-07-01 RX ADMIN — Medication 400 MILLIGRAM(S): at 13:46

## 2019-07-01 RX ADMIN — Medication 400 MILLIGRAM(S): at 14:41

## 2019-07-01 RX ADMIN — Medication 650 MILLIGRAM(S): at 14:41

## 2019-07-01 RX ADMIN — Medication 400 MILLIGRAM(S): at 07:10

## 2019-07-01 RX ADMIN — GABAPENTIN 200 MILLIGRAM(S): 400 CAPSULE ORAL at 21:17

## 2019-07-01 RX ADMIN — HEPARIN SODIUM 5000 UNIT(S): 5000 INJECTION INTRAVENOUS; SUBCUTANEOUS at 13:47

## 2019-07-01 RX ADMIN — PANTOPRAZOLE SODIUM 40 MILLIGRAM(S): 20 TABLET, DELAYED RELEASE ORAL at 05:38

## 2019-07-01 RX ADMIN — Medication 650 MILLIGRAM(S): at 05:38

## 2019-07-01 RX ADMIN — HEPARIN SODIUM 5000 UNIT(S): 5000 INJECTION INTRAVENOUS; SUBCUTANEOUS at 21:15

## 2019-07-01 RX ADMIN — Medication 50 MICROGRAM(S): at 05:38

## 2019-07-01 RX ADMIN — Medication 650 MILLIGRAM(S): at 07:10

## 2019-07-01 RX ADMIN — GABAPENTIN 200 MILLIGRAM(S): 400 CAPSULE ORAL at 05:38

## 2019-07-01 NOTE — CHART NOTE - NSCHARTNOTEFT_GEN_A_CORE
CC: etoh withdrawal    S: DERIK Petty translating at bedside.  Patient does not want to go to a shelter until the winter.  His family is all in [facility].  WALT Pop reassessed him as well.  Plan for d/c to rehab today.  Has baseline back pain but able to ambulate and per PT even did stairs.    O: AVSS  cortisol stimulation test normal    A/P: 57 y/o male with h/o ETOH abuse, chronic low back pain, chronic pancreatitis due to ETOH abuse presented to the ED via EMS who found patient on side of the road with ETOH intoxication and lumbar compression fracture, recommended for [facility] but patient homeless and refusing placement in shelter functional status improved, [place] [facility] [place] [facility].  F/u appointment made and meds provided.    Discharge time: 50 min.  Reviewed f/u, meds/paperwork with WALT Pop and ENRICO Deutsch.  Beeper: [phone]

## 2019-07-02 VITALS
HEART RATE: 59 BPM | DIASTOLIC BLOOD PRESSURE: 74 MMHG | OXYGEN SATURATION: 99 % | RESPIRATION RATE: 18 BRPM | TEMPERATURE: 98 F | SYSTOLIC BLOOD PRESSURE: 115 MMHG

## 2019-07-02 RX ADMIN — Medication 50 MICROGRAM(S): at 05:50

## 2019-07-02 RX ADMIN — PANTOPRAZOLE SODIUM 40 MILLIGRAM(S): 20 TABLET, DELAYED RELEASE ORAL at 05:50

## 2019-07-02 RX ADMIN — Medication 650 MILLIGRAM(S): at 00:33

## 2019-07-02 RX ADMIN — Medication 1 TABLET(S): at 09:08

## 2019-07-02 RX ADMIN — Medication 650 MILLIGRAM(S): at 00:07

## 2019-07-02 RX ADMIN — Medication 400 MILLIGRAM(S): at 09:08

## 2019-07-02 RX ADMIN — Medication 1 MILLIGRAM(S): at 09:08

## 2019-07-02 RX ADMIN — Medication 400 MILLIGRAM(S): at 13:01

## 2019-07-02 RX ADMIN — HEPARIN SODIUM 5000 UNIT(S): 5000 INJECTION INTRAVENOUS; SUBCUTANEOUS at 05:50

## 2019-07-02 RX ADMIN — GABAPENTIN 200 MILLIGRAM(S): 400 CAPSULE ORAL at 05:50

## 2019-07-02 RX ADMIN — HEPARIN SODIUM 5000 UNIT(S): 5000 INJECTION INTRAVENOUS; SUBCUTANEOUS at 13:01

## 2019-07-02 RX ADMIN — Medication 650 MILLIGRAM(S): at 05:50

## 2019-07-02 RX ADMIN — Medication 650 MILLIGRAM(S): at 13:01

## 2019-07-02 RX ADMIN — Medication 650 MILLIGRAM(S): at 06:20

## 2019-07-02 RX ADMIN — Medication 100 MILLIGRAM(S): at 09:08

## 2019-07-02 RX ADMIN — Medication 400 MILLIGRAM(S): at 10:00

## 2019-07-02 RX ADMIN — GABAPENTIN 200 MILLIGRAM(S): 400 CAPSULE ORAL at 13:01

## 2019-07-10 PROCEDURE — 82533 TOTAL CORTISOL: CPT

## 2019-07-10 PROCEDURE — 82607 VITAMIN B-12: CPT

## 2019-07-10 PROCEDURE — 73590 X-RAY EXAM OF LOWER LEG: CPT

## 2019-07-10 PROCEDURE — 97162 PT EVAL MOD COMPLEX 30 MIN: CPT

## 2019-07-10 PROCEDURE — 86780 TREPONEMA PALLIDUM: CPT

## 2019-07-10 PROCEDURE — 72148 MRI LUMBAR SPINE W/O DYE: CPT

## 2019-07-10 PROCEDURE — 71045 X-RAY EXAM CHEST 1 VIEW: CPT

## 2019-07-10 PROCEDURE — 82962 GLUCOSE BLOOD TEST: CPT

## 2019-07-10 PROCEDURE — 73521 X-RAY EXAM HIPS BI 2 VIEWS: CPT

## 2019-07-10 PROCEDURE — 72131 CT LUMBAR SPINE W/O DYE: CPT

## 2019-07-10 PROCEDURE — 84443 ASSAY THYROID STIM HORMONE: CPT

## 2019-07-10 PROCEDURE — 72100 X-RAY EXAM L-S SPINE 2/3 VWS: CPT

## 2019-07-10 PROCEDURE — 86803 HEPATITIS C AB TEST: CPT

## 2019-07-10 PROCEDURE — 82306 VITAMIN D 25 HYDROXY: CPT

## 2019-07-10 PROCEDURE — 83735 ASSAY OF MAGNESIUM: CPT

## 2019-07-10 PROCEDURE — 80053 COMPREHEN METABOLIC PANEL: CPT

## 2019-07-10 PROCEDURE — 82746 ASSAY OF FOLIC ACID SERUM: CPT

## 2019-07-10 PROCEDURE — 84100 ASSAY OF PHOSPHORUS: CPT

## 2019-07-10 PROCEDURE — 85027 COMPLETE CBC AUTOMATED: CPT

## 2019-07-10 PROCEDURE — 82024 ASSAY OF ACTH: CPT

## 2019-07-10 PROCEDURE — 99285 EMERGENCY DEPT VISIT HI MDM: CPT | Mod: 25

## 2019-07-10 PROCEDURE — 93005 ELECTROCARDIOGRAM TRACING: CPT

## 2019-07-10 PROCEDURE — 70450 CT HEAD/BRAIN W/O DYE: CPT

## 2019-07-10 PROCEDURE — 76377 3D RENDER W/INTRP POSTPROCES: CPT

## 2019-07-10 PROCEDURE — 97116 GAIT TRAINING THERAPY: CPT

## 2019-07-10 PROCEDURE — 70486 CT MAXILLOFACIAL W/O DYE: CPT

## 2019-07-10 PROCEDURE — 72125 CT NECK SPINE W/O DYE: CPT

## 2019-07-10 PROCEDURE — 72170 X-RAY EXAM OF PELVIS: CPT

## 2019-07-10 PROCEDURE — 97530 THERAPEUTIC ACTIVITIES: CPT

## 2019-07-10 PROCEDURE — 96374 THER/PROPH/DIAG INJ IV PUSH: CPT

## 2019-07-10 PROCEDURE — 96375 TX/PRO/DX INJ NEW DRUG ADDON: CPT

## 2019-07-10 PROCEDURE — 80048 BASIC METABOLIC PNL TOTAL CA: CPT

## 2019-07-10 PROCEDURE — 84439 ASSAY OF FREE THYROXINE: CPT

## 2019-07-10 PROCEDURE — 87389 HIV-1 AG W/HIV-1&-2 AB AG IA: CPT

## 2019-07-10 PROCEDURE — 80307 DRUG TEST PRSMV CHEM ANLYZR: CPT

## 2019-07-10 PROCEDURE — 80076 HEPATIC FUNCTION PANEL: CPT

## 2019-07-15 ENCOUNTER — APPOINTMENT (OUTPATIENT)
Dept: INTERNAL MEDICINE | Facility: CLINIC | Age: 59
End: 2019-07-15

## 2019-07-24 ENCOUNTER — APPOINTMENT (OUTPATIENT)
Dept: INTERNAL MEDICINE | Facility: CLINIC | Age: 59
End: 2019-07-24

## 2019-11-08 ENCOUNTER — EMERGENCY (EMERGENCY)
Facility: HOSPITAL | Age: 59
LOS: 1 days | Discharge: HOME | End: 2019-11-08
Attending: EMERGENCY MEDICINE
Payer: SELF-PAY

## 2019-11-08 VITALS
SYSTOLIC BLOOD PRESSURE: 122 MMHG | DIASTOLIC BLOOD PRESSURE: 86 MMHG | OXYGEN SATURATION: 98 % | RESPIRATION RATE: 18 BRPM | HEART RATE: 69 BPM

## 2019-11-08 DIAGNOSIS — Z98.89 OTHER SPECIFIED POSTPROCEDURAL STATES: Chronic | ICD-10-CM

## 2019-11-08 PROCEDURE — 70450 CT HEAD/BRAIN W/O DYE: CPT | Mod: 26

## 2019-11-08 PROCEDURE — 72125 CT NECK SPINE W/O DYE: CPT | Mod: 26

## 2019-11-08 PROCEDURE — 99284 EMERGENCY DEPT VISIT MOD MDM: CPT

## 2019-11-08 RX ORDER — TETANUS TOXOID, REDUCED DIPHTHERIA TOXOID AND ACELLULAR PERTUSSIS VACCINE, ADSORBED 5; 2.5; 8; 8; 2.5 [IU]/.5ML; [IU]/.5ML; UG/.5ML; UG/.5ML; UG/.5ML
0.5 SUSPENSION INTRAMUSCULAR ONCE
Refills: 0 | Status: COMPLETED | OUTPATIENT
Start: 2019-11-08 | End: 2019-11-08

## 2019-11-08 RX ADMIN — TETANUS TOXOID, REDUCED DIPHTHERIA TOXOID AND ACELLULAR PERTUSSIS VACCINE, ADSORBED 0.5 MILLILITER(S): 5; 2.5; 8; 8; 2.5 SUSPENSION INTRAMUSCULAR at 19:35

## 2019-11-08 NOTE — ED PROVIDER NOTE - ATTENDING CONTRIBUTION TO CARE
attending Berenice: 59yM h/o alcohol abuse, pancreatitis BIBEMS for intoxication.  Pt with evidence of trauma to L hand. Pt moving all extremities, cooperative on exam. Will place on enhanced supervision, xray and CT imaging eval traumatic injury, tdap, FSG, reassess for sobreity

## 2019-11-08 NOTE — ED ADULT TRIAGE NOTE - MODE OF ARRIVAL
[address] [place] [address] Dr DISCHARGE SUMMARY Alirio Holder 
MR#: [id number] : 1939 ACCOUNT #: [de-identified] ADMIT DATE: 2018 DISCHARGE DATE: 2018 TIME OF DEATH:  3900 CAUSE OF DEATH:  Septic shock. Other contributing factors included:  Atrial fibrillation with rapid ventricular response, hyponatremia, hypomagnesemia, a history of hypertension, diabetes mellitus type 2, hyperlipidemia and hypothyroidism. Acute hypoxic respiratory failure, developing during hospital course, as well as acute kidney injury, possible pulmonary embolism, presumed aspiration pneumonia. Please refer to admission H and P. 
 
Briefly, the patient was an 49-year-old female with a past medical history including the above who was admitted to Orthopedics and transferred subsequently to our service after suffering a fall prior to presentation to the emergency department. It occurred when she was getting out of bed, she had a dislocated right total hip replacement, underwent closed reduction on the day of admission. We were consulted initially when a rapid response was called on 2018 for hypotension. She was found to have AFib with RVR. Her systolic pressures were in the 70s, rate was in the 160s. Given normal saline bolus, attempts were made to cardiovert the patient without success. She was started on pressor therapy and admitted to the ICU. Family arrived, and a decision was made to maintain the patient on comfort care support. Prior to this, while she was in ICU, we looked for a source of infection and it appeared that she may have had a urinary tract infection, maintained pressor support, antibiotics, and replaced her electrolytes. DISPOSITION:  She was subsequently made comfort care on the  and  at the aforementioned date and time. MD CASH Serrato/[place] 
D: 2018 19:31    
T: 2018 04:23 
JOB #: [id number] CC: Nat Conn NP 
 [facility] 
[place], [address] [facility]

## 2019-11-08 NOTE — ED ADULT NURSE NOTE - OBJECTIVE STATEMENT
60 y/o male brought in by EMS, homeless and found outside intoxicated. Per EMS, bystander reported pt fell and injured his L hand, unknown head trauma or LOC. Known homeless PMH of alcohol abuse and pancreatitis. 60 y/o male brought in by EMS, homeless and found outside intoxicated. Per EMS, bystander reported pt fell and injured his L hand, unknown head trauma or LOC. Known homeless, smells strongly of alcohol and urine. PMH of alcohol abuse and pancreatitis. A&Ox1 to person, no abd distention or tenderness, lac noted on left hand, nonambulatory at this time d/t intox. Pt complains of back ache. Resting in bed and given blankets for warmth and comfort. Will continue to reassess.

## 2019-11-08 NOTE — ED PROVIDER NOTE - NSFOLLOWUPINSTRUCTIONS_ED_ALL_ED_FT
You do not have a brain bleed or fractures of your hand.  If you have any pain, you can take tylenol 1000mg as needed -- please follow the pill bottle instructions for further dosage and safety instructions.    Follow up with your primary care doctor in the next 24-48 hours and bring a copy of your results.    If you lose consciousness, have significant headache that is not going away with pain medications, or have any other concerning symptoms, please return immediately to the emergency department.

## 2019-11-08 NOTE — ED PROVIDER NOTE - CLINICAL SUMMARY MEDICAL DECISION MAKING FREE TEXT BOX
Pt. is a 59 y.o. M p/w what is likely acute alcohol intoxication.  Pt. arousable to verbal stimuli and responding to few questions.  Will get CTh and neck considering unsure if pt. fell or had LOC.  Will get xrays of left hand, wrist, forearm to r/o fx or dislocation.  Will wait for clinical sobriety and if all results unremarkable, can d/c.

## 2019-11-08 NOTE — ED PROVIDER NOTE - PROGRESS NOTE DETAILS
Bo PGY1 - Pt. awake and ambulating.  PO challenge completed.  Pt. able to be discharged.  Discussed results w/ pt.  All questions and concerns have been addressed.

## 2019-11-08 NOTE — ED PROVIDER NOTE - PATIENT PORTAL LINK FT
You can access the FollowMyHealth Patient Portal offered by [facility] by registering at the following website: [email or web address]. By joining [employer]’s FollowMyHealth portal, you will also be able to view your health information using other applications (apps) compatible with our system.

## 2019-11-08 NOTE — ED ADULT NURSE REASSESSMENT NOTE - NS ED NURSE REASSESS COMMENT FT1
pts jacket and shirt removed and pt placed in gown. pt refusing to leave gown on/ pt being kept covered with blankets. MD nieves

## 2019-11-08 NOTE — ED PROVIDER NOTE - OBJECTIVE STATEMENT
Pt. is a 59 y.o. M w/ macrocyctic anemia, alcohol abuse, pancreatitis BIBEMS for intoxication.  Pt. is not exhibiting signs of withdrawal at this moment and is arousable to verbal stimuli.  Pt. answers some questions but otherwise is silent and sleeping.  No tongue fasciculations or tremor noted.  2cm abrasion on left palm, no other deformities of hand.  No other obvious signs of trauma.  Pt. states he drank some alcohol but not answering whether or not he took anything else.

## 2019-11-09 VITALS
RESPIRATION RATE: 16 BRPM | DIASTOLIC BLOOD PRESSURE: 76 MMHG | HEART RATE: 96 BPM | OXYGEN SATURATION: 98 % | TEMPERATURE: 100 F | SYSTOLIC BLOOD PRESSURE: 122 MMHG

## 2019-11-09 PROCEDURE — 73130 X-RAY EXAM OF HAND: CPT

## 2019-11-09 PROCEDURE — 73090 X-RAY EXAM OF FOREARM: CPT

## 2019-11-09 PROCEDURE — 72125 CT NECK SPINE W/O DYE: CPT

## 2019-11-09 PROCEDURE — 82962 GLUCOSE BLOOD TEST: CPT

## 2019-11-09 PROCEDURE — 73110 X-RAY EXAM OF WRIST: CPT | Mod: 26,LT

## 2019-11-09 PROCEDURE — 73130 X-RAY EXAM OF HAND: CPT | Mod: 26,LT

## 2019-11-09 PROCEDURE — 99285 EMERGENCY DEPT VISIT HI MDM: CPT | Mod: 25

## 2019-11-09 PROCEDURE — 73090 X-RAY EXAM OF FOREARM: CPT | Mod: 26,LT

## 2019-11-09 PROCEDURE — 90715 TDAP VACCINE 7 YRS/> IM: CPT

## 2019-11-09 PROCEDURE — 90471 IMMUNIZATION ADMIN: CPT

## 2019-11-09 PROCEDURE — 70450 CT HEAD/BRAIN W/O DYE: CPT

## 2019-11-09 PROCEDURE — 73110 X-RAY EXAM OF WRIST: CPT

## 2019-11-09 NOTE — ED ADULT NURSE REASSESSMENT NOTE - NS ED NURSE REASSESS COMMENT FT1
Pt currently sleeping. RN used to translate from Ukrainian to english. Pt pain reassessed and pt only complained of his chronic back pain from herniated discs. Pt drank ginger ale and went back to bed. VSS. Will continue to monitor.

## 2019-11-09 NOTE — ED ADULT NURSE REASSESSMENT NOTE - NS ED NURSE REASSESS COMMENT FT1
Pt is currently sleeping. Pt was changed out of clothes into a gown and was more awake/responsive during change. Pt was given 3 blankets for comfort/warmth. Pt given x-ray and results available. Bed rails raised, bed in lowest position. Will continue to monitor and encourage ambulation with assistance.

## 2019-12-15 ENCOUNTER — EMERGENCY (EMERGENCY)
Facility: HOSPITAL | Age: 59
LOS: 1 days | Discharge: HOME | End: 2019-12-15
Attending: EMERGENCY MEDICINE
Payer: SELF-PAY

## 2019-12-15 VITALS
SYSTOLIC BLOOD PRESSURE: 117 MMHG | HEART RATE: 55 BPM | RESPIRATION RATE: 16 BRPM | TEMPERATURE: 98 F | OXYGEN SATURATION: 100 % | DIASTOLIC BLOOD PRESSURE: 75 MMHG

## 2019-12-15 DIAGNOSIS — Z98.89 OTHER SPECIFIED POSTPROCEDURAL STATES: Chronic | ICD-10-CM

## 2019-12-15 LAB
ALBUMIN SERPL ELPH-MCNC: 4.6 G/DL — SIGNIFICANT CHANGE UP (ref 3.3–5)
ALP SERPL-CCNC: 66 U/L — SIGNIFICANT CHANGE UP (ref 40–120)
ALT FLD-CCNC: 46 U/L — HIGH (ref 10–45)
ANION GAP SERPL CALC-SCNC: 16 MMOL/L — SIGNIFICANT CHANGE UP (ref 5–17)
APAP SERPL-MCNC: <15 UG/ML — SIGNIFICANT CHANGE UP (ref 10–30)
AST SERPL-CCNC: 106 U/L — HIGH (ref 10–40)
BASOPHILS # BLD AUTO: 0.03 K/UL — SIGNIFICANT CHANGE UP (ref 0–0.2)
BASOPHILS NFR BLD AUTO: 0.4 % — SIGNIFICANT CHANGE UP (ref 0–2)
BILIRUB SERPL-MCNC: 1.1 MG/DL — SIGNIFICANT CHANGE UP (ref 0.2–1.2)
BUN SERPL-MCNC: 13 MG/DL — SIGNIFICANT CHANGE UP (ref 7–23)
CALCIUM SERPL-MCNC: 9.4 MG/DL — SIGNIFICANT CHANGE UP (ref 8.4–10.5)
CHLORIDE SERPL-SCNC: 104 MMOL/L — SIGNIFICANT CHANGE UP (ref 96–108)
CO2 SERPL-SCNC: 23 MMOL/L — SIGNIFICANT CHANGE UP (ref 22–31)
CREAT SERPL-MCNC: 0.85 MG/DL — SIGNIFICANT CHANGE UP (ref 0.5–1.3)
EOSINOPHIL # BLD AUTO: 0.09 K/UL — SIGNIFICANT CHANGE UP (ref 0–0.5)
EOSINOPHIL NFR BLD AUTO: 1.1 % — SIGNIFICANT CHANGE UP (ref 0–6)
ETHANOL SERPL-MCNC: 393 MG/DL — HIGH (ref 0–10)
GLUCOSE SERPL-MCNC: 87 MG/DL — SIGNIFICANT CHANGE UP (ref 70–99)
HCT VFR BLD CALC: 37 % — LOW (ref 39–50)
HGB BLD-MCNC: 12.1 G/DL — LOW (ref 13–17)
IMM GRANULOCYTES NFR BLD AUTO: 0.3 % — SIGNIFICANT CHANGE UP (ref 0–1.5)
LYMPHOCYTES # BLD AUTO: 2.09 K/UL — SIGNIFICANT CHANGE UP (ref 1–3.3)
LYMPHOCYTES # BLD AUTO: 26.4 % — SIGNIFICANT CHANGE UP (ref 13–44)
MCHC RBC-ENTMCNC: 32.7 GM/DL — SIGNIFICANT CHANGE UP (ref 32–36)
MCHC RBC-ENTMCNC: 33.5 PG — SIGNIFICANT CHANGE UP (ref 27–34)
MCV RBC AUTO: 102.5 FL — HIGH (ref 80–100)
MONOCYTES # BLD AUTO: 0.64 K/UL — SIGNIFICANT CHANGE UP (ref 0–0.9)
MONOCYTES NFR BLD AUTO: 8.1 % — SIGNIFICANT CHANGE UP (ref 2–14)
NEUTROPHILS # BLD AUTO: 5.05 K/UL — SIGNIFICANT CHANGE UP (ref 1.8–7.4)
NEUTROPHILS NFR BLD AUTO: 63.7 % — SIGNIFICANT CHANGE UP (ref 43–77)
NRBC # BLD: 0 /100 WBCS — SIGNIFICANT CHANGE UP (ref 0–0)
PLATELET # BLD AUTO: 225 K/UL — SIGNIFICANT CHANGE UP (ref 150–400)
POTASSIUM SERPL-MCNC: 3.9 MMOL/L — SIGNIFICANT CHANGE UP (ref 3.5–5.3)
POTASSIUM SERPL-SCNC: 3.9 MMOL/L — SIGNIFICANT CHANGE UP (ref 3.5–5.3)
PROT SERPL-MCNC: 8 G/DL — SIGNIFICANT CHANGE UP (ref 6–8.3)
RBC # BLD: 3.61 M/UL — LOW (ref 4.2–5.8)
RBC # FLD: 13.9 % — SIGNIFICANT CHANGE UP (ref 10.3–14.5)
SALICYLATES SERPL-MCNC: <2 MG/DL — LOW (ref 15–30)
SODIUM SERPL-SCNC: 143 MMOL/L — SIGNIFICANT CHANGE UP (ref 135–145)
WBC # BLD: 7.92 K/UL — SIGNIFICANT CHANGE UP (ref 3.8–10.5)
WBC # FLD AUTO: 7.92 K/UL — SIGNIFICANT CHANGE UP (ref 3.8–10.5)

## 2019-12-15 PROCEDURE — 72125 CT NECK SPINE W/O DYE: CPT | Mod: 26

## 2019-12-15 PROCEDURE — 70450 CT HEAD/BRAIN W/O DYE: CPT | Mod: 26

## 2019-12-15 PROCEDURE — 71045 X-RAY EXAM CHEST 1 VIEW: CPT | Mod: 26

## 2019-12-15 PROCEDURE — 99285 EMERGENCY DEPT VISIT HI MDM: CPT

## 2019-12-15 RX ORDER — HALOPERIDOL DECANOATE 100 MG/ML
5 INJECTION INTRAMUSCULAR ONCE
Refills: 0 | Status: COMPLETED | OUTPATIENT
Start: 2019-12-15 | End: 2019-12-15

## 2019-12-15 RX ADMIN — HALOPERIDOL DECANOATE 5 MILLIGRAM(S): 100 INJECTION INTRAMUSCULAR at 17:22

## 2019-12-15 NOTE — ED PROVIDER NOTE - OBJECTIVE STATEMENT
Attending note (Tomas): 58 y/o M with h/o ETOH abuse, multiple prior presentations for alcohol intoxication presenting to ED BIBEMS after found on ground out from of [facility], unclear if he had been drinking as patient unable to give history; EMS called by bystander.  Patient mumbles but not speaking (not answering questions in english or Tanzanian).

## 2019-12-15 NOTE — ED PROVIDER NOTE - CLINICAL SUMMARY MEDICAL DECISION MAKING FREE TEXT BOX
Attending note (Tomas): 60 y/o M with h/o ETOH abuse presenting with likely intoxication; but found on ground, unclear if trauma and patient unable to provide any history or information; no visible signs of trauma on examination; will obtain CT head/c-spine; labs (cbc, cmp, serum tox) and observe in ED.

## 2019-12-15 NOTE — ED ADULT NURSE REASSESSMENT NOTE - NS ED NURSE REASSESS COMMENT FT1
Pt appears more awake, talking and sitting up, requesting food. Rhododendron, apple and ginger ale given to pt.

## 2019-12-15 NOTE — ED ADULT NURSE NOTE - NSIMPLEMENTINTERV_GEN_ALL_ED
Implemented All Fall with Harm Risk Interventions:  Amelia to call system. Call bell, personal items and telephone within reach. Instruct patient to call for assistance. Room bathroom lighting operational. Non-slip footwear when patient is off stretcher. Physically safe environment: no spills, clutter or unnecessary equipment. Stretcher in lowest position, wheels locked, appropriate side rails in place. Provide visual cue, wrist band, yellow gown, etc. Monitor gait and stability. Monitor for mental status changes and reorient to person, place, and time. Review medications for side effects contributing to fall risk. Reinforce activity limits and safety measures with patient and family. Provide visual clues: red socks.

## 2019-12-15 NOTE — ED PROVIDER NOTE - CONSTITUTIONAL, MLM
normal... opens eyes to name, following simple commands (open eyes, open mouth, moves extremities); but unable to answer more detailed questions/commands

## 2019-12-15 NOTE — ED PROVIDER NOTE - NEUROLOGICAL, MLM
Alert and oriented, no focal deficits, no motor or sensory deficits. No facial asymmetry, moving all extremities, slurred speech and alcohol on breath.

## 2019-12-15 NOTE — ED PROVIDER NOTE - PHYSICAL EXAMINATION
patient completed exposed and examined; smells of urine, unable to give history  no rashes/abrasions/ecchymoses or other gross signs of trauma  head NCAT patient completely exposed and examined; smells of urine, unable to give history  no rashes/abrasions/ecchymoses or other gross signs of trauma  head NCAT

## 2019-12-15 NOTE — ED PROVIDER NOTE - NSFOLLOWUPINSTRUCTIONS_ED_ALL_ED_FT
(1) Follow up with your primary care physician as discussed. In addition, we did not find evidence of a life threatening illness on your testing here today, but listed below are the specialists that will be necessary to see as an outpatient to continue the workup.  Please call the numbers listed below or [phone] to set up the necessary appointments  or call [phone] to make an appointment with the clinic.  (2) You were seen for alcohol intoxication. A copy of your resulted labs, imaging, and findings have been provided to you.  (3) Please, decrease the amount of drink alcohol  (4) Return immediately to the emergency department for new, persistent, or worsening symptoms or signs. Return immediately to the emergency department if you have chest pain, shortness of breath, loss of consciousness, or any other new concerns.

## 2019-12-15 NOTE — ED PROVIDER NOTE - ATTENDING CONTRIBUTION TO CARE
60 y/o M with h/o ETOH abuse, multiple prior presentations for alcohol intoxication presenting to ED BIBEMS after found on ground out from of [facility], unclear if he had been drinking as patient unable to give history; EMS called by bystander.  Patient mumbles but not speaking (not answering questions in english or Yemeni).    PE as noted above, notable for no signs of trauma, no facial asymmetry, is moving all extremities, and has slurred speech and alcohol on breath.    AP: Presentation is most c/w acute alcoholic intoxication; however, patient unable to give history or corroborating information and no collateral information available (minimal information from EMS); will obtain CT head/c-spine (possible fall? found on ground) to eval for trauma; labs: cbc, cmp, etoh level.  Patient endorsed to oncoming physician at time of sign out (pending results of CT and labs).

## 2019-12-15 NOTE — ED PROVIDER NOTE - PROGRESS NOTE DETAILS
Attending note (Tomas): called to CT; patient kicking/screaming, not cooperating with CT; exam otherwise unchanged, will give im haldol 5mg for agitation to proceed with evaluation. Urban Bhat MD, PGY2: Patient received at resident sign out. Pt is now awake. drank a cup of water, with no hesitation, walked with no support into the restroom. Pt states he has home at [place], but not sure how to go home. now ready to go home, if no way to go, then SW will be communicated in the morning. No tongue fasciculation, hands tremors, no sings of withdrawal. Elo JIMENEZ: Patient signed out by Dr. Marin pending CT Head and CT C-spine and clinical sobriety. CT Scans neg for acute injury. Patient states he has no money and cannot get home. Plan to dispo in AM. pt endorsed to me, now eating breakfast. will ambulate and likely dc. -Sam Bennett MD- Pt was able to ambulate in ED without falling or stumbling. Pt feels safe to go home with metro card. Will d/c home with cane.  -Tony Allred, PGY-1

## 2019-12-15 NOTE — ED ADULT NURSE NOTE - OBJECTIVE STATEMENT
Pt presents to ED via EMS from scene outside of [facility] where pt was found laying on the sidewalk. EMS reports fingerstick 98 and pt is frequently found intoxicated around town. Pt appears sleepy, arousable to voice and touch, refusing to answer questions in either English or Belizean. Pt following simple commands and oriented to his name. Pt smells of urine and alcohol and is unkempt. No obvious injuries noted. Respirations even and unlabored.

## 2019-12-15 NOTE — ED PROVIDER NOTE - PATIENT PORTAL LINK FT
You can access the FollowMyHealth Patient Portal offered by [facility] by registering at the following website: [email or web address]. By joining [employer]’s FollowMyHealth portal, you will also be able to view your health information using other applications (apps) compatible with our system.

## 2019-12-16 VITALS
DIASTOLIC BLOOD PRESSURE: 70 MMHG | HEART RATE: 87 BPM | SYSTOLIC BLOOD PRESSURE: 147 MMHG | TEMPERATURE: 99 F | RESPIRATION RATE: 18 BRPM | OXYGEN SATURATION: 98 %

## 2019-12-16 PROCEDURE — 99285 EMERGENCY DEPT VISIT HI MDM: CPT | Mod: 25

## 2019-12-16 PROCEDURE — 70450 CT HEAD/BRAIN W/O DYE: CPT

## 2019-12-16 PROCEDURE — 96372 THER/PROPH/DIAG INJ SC/IM: CPT

## 2019-12-16 PROCEDURE — 72125 CT NECK SPINE W/O DYE: CPT

## 2019-12-16 PROCEDURE — 80307 DRUG TEST PRSMV CHEM ANLYZR: CPT

## 2019-12-16 PROCEDURE — 85027 COMPLETE CBC AUTOMATED: CPT

## 2019-12-16 PROCEDURE — 71045 X-RAY EXAM CHEST 1 VIEW: CPT

## 2019-12-16 PROCEDURE — 80053 COMPREHEN METABOLIC PANEL: CPT

## 2019-12-16 PROCEDURE — 82962 GLUCOSE BLOOD TEST: CPT

## 2019-12-16 RX ADMIN — Medication 25 MILLIGRAM(S): at 01:27

## 2019-12-16 RX ADMIN — Medication 25 MILLIGRAM(S): at 03:12

## 2019-12-16 NOTE — ED ADULT NURSE REASSESSMENT NOTE - NS ED NURSE REASSESS COMMENT FT1
Pt resting in bed.  VSS.  NAD.  Perrl wnl , cruz with equal strength..  Lungs cta no chest pain or sob.  No signs of fevers, chills or N/V

## 2020-01-14 ENCOUNTER — EMERGENCY (EMERGENCY)
Facility: HOSPITAL | Age: 60
LOS: 1 days | Discharge: HOME | End: 2020-01-14
Attending: EMERGENCY MEDICINE | Admitting: EMERGENCY MEDICINE
Payer: SELF-PAY

## 2020-01-14 VITALS
TEMPERATURE: 98 F | SYSTOLIC BLOOD PRESSURE: 132 MMHG | WEIGHT: 149.91 LBS | RESPIRATION RATE: 16 BRPM | OXYGEN SATURATION: 98 % | HEART RATE: 84 BPM | HEIGHT: 66 IN | DIASTOLIC BLOOD PRESSURE: 88 MMHG

## 2020-01-14 DIAGNOSIS — Z98.89 OTHER SPECIFIED POSTPROCEDURAL STATES: Chronic | ICD-10-CM

## 2020-01-14 LAB — GLUCOSE BLDC GLUCOMTR-MCNC: 144 MG/DL — HIGH (ref 70–99)

## 2020-01-14 PROCEDURE — 82962 GLUCOSE BLOOD TEST: CPT

## 2020-01-14 PROCEDURE — 99285 EMERGENCY DEPT VISIT HI MDM: CPT

## 2020-01-14 PROCEDURE — 99284 EMERGENCY DEPT VISIT MOD MDM: CPT

## 2020-01-14 NOTE — ED PROVIDER NOTE - PHYSICAL EXAMINATION
General: + patient intoxicated, sleepy but opens eyes to voice, NAD.   HEENT: normocephalic, atraumatic, EOMI, no scleral icterus.   Cardiac: RRR, S1, S2, no murmur, rubs, gallop.   LUNGS: CTA B/L no wheeze, rhonchi, rales.   Abdomen: soft NT, ND, no rebound no guarding  EXT: Moving all extremities/   Neuro: unable to obtain due to ETOH intoxication. grossly no focal neurological deficits.   Skin: warm, dry General: + patient intoxicated, sleepy but opens eyes to voice, NAD.   HEENT: normocephalic, atraumatic, EOMI, no scleral icterus.   Cardiac: RRR, S1, S2, no murmur, rubs, gallop.   LUNGS: CTA B/L no wheeze, rhonchi, rales.   Abdomen: soft NT, ND, no rebound no guarding  EXT: Moving all extremities/ no gross deformity of extremities   Neuro: unable to obtain due to ETOH intoxication. grossly no focal neurological deficits.   Skin: warm, dry, no noted ecchymosis,

## 2020-01-14 NOTE — ED ADULT NURSE REASSESSMENT NOTE - NS ED NURSE REASSESS COMMENT FT1
Pt remains in the ED. Resting comfortably in bed. Breathing spontaneously and unlabored. Pt was caught drinking vodka under the blankets. Vodka taken and disposed of. Pt placed on 1:1. Will continue to monitor. Awaiting dispo. Pt remains in the ED. Resting comfortably in bed. Breathing spontaneously and unlabored. Pt was caught drinking vodka under the blankets. Vodka taken and disposed of. Pt undressed completely and placed in hospital gown. Pt placed on 1:1. Will continue to monitor. Awaiting dispo.

## 2020-01-14 NOTE — ED ADULT NURSE REASSESSMENT NOTE - NS ED NURSE REASSESS COMMENT FT1
Report received from Chey MORE. Unlabored, spontaneous respirations, NAD. On 1:1 for pt safety, PCA at bedside at all times. Pt provided food and water. Awaiting dispo at this time.

## 2020-01-14 NOTE — ED PROVIDER NOTE - CLINICAL SUMMARY MEDICAL DECISION MAKING FREE TEXT BOX
Adult male with history of alcohol intox presents intoxicated with alcohol on breath. obtain fingerstick. reassess.

## 2020-01-14 NOTE — ED PROVIDER NOTE - PATIENT PORTAL LINK FT
You can access the FollowMyHealth Patient Portal offered by [facility] by registering at the following website: [email or web address]. By joining [employer]’s FollowMyHealth portal, you will also be able to view your health information using other applications (apps) compatible with our system.

## 2020-01-14 NOTE — ED PROVIDER NOTE - OBJECTIVE STATEMENT
60 yo male pmhx of with h/o ETOH abuse, multiple prior presentations for alcohol intoxication presenting to ED BIBEMS after found on ground. Patient noted with alcohol on breath and unable to give history in english or Korean ( [id number]) 60 yo male pmhx of with h/o ETOH abuse, multiple prior presentations for alcohol intoxication presenting to ED BIBEMS after found on ground. Patient noted with alcohol on breath and unable to give history in english or Portuguese ( [id number]). Pt repeatedly answers "today, tomorrow, now" in Portuguese and english, but moves all extremities, no gross abnormalities. 58 yo male pmhx of with h/o ETOH abuse, multiple prior presentations for alcohol intoxication presenting to ED BIBEMS after found on ground. Patient noted with alcohol on breath and unable to give history in english or Azeri ( [id number]). Pt repeatedly answers "today, tomorrow, now" in Azeri and english, but moves all extremities, no gross abnormalities.  Laurent Plascencia MD, FACEP: patient not answering questions, not axox3 at this time, not participating in history.

## 2020-01-14 NOTE — ED PROVIDER NOTE - ATTENDING CONTRIBUTION TO CARE
Agree with above, Laurent Plascencia MD, FACEP  Laurent Plascencia MD, FACEP: In this physician's medical judgement based on clinical history and physical exam, patient is intoxicated without signs of trauma, pupils are equal, moving all extremities  patient known to this emergency department and was caught drinking the last ~1/7th of a pint of Allan in the emergency department.   patient will not answer as to where he is  he states 1960 his birth year as the year  trachea midline  bilateral breath sounds   non-tachycardic, non-tachypneic   no gross deformity of extremities   concern for alcohol intox  will get c/s, will monitor closely, will reassess and disposition when clinically sober and in control of faculties.

## 2020-01-14 NOTE — ED PROVIDER NOTE - PROGRESS NOTE DETAILS
Linette Mayes M.D. Resident   Patient found to be drinking Allan in the ED. Allan removed. Linette Mayes M.D. Resident  Patient reassessed. now can give one word response in english. Still falling asleep after response. Linette Mayes M.D. Resident  Patient reassessed. now can give one word response and say his name in english. Still falling asleep after response. Derek De Oliveira DO PGY2 - pt ambulatory, eating, drinking, clinically sober. Admits to drinking, no complaints. safe for dc

## 2020-01-14 NOTE — ED ADULT NURSE REASSESSMENT NOTE - NS ED NURSE REASSESS COMMENT FT1
Pt presents via EMS. Pt remains in the ED. Resting comfortably in bed. Breathing spontaneously and unlabored. NAD. VSS. MD and RN used  to communicate.  ID [id number]. Pt states continues to state "today, right now and tomorrow." Pt placed under enhanced supervision. Allowing to metabolize ETOH. Will continue to monitor. Awaiting dispo.

## 2020-01-14 NOTE — ED ADULT NURSE NOTE - OBJECTIVE STATEMENT
59 y M presents via EMS. Pt remains in the ED. Resting comfortably in bed. Breathing spontaneously and unlabored. NAD. VSS. MD and RN used  to communicate.  ID [id number]. Pt states continues to state "today, right now and tomorrow." Pt placed under enhanced supervision. Allowing to metabolize ETOH. Pt changed into gown, personal belongings removed from his possession. Will continue to monitor. Awaiting dispo.

## 2020-01-15 VITALS
SYSTOLIC BLOOD PRESSURE: 127 MMHG | RESPIRATION RATE: 19 BRPM | OXYGEN SATURATION: 98 % | DIASTOLIC BLOOD PRESSURE: 73 MMHG | HEART RATE: 86 BPM | TEMPERATURE: 98 F

## 2020-01-15 NOTE — ED ADULT NURSE REASSESSMENT NOTE - NS ED NURSE REASSESS COMMENT FT1
Pt ambulated with 1:1 for direct observation, witness by MD De Oliveira. Awaiting dispo at this time.

## 2020-02-06 ENCOUNTER — EMERGENCY (EMERGENCY)
Facility: HOSPITAL | Age: 60
LOS: 1 days | Discharge: HOME | End: 2020-02-06
Attending: STUDENT IN AN ORGANIZED HEALTH CARE EDUCATION/TRAINING PROGRAM
Payer: SELF-PAY

## 2020-02-06 VITALS
HEIGHT: 68 IN | HEART RATE: 66 BPM | DIASTOLIC BLOOD PRESSURE: 88 MMHG | WEIGHT: 149.91 LBS | OXYGEN SATURATION: 97 % | RESPIRATION RATE: 18 BRPM | SYSTOLIC BLOOD PRESSURE: 139 MMHG

## 2020-02-06 DIAGNOSIS — Z98.89 OTHER SPECIFIED POSTPROCEDURAL STATES: Chronic | ICD-10-CM

## 2020-02-06 PROCEDURE — 82962 GLUCOSE BLOOD TEST: CPT

## 2020-02-06 PROCEDURE — 70450 CT HEAD/BRAIN W/O DYE: CPT | Mod: 26

## 2020-02-06 PROCEDURE — 70450 CT HEAD/BRAIN W/O DYE: CPT

## 2020-02-06 PROCEDURE — 99284 EMERGENCY DEPT VISIT MOD MDM: CPT

## 2020-02-06 PROCEDURE — 99285 EMERGENCY DEPT VISIT HI MDM: CPT | Mod: 25

## 2020-02-06 NOTE — ED PROVIDER NOTE - PATIENT PORTAL LINK FT
You can access the FollowMyHealth Patient Portal offered by [facility] by registering at the following website: [email or web address]. By joining [employer]s FollowMyHealth portal, you will also be able to view your health information using other applications (apps) compatible with our system.

## 2020-02-06 NOTE — ED PROVIDER NOTE - OBJECTIVE STATEMENT
59M 59M hx frequent ED visits for ETOH abuse, presents to ED via EMS for intoxication. Per EMS grocery store called as pt was lying on floor of grocery store and would not get up. Reportedly was on a bench, had fallen and hit his head. Upon interviewing pt, he is not compliant to questioning, only notes total body pain, denies fever, abd pain, headache or recall of falling. Initially in soiled clothes.

## 2020-02-06 NOTE — ED PROVIDER NOTE - PROGRESS NOTE DETAILS
Rivera PGY3: pt tolerated po and able to ambulate independently, no acute complains, no extremity tremors or tongue fasciculations, no vital sign abnormalities to suggest withdrawal, ok for dc

## 2020-02-06 NOTE — ED PROVIDER NOTE - PHYSICAL EXAMINATION
Gen: AAox3, NAD  Head: NCAT  ENT: Airway patent, moist mucous membranes, nasal passageways clear, no pharyngeal erythema or exudates, uvula midline, no cervical lymphadenopathy  Cardiac: Normal rate, normal rhythm, no murmurs/rubs/gallops appreciated  Respiratory: Lungs CTA B/L  Gastrointestinal: +BS, Abdomen soft, nontender, nondistended, no rebound, no guarding, no organomegaly   MSK: No gross abnormalities, FROM of all four extremities, no edema  HEME: Extremities warm, pulses intact and symmetrical in all four extremities  Skin: No rashes, no lesions  Neuro: No gross neurologic deficits, CN II-XII intact, no facial asymmetry, no dysarthria, dysdiadochokinesia, strength equal in all four extremities, no gait abnormality Gen: AAox2, unkempt in appearance   Head: NCAT  ENT: Airway patent, moist mucous membranes  Cardiac: Normal rate, normal rhythm, no murmurs/rubs/gallops appreciated  Respiratory: Lungs CTA B/L  Gastrointestinal:  Abdomen soft, nontender, nondistended, no rebound, no guarding  MSK: No gross abnormalities, FROM of all four extremities, no edema, No appreciable midline spinal ttp or joint ttp   HEME: Extremities warm  Skin: + left forehead abrasion   Neuro: No gross neurologic deficits, no tremors, no tongue fasciculations, no sensory deficits, strength intact of all extremities

## 2020-02-06 NOTE — ED PROVIDER NOTE - NS ED ROS FT
Miguel PGY3:   Gen: No fever  Eyes: No eye irritation   ENT: No throat pain   Resp: No  trouble breathing  Cardiovascular: No chest pain   Gastroenteric: No nausea/vomiting   :  No change in urine output; no dysuria  MS: + myalgias   Skin: + left forehead abrasion   Neuro: No headache; no abnormal movements  Remainder negative, except as per the HPI Miguel PGY3: unreliable historian given etoh use.   Gen: No fever  Eyes: No eye irritation   ENT: No neck pain   Resp: No  trouble breathing  Cardiovascular: No chest pain   Gastroenteric: No nausea/vomiting   :  No change in urine output; no dysuria  MS: + myalgias   Skin: + left forehead abrasion   Neuro: No headache; no abnormal movements  Remainder negative, except as per the HPI

## 2020-02-06 NOTE — ED PROVIDER NOTE - CLINICAL SUMMARY MEDICAL DECISION MAKING FREE TEXT BOX
59M hx ETOH abuse, found at grocery store with small forehead abrasion, pt known to ED staff, appears at baseline, will CTH given unknown trauma and unreliable historian, no focal neuro deficits, will await sobriety, initial FS and temp wnl.

## 2020-02-06 NOTE — ED PROVIDER NOTE - NSFOLLOWUPINSTRUCTIONS_ED_ALL_ED_FT
SUBJECTIVE:                                                    Kezia Dixon is a 61 year old female who presents to clinic today for the following health issues:      Hypertension Follow-up      Outpatient blood pressures are not being checked.    Low Salt Diet: not monitoring salt       Amount of exercise or physical activity: 5-6 days per week for 30-40 minutes    Problems taking medications regularly: No    Medication side effects: none    Diet: regular (no restrictions)      Here to follow up post herpetic neuralgia pain management  Taking gabapentin 600mg TID with improved but not resolved symptoms  Describes current 6/10 pain in dermatomal distribution on right back in same pattern as previous shingles rash  Tried increasing gabapentin to 900 TID without significant improvement  Interested in other options    Problem list and histories reviewed & adjusted, as indicated.  Additional history: as documented    Patient Active Problem List   Diagnosis     Esophageal cancer (H)     Benign essential hypertension     Alcoholism (H)     Pancreatic pseudocyst     Impaired fasting glucose     Pap smear with atypical squamous cells, cannot exclude high grade squamous intraepithelial lesion (ASC-H)     Past Surgical History:   Procedure Laterality Date     AAA REPAIR      splenic artery aneurysm embolization     COLONOSCOPY  11/26/2013    Procedure: COMBINED COLONOSCOPY, SINGLE BIOPSY/POLYPECTOMY BY BIOPSY;  COLONOSCOPY (MAC);  Surgeon: Montana Rosa MD;  Location:  GI     ESOPHAGOGASTRECTOMY N/A 3/22/2016    Procedure: ESOPHAGOGASTRECTOMY;  Surgeon: Alvin Riojas MD;  Location:  OR     ESOPHAGOSCOPY, GASTROSCOPY, DUODENOSCOPY (EGD), COMBINED N/A 12/22/2015    Procedure: COMBINED ENDOSCOPIC ULTRASOUND, ESOPHAGOSCOPY, GASTROSCOPY, DUODENOSCOPY (EGD), FINE NEEDLE ASPIRATE/BIOPSY;  Surgeon: Danelle Michael MD;  Location:  GI     GASTROSTOMY, INSERT TUBE, COMBINED N/A 2/2/2016     Procedure: COMBINED GASTROSTOMY, INSERT TUBE (OPEN);  Surgeon: Alvin Riojas MD;  Location: [facility] OR     HAND SURGERY      right     INSERT PORT VASCULAR ACCESS N/A 12/28/2015    Procedure: INSERT PORT VASCULAR ACCESS;  Surgeon: Alvin Riojas MD;  Location: [facility] OR     REMOVE PORT VASCULAR ACCESS Left 7/22/2016    Procedure: REMOVE PORT VASCULAR ACCESS;  Surgeon: Alvin Riojas MD;  Location: [facility] OR     TONSILLECTOMY         Social History   Substance Use Topics     Smoking status: Former Smoker     Packs/day: 0.25     Quit date: 12/11/2015     Smokeless tobacco: Never Used     Alcohol use 0.0 oz/week     0 Standard drinks or equivalent per week      Comment: occas wine     Family History   Problem Relation Age of Onset     Other Cancer Father      melanoma     Prostate Cancer Father      Other Cancer Brother      melanoma     Other Cancer Brother      melanoma         Current Outpatient Prescriptions   Medication Sig Dispense Refill     amitriptyline (ELAVIL) 50 MG tablet Take 1 tablet (50 mg) by mouth At Bedtime 90 tablet 3     omeprazole (PRILOSEC) 40 MG capsule Take 40 mg by mouth daily       gabapentin (NEURONTIN) 300 MG capsule 1-3 capsule(s) up to three times per day as needed for pain related to post herpetic neuralgia 180 capsule 3     metoprolol (LOPRESSOR) 25 MG tablet Take 1 tablet (25 mg) by mouth 2 times daily 180 tablet 3     fluticasone (FLONASE) 50 MCG/ACT nasal spray Spray 2 sprays into both nostrils daily 1 Bottle 11     [DISCONTINUED] amitriptyline (ELAVIL) 25 MG tablet TAKE 1 TABLET (25 MG) BY MOUTH AT BEDTIME 90 tablet 1     Allergies   Allergen Reactions     Codeine Sulfate Nausea     Simvastatin Cramps     Leg cramps     Penicillins Rash       Reviewed and updated as needed this visit by clinical staff       Reviewed and updated as needed this visit by Provider         ROS:  Recovering alcoholic sober currently  Recent CT of chest and upper abdomen reviewed with  "patient, no bone lesions noted, no recurrent esophageal cancer noted  Patient still having trouble gaining weight  Denies cough or hemoptysis     OBJECTIVE:                                                    /63 (BP Location: Right arm, Cuff Size: Adult Regular)  Pulse 77  Temp 95.6  F (35.3  C) (Tympanic)  Ht 5' 1.5\" (1.562 m)  Wt 93 lb (42.2 kg)  SpO2 100%  Breastfeeding? No  BMI 17.29 kg/m2  Body mass index is 17.29 kg/(m^2).  GENERAL: healthy, alert and no distress  RESP: lungs clear to auscultation - no rales, rhonchi or wheezes  SKIN: no suspicious lesions or rashes  NEURO: Normal strength and tone, mentation intact and speech normal  PSYCH: mentation appears normal, affect normal/bright    Diagnostic Test Results:  Again CT reviewed in SI connect with patient      ASSESSMENT/PLAN:                                                            1. Neuralgia, post-herpetic  Try increasing elavil from 25 daily to 50 daily; side effects and risks discussed  If that does not help enough, we discussed risks and benefits of cymbalta and lidocaine topical (patch); she will call if the elavil does not help enough and we can phone in an prescritpion if needed   - amitriptyline (ELAVIL) 50 MG tablet; Take 1 tablet (50 mg) by mouth At Bedtime  Dispense: 90 tablet; Refill: 3    2. Malignant neoplasm of esophagus, unspecified location (H)  No evidence of recurrence by recent CT     3. Alcoholism (H)  With current sobriety       FUTURE APPOINTMENTS:       - Pending symptoms     Mustapha Velásquez MD  [facility]    " Alcohol Use Disorder    WHAT YOU NEED TO KNOW:    Alcohol use disorder (AUD) is problem drinking. AUD includes alcohol abuse and alcohol dependency.     DISCHARGE INSTRUCTIONS:    Seek care immediately if:     Your heart is beating faster than usual.      You have hallucinations.      You cannot remember what happens while you are drinking.      You have seizures.    Contact your healthcare provider if:     You are anxious and have nausea.      Your hands are shaky and you are sweating heavily.      You have questions or concerns about your condition or care.    Follow up with your healthcare provider as directed: Do not try to stop drinking on your own. Your healthcare provider may need to help you withdraw from alcohol safely. He may need to admit you to the hospital. You may also need any of the following treatments:    Medicines to decrease your craving for alcohol      Support groups such as Alcoholics Anonymous       Therapy from a psychiatrist or psychologist       Admission to an inpatient facility for treatment for severe AUD    Interested in discussing options to reduce your alcohol or drug use?      [facility]: [phone]   [facility] Substance Abuse Services: [phone], option #2   Methadone Maintenance & Ambulatory Opiate Detox: [phone]  [facility]: [phone]  [facility]: [phone]  [facility]: [phone]    [facility]: [phone], option #2   [facility]: [phone]    [facility]: [phone]    [facility] Intake: [phone]  [facility] Chemical Dependency/Ancillary Withdrawal: [phone]  [facility] Methadone Maintenance: [phone]    [facility]: [phone]  [facility] Addiction Treatment Services: [phone]    [facility]: 3-581-5-HOPENY    [facility] Office of Alcoholism and Substance Abuse Services (OASAS): https://www.oasas.[place].gov/providerdirectory/  [facility] for Addiction Services and Psychotherapy Interventions Research (CASPIR)  www.[facility].org     Interested in discussing options to reduce your tobacco use?    [facility] for Tobacco Control:  [phone]  [facility] QUITLINE: 7-998-KL-QUITS ([phone])    Interested in learning more about substance use?      http://rethinkingdrinking.niaaa.nih.gov   https://www.drugabuse.gov/patients-families     Learn more about opioid overdose prevention programs in [facility]:  http://www.[facility].ny.[facility]/diseases/aids/general/opioid_overdose_prevention/

## 2020-02-06 NOTE — ED ADULT NURSE NOTE - OBJECTIVE STATEMENT
60 y/o male presents to ED via EMS for intoxication. Per EMS grocery store called as pt was lying on floor of grocery store and would not get up. On exam, laying in stretcher with small abrasion to L forehead. Pt in soiled clothes, declining to change into paper scrubs. Unlabored, spontaneous respirations, NAD. Seen and evaluated by MD. Awaiting imaging at this time. 1:1 at bedside for direct observation of pt at all times.

## 2020-02-06 NOTE — ED ADULT NURSE NOTE - NSIMPLEMENTINTERV_GEN_ALL_ED
Implemented All Fall with Harm Risk Interventions:  Printer to call system. Call bell, personal items and telephone within reach. Instruct patient to call for assistance. Room bathroom lighting operational. Non-slip footwear when patient is off stretcher. Physically safe environment: no spills, clutter or unnecessary equipment. Stretcher in lowest position, wheels locked, appropriate side rails in place. Provide visual cue, wrist band, yellow gown, etc. Monitor gait and stability. Monitor for mental status changes and reorient to person, place, and time. Review medications for side effects contributing to fall risk. Reinforce activity limits and safety measures with patient and family. Provide visual clues: red socks.

## 2020-02-06 NOTE — ED PROVIDER NOTE - ATTENDING CONTRIBUTION TO CARE
59 M BIBEMS for being found down at [employer] was sitting on a bench and fell off bench 2/2 alcohol use and hit his head. Pt is requesting transport to [place], however pt was brought to [facility]. Pt was unable to stand up 2/2 alcohol intoxication. Pt is known to our ER.     has an abrasion on the L scalp   \ 59 M BIBEMS for being found down at [employer] was sitting on a bench and fell off bench 2/2 alcohol use and hit his head. Pt is requesting transport to [place], however pt was [facility] to [facility]. Pt was unable to stand up 2/2 alcohol intoxication. Pt is known to our ER.     has an abrasion on the L scalp   he is moving all 4 extremities,   he has no complaints at this time but is intoxicated, oriented to self and place but not time  glucose wnl, plan for ct head  and reassessment until sober  case signed out to Dr. Chip Tanner at [phone]

## 2020-02-07 VITALS
TEMPERATURE: 98 F | OXYGEN SATURATION: 95 % | DIASTOLIC BLOOD PRESSURE: 72 MMHG | HEART RATE: 76 BPM | RESPIRATION RATE: 18 BRPM | SYSTOLIC BLOOD PRESSURE: 125 MMHG

## 2020-02-07 NOTE — ED ADULT NURSE REASSESSMENT NOTE - NS ED NURSE REASSESS COMMENT FT1
Pt ambulated with DO Rviera and RN at bedside to observe pt walk down the patton. Pt provided sandwich and juice per DO Rivera.

## 2020-02-07 NOTE — CHART NOTE - NSCHARTNOTEFT_GEN_A_CORE
ED SW: Patient presented for alcohol intoxication. Patient medically cleared and awaiting SW intervention in Triage. Gordon  (ID #[id number]) facilitated conversation. Patient known to [facility] due to multiple ED visits for alcohol intoxication. Patient known to be homeless/undocumented and resides in the [facility]. Patient requesting assist with discharge transportation. Per patient, taxi discharge is preferred. Patient informed of metrocard for ride assist. Directions provided multiple times to patient via . Patient requesting to go to [facility] prior to [facility]. Patient informed he is only eligible for 1 metrocard and has to make a decision on where he intends to use it for travel. Patient resourceful and refusing shelter resource. Patient dressed appropriately for the weather (hooded sweatshirt, sweatpants, hooded down winter jacket, sneakers). Patient escorted to [address] bus stop by [facility].

## 2020-02-13 NOTE — ED ADULT NURSE NOTE - NS ED NURSE IV DC DT
Date of Service:  2/13/2020    Chief Complaint:  Benign prostatic hypertrophy with obstruction   Gross hematuria  Incomplete bladder    History of Present Illness: The patient presents today 3 days since I last saw him, his medical records were reviewed and in summary Tyler Nash is a 68-year old male who is a former Dr. Dory Anguiano patient with an extensive medical history to include hypertension, PVD, CAD status post CABG x 6, hyperlipidemia, chronic obstructive pulmonary disease, gastroesophageal reflux disease, Afib and presented to the [facility] emergency room on 1- for evaluation of painless gross hematuria. He admitted that the hematuria was occurring intermittent over the past several months. He does take Xarelto and Plavix. He was discharged with a marcos catheter, he was also started on Keflex for a presumed infection. He does have a history of benign prostatic hypertrophy with obstruction and likely had an interstitial prostate procedure at [facility]. He also has a history of colorectal cancer treated with radiation in 1999. His marcos catheter was removed on 2-3-20, he was also started on Flomax 0.8 mg. A CT scan done on 2-6-20 reveals a bladder stone, otherwise unremarkable. A cystoscopy was performed that poorly visualized secondary to blood and a small clot. Prostate with definite regrowth and erythema. He presents today after being evaluation in the emergency room as his gross hematuria became worse and he was passing blood clots. A marcos catheter was placed at that time. He is not taking Plavix or Xarelto at this time. He denies any fevers, chills or dysuria. He is currently taking Flomax 0.8 mg along with a Finasteride daily for medical management.      PSA Test Results:  Lab Results   Component Value Date    PSA 1.32 02/26/2018    PSA 1.19 10/14/2014    PSA 1.07 06/11/2012       Past Medical History:   Past Medical History:   Diagnosis Date   â¢ Arthritis     shoulder   â¢ Chronic bronchitis (CMS/[facility])     Followed by Dr. Daniela Amador [phone]   â¢ Chronic pain     back   â¢ Claudication (CMS/[facility])    â¢ COPD, moderate (CMS/[facility])     Followed by Dr. Daniela Amador [phone]   â¢ Coronary artery disease    â¢ Diverticulosis    â¢ Dyspnea     Followed by Dr. Daniela Amador [phone]   â¢ Emphysema    â¢ GERD (gastroesophageal reflux disease)     History of   â¢ Hyperlipidemia    â¢ Ischemic heart disease    â¢ Malignant neoplasm (CMS/[facility]) 05/1999    rectal cancer   â¢ Nodule of right lung     Followed by Dr. Daniela Amador [phone]   â¢ PAD (peripheral artery disease) (CMS/[facility])    â¢ RAD (reactive airway disease)    â¢ Sciatica    â¢ Sleep apnea     mild-no C Pap   â¢ Wears dentures     upper partial denture   â¢ Wears glasses        Surgical History:  Past Surgical History:   Procedure Laterality Date   â¢ Anterior cervical discectomy w/ fusion  03/2004   â¢ Appendectomy  06/1992   â¢ Bone resection, rib  1972   â¢ Cardiac catherization     â¢ Cardiac catheterization/possible ptca/possible stent  07/16/2019   â¢ Cardiac surgery  08/2008    CABG   â¢ Colectomy  05/04/1999   â¢ Colonoscopy  05/06/2012    sigmoid diverticulosis   â¢ Ir angiogram extremity left  07/26/2018    W/ PTA   â¢ Joint replacement  11/23/2015    Right total shoulder replacement   â¢ Rotator cuff repair      Left rotator cuff   â¢ Tonsillectomy and adenoidectomy  1947   :    Family History:  Family history of G.U. (Genitourinary) Malignancy - no  Family History   Problem Relation Age of Onset   â¢ Arthritis Mother    â¢ Cancer Mother    â¢ Respiratory Mother         COPD   â¢ COPD Mother    â¢ Asthma Father    â¢ Cancer Father         lung   â¢ Heart Father    â¢ Respiratory Father         COPD   â¢ COPD Father    â¢ Heart disease Father    â¢ Cancer Sister        Social History:  Social History     Socioeconomic History   â¢ Marital status: /Civil Union     Spouse name: Not on file   â¢ Number of children: Not on file   â¢ Years of education: Not on file â¢ Highest education level: Not on file   Occupational History   â¢ Not on file   Social Needs   â¢ Financial resource strain: Not on file   â¢ Food insecurity:     Worry: Not on file     Inability: Not on file   â¢ Transportation needs:     Medical: Not on file     Non-medical: Not on file   Tobacco Use   â¢ Smoking status: Former Smoker     Years: 30.00     Types: Cigarettes     Last attempt to quit: 10/16/1986     Years since quittin.3   â¢ Smokeless tobacco: Never Used   Substance and Sexual Activity   â¢ Alcohol use: Yes     Alcohol/week: 4.2 standard drinks     Types: 5 Standard drinks or equivalent per week   â¢ Drug use: No   â¢ Sexual activity: Not on file   Lifestyle   â¢ Physical activity:     Days per week: Not on file     Minutes per session: Not on file   â¢ Stress: Not on file   Relationships   â¢ Social connections:     Talks on phone: Not on file     Gets together: Not on file     Attends Sabianist service: Not on file     Active member of club or organization: Not on file     Attends meetings of clubs or organizations: Not on file     Relationship status: Not on file   â¢ Intimate partner violence:     Fear of current or ex partner: Not on file     Emotionally abused: Not on file     Physically abused: Not on file     Forced sexual activity: Not on file   Other Topics Concern   â¢ Not on file   Social History Narrative   â¢ Not on file       Allergies:  ALLERGIES:  No Known Allergies    Medications:  Current Outpatient Medications   Medication Sig Dispense Refill   â¢ finasteride (PROSCAR) 5 MG tablet Take 1 tablet by mouth daily. 30 tablet 3   â¢ losartan (COZAAR) 50 MG tablet Take 2 tablets by mouth daily. 180 tablet 3   â¢ rivaroxaban (XARELTO) 20 MG Tab Take 1 tablet by mouth daily. (Patient taking differently: Take 20 mg by mouth daily.  Patient takes every other day) 30 tablet 6   â¢ tamsulosin (FLOMAX) 0.4 MG Cap Take 2 capsules by mouth daily after a meal. ONE HALF HOUR AFTER EVENING MEAL 60 capsule 3 "  â¢ cephalexin (KEFLEX) 500 MG capsule Take 1 capsule by mouth 2 times daily for 7 days. 14 capsule 0   â¢ hydrochlorothiazide (HYDRODIURIL) 25 MG tablet TAKE 1 TABLET BY MOUTH DAILY 90 tablet 3   â¢ rosuvastatin (CRESTOR) 40 MG tablet Take 1 tablet by mouth daily. 90 tablet 3   â¢ benzonatate (TESSALON PERLES) 100 MG capsule Take 1 capsule by mouth 3 times daily as needed for Cough. 30 capsule 0   â¢ metoPROLOL succinate (TOPROL-XL) 200 MG 24 hr tablet TAKE 1 TABLET BY MOUTH DAILY 90 tablet 4   â¢ clopidogrel (PLAVIX) 75 MG tablet Take 8 tablets (600 mg) by mouth once on the evening of 7/17. Then, starting 7/18, take 1 tablet (75 mg) by mouth once daily. 98 tablet 3   â¢ loratadine (CLARITIN) 10 MG tablet Take 10 mg by mouth daily. â¢ theophylline SR, 12 hr, (ANSELMO-DUR) 200 MG 12 hr tablet Take 1 tablet by mouth 2 times daily. 60 tablet 5   â¢ amLODIPine (NORVASC) 10 MG tablet TAKE 1/2 TABLET BY MOUTH DAILY 45 tablet 3   â¢ budesonide/formoterol (SYMBICORT) 80-4.5 MCG/ACT inhaler Inhale into the lungs 2 times daily. â¢ tiotropium (SPIRIVA HANDIHALER) 18 MCG inhalation capsule Place 1 capsule into inhaler and inhale daily. 90 capsule 3   â¢ albuterol 108 (90 BASE) MCG/ACT inhaler Inhale 2 puffs into the lungs every 4 hours as needed for Shortness of Breath or Wheezing. 1 Inhaler 3     No current facility-administered medications for this visit. Allergies, Medications, Past Medical History, Past Surgical History, Family History, and Social History were reviewed today. Review of Systems:    Denies fever and chills. Complains of gross hematuria, benign prostatic hypertrophy with obstruction. All other systems have been reviewed and are negative. Physical Exam:    Constitutional:  Visit Vitals  /77   Pulse 98   Resp 18   Ht 5' 10"" (1.778 m)   Wt 90.3 kg   BMI 28.56 kg/mÂ²     Well developed, well nourished, and afebrile. Psychiatric:  Alert and oriented x3. Normal mood and affect.     Skin:  Warm and " dry with no lesions or induration. Genitourinary:  Not performed       Assessment and Plan:  Therefore, we discussed where to go from here. For his benign prostatic hypertrophy with obstruction and gross hematuria- Patient should return tomorrow for a voiding trial. If he is unable to void he will need the marcos catheter replaced. Patient should discuss with his Cardiologist when he can resume Plavix and Xarelto. He should remain on Flomax 0.8 mg along with Finasteride daily for medical management. He should follow up in 2 weeks to see how he is doing from a symptom standpoint. He agrees with the plan and is pleased. He will also need a follow-up cystoscopy, as the bladder lumen was not well visualized at the time of his last cystoscopy. On 2/13/2020, Tammie KHALIL scribed the services personally performed by Charlies Bumpers, MD    The documentation recorded by the scribe accurately and completely reflects the service(s) I personally performed and the decisions made by me. 18-Oct-2017 05:51

## 2020-02-23 ENCOUNTER — EMERGENCY (EMERGENCY)
Facility: HOSPITAL | Age: 60
LOS: 1 days | Discharge: HOME | End: 2020-02-23
Attending: EMERGENCY MEDICINE
Payer: SELF-PAY

## 2020-02-23 VITALS
DIASTOLIC BLOOD PRESSURE: 70 MMHG | OXYGEN SATURATION: 97 % | SYSTOLIC BLOOD PRESSURE: 110 MMHG | HEIGHT: 68 IN | WEIGHT: 160.06 LBS | HEART RATE: 71 BPM | RESPIRATION RATE: 18 BRPM

## 2020-02-23 DIAGNOSIS — Z98.89 OTHER SPECIFIED POSTPROCEDURAL STATES: Chronic | ICD-10-CM

## 2020-02-23 LAB
ALBUMIN SERPL ELPH-MCNC: 4 G/DL — SIGNIFICANT CHANGE UP (ref 3.3–5)
ALP SERPL-CCNC: 57 U/L — SIGNIFICANT CHANGE UP (ref 40–120)
ALT FLD-CCNC: 43 U/L — SIGNIFICANT CHANGE UP (ref 10–45)
ANION GAP SERPL CALC-SCNC: 18 MMOL/L — HIGH (ref 5–17)
APTT BLD: 30.6 SEC — SIGNIFICANT CHANGE UP (ref 27.5–36.3)
AST SERPL-CCNC: 105 U/L — HIGH (ref 10–40)
BASOPHILS # BLD AUTO: 0.04 K/UL — SIGNIFICANT CHANGE UP (ref 0–0.2)
BASOPHILS NFR BLD AUTO: 0.6 % — SIGNIFICANT CHANGE UP (ref 0–2)
BILIRUB SERPL-MCNC: 0.8 MG/DL — SIGNIFICANT CHANGE UP (ref 0.2–1.2)
BUN SERPL-MCNC: 14 MG/DL — SIGNIFICANT CHANGE UP (ref 7–23)
CALCIUM SERPL-MCNC: 8.7 MG/DL — SIGNIFICANT CHANGE UP (ref 8.4–10.5)
CHLORIDE SERPL-SCNC: 103 MMOL/L — SIGNIFICANT CHANGE UP (ref 96–108)
CO2 SERPL-SCNC: 19 MMOL/L — LOW (ref 22–31)
CREAT SERPL-MCNC: 0.86 MG/DL — SIGNIFICANT CHANGE UP (ref 0.5–1.3)
EOSINOPHIL # BLD AUTO: 0.26 K/UL — SIGNIFICANT CHANGE UP (ref 0–0.5)
EOSINOPHIL NFR BLD AUTO: 3.9 % — SIGNIFICANT CHANGE UP (ref 0–6)
GLUCOSE SERPL-MCNC: 95 MG/DL — SIGNIFICANT CHANGE UP (ref 70–99)
HCT VFR BLD CALC: 34.3 % — LOW (ref 39–50)
HGB BLD-MCNC: 11.1 G/DL — LOW (ref 13–17)
IMM GRANULOCYTES NFR BLD AUTO: 0.9 % — SIGNIFICANT CHANGE UP (ref 0–1.5)
INR BLD: 0.89 RATIO — SIGNIFICANT CHANGE UP (ref 0.88–1.16)
LYMPHOCYTES # BLD AUTO: 2 K/UL — SIGNIFICANT CHANGE UP (ref 1–3.3)
LYMPHOCYTES # BLD AUTO: 30.2 % — SIGNIFICANT CHANGE UP (ref 13–44)
MCHC RBC-ENTMCNC: 32.4 GM/DL — SIGNIFICANT CHANGE UP (ref 32–36)
MCHC RBC-ENTMCNC: 33.7 PG — SIGNIFICANT CHANGE UP (ref 27–34)
MCV RBC AUTO: 104.3 FL — HIGH (ref 80–100)
MONOCYTES # BLD AUTO: 0.68 K/UL — SIGNIFICANT CHANGE UP (ref 0–0.9)
MONOCYTES NFR BLD AUTO: 10.3 % — SIGNIFICANT CHANGE UP (ref 2–14)
NEUTROPHILS # BLD AUTO: 3.58 K/UL — SIGNIFICANT CHANGE UP (ref 1.8–7.4)
NEUTROPHILS NFR BLD AUTO: 54.1 % — SIGNIFICANT CHANGE UP (ref 43–77)
NRBC # BLD: 0 /100 WBCS — SIGNIFICANT CHANGE UP (ref 0–0)
PLATELET # BLD AUTO: 253 K/UL — SIGNIFICANT CHANGE UP (ref 150–400)
POTASSIUM SERPL-MCNC: 3.7 MMOL/L — SIGNIFICANT CHANGE UP (ref 3.5–5.3)
POTASSIUM SERPL-SCNC: 3.7 MMOL/L — SIGNIFICANT CHANGE UP (ref 3.5–5.3)
PROT SERPL-MCNC: 7.1 G/DL — SIGNIFICANT CHANGE UP (ref 6–8.3)
PROTHROM AB SERPL-ACNC: 10.1 SEC — SIGNIFICANT CHANGE UP (ref 10–12.9)
RBC # BLD: 3.29 M/UL — LOW (ref 4.2–5.8)
RBC # FLD: 12.8 % — SIGNIFICANT CHANGE UP (ref 10.3–14.5)
SODIUM SERPL-SCNC: 140 MMOL/L — SIGNIFICANT CHANGE UP (ref 135–145)
TROPONIN T, HIGH SENSITIVITY RESULT: 20 NG/L — SIGNIFICANT CHANGE UP (ref 0–51)
TROPONIN T, HIGH SENSITIVITY RESULT: 21 NG/L — SIGNIFICANT CHANGE UP (ref 0–51)
WBC # BLD: 6.62 K/UL — SIGNIFICANT CHANGE UP (ref 3.8–10.5)
WBC # FLD AUTO: 6.62 K/UL — SIGNIFICANT CHANGE UP (ref 3.8–10.5)

## 2020-02-23 PROCEDURE — 96375 TX/PRO/DX INJ NEW DRUG ADDON: CPT

## 2020-02-23 PROCEDURE — 70450 CT HEAD/BRAIN W/O DYE: CPT

## 2020-02-23 PROCEDURE — 85730 THROMBOPLASTIN TIME PARTIAL: CPT

## 2020-02-23 PROCEDURE — 99285 EMERGENCY DEPT VISIT HI MDM: CPT

## 2020-02-23 PROCEDURE — 85610 PROTHROMBIN TIME: CPT

## 2020-02-23 PROCEDURE — 85027 COMPLETE CBC AUTOMATED: CPT

## 2020-02-23 PROCEDURE — 71045 X-RAY EXAM CHEST 1 VIEW: CPT

## 2020-02-23 PROCEDURE — 82962 GLUCOSE BLOOD TEST: CPT

## 2020-02-23 PROCEDURE — 93010 ELECTROCARDIOGRAM REPORT: CPT

## 2020-02-23 PROCEDURE — 96374 THER/PROPH/DIAG INJ IV PUSH: CPT

## 2020-02-23 PROCEDURE — 70450 CT HEAD/BRAIN W/O DYE: CPT | Mod: 26

## 2020-02-23 PROCEDURE — 80053 COMPREHEN METABOLIC PANEL: CPT

## 2020-02-23 PROCEDURE — 99285 EMERGENCY DEPT VISIT HI MDM: CPT | Mod: 25

## 2020-02-23 PROCEDURE — 93005 ELECTROCARDIOGRAM TRACING: CPT

## 2020-02-23 PROCEDURE — 71045 X-RAY EXAM CHEST 1 VIEW: CPT | Mod: 26

## 2020-02-23 PROCEDURE — 84484 ASSAY OF TROPONIN QUANT: CPT

## 2020-02-23 RX ORDER — FOLIC ACID 0.8 MG
1 TABLET ORAL ONCE
Refills: 0 | Status: COMPLETED | OUTPATIENT
Start: 2020-02-23 | End: 2020-02-23

## 2020-02-23 RX ORDER — THIAMINE MONONITRATE (VIT B1) 100 MG
100 TABLET ORAL ONCE
Refills: 0 | Status: COMPLETED | OUTPATIENT
Start: 2020-02-23 | End: 2020-02-23

## 2020-02-23 RX ADMIN — Medication 1 MILLIGRAM(S): at 20:25

## 2020-02-23 RX ADMIN — Medication 100 MILLIGRAM(S): at 20:25

## 2020-02-23 NOTE — ED PROVIDER NOTE - PROGRESS NOTE DETAILS
ED Sign Out, eval for alcohol intox, well appearing, pending close reassessments and likely dc w/ close outpt fu -- Brenton Delgado MD Dominga Quiros PGY 1: Pt signed out to me pending sobriety. Pt alert and oriented, ambulating well and tolerating PO in the ED. Pt clinically sober at this time, will discharge home. Pt reports that he has no money with him and no family or friends that will come pick him up. Social work familiar with patient. will supply with a metrocard for assistance home. Dominga Quiros PGY 1: Pt signed out to me pending sobriety. Pt alert and oriented, ambulating well and tolerating PO in the ED. Pt clinically sober at this time, will discharge home. Pt reports that he has no money with him and no family or friends that will come pick him up. Social work familiar with patient. will supply with a metrocard for assistance home.       used [id number] Patient signed out to me pending reevaluation. On reevaluation patient well appearing, no acute distress, lungs clear bilaterally, abdomen ssnt, CN2-12 intact, patient amble to ambulate, tolerating PO, will d/c home, social work to supply metrocard

## 2020-02-23 NOTE — ED PROVIDER NOTE - NS ED ROS FT
ROS:  GENERAL: No fever, no chills  EYES: no change in vision  HEENT: no trouble swallowing, no trouble speaking  CARDIAC: +CP   PULMONARY: no cough, no shortness of breath  GI: no abdominal pain, no nausea, no vomiting, no diarrhea, no constipation  : No dysuria, no frequency, no change in appearance, or odor of urine  SKIN: no rashes  NEURO: +HA. no numbness, no weakness  MSK: No joint pain    Yoel Dotson PGY2

## 2020-02-23 NOTE — ED PROVIDER NOTE - CLINICAL SUMMARY MEDICAL DECISION MAKING FREE TEXT BOX
Alcohol intoxication. ROS sig for chest pain and HA. Clinically intoxicated, comfortable, neuro intact, no signs of trauma. Will assess for ACS, PTX, PNA, and intracranial pathology with labs/CT and observe until clinically sober.

## 2020-02-23 NOTE — ED PROVIDER NOTE - PATIENT PORTAL LINK FT
You can access the FollowMyHealth Patient Portal offered by [facility] by registering at the following website: [email or web address]. By joining [employer]’s FollowMyHealth portal, you will also be able to view your health information using other applications (apps) compatible with our system.

## 2020-02-23 NOTE — ED PROVIDER NOTE - PHYSICAL EXAMINATION
Gen: AAOx3, non-toxic  Head: NCAT  HEENT: EOMI, oral mucosa moist, normal conjunctiva  Lung: CTAB, no respiratory distress, no wheezes/rhonchi/rales B/L, speaking in full sentences  CV: RRR, no murmurs, rubs or gallops  Abd: soft, NTND, no guarding, no CVA tenderness  MSK: no midline spinal TTP, no visible deformities  Neuro: No focal sensory or motor deficits, normal CN exam   Skin: Warm, well perfused, no rash, no bruises/lacerations or any other signs of trauma  Psych: normal affect.     ~Yoel Dotson PGY2

## 2020-02-23 NOTE — ED ADULT NURSE NOTE - OBJECTIVE STATEMENT
Pt is a 58 y/o male arriving by [facility] police after being found in a park. Pt is unable to answer questions or follow commands. Unable to obtain answers from patient regarding how much he drank and when his last drink was. Pt appears slightly agitated. He appears to have poor hygiene and has a foul odor. No bleeding or bruises noted on body. Will continue to monitor.

## 2020-02-23 NOTE — ED PROVIDER NOTE - ATTENDING CONTRIBUTION TO CARE
Attending MD Aguilera:  I personally have seen and examined this patient.  Resident note reviewed and agree on plan of care and except where noted.  See HPI, PE, and MDM for details.      Pt well known to this ED for multiple visits for etoh intoxication. patient arrives with same via EMS, clinically intoxicated, no external trauma but patient does endorse hitting head and some chest pain so will ro ICH with CT head, r/o ACS with ECG and biomarkers but highly doubt this as patient is well appearing and asking for food in ED. Plan for observation for sobriety after diagnostics

## 2020-02-23 NOTE — ED PROVIDER NOTE - NSFOLLOWUPINSTRUCTIONS_ED_ALL_ED_FT
Alcohol intoxication occurs when the amount of alcohol that a person has consumed impairs his or her ability to mentally and physically function. Chronic alcohol consumption can also lead to a variety of health issues including neurological disease, stomach disease, heart disease, liver disease, etc. Do not drive after drinking alcohol. Drinking enough alcohol to end up in an Emergency Room suggests you may have an alcohol abuse problem. Seek help at a drug addiction center.    Follow up with your PMD.     SEEK IMMEDIATE MEDICAL CARE IF YOU HAVE ANY OF THE FOLLOWING SYMPTOMS: seizures, vomiting blood, blood in your stool, lightheadedness/dizziness, or becoming shaky to tremulous when you stop drinking.

## 2020-02-23 NOTE — ED PROVIDER NOTE - OBJECTIVE STATEMENT
59M with PMH of alcohol abuse BIBEMS for alcohol intoxication. Pt clinically intoxicated, endorse alcohol use today. Reports chest pain and headache for 2 days. Denies any other symptoms including fever, visual changes, numbness, weakness, SOB, cough, abd pain, N/V/D, fall, or trauma.

## 2020-02-24 VITALS
SYSTOLIC BLOOD PRESSURE: 163 MMHG | HEART RATE: 89 BPM | TEMPERATURE: 98 F | RESPIRATION RATE: 18 BRPM | OXYGEN SATURATION: 99 % | DIASTOLIC BLOOD PRESSURE: 93 MMHG

## 2020-02-24 NOTE — CHART NOTE - NSCHARTNOTEFT_GEN_A_CORE
ED SW: LMSW informed patient is clinically sober and ready for discharge. Patient requesting assist with transportation. Chart reviewed. [place] [facility] [place] for alcohol intoxication. Patient is Danish speaking, homeless; patient consistently refuses shelter referral. Patient demonstrates no interest in alcohol cessation. [facility]  (ID#[id number]) used to discuss discharge plan. Patient provided with [employer] card ([id number]) to travel to destination. Per patient, he is traveling to the [employer]. No further SW needs indicated at this time.

## 2020-02-24 NOTE — ED ADULT NURSE REASSESSMENT NOTE - NS ED NURSE REASSESS COMMENT FT1
Pt. sleeping comfortably in stretcher in no acute distress. VSS. Pt. does not have any complaints at this time.

## 2020-03-12 ENCOUNTER — EMERGENCY (EMERGENCY)
Facility: HOSPITAL | Age: 60
LOS: 1 days | Discharge: HOME | End: 2020-03-12
Attending: EMERGENCY MEDICINE | Admitting: EMERGENCY MEDICINE
Payer: SELF-PAY

## 2020-03-12 VITALS
TEMPERATURE: 98 F | RESPIRATION RATE: 14 BRPM | DIASTOLIC BLOOD PRESSURE: 70 MMHG | SYSTOLIC BLOOD PRESSURE: 131 MMHG | HEART RATE: 79 BPM | OXYGEN SATURATION: 95 %

## 2020-03-12 DIAGNOSIS — Z98.89 OTHER SPECIFIED POSTPROCEDURAL STATES: Chronic | ICD-10-CM

## 2020-03-12 PROCEDURE — 99284 EMERGENCY DEPT VISIT MOD MDM: CPT

## 2020-03-12 NOTE — ED PROVIDER NOTE - CLINICAL SUMMARY MEDICAL DECISION MAKING FREE TEXT BOX
Jonathan Weil, PGY3 - strong suspicion for acute alcohol intoxication. Will obtain FSG, defer other laboratories given historical picture. Monitor for improvement and reassess. Jonathan Weil, PGY3 - strong suspicion for acute alcohol intoxication. Will obtain FSG, defer other laboratories given historical picture. Monitor for improvement and reassess.  Attending Alyssa Bennett: 60 y/o male ho ETOH abuse presenting with concern for intoxication. no evidence of trauma on exam. pt moving all extremities. fingerstick wnl. abdomen soft. pt without any compliants currently. appears clinically intoxicated. will monitor and sober re-eval

## 2020-03-12 NOTE — ED PROVIDER NOTE - ATTENDING CONTRIBUTION TO CARE
Attending MD Alyssa Bennett:  I personally have seen and examined this patient.  Resident note reviewed and agree on plan of care and except where noted.  See HPI, PE, and MDM for details.

## 2020-03-12 NOTE — ED PROVIDER NOTE - NSFOLLOWUPINSTRUCTIONS_ED_ALL_ED_FT
Follow up with your primary physician in 2-3 days. If needed call [phone] to find a primary care physician or call  [phone] to schedule an appointment with the general medicine clinic.     Return to the ER for tremors, seizures, confusion, or any other new concerning symptoms.    If you were prescribed any medications please refer to the package insert for complete instructions on medication use and to review potential side effects. Please call the emergency department or speak with your primary physician if you have any additional questions regarding how to use this medication. Follow up with your primary physician in 2-3 days. If needed call [phone] to find a primary care physician or call  [phone] to schedule an appointment with the general medicine clinic.     Return to the ER for tremors, seizures, confusion, or any other new concerning symptoms.    If you were prescribed any medications please refer to the package insert for complete instructions on medication use and to review potential side effects. Please call the emergency department or speak with your primary physician if you have any additional questions regarding how to use this medication.    LIMIT ALCOHOL USE -  VERY HARMFUL TO YOUR HEALTH

## 2020-03-12 NOTE — ED PROVIDER NOTE - PROGRESS NOTE DETAILS
Jonathan Weil, PGY3 - pt seen and reassessed. Now clinically sober. Via  ID #[id number] he states he would take the bus to get back to where he stays, but does not have money or a bus pass. Will await SW in the morning. Awake, alert, clinically sober,. ambulatory unassisted, desires d/c. SW will speak with patient in WR to assist with transport home. Patient instructed to limit ETOH due to risk of adverse health effects. Patient given strict return precautions.  Wei Mo MD, PGY3 Emergency Medicine

## 2020-03-12 NOTE — ED PROVIDER NOTE - OBJECTIVE STATEMENT
Jonathan Weil, PGY3 - 59M hx EtOH abuse BIBA on concern for alcohol intoxication. EMS was called after bystanders reported he appeared intoxicated. Supposedly agitated at the scene, managed with verbal de-escalation. Patient c/o back pain for "a long time," otherwise does not have any other complaints. Denies any other acute issues.

## 2020-03-12 NOTE — ED PROVIDER NOTE - PATIENT PORTAL LINK FT
You can access the FollowMyHealth Patient Portal offered by [facility] by registering at the following website: [email or web address]. By joining [employer]’s FollowMyHealth portal, you will also be able to view your health information using other applications (apps) compatible with our system.

## 2020-03-12 NOTE — ED ADULT NURSE NOTE - OBJECTIVE STATEMENT
58 yo m pmhx of ETOH presents to the ED s/p found intoxicated on the streets. Pt verbalizes he was in [place]. PT noted to appear drowsy, CHANTALE 3mmhg b/l, noted to have a healing abrasion on the left upper cheek, reports tenderness on the left lower back. Currently sleepy, unable to maintain conversation, poor historian.

## 2020-03-12 NOTE — ED PROVIDER NOTE - PHYSICAL EXAMINATION
General: Awake, alert, appears unkempt, smells of alcohol, no acute distress  HENT: NC/AT. Posterior oropharynx clear. Patent airway  Eyes: PERRL, EOMI  CV: RRR, no m/r/g. 2+ peripheral pulses. Extremities are warm and well perfused.  Respiratory: CTAB no w/r/r. No respiratory distress.  Abdominal: soft, non-distended, non-tender, no rebound, guarding, or rigidity  Neuro: No focal deficits  Skin: no rashes  MSK: diffuse lumbar paraspinal ttp. No ttp throughout the rest of the spine  Psych: normal mood and affect General: Awake, alert, appears unkempt, smells of alcohol, no acute distress  HENT: NC/AT. Posterior oropharynx clear. Patent airway  Eyes: PERRL, EOMI  CV: RRR, no m/r/g. 2+ peripheral pulses. Extremities are warm and well perfused.  Respiratory: CTAB no w/r/r. No respiratory distress.  Abdominal: soft, non-distended, non-tender, no rebound, guarding, or rigidity  Neuro: No focal deficits  Skin: no rashes  MSK: diffuse lumbar paraspinal ttp. No ttp throughout the rest of the spine  Psych: normal mood and affect  Attending Alyssa Bennett: Gen: NAD,poor hygiene heent: atrauamtic, eomi, perrla,scab to left periorbital area, no fluctuence,  mmm, op pink, uvula midline, neck; nttp, no nuchal rigidity, chest: nttp, no crepitus, cv: rrr, no murmurs, lungs: ctab, abd: soft, nontender, nondistended, no peritoneal signs, +BS, no guarding, ext: wwp, neg homans, skin: no rash, neuro:sleepy, slurring speech, equal strength b/l

## 2020-03-13 VITALS
DIASTOLIC BLOOD PRESSURE: 89 MMHG | TEMPERATURE: 98 F | SYSTOLIC BLOOD PRESSURE: 148 MMHG | RESPIRATION RATE: 16 BRPM | HEART RATE: 88 BPM | OXYGEN SATURATION: 96 %

## 2020-03-13 PROCEDURE — 99285 EMERGENCY DEPT VISIT HI MDM: CPT

## 2020-03-13 PROCEDURE — 82962 GLUCOSE BLOOD TEST: CPT

## 2020-03-13 RX ORDER — ACETAMINOPHEN 500 MG
650 TABLET ORAL ONCE
Refills: 0 | Status: COMPLETED | OUTPATIENT
Start: 2020-03-13 | End: 2020-03-13

## 2020-03-13 RX ADMIN — Medication 25 MILLIGRAM(S): at 02:47

## 2020-03-13 RX ADMIN — Medication 650 MILLIGRAM(S): at 05:34

## 2020-03-13 NOTE — ED ADULT NURSE REASSESSMENT NOTE - NS ED NURSE REASSESS COMMENT FT1
Report received from DERIK Rivers. Pt AAOx4, NAD, resp nonlabored, skin warm/dry, resting comfortably in bed with family at bedside. Pt given coffee and breakfast, awaiting SW. Pt denies headache, dizziness, chest pain, palpitations, SOB, abd pain, n/v/d, urinary symptoms, fevers, chills, weakness at this time. Pt awaiting dispo home. Safety maintained with call bell within reach.

## 2020-03-13 NOTE — ED ADULT NURSE REASSESSMENT NOTE - NS ED NURSE REASSESS COMMENT FT1
Pt appears in no acute distress, currently sleeping, arouses easily to verbal stimulation, placed in front of nursing station.

## 2020-07-27 ENCOUNTER — APPOINTMENT (OUTPATIENT)
Dept: INTERNAL MEDICINE | Facility: CLINIC | Age: 60
End: 2020-07-27

## 2020-10-11 NOTE — PROGRESS NOTE ADULT - ASSESSMENT
Asthma 57 y/o male with h/o ETOH abuse, chronic low back pain, chronic pancreatitis due to ETOH abuse presented to the ED via EMS who found patient on side of the road with ETOH intoxication and lumbar compression fracture, recommended for [facility] but patient homeless and refusing placement in shelter awaiting improved functional status for discharge being seen by PT daily, orthostatic today.

## 2020-12-03 ENCOUNTER — APPOINTMENT (OUTPATIENT)
Dept: INTERNAL MEDICINE | Facility: CLINIC | Age: 60
End: 2020-12-03

## 2020-12-29 ENCOUNTER — EMERGENCY (EMERGENCY)
Facility: HOSPITAL | Age: 60
LOS: 1 days | End: 2020-12-29
Attending: STUDENT IN AN ORGANIZED HEALTH CARE EDUCATION/TRAINING PROGRAM
Payer: MEDICAID

## 2020-12-29 VITALS
DIASTOLIC BLOOD PRESSURE: 72 MMHG | HEIGHT: 68 IN | TEMPERATURE: 98 F | OXYGEN SATURATION: 98 % | RESPIRATION RATE: 18 BRPM | HEART RATE: 73 BPM | SYSTOLIC BLOOD PRESSURE: 116 MMHG | WEIGHT: 160.06 LBS

## 2020-12-29 DIAGNOSIS — Z98.89 OTHER SPECIFIED POSTPROCEDURAL STATES: Chronic | ICD-10-CM

## 2020-12-29 PROCEDURE — 73090 X-RAY EXAM OF FOREARM: CPT | Mod: 26,LT

## 2020-12-29 PROCEDURE — 73060 X-RAY EXAM OF HUMERUS: CPT | Mod: 26,RT

## 2020-12-29 PROCEDURE — 99284 EMERGENCY DEPT VISIT MOD MDM: CPT

## 2020-12-29 PROCEDURE — 72100 X-RAY EXAM L-S SPINE 2/3 VWS: CPT | Mod: 26

## 2020-12-29 PROCEDURE — 73030 X-RAY EXAM OF SHOULDER: CPT | Mod: 26,RT

## 2020-12-29 PROCEDURE — 73080 X-RAY EXAM OF ELBOW: CPT | Mod: 26,RT

## 2020-12-29 NOTE — ED ADULT TRIAGE NOTE - CHIEF COMPLAINT QUOTE
c/o pain to legs back and arms, recent DC from [facility], fall in front of EMS unable to ambulate, denies alcohol, no LOC

## 2020-12-29 NOTE — ED PROVIDER NOTE - ATTENDING CONTRIBUTION TO CARE
I performed a history and physical exam of the patient and discussed their management with the resident. I reviewed the resident's note and agree with the documented findings and plan of care. My medical decision making and observations are found above.    60M hx htn, etoh p/w fall. Patient with requent ER visits with ETOH abuse. Although patient denied etoh to resident, he did endorse etoh to me and nurse as well but does not comment on quantity or last drink. Patient reports diffuse R sided pain. Reported to have fallen in front of police by EMS. +appears intoxicated. No evidence of head/neck/oropharyngeal trauma. Patient's airway intact, +b/l breath osunds. 2+ pulses in distal extremities, gcs 15 and vital signs wnl. moving all extremities with 5/5 strength. cn2-12 grossly intact. no obvious focal deficits. abd soft, ntnd. no evidnece of ecchymosis/abrasions.  Fall likely 2/2 etoh intox. Will get plain films, finger stick, reassess for clinical sobriety. I performed a history and physical exam of the patient and discussed their management with the PA. I reviewed the PA's note and agree with the documented findings and plan of care. My medical decision making and observations are found above.    60M hx htn, etoh p/w fall. Patient with requent ER visits with ETOH abuse. Although patient denied etoh to resident, he did endorse etoh to me and nurse as well but does not comment on quantity or last drink. Patient reports diffuse R sided pain. Reported to have fallen in front of police by EMS. +appears intoxicated. No evidence of head/neck/oropharyngeal trauma. Patient's airway intact, +b/l breath osunds. 2+ pulses in distal extremities, gcs 15 and vital signs wnl. moving all extremities with 5/5 strength. cn2-12 grossly intact. no obvious focal deficits. abd soft, ntnd. no evidnece of ecchymosis/abrasions.  Fall likely 2/2 etoh intox. Will get plain films, finger stick, reassess for clinical sobriety.

## 2020-12-29 NOTE — ED PROVIDER NOTE - NSFOLLOWUPINSTRUCTIONS_ED_ALL_ED_FT
Please follow up with your primary care provider for further concerns you may have regarding your general health. Attached you will find your results from today's visit. Continue taking your medications as prescribed and keep your upcoming medical appointments.    You have been diagnosed with a rib fracture - it is nondisplaced and will heal on its own. Read the attached handout on managing your pain and symptoms at home.     To control your pain at home, you should take Ibuprofen 400 mg along with Tylenol 650mg-1000mg every 6 to 8 hours. Limit your maximum daily Tylenol from all sources to 4000mg. Be aware that many other medications contain acetaminophen which is also known as Tylenol. Taking Tylenol and Ibuprofen together has been shown to be more effective at relieving pain than taking them separately. These are both over the counter medications that you can  at your local pharmacy without a prescription. You need to respect all of the warnings on the bottles. You shouldn’t take these medications for more than a week without following up with your doctor. Both medications come with certain risks and side effects that you need to discuss with your doctor, especially if you are taking them for a prolonged period.

## 2020-12-29 NOTE — ED ADULT NURSE NOTE - OBJECTIVE STATEMENT
61 y/o Male presenting to the ED by EMS, A&Ox3, brought in after EMS found him asleep on the corner near [facility] after discharge from their ED. Pt was seen at [facility] after complaining of back pain. When the police arrived to pick pt up he stood up and fell back to the ground, as per EMS pt did not lose consciousness, did not hit his head and was able to get right back up. Pt was wearing three jackets a sweater and three pairs of pants. Pt undressed and in gown all belongings remain with patient at this time. Abrasion noted to the right finger. No further signs of trauma noted to the body. Pt able to move all extremities. Endorses lower back pain, no ecchymosis erythema edema or other signs of trauma noted to the area of pain. Pt seems unkept, dirt noted throughout clothing and under finger/toe nails. Toe nail noted to chip off when socks were removed, MD aware. Pt smells of alcohol, admits to drinking "a little alcohol" reports he drinks sometimes. As per hx in the system and EMS, pt has hx of ETOH abuse, has hx of elopement and combativeness. Pt placed on a 1:1 for constant observation due to risk of elopement. Safety and comfort measures provided, bed locked and in lowest position, side rails up for safety. 1:1 remains at bedside. Awaiting MD evaluation.

## 2020-12-29 NOTE — ED PROVIDER NOTE - PROGRESS NOTE DETAILS
tennille: pt w/ rib fracture, will discuss pain management, plan for dc. well appearing, ambulating, clinically sober.

## 2020-12-29 NOTE — ED PROVIDER NOTE - PATIENT PORTAL LINK FT
You can access the FollowMyHealth Patient Portal offered by [facility] by registering at the following website: [email or web address]. By joining [employer]’s FollowMyHealth portal, you will also be able to view your health information using other applications (apps) compatible with our system.

## 2020-12-29 NOTE — ED ADULT NURSE NOTE - INTERVENTIONS DEFINITIONS
Jenera to call system/Call bell, personal items and telephone within reach/Instruct patient to call for assistance/Physically safe environment: no spills, clutter or unnecessary equipment/Stretcher in lowest position, wheels locked, appropriate side rails in place/Monitor for mental status changes and reorient to person, place, and time/Provide visual clues: red socks

## 2020-12-29 NOTE — ED PROVIDER NOTE - PHYSICAL EXAMINATION
GEN: Pt intoxicated, smells of ETOH, unkept, alert, loosely following commands.  PSYCH: Intoxicated.  EYES: Sclera white w/o injection.  ENT: Head NCAT. MMM. Neck supple FROM. Airway patent.  RESP: No chest wall tenderness, CTA b/l, no wheezes, rales, or rhonchi.   CARDIAC: RRR, clear distinct S1, S2, no appreciable murmurs.  ABD: Abdomen soft, non-tender. No CVAT b/l.  MSK: No joint erythema or obvious deformity. No obvious spinal deformity, no midline spinal ttp, no step-offs. FROM b/l UE and LE.  NEURO: No focal motor or sensory deficits.  VASC: Radial and dorsalis pedis pulses 2+ b/l. No edema or calf tenderness.  SKIN: +abrasion to R hand and digits. No rashes or lesions.

## 2020-12-29 NOTE — ED PROVIDER NOTE - OBJECTIVE STATEMENT
60y Sudanese speaking M pmhx HTN, ETOH abuse presents to ED c/o fall. As per EMS pt discharged from [facility] ED (seen for back pain) at 5:30, walked out and found sleeping on side of road, fell in front of police c/o back and b/l leg pain, blood glucose 109, pt denied ETOH but smells of alcohol. Multiple ED visits for intox, w/ hx of combativeness and elopement. Pt c/o R arm pain during exam. Denies ETOH. No other complaints.

## 2020-12-29 NOTE — ED ADULT NURSE NOTE - NSFALLRSKASSESSTYPE_ED_ALL_ED
----- Message from Kaleigh Yoder RN sent at 6/1/2020  9:47 AM CDT -----  Regarding: CHF update  CHF update after changes at OV on 6/1/20    Increase torsemide to 40 mg po bid for 3 days then see if her abdominal bloating has resolved by end of week. Thanks.   appt with Dr Enriquez in 6 months to establish care.     Now resides at [facility] -only doing Weights and BP/HR. Will have to call pt for update on symptoms.   [facility] [place] [address]       Contact that they have on file is:   Serjio Sumner   office: [phone]   cell: [phone]   fax: [phone]      Initial (On Arrival)

## 2020-12-29 NOTE — ED ADULT NURSE NOTE - CHIEF COMPLAINT QUOTE
c/o pain to legs back and arms, recent DC from [facility], fall in front of EMS unable to ambulate, denies alcohol, no LOC

## 2020-12-30 VITALS
SYSTOLIC BLOOD PRESSURE: 124 MMHG | OXYGEN SATURATION: 96 % | HEART RATE: 88 BPM | DIASTOLIC BLOOD PRESSURE: 75 MMHG | TEMPERATURE: 99 F | RESPIRATION RATE: 17 BRPM

## 2020-12-30 PROCEDURE — 99285 EMERGENCY DEPT VISIT HI MDM: CPT | Mod: 25

## 2020-12-30 PROCEDURE — 73060 X-RAY EXAM OF HUMERUS: CPT

## 2020-12-30 PROCEDURE — 82962 GLUCOSE BLOOD TEST: CPT

## 2020-12-30 PROCEDURE — 72100 X-RAY EXAM L-S SPINE 2/3 VWS: CPT

## 2020-12-30 PROCEDURE — 73020 X-RAY EXAM OF SHOULDER: CPT

## 2020-12-30 PROCEDURE — 73080 X-RAY EXAM OF ELBOW: CPT

## 2020-12-30 PROCEDURE — 73030 X-RAY EXAM OF SHOULDER: CPT

## 2020-12-30 PROCEDURE — 71101 X-RAY EXAM UNILAT RIBS/CHEST: CPT | Mod: 26

## 2020-12-30 PROCEDURE — 73090 X-RAY EXAM OF FOREARM: CPT

## 2020-12-30 PROCEDURE — 71101 X-RAY EXAM UNILAT RIBS/CHEST: CPT

## 2020-12-30 RX ORDER — LIDOCAINE 4 G/100G
1 CREAM TOPICAL ONCE
Refills: 0 | Status: COMPLETED | OUTPATIENT
Start: 2020-12-30 | End: 2020-12-30

## 2020-12-30 RX ORDER — IBUPROFEN 200 MG
600 TABLET ORAL ONCE
Refills: 0 | Status: COMPLETED | OUTPATIENT
Start: 2020-12-30 | End: 2020-12-30

## 2020-12-30 RX ADMIN — LIDOCAINE 1 PATCH: 4 CREAM TOPICAL at 06:31

## 2020-12-30 RX ADMIN — Medication 600 MILLIGRAM(S): at 06:32

## 2021-01-01 NOTE — PATIENT PROFILE ADULT. - AS SC BRADEN NUTRITION
The patient was not seen.  Eloped from the waiting room prior to my evaluation.       Inna Schofield MD  08/20/21 1764     (2) probably inadequate

## 2021-04-14 NOTE — ED ADULT TRIAGE NOTE - NS ED NURSE DIRECT TO ROOM YN
Please schedule a complete physical exam in late January, 2022.    Please monitor your blood pressure periodically.  GOAL: 120/70. Notify MD if persistently higher  
Yes

## 2021-04-16 ENCOUNTER — EMERGENCY (EMERGENCY)
Facility: HOSPITAL | Age: 61
LOS: 1 days | Discharge: HOME | End: 2021-04-16
Attending: EMERGENCY MEDICINE
Payer: MEDICAID

## 2021-04-16 VITALS
HEIGHT: 68 IN | RESPIRATION RATE: 18 BRPM | TEMPERATURE: 98 F | SYSTOLIC BLOOD PRESSURE: 163 MMHG | HEART RATE: 10 BPM | DIASTOLIC BLOOD PRESSURE: 85 MMHG | OXYGEN SATURATION: 100 %

## 2021-04-16 DIAGNOSIS — Z98.89 OTHER SPECIFIED POSTPROCEDURAL STATES: Chronic | ICD-10-CM

## 2021-04-16 LAB
ALBUMIN SERPL ELPH-MCNC: 4.2 G/DL — SIGNIFICANT CHANGE UP (ref 3.3–5)
ALP SERPL-CCNC: 84 U/L — SIGNIFICANT CHANGE UP (ref 40–120)
ALT FLD-CCNC: 47 U/L — HIGH (ref 10–45)
ANION GAP SERPL CALC-SCNC: 15 MMOL/L — SIGNIFICANT CHANGE UP (ref 5–17)
AST SERPL-CCNC: 102 U/L — HIGH (ref 10–40)
BASE EXCESS BLDV CALC-SCNC: 0.5 MMOL/L — SIGNIFICANT CHANGE UP (ref -2–2)
BASOPHILS # BLD AUTO: 0.03 K/UL — SIGNIFICANT CHANGE UP (ref 0–0.2)
BASOPHILS NFR BLD AUTO: 0.5 % — SIGNIFICANT CHANGE UP (ref 0–2)
BILIRUB SERPL-MCNC: 0.5 MG/DL — SIGNIFICANT CHANGE UP (ref 0.2–1.2)
BUN SERPL-MCNC: 14 MG/DL — SIGNIFICANT CHANGE UP (ref 7–23)
CA-I SERPL-SCNC: 1.09 MMOL/L — LOW (ref 1.12–1.3)
CALCIUM SERPL-MCNC: 8.7 MG/DL — SIGNIFICANT CHANGE UP (ref 8.4–10.5)
CHLORIDE BLDV-SCNC: 110 MMOL/L — HIGH (ref 96–108)
CHLORIDE SERPL-SCNC: 105 MMOL/L — SIGNIFICANT CHANGE UP (ref 96–108)
CO2 BLDV-SCNC: 27 MMOL/L — SIGNIFICANT CHANGE UP (ref 22–30)
CO2 SERPL-SCNC: 21 MMOL/L — LOW (ref 22–31)
CREAT SERPL-MCNC: 0.87 MG/DL — SIGNIFICANT CHANGE UP (ref 0.5–1.3)
EOSINOPHIL # BLD AUTO: 0.15 K/UL — SIGNIFICANT CHANGE UP (ref 0–0.5)
EOSINOPHIL NFR BLD AUTO: 2.4 % — SIGNIFICANT CHANGE UP (ref 0–6)
ETHANOL SERPL-MCNC: 368 MG/DL — HIGH (ref 0–10)
GAS PNL BLDV: 147 MMOL/L — HIGH (ref 135–145)
GAS PNL BLDV: SIGNIFICANT CHANGE UP
GAS PNL BLDV: SIGNIFICANT CHANGE UP
GLUCOSE BLDV-MCNC: 106 MG/DL — HIGH (ref 70–99)
GLUCOSE SERPL-MCNC: 107 MG/DL — HIGH (ref 70–99)
HCO3 BLDV-SCNC: 26 MMOL/L — SIGNIFICANT CHANGE UP (ref 21–29)
HCT VFR BLD CALC: 36.5 % — LOW (ref 39–50)
HCT VFR BLDA CALC: 37 % — LOW (ref 39–50)
HGB BLD CALC-MCNC: 11.9 G/DL — LOW (ref 13–17)
HGB BLD-MCNC: 11.8 G/DL — LOW (ref 13–17)
IMM GRANULOCYTES NFR BLD AUTO: 0.3 % — SIGNIFICANT CHANGE UP (ref 0–1.5)
LACTATE BLDV-MCNC: 2.1 MMOL/L — HIGH (ref 0.7–2)
LYMPHOCYTES # BLD AUTO: 1.9 K/UL — SIGNIFICANT CHANGE UP (ref 1–3.3)
LYMPHOCYTES # BLD AUTO: 30.7 % — SIGNIFICANT CHANGE UP (ref 13–44)
MCHC RBC-ENTMCNC: 32.3 GM/DL — SIGNIFICANT CHANGE UP (ref 32–36)
MCHC RBC-ENTMCNC: 32.3 PG — SIGNIFICANT CHANGE UP (ref 27–34)
MCV RBC AUTO: 100 FL — SIGNIFICANT CHANGE UP (ref 80–100)
MONOCYTES # BLD AUTO: 0.5 K/UL — SIGNIFICANT CHANGE UP (ref 0–0.9)
MONOCYTES NFR BLD AUTO: 8.1 % — SIGNIFICANT CHANGE UP (ref 2–14)
NEUTROPHILS # BLD AUTO: 3.59 K/UL — SIGNIFICANT CHANGE UP (ref 1.8–7.4)
NEUTROPHILS NFR BLD AUTO: 58 % — SIGNIFICANT CHANGE UP (ref 43–77)
NRBC # BLD: 0 /100 WBCS — SIGNIFICANT CHANGE UP (ref 0–0)
PCO2 BLDV: 45 MMHG — SIGNIFICANT CHANGE UP (ref 42–55)
PH BLDV: 7.37 — SIGNIFICANT CHANGE UP (ref 7.35–7.45)
PLATELET # BLD AUTO: 241 K/UL — SIGNIFICANT CHANGE UP (ref 150–400)
PO2 BLDV: 62 MMHG — HIGH (ref 25–45)
POTASSIUM BLDV-SCNC: 3.8 MMOL/L — SIGNIFICANT CHANGE UP (ref 3.5–5.3)
POTASSIUM SERPL-MCNC: 4.6 MMOL/L — SIGNIFICANT CHANGE UP (ref 3.5–5.3)
POTASSIUM SERPL-SCNC: 4.6 MMOL/L — SIGNIFICANT CHANGE UP (ref 3.5–5.3)
PROT SERPL-MCNC: 7.6 G/DL — SIGNIFICANT CHANGE UP (ref 6–8.3)
RBC # BLD: 3.65 M/UL — LOW (ref 4.2–5.8)
RBC # FLD: 14.1 % — SIGNIFICANT CHANGE UP (ref 10.3–14.5)
SAO2 % BLDV: 87 % — SIGNIFICANT CHANGE UP (ref 67–88)
SODIUM SERPL-SCNC: 141 MMOL/L — SIGNIFICANT CHANGE UP (ref 135–145)
WBC # BLD: 6.19 K/UL — SIGNIFICANT CHANGE UP (ref 3.8–10.5)
WBC # FLD AUTO: 6.19 K/UL — SIGNIFICANT CHANGE UP (ref 3.8–10.5)

## 2021-04-16 PROCEDURE — 70450 CT HEAD/BRAIN W/O DYE: CPT | Mod: 26

## 2021-04-16 PROCEDURE — 76377 3D RENDER W/INTRP POSTPROCES: CPT | Mod: 26

## 2021-04-16 PROCEDURE — 99285 EMERGENCY DEPT VISIT HI MDM: CPT | Mod: 25

## 2021-04-16 PROCEDURE — G1004: CPT

## 2021-04-16 PROCEDURE — 70486 CT MAXILLOFACIAL W/O DYE: CPT | Mod: 26,MG

## 2021-04-16 PROCEDURE — 72125 CT NECK SPINE W/O DYE: CPT | Mod: 26

## 2021-04-16 PROCEDURE — 12013 RPR F/E/E/N/L/M 2.6-5.0 CM: CPT

## 2021-04-16 RX ORDER — FOLIC ACID 0.8 MG
1 TABLET ORAL ONCE
Refills: 0 | Status: COMPLETED | OUTPATIENT
Start: 2021-04-16 | End: 2021-04-16

## 2021-04-16 RX ORDER — THIAMINE MONONITRATE (VIT B1) 100 MG
100 TABLET ORAL ONCE
Refills: 0 | Status: COMPLETED | OUTPATIENT
Start: 2021-04-16 | End: 2021-04-16

## 2021-04-16 RX ORDER — TETANUS TOXOID, REDUCED DIPHTHERIA TOXOID AND ACELLULAR PERTUSSIS VACCINE, ADSORBED 5; 2.5; 8; 8; 2.5 [IU]/.5ML; [IU]/.5ML; UG/.5ML; UG/.5ML; UG/.5ML
0.5 SUSPENSION INTRAMUSCULAR ONCE
Refills: 0 | Status: COMPLETED | OUTPATIENT
Start: 2021-04-16 | End: 2021-04-16

## 2021-04-16 RX ORDER — SODIUM CHLORIDE 9 MG/ML
1000 INJECTION, SOLUTION INTRAVENOUS ONCE
Refills: 0 | Status: COMPLETED | OUTPATIENT
Start: 2021-04-16 | End: 2021-04-16

## 2021-04-16 RX ADMIN — Medication 100 MILLIGRAM(S): at 20:34

## 2021-04-16 RX ADMIN — SODIUM CHLORIDE 1000 MILLILITER(S): 9 INJECTION, SOLUTION INTRAVENOUS at 20:34

## 2021-04-16 RX ADMIN — TETANUS TOXOID, REDUCED DIPHTHERIA TOXOID AND ACELLULAR PERTUSSIS VACCINE, ADSORBED 0.5 MILLILITER(S): 5; 2.5; 8; 8; 2.5 SUSPENSION INTRAMUSCULAR at 20:55

## 2021-04-16 NOTE — ED PROVIDER NOTE - PATIENT PORTAL LINK FT
You can access the FollowMyHealth Patient Portal offered by [facility] by registering at the following website: [email or web address]. By joining [employer]’s FollowMyHealth portal, you will also be able to view your health information using other applications (apps) compatible with our system.

## 2021-04-16 NOTE — ED PROVIDER NOTE - ATTENDING CONTRIBUTION TO CARE
60M, pmh alcohol abuse, biba with facial trauma s/p slip and fall. Admits to etoh use, tripping and falling, but unable to provide more details. States last drink of alcohol earlier today.    PE: Intoxicated, smells of etoh, +L infraorbital abrasion, +forehead abrasions, MMM, Trachea midline, Normal conjunctiva, lungs CTAB, S1/S2 RRR, Normal perfusion, 2+ radial pulses bilat, Abdomen Soft, NTND, No rebound/guarding, No LE edema, No deformity of extremities, No rashes,  No focal motor or sensory deficits. No appreciable midline c, t, l ttp. FROM bilat UE and LE without appreciable bony or joint ttp.    Intoxicated. Facial trauma. Most c/w trip and fall. Will check labs. CTs. Observe and re-eval once sober. - Antoine Ngo MD

## 2021-04-16 NOTE — ED PROVIDER NOTE - PROGRESS NOTE DETAILS
labs wnl, head and CT with no fractures or ICH. Patient tolerating PO. No signs of withdrawal. Attending Blessing:  Pt signed out earlier. 59 y/o m brought in with reported alcohol intoxication. Reported fall sustaining lacs to face s/p repair. pending CT and re evaluation for sobriety. CT's neg for acute pathology. Pt answering questions appropriately but unsteady on his feet. Plan to obs further until appears more clinically sober. Pt signed out to me, reassessed, ambulating and tolerating po. Will DC with return precautions. Josephine Workman, STEPHANIE PGY2 Pt signed out to me, reassessed, ambulating and tolerating po. VSS no tongue fasciculations, no tremors. Will DC with return precautions. Josephine Workman, STEPHANIE PGY2 Attending MD Barnett: Requesting to leave.  Reports feels well.  Able to stand and able to verbalize requests.  Will speak to SW about [employer]/transport.  Stable for discharge.

## 2021-04-16 NOTE — ED PROVIDER NOTE - PHYSICAL EXAMINATION
Gen: AAOx2, acutely intoxicated, following commands, moving all extr.   Head: left and right forehead abrasion. R supraorbital laceration. Infraorbital L abrasion. EOM intact. No c-spine TTP.   HEENT: EOMI, oral mucosa moist, normal conjunctiva  Lung: CTAB, no respiratory distress, no wheezes/rhonchi/rales B/L, speaking in full sentences  CV: RRR, no murmurs, rubs or gallops  Abd: soft, NTND, no guarding, no CVA tenderness  MSK: no visible deformities  Neuro: No focal sensory or motor deficits, normal CN exam. No tongue fasculations. No tremors.   Skin: see above   Psych: normal affect.

## 2021-04-16 NOTE — ED ADULT NURSE NOTE - OBJECTIVE STATEMENT
Pt is a 60y M PMH ETOH abuse, ETOH withdrawal, BIBEMS p/w intox and facial lac. [place]. Pt appears intoxicated, able to follow commands. Pt reports tripping and falling yesterday but unable to provide other details. Pt has a mix of dried and fresh blood on his face. Pt states last drink was today unknown time. a&Ox2, FRANCIS, lungs clear, distal pulses intact, abdomen soft non tender, laceration noted to R supraorbital area, L infraorbital abrasion, L and R forehead abrasion. Side rails up for safety, bed in lowest position, pt moved near nursing station for closer monitoring. Will continue to monitor.

## 2021-04-16 NOTE — ED PROVIDER NOTE - OBJECTIVE STATEMENT
61 yo M with hx of etoh abuse brought my EMS for facial trauma after tripping and falling and alcohol intoxication. Patient appears intoxicated, but follow commands. Reports tripping and falling yesterday, unable to provide details.  Denies any pain. Reports last drink was today but is not sure of the time or type of drinks. Pt unsure of prior admissions for etoh use / withdrawal.

## 2021-04-16 NOTE — ED ADULT NURSE NOTE - NSIMPLEMENTINTERV_GEN_ALL_ED
Baby just passed large, hard stool  
Baby quiet. Watching videos. No distress  
Mother updated on plan of care. MD at bedside. Mother notified stool sample needed.   
Waiting further orders  
abd now soft, baby no longer crying . He is holding cellphone watching a video  
Implemented All Fall Risk Interventions:  [place] to call system. Call bell, personal items and telephone within reach. Instruct patient to call for assistance. Room bathroom lighting operational. Non-slip footwear when patient is off stretcher. Physically safe environment: no spills, clutter or unnecessary equipment. Stretcher in lowest position, wheels locked, appropriate side rails in place. Provide visual cue, wrist band, yellow gown, etc. Monitor gait and stability. Monitor for mental status changes and reorient to person, place, and time. Review medications for side effects contributing to fall risk. Reinforce activity limits and safety measures with patient and family.

## 2021-04-16 NOTE — ED PROVIDER NOTE - NSFOLLOWUPINSTRUCTIONS_ED_ALL_ED_FT
You were seen in the ED for facial trauma / alcohol use  The following labs/imaging were obtained: see attached (if applicable)  Continue home medications (if any).   Return to the ED if you develop fever, facial leg or arm weakness or  worsening or new concerning symptoms.  Follow up with your primary care in 2-3 days.   Discussed with pt results of work up, strict return precautions, and need for follow up.  Pt expressed understanding and agrees with plan.

## 2021-04-16 NOTE — ED PROVIDER NOTE - CLINICAL SUMMARY MEDICAL DECISION MAKING FREE TEXT BOX
61 yo M with hx of etoh abuse brought my EMS for facial trauma after tripping and falling and alcohol intoxication, last drink today. AAOx2, acutely intoxicated, following commands, moving all extr.  left and right forehead abrasions. R supraorbital laceration. Infraorbital L abrasion. EOM intact. No c-spine TTP. No tongue fasciculations or tremors. No signs of active alcohol withdrawal. CT head given increased risk of SDH, Ct maxf to [place] for orbital floor fractures, update tdap and lac / wound repair.

## 2021-04-16 NOTE — ED PROVIDER NOTE - SHIFT CHANGE DETAILS
Attending MD Barnett: Intoxication, labs and imaging non actionable, pending clinical sobriety, repeat EtOH sent

## 2021-04-16 NOTE — ED PROVIDER NOTE - NS ED ROS FT
GENERAL: No fever or chills  EYES: no change in vision  HEENT: see hpi  CARDIAC: no chest pain or palpiations   PULMONARY: no cough or SOB  GI: no abdominal pain, nausea, vomiting, diarrhea, or constipation   : No changes in urination  SKIN: see hpi  NEURO: no headache, numbness, or weakness.  MSK: No joint pain

## 2021-04-17 VITALS
DIASTOLIC BLOOD PRESSURE: 90 MMHG | HEART RATE: 96 BPM | SYSTOLIC BLOOD PRESSURE: 158 MMHG | RESPIRATION RATE: 16 BRPM | TEMPERATURE: 99 F | OXYGEN SATURATION: 97 %

## 2021-04-17 LAB — ETHANOL SERPL-MCNC: 148 MG/DL — HIGH (ref 0–10)

## 2021-04-17 PROCEDURE — 99285 EMERGENCY DEPT VISIT HI MDM: CPT | Mod: 25

## 2021-04-17 PROCEDURE — 85018 HEMOGLOBIN: CPT

## 2021-04-17 PROCEDURE — 83605 ASSAY OF LACTIC ACID: CPT

## 2021-04-17 PROCEDURE — 80307 DRUG TEST PRSMV CHEM ANLYZR: CPT

## 2021-04-17 PROCEDURE — 70486 CT MAXILLOFACIAL W/O DYE: CPT

## 2021-04-17 PROCEDURE — 82330 ASSAY OF CALCIUM: CPT

## 2021-04-17 PROCEDURE — 76377 3D RENDER W/INTRP POSTPROCES: CPT

## 2021-04-17 PROCEDURE — 85025 COMPLETE CBC W/AUTO DIFF WBC: CPT

## 2021-04-17 PROCEDURE — 72125 CT NECK SPINE W/O DYE: CPT

## 2021-04-17 PROCEDURE — 82947 ASSAY GLUCOSE BLOOD QUANT: CPT

## 2021-04-17 PROCEDURE — 82435 ASSAY OF BLOOD CHLORIDE: CPT

## 2021-04-17 PROCEDURE — 12013 RPR F/E/E/N/L/M 2.6-5.0 CM: CPT

## 2021-04-17 PROCEDURE — 96375 TX/PRO/DX INJ NEW DRUG ADDON: CPT | Mod: XU

## 2021-04-17 PROCEDURE — 70450 CT HEAD/BRAIN W/O DYE: CPT

## 2021-04-17 PROCEDURE — 90471 IMMUNIZATION ADMIN: CPT

## 2021-04-17 PROCEDURE — 84295 ASSAY OF SERUM SODIUM: CPT

## 2021-04-17 PROCEDURE — 84132 ASSAY OF SERUM POTASSIUM: CPT

## 2021-04-17 PROCEDURE — 85014 HEMATOCRIT: CPT

## 2021-04-17 PROCEDURE — 82803 BLOOD GASES ANY COMBINATION: CPT

## 2021-04-17 PROCEDURE — 90715 TDAP VACCINE 7 YRS/> IM: CPT

## 2021-04-17 PROCEDURE — 96374 THER/PROPH/DIAG INJ IV PUSH: CPT | Mod: XU

## 2021-04-17 PROCEDURE — 80053 COMPREHEN METABOLIC PANEL: CPT

## 2021-04-17 RX ADMIN — Medication 1 MILLIGRAM(S): at 01:44

## 2021-04-17 RX ADMIN — Medication 100 MILLIGRAM(S): at 01:44

## 2021-04-17 NOTE — ED ADULT NURSE REASSESSMENT NOTE - NS ED NURSE REASSESS COMMENT FT1
Pt ambulated with two assist, unsteady on his feet. Alcohol level redrawn. CIWA score of 1. Pt given sandwich and water, tolerating well. Side rails up for safety, bed in lowest position. Will continue to monitor.

## 2021-04-17 NOTE — CHART NOTE - NSCHARTNOTEFT_GEN_A_CORE
WEEKEND ED COVERAGE:     Social work met with ED patient: "61 yo M with hx of ETOH abuse brought my EMS for facial trauma after tripping and falling and alcohol intoxication." Patient medically cleared for discharge from ED.     LMSW met with patient at bedside, introduced self and role using  ID: [id number] for [place]iny[place] language. Patient reporting to LMSW that he wants to return to [place]. Patient declining to confirm a home address or emergency contact. Patient appears to be undomiciled but declining need for resources from LMSW. LMSW attempted to completed SBIRT Assessment and inquire about ETOH use, patient reporting that he drinks daily, cannot recall how many drinks. Patient continues to report that he is missing a jacket. RN assisted with looking, patient currently with all clothing that he was brought into the ED with, patient with a coat, pants, shoes; appropriately dressed for weather. Patient declined to participate in conversation about substance use treatment.     LMSW inquired about transportation upon discharge. Patient with Medicaid, LMSW offered to book Taxi through [employer], however when call LMSW unable to without verifying patient's address or phone number. LMSW explain situation to patient, patient declined need for taxi. LMSW offered patient [employer] Card, which he accepted. Patient reporting no concerns for LMSW. Provided with Discharge Paperwork by RN.

## 2021-04-28 NOTE — ED ADULT NURSE NOTE - CAS ELECT INFOMATION PROVIDED
What Type Of Note Output Would You Prefer (Optional)?: Standard Output
How Severe Is Your Skin Lesion?: mild
Has Your Skin Lesion Been Treated?: not been treated
Is This A New Presentation, Or A Follow-Up?: Skin Lesions
Additional History: Patient is here today to have hands and arms examined. 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 Patient declined going to [place] for mohs. Patient rather wait for [facility] mohs.
DC instructions

## 2021-05-12 ENCOUNTER — INPATIENT (INPATIENT)
Facility: HOSPITAL | Age: 61
LOS: 1 days | Discharge: LEFT BEFORE DISCHARGE | DRG: 894 | End: 2021-05-14
Attending: HOSPITALIST | Admitting: HOSPITALIST
Payer: MEDICAID

## 2021-05-12 VITALS
SYSTOLIC BLOOD PRESSURE: 122 MMHG | HEART RATE: 101 BPM | TEMPERATURE: 99 F | HEIGHT: 68 IN | OXYGEN SATURATION: 99 % | DIASTOLIC BLOOD PRESSURE: 77 MMHG | RESPIRATION RATE: 17 BRPM

## 2021-05-12 DIAGNOSIS — D64.9 ANEMIA, UNSPECIFIED: ICD-10-CM

## 2021-05-12 DIAGNOSIS — R91.1 SOLITARY PULMONARY NODULE: ICD-10-CM

## 2021-05-12 DIAGNOSIS — F10.10 ALCOHOL ABUSE, UNCOMPLICATED: ICD-10-CM

## 2021-05-12 DIAGNOSIS — S02.2XXA FRACTURE OF NASAL BONES, INITIAL ENCOUNTER FOR CLOSED FRACTURE: ICD-10-CM

## 2021-05-12 DIAGNOSIS — R74.8 ABNORMAL LEVELS OF OTHER SERUM ENZYMES: ICD-10-CM

## 2021-05-12 DIAGNOSIS — F10.929 ALCOHOL USE, UNSPECIFIED WITH INTOXICATION, UNSPECIFIED: ICD-10-CM

## 2021-05-12 DIAGNOSIS — S32.009A UNSPECIFIED FRACTURE OF UNSPECIFIED LUMBAR VERTEBRA, INITIAL ENCOUNTER FOR CLOSED FRACTURE: ICD-10-CM

## 2021-05-12 DIAGNOSIS — Z98.89 OTHER SPECIFIED POSTPROCEDURAL STATES: Chronic | ICD-10-CM

## 2021-05-12 DIAGNOSIS — Z29.9 ENCOUNTER FOR PROPHYLACTIC MEASURES, UNSPECIFIED: ICD-10-CM

## 2021-05-12 LAB
ALBUMIN SERPL ELPH-MCNC: 4.2 G/DL — SIGNIFICANT CHANGE UP (ref 3.3–5)
ALP SERPL-CCNC: 75 U/L — SIGNIFICANT CHANGE UP (ref 40–120)
ALT FLD-CCNC: 71 U/L — HIGH (ref 10–45)
ANION GAP SERPL CALC-SCNC: 21 MMOL/L — HIGH (ref 5–17)
APTT BLD: 31 SEC — SIGNIFICANT CHANGE UP (ref 27.5–35.5)
AST SERPL-CCNC: 166 U/L — HIGH (ref 10–40)
BASE EXCESS BLDV CALC-SCNC: -1 MMOL/L — SIGNIFICANT CHANGE UP (ref -2–2)
BASE EXCESS BLDV CALC-SCNC: 0.6 MMOL/L — SIGNIFICANT CHANGE UP (ref -2–2)
BASOPHILS # BLD AUTO: 0.03 K/UL — SIGNIFICANT CHANGE UP (ref 0–0.2)
BASOPHILS NFR BLD AUTO: 0.5 % — SIGNIFICANT CHANGE UP (ref 0–2)
BILIRUB SERPL-MCNC: 0.9 MG/DL — SIGNIFICANT CHANGE UP (ref 0.2–1.2)
BUN SERPL-MCNC: 19 MG/DL — SIGNIFICANT CHANGE UP (ref 7–23)
CA-I SERPL-SCNC: 1.03 MMOL/L — LOW (ref 1.12–1.3)
CA-I SERPL-SCNC: 1.13 MMOL/L — SIGNIFICANT CHANGE UP (ref 1.12–1.3)
CALCIUM SERPL-MCNC: 9.2 MG/DL — SIGNIFICANT CHANGE UP (ref 8.4–10.5)
CHLORIDE BLDV-SCNC: 112 MMOL/L — HIGH (ref 96–108)
CHLORIDE BLDV-SCNC: 113 MMOL/L — HIGH (ref 96–108)
CHLORIDE SERPL-SCNC: 107 MMOL/L — SIGNIFICANT CHANGE UP (ref 96–108)
CO2 BLDV-SCNC: 25 MMOL/L — SIGNIFICANT CHANGE UP (ref 22–30)
CO2 BLDV-SCNC: 26 MMOL/L — SIGNIFICANT CHANGE UP (ref 22–30)
CO2 SERPL-SCNC: 19 MMOL/L — LOW (ref 22–31)
CREAT SERPL-MCNC: 1.06 MG/DL — SIGNIFICANT CHANGE UP (ref 0.5–1.3)
EOSINOPHIL # BLD AUTO: 0.08 K/UL — SIGNIFICANT CHANGE UP (ref 0–0.5)
EOSINOPHIL NFR BLD AUTO: 1.4 % — SIGNIFICANT CHANGE UP (ref 0–6)
ETHANOL SERPL-MCNC: 229 MG/DL — HIGH (ref 0–10)
GAS PNL BLDV: 137 MMOL/L — SIGNIFICANT CHANGE UP (ref 135–145)
GAS PNL BLDV: 148 MMOL/L — HIGH (ref 135–145)
GAS PNL BLDV: SIGNIFICANT CHANGE UP
GLUCOSE BLDV-MCNC: 135 MG/DL — HIGH (ref 70–99)
GLUCOSE BLDV-MCNC: 98 MG/DL — SIGNIFICANT CHANGE UP (ref 70–99)
GLUCOSE SERPL-MCNC: 131 MG/DL — HIGH (ref 70–99)
HCO3 BLDV-SCNC: 24 MMOL/L — SIGNIFICANT CHANGE UP (ref 21–29)
HCO3 BLDV-SCNC: 24 MMOL/L — SIGNIFICANT CHANGE UP (ref 21–29)
HCT VFR BLD CALC: 33.1 % — LOW (ref 39–50)
HCT VFR BLDA CALC: 34 % — LOW (ref 39–50)
HCT VFR BLDA CALC: 35 % — LOW (ref 39–50)
HGB BLD CALC-MCNC: 10.9 G/DL — LOW (ref 13–17)
HGB BLD CALC-MCNC: 11.3 G/DL — LOW (ref 13–17)
HGB BLD-MCNC: 10.6 G/DL — LOW (ref 13–17)
IMM GRANULOCYTES NFR BLD AUTO: 0.5 % — SIGNIFICANT CHANGE UP (ref 0–1.5)
INR BLD: 0.89 RATIO — SIGNIFICANT CHANGE UP (ref 0.88–1.16)
LACTATE BLDV-MCNC: 1.8 MMOL/L — SIGNIFICANT CHANGE UP (ref 0.7–2)
LACTATE BLDV-MCNC: 4 MMOL/L — CRITICAL HIGH (ref 0.7–2)
LIDOCAIN IGE QN: 61 U/L — HIGH (ref 7–60)
LYMPHOCYTES # BLD AUTO: 1.04 K/UL — SIGNIFICANT CHANGE UP (ref 1–3.3)
LYMPHOCYTES # BLD AUTO: 18.6 % — SIGNIFICANT CHANGE UP (ref 13–44)
MCHC RBC-ENTMCNC: 31.9 PG — SIGNIFICANT CHANGE UP (ref 27–34)
MCHC RBC-ENTMCNC: 32 GM/DL — SIGNIFICANT CHANGE UP (ref 32–36)
MCV RBC AUTO: 99.7 FL — SIGNIFICANT CHANGE UP (ref 80–100)
MONOCYTES # BLD AUTO: 0.45 K/UL — SIGNIFICANT CHANGE UP (ref 0–0.9)
MONOCYTES NFR BLD AUTO: 8.1 % — SIGNIFICANT CHANGE UP (ref 2–14)
NEUTROPHILS # BLD AUTO: 3.95 K/UL — SIGNIFICANT CHANGE UP (ref 1.8–7.4)
NEUTROPHILS NFR BLD AUTO: 70.9 % — SIGNIFICANT CHANGE UP (ref 43–77)
NRBC # BLD: 0 /100 WBCS — SIGNIFICANT CHANGE UP (ref 0–0)
PCO2 BLDV: 38 MMHG — SIGNIFICANT CHANGE UP (ref 35–50)
PCO2 BLDV: 41 MMHG — SIGNIFICANT CHANGE UP (ref 35–50)
PH BLDV: 7.38 — SIGNIFICANT CHANGE UP (ref 7.35–7.45)
PH BLDV: 7.42 — SIGNIFICANT CHANGE UP (ref 7.35–7.45)
PLATELET # BLD AUTO: 269 K/UL — SIGNIFICANT CHANGE UP (ref 150–400)
PO2 BLDV: 62 MMHG — HIGH (ref 25–45)
PO2 BLDV: 72 MMHG — HIGH (ref 25–45)
POTASSIUM BLDV-SCNC: 3.2 MMOL/L — LOW (ref 3.5–5.3)
POTASSIUM BLDV-SCNC: 5.1 MMOL/L — SIGNIFICANT CHANGE UP (ref 3.5–5.3)
POTASSIUM SERPL-MCNC: 3.3 MMOL/L — LOW (ref 3.5–5.3)
POTASSIUM SERPL-SCNC: 3.3 MMOL/L — LOW (ref 3.5–5.3)
PROT SERPL-MCNC: 7.1 G/DL — SIGNIFICANT CHANGE UP (ref 6–8.3)
PROTHROM AB SERPL-ACNC: 10.7 SEC — SIGNIFICANT CHANGE UP (ref 10.6–13.6)
RBC # BLD: 3.32 M/UL — LOW (ref 4.2–5.8)
RBC # FLD: 13.6 % — SIGNIFICANT CHANGE UP (ref 10.3–14.5)
SAO2 % BLDV: 87 % — SIGNIFICANT CHANGE UP (ref 67–88)
SAO2 % BLDV: 94 % — HIGH (ref 67–88)
SODIUM SERPL-SCNC: 147 MMOL/L — HIGH (ref 135–145)
WBC # BLD: 5.58 K/UL — SIGNIFICANT CHANGE UP (ref 3.8–10.5)
WBC # FLD AUTO: 5.58 K/UL — SIGNIFICANT CHANGE UP (ref 3.8–10.5)

## 2021-05-12 PROCEDURE — 72100 X-RAY EXAM L-S SPINE 2/3 VWS: CPT | Mod: 26

## 2021-05-12 PROCEDURE — 70486 CT MAXILLOFACIAL W/O DYE: CPT | Mod: 26,MG

## 2021-05-12 PROCEDURE — 99223 1ST HOSP IP/OBS HIGH 75: CPT

## 2021-05-12 PROCEDURE — 72170 X-RAY EXAM OF PELVIS: CPT | Mod: 26

## 2021-05-12 PROCEDURE — 71045 X-RAY EXAM CHEST 1 VIEW: CPT | Mod: 26

## 2021-05-12 PROCEDURE — 70450 CT HEAD/BRAIN W/O DYE: CPT | Mod: 26,MG

## 2021-05-12 PROCEDURE — G1004: CPT

## 2021-05-12 PROCEDURE — 74177 CT ABD & PELVIS W/CONTRAST: CPT | Mod: 26,MA

## 2021-05-12 PROCEDURE — 99285 EMERGENCY DEPT VISIT HI MDM: CPT

## 2021-05-12 PROCEDURE — 76377 3D RENDER W/INTRP POSTPROCES: CPT | Mod: 26

## 2021-05-12 PROCEDURE — 93010 ELECTROCARDIOGRAM REPORT: CPT

## 2021-05-12 PROCEDURE — 72125 CT NECK SPINE W/O DYE: CPT | Mod: 26,MG

## 2021-05-12 PROCEDURE — 71260 CT THORAX DX C+: CPT | Mod: 26,MA

## 2021-05-12 RX ORDER — IBUPROFEN 200 MG
600 TABLET ORAL ONCE
Refills: 0 | Status: COMPLETED | OUTPATIENT
Start: 2021-05-12 | End: 2021-05-12

## 2021-05-12 RX ORDER — POTASSIUM CHLORIDE 20 MEQ
40 PACKET (EA) ORAL ONCE
Refills: 0 | Status: COMPLETED | OUTPATIENT
Start: 2021-05-12 | End: 2021-05-12

## 2021-05-12 RX ORDER — SODIUM CHLORIDE 9 MG/ML
1000 INJECTION, SOLUTION INTRAVENOUS ONCE
Refills: 0 | Status: COMPLETED | OUTPATIENT
Start: 2021-05-12 | End: 2021-05-12

## 2021-05-12 RX ORDER — FOLIC ACID 0.8 MG
1 TABLET ORAL DAILY
Refills: 0 | Status: DISCONTINUED | OUTPATIENT
Start: 2021-05-12 | End: 2021-05-14

## 2021-05-12 RX ADMIN — Medication 40 MILLIEQUIVALENT(S): at 18:20

## 2021-05-12 RX ADMIN — SODIUM CHLORIDE 1000 MILLILITER(S): 9 INJECTION, SOLUTION INTRAVENOUS at 18:19

## 2021-05-12 RX ADMIN — Medication 50 MILLIGRAM(S): at 18:19

## 2021-05-12 RX ADMIN — Medication 50 MILLIGRAM(S): at 21:37

## 2021-05-12 RX ADMIN — Medication 600 MILLIGRAM(S): at 18:20

## 2021-05-12 NOTE — ED PROVIDER NOTE - RESPIRATORY, MLM
Breath sounds clear and equal bilaterally. No wheezing, rales or rhonchi. +R lower chest wall tenderness, no crepitus.

## 2021-05-12 NOTE — ED PROVIDER NOTE - PATIENT PORTAL LINK FT
You can access the FollowMyHealth Patient Portal offered by [facility] by registering at the following website: [email or web address]. By joining [facility]’s FollowMyHealth portal, you will also be able to view your health information using other applications (apps) compatible with our system.

## 2021-05-12 NOTE — H&P ADULT - PROBLEM SELECTOR PLAN 6
Pt was found to have a 3mm solid lung nodule.  Pt will need follow up imaging. Pt was found to have a left nasal bone fracture. Apply ice.   Tylenol PRN for pain. Pt was found to have a left nasal bone fracture. Apply ice.   Consult plastic surgery team for evaluation in AM to determine if fracture requires any intervention.   Tylenol PRN for pain.

## 2021-05-12 NOTE — H&P ADULT - NSHPREVIEWOFSYSTEMS_GEN_ALL_CORE
REVIEW OF SYSTEMS  CONSTITUTIONAL: No fever, no chills, no fatigue  EYES: No eye pain, no vision changes  ENMT:  No difficulty hearing, no throat pain  RESPIRATORY: No cough, no wheezing, no sputum production; No shortness of breath  CARDIOVASCULAR: No chest pain, no palpitations, no ROSE, no leg swelling  GASTROINTESTINAL: No abdominal pain, no nausea, no vomiting, no hematemesis, no diarrhea, no constipation, no melena, no hematochezia.  GENITOURINARY: No dysuria, no hematuria  NEUROLOGICAL: No headaches, no loss of strength, no numbness  SKIN: No itching, no rashes, no lesions   MUSCULOSKELETAL: No joint pain, no joint swelling; No muscle pain  HEME/LYMPH: No easy bruising, bleeding REVIEW OF SYSTEMS  CONSTITUTIONAL: No fever, + chills  EYES: No eye pain, no vision changes  ENMT:  No difficulty hearing, no throat pain  RESPIRATORY: + cough, no wheezing, + occasional shortness of breath  CARDIOVASCULAR: No chest pain, no palpitations  GASTROINTESTINAL: No abdominal pain, no nausea, + vomiting  GENITOURINARY: No dysuria, no hematuria  NEUROLOGICAL: No headaches, + generalized weakness  SKIN: No itching, no rashes, no lesions   MUSCULOSKELETAL: No joint pain, no joint swelling; + right upper extremity pain  HEME/LYMPH: No easy bruising, bleeding

## 2021-05-12 NOTE — ED ADULT NURSE NOTE - OBJECTIVE STATEMENT
60 year old male patient BIBA for intox and fall. EMS reports witnessed fall upon arrival and patietn was ambulatory after fall. Patient reports alcohol use today, but "stopped because the police came." Patient unable to say timing in which he drank. Patient reports R sided body pain but able to move extremities without difficulty. Patient has abrasion to L side of face with no active bleeding noted, and abrasions to b/l knees. Patient denies HA, CP, abd pain.

## 2021-05-12 NOTE — ED ADULT NURSE REASSESSMENT NOTE - NS ED NURSE REASSESS COMMENT FT1
MD requested pt road test. Pt unable to ambulate without assistance. D/C cancelled. MD requested pt road test. Pt unable to ambulate without assistance. D/C cancelled. Pending xray of spine.

## 2021-05-12 NOTE — ED PROVIDER NOTE - SKIN, MLM
+superficial abrasion noted to L maxillary region. Otherwise skin normal color for race, warm, dry and intact. No evidence of rash.

## 2021-05-12 NOTE — CONSULT NOTE ADULT - SUBJECTIVE AND OBJECTIVE BOX
Patient is a 60y Male who presents c/o LBP. Admitted for alcohol withdrawal. Denies HS/LOC. Denies pain/injury elsewhere. Denies numbness/tingling/paresthesias/weakness. Denies bowel/bladder incontinence. Denies fevers/chills. No other complaints at this time.    HEALTH ISSUES - PROBLEM Dx:          MEDICATIONS  (STANDING):      Allergies    No Known Allergies    Intolerances        PAST MEDICAL & SURGICAL HISTORY:  Alcohol Abuse    ETOH abuse    Macrocytic anemia    Alcohol-induced chronic pancreatitis    History of left knee surgery          Vital Signs Last 24 Hrs  T(C): 36.7 (05-12-21 @ 21:42), Max: 37.2 (05-12-21 @ 13:53)  T(F): 98.1 (05-12-21 @ 21:42), Max: 98.9 (05-12-21 @ 13:53)  HR: 78 (05-12-21 @ 21:42) (74 - 101)  BP: 146/76 (05-12-21 @ 21:42) (114/61 - 146/76)  BP(mean): --  RR: 20 (05-12-21 @ 21:42) (16 - 20)  SpO2: 97% (05-12-21 @ 21:42) (95% - 99%)    Gen: NAD  Spine PE:  Skin intact  No gross deformity  No midline TTP C/T/L/S spine  No bony step offs  No paraspinal muscle ttp/hypertonicity   Negative clonus  Negative babinski  Negative beltran  No saddle anesthesia    Motor:                   C5                C6              C7               C8           T1   R            5/5                5/5            5/5             5/5          5/5  L             5/5               5/5             5/5             5/5          5/5                L2             L3             L4               L5            S1  R         5/5           5/5          5/5             5/5           5/5  L          5/5          5/5           5/5             5/5           5/5    Sensory:            C5         C6         C7      C8       T1        (0=absent, 1=impaired, 2=normal, NT=not testable)  R         2            2           2        2         2  L          2            2           2        2         2               L2          L3         L4      L5       S1         (0=absent, 1=impaired, 2=normal, NT=not testable)  R         2            2            2        2        2  L          2            2           2        2         2                          10.6   5.58  )-----------( 269      ( 12 May 2021 16:19 )             33.1     12 May 2021 16:19    147    |  107    |  19     ----------------------------<  131    3.3     |  19     |  1.06     Ca    9.2        12 May 2021 16:19    TPro  7.1    /  Alb  4.2    /  TBili  0.9    /  DBili  x      /  AST  166    /  ALT  71     /  AlkPhos  75     12 May 2021 16:19    PT/INR - ( 12 May 2021 16:19 )   PT: 10.7 sec;   INR: 0.89 ratio         PTT - ( 12 May 2021 16:19 )  PTT:31.0 sec      Imaging:   CT LSP: Showing L1 TP Fx, AI L2 TP Fx    A/P: 60y Male with L1, L2 TP Fxs    No surgical intervention for TP Fxs, stable injury  Pain control  WBAT with assistive devices as needed  FU Labs/imaging  DVT PPx  -No acute orthopaedic surgical intervention at this time, please re-consult as needed  -Ortho Stable for DC  -Case discussed with attending who agrees with above plan

## 2021-05-12 NOTE — H&P ADULT - PROBLEM SELECTOR PLAN 7
VTE ppx:  Activity: bedrest, fall precaution  Diet: regular Pt was found to have a 3mm solid lung nodule.  Pt will need follow up imaging. Pt was found to have a 3mm solid lung nodule.  Pt will need close follow up with PMD after discharge to arrange for repeat imaging given the patient's smoking history.

## 2021-05-12 NOTE — H&P ADULT - PROBLEM SELECTOR PLAN 3
AST and ALT are elevated above 2:1 ratio which reflects alcohol use.  Trend liver enzymes. Start keflex PO for left facial erythema and swelling which appears to be developing cellulitis vs trauma from fall.  Monitor for improvement.

## 2021-05-12 NOTE — ED PROVIDER NOTE - LEFT FACE
L sided facial swelling noted over maxillary region with overlying superficial abrasions. No open laceration./SWELLING

## 2021-05-12 NOTE — H&P ADULT - PROBLEM SELECTOR PLAN 2
The patient was identified to have a transverse process fracture of the L1 vertebra, healing transverse process fracture of L2 vertebra and healing fractures of right 9th and 12th ribs.   Obtain TLSO brace in AM for use with ambulation.  Start tylenol PRN for pain control The patient was identified to have a transverse process fracture of the L1 vertebra, healing transverse process fracture of L2 vertebra and healing fractures of right 9th and 12th ribs.   Obtain TLSO brace in AM for use with ambulation.  Start tylenol PRN for pain control    Obtain xray of left forearm to assess for injury. The patient was identified to have a transverse process fracture of the L1 vertebra, healing transverse process fracture of L2 vertebra and healing fractures of right 9th and 12th ribs.   Obtain TLSO brace in AM for use with ambulation.  Start tylenol PRN for pain control.  Per surgical team, there is no plan for acute surgical intervention at this time.     Pt complains of R arm pain after fall. Obtain xray of right forearm to assess for injury.

## 2021-05-12 NOTE — ED PROVIDER NOTE - NSFOLLOWUPINSTRUCTIONS_ED_ALL_ED_FT
You were seen for alcohol intoxication.     Seek immediate medical assistance for any new or worsening symptoms.     Follow up with an orthopedic doctor for a broken bone in your lower back and you left nose.     Please take 600 mg of Ibuprofen (aka Motrin, Advil) and/or 650 mg Acetaminophen (aka Tylenol) every 6 hours, as needed, for mild-moderate pain.

## 2021-05-12 NOTE — H&P ADULT - NSHPPHYSICALEXAM_GEN_ALL_CORE
T(C): 36.7 (05-12-21 @ 21:42), Max: 37.2 (05-12-21 @ 13:53)  HR: 78 (05-12-21 @ 21:42) (74 - 101)  BP: 146/76 (05-12-21 @ 21:42) (114/61 - 146/76)  RR: 20 (05-12-21 @ 21:42) (16 - 20)  SpO2: 97% (05-12-21 @ 21:42) (95% - 99%)  Wt(kg): --    PHYSICAL EXAM:  GENERAL: disheveled, NAD, lying in stretcher  HEAD:  + Left facial laceration with erythema and tenderness to palpation  EYES: EOMI, PERRLA, conjunctiva and sclera clear  ENMT: No oropharyngeal exudates, Moist mucous membranes  NECK: Supple, no cervical lymphadenopathy  NERVOUS SYSTEM:  Alert & Oriented X name, location and birthday, CN II-XII intact, 4/5 BUE, 5/5 BLE motor strength, follows commands if repeated, full sensation to light touch   no visible tremor  CHEST/LUNG: coarse breath sounds at R lung field, no wheezing  HEART: Regular rate and rhythm; No murmurs  ABDOMEN: Soft, Nontender, Nondistended  EXTREMITIES:  2+ radial Pulses, no cyanosis  MSK: + R forearm tenderness to palpation  + lumbar spine tenderness to palpation

## 2021-05-12 NOTE — ED ADULT NURSE REASSESSMENT NOTE - NS ED NURSE REASSESS COMMENT FT1
Pt resting in [place]. Pt A&O x 3, VSS. Pt given soda. Pt denies other needs at this time. Pending dispo.

## 2021-05-12 NOTE — H&P ADULT - PROBLEM SELECTOR PLAN 4
Pt is anemic. No history of recent overt bleeding. Anemia is likely related to alcohol abuse.   Will check iron, folate, B12 levels. AST and ALT are elevated above 2:1 ratio which reflects alcohol use.  Trend liver enzymes.

## 2021-05-12 NOTE — H&P ADULT - NSHPLABSRESULTS_GEN_ALL_CORE
Labs, imaging  personally reviewed by me.     CBC found anemia Hgb of 10.6.  Coags are WNL.  CMP found hypokalemia K 3.3. Anion gap elevated at 21.  AST elevated at 166, ALT elevated at 71.  Lactate was elevated at 4--> trended down to 1.8.   Blood alcohol level 229.      LABS:                        10.6   5.58  )-----------( 269      ( 12 May 2021 16:19 )             33.1     Hgb Trend: 10.6<--  05-12    147<H>  |  107  |  19  ----------------------------<  131<H>  3.3<L>   |  19<L>  |  1.06    Ca    9.2      12 May 2021 16:19    TPro  7.1  /  Alb  4.2  /  TBili  0.9  /  DBili  x   /  AST  166<H>  /  ALT  71<H>  /  AlkPhos  75  05-12    Creatinine Trend: 1.06<--, 0.87<--  PT/INR - ( 12 May 2021 16:19 )   PT: 10.7 sec;   INR: 0.89 ratio         PTT - ( 12 May 2021 16:19 )  PTT:31.0 sec      Venous Blood Gas:  05-12 @ 19:41  7.42/38/72/24/94  VBG Lactate: 1.8  Venous Blood Gas:  05-12 @ 16:19  7.38/41/62/24/87  VBG Lactate: 4.0

## 2021-05-12 NOTE — H&P ADULT - ASSESSMENT
This patient is a 59yo gentleman with PMH of EtOH abuse, chronic pancreatitis who presents to the ED after an observed fall, in the setting of alcohol use, found to have L nasal bone fracture, vertebral fracture of transverse process and left facial wound.   Pt also complains of cough and vomiting but has not vomited while in the ED. CT chest found no pulmonary consolidation, pleural effusions or pneumothorax.

## 2021-05-12 NOTE — H&P ADULT - PROBLEM SELECTOR PLAN 5
Pt was found to have a left nasal bone fracture. Apply ice.   Tylenol PRN for pain. Pt is anemic. No history of recent overt bleeding. Anemia is likely related to alcohol abuse.   Will check iron, folate, B12 levels.

## 2021-05-12 NOTE — H&P ADULT - NSHPSOCIALHISTORY_GEN_ALL_CORE
The patient The patient smokes 2 cigarettes daily.  He drinks a bottle of alcohol daily. His last drink was 3 days ago.  He is homeless.

## 2021-05-12 NOTE — H&P ADULT - HISTORY OF PRESENT ILLNESS
This patient is a 61yo gentleman with PMH of EtOH abuse, chronic pancreatitis who presents to the ED after an observed fall.  This patient is a 61yo gentleman with PMH of EtOH abuse, chronic pancreatitis who presents to the ED after an observed fall. The patient is undomiciled. He reports that he fell while walking around in [place] earlier today. He states that he has felt very weak and fell onto his back, but did not lose consciousness and did not hit his head. He endorses having chills, cough and vomiting. He complains of RUE pain. He also endorses dizziness and occasional dyspnea. The patient's speech is slow and halted. He endorses that he drinks a bottle of alcohol a day, but cannot quantify the amount or state the type of alcohol he consumes. His last drink was 3 days ago.  He has tremors and has been hospitalized due to alcohol withdrawal in the past, but denies history of ICU stay or withdrawal seizures.   The patient has not received COVID19 vaccine.

## 2021-05-12 NOTE — ED PROVIDER NOTE - EXTREMITY EXAM
No obvious deformity of extremities. No ecchymosis or bruising noted. No bony joint or muscular ttp. Full AROM of all extremities with 5/5 strength./no deformity, pain or tenderness, no restriction of movement

## 2021-05-12 NOTE — H&P ADULT - PROBLEM SELECTOR PLAN 1
The patient has had multiple ED visits related to injury from alcohol intoxication.   His blood alcohol level is currently elevated at 229. He is at high risk of withdrawal.   Will start ativan taper.   The patient is undomiciled. Will consult SW in AM.   Start folate, thiamine and multivitamin.   Keep on fall precautions, bedrest for now. The patient has had multiple ED visits related to injury from alcohol intoxication.   His blood alcohol level is currently elevated at 229. He is at high risk of withdrawal.   Will start librium taper.   The patient is undomiciled. Will consult SW in AM.   Start folate, thiamine and multivitamin.   Keep on fall precautions, bedrest for now.    He also endorse vague symptoms of generalized weakness which may be related to alcohol use. Continue to monitor. CT imaging identified fractures as noted below. Check EKG.

## 2021-05-12 NOTE — ED PROVIDER NOTE - OBJECTIVE STATEMENT
59 yo male PMhx ETOH abuse BIBEMS for intox. EMS was called for public intox, upon arrival they witnessed fall and was ambulatory afterwards. pt unable to quantify how much he drank. Currently c/o R sided body pain. No other complaints, denies cp, sob, abd pain, n/v/d, weakness, difficulty walking, headache.

## 2021-05-12 NOTE — ED PROVIDER NOTE - ATTENDING CONTRIBUTION TO CARE
Attending MD Barnett:   I personally have seen and examined this patient.  Physician assistant note reviewed and agree on plan of care and except where noted.  See below for details.     Seen in [place], interviewed in [place]    60M with PMH/PSH including EtOH abuse brought in by EMS reports R rib pain.  Reports fell onto his R side.  Reports has pain at the R side of chest, worse with movement.  Reports also scraped his L face.  Patient reports drinking EtOH earlier.  Patient does not recall more information about his fall.  Denies shortness of breath, abdominal pain, vomiting, diarrhea, cough, fever, recent COVID, headache, vision changes.      Exam:   General: NAD, poor hygiene, EtOH on breath, following commands  HENT: head with abrasion over L zygoma with dried blood, airway patent with poor dentition visible  Eyes: PERRL  Lungs: lungs CTAB with good inspiratory effort, no wheezing, no rhonchi, no rales, +tenderness to R side of chest, no overlying crepitus   Cardiac: +S1S2, tachy, no m/r/g  GI: abdomen soft with +BS, NT, ND+  : no CVAT  MSK: FROM at neck, no tenderness to midline palpation, no stepoffs along length of spine], +paralumbar tenderness to palpation  Neuro: moving all extremities spontaneously, sensory grossly intact, no gross neuro deficits  Skin: as above    A/P: 60M with EtOH and R sided chest tenderness and L face abrasion, given intox will obtain CT H, CT Max Face, CT chest, CT AP, will obtain trauma labs, give IVFs, reassess

## 2021-05-12 NOTE — ED ADULT NURSE REASSESSMENT NOTE - NS ED NURSE REASSESS COMMENT FT1
Patient undressed and belongings placed behind bed. Red socks placed on patient. Patient moved to hallway spot in front of nurses station. Patient given urinal.  Patient advised not to get up and fall risk precautions and told to call RN if he needs anything.

## 2021-05-13 DIAGNOSIS — L03.90 CELLULITIS, UNSPECIFIED: ICD-10-CM

## 2021-05-13 DIAGNOSIS — M79.603 PAIN IN ARM, UNSPECIFIED: ICD-10-CM

## 2021-05-13 DIAGNOSIS — S02.2XXA FRACTURE OF NASAL BONES, INITIAL ENCOUNTER FOR CLOSED FRACTURE: ICD-10-CM

## 2021-05-13 LAB
ALBUMIN SERPL ELPH-MCNC: 4.2 G/DL — SIGNIFICANT CHANGE UP (ref 3.3–5)
ALP SERPL-CCNC: 79 U/L — SIGNIFICANT CHANGE UP (ref 40–120)
ALT FLD-CCNC: 64 U/L — HIGH (ref 10–45)
ANION GAP SERPL CALC-SCNC: 17 MMOL/L — SIGNIFICANT CHANGE UP (ref 5–17)
AST SERPL-CCNC: 114 U/L — HIGH (ref 10–40)
BASOPHILS # BLD AUTO: 0.05 K/UL — SIGNIFICANT CHANGE UP (ref 0–0.2)
BASOPHILS NFR BLD AUTO: 0.9 % — SIGNIFICANT CHANGE UP (ref 0–2)
BILIRUB SERPL-MCNC: 1.9 MG/DL — HIGH (ref 0.2–1.2)
BUN SERPL-MCNC: 15 MG/DL — SIGNIFICANT CHANGE UP (ref 7–23)
CALCIUM SERPL-MCNC: 9.1 MG/DL — SIGNIFICANT CHANGE UP (ref 8.4–10.5)
CHLORIDE SERPL-SCNC: 104 MMOL/L — SIGNIFICANT CHANGE UP (ref 96–108)
CO2 SERPL-SCNC: 19 MMOL/L — LOW (ref 22–31)
CREAT SERPL-MCNC: 0.81 MG/DL — SIGNIFICANT CHANGE UP (ref 0.5–1.3)
EOSINOPHIL # BLD AUTO: 0.17 K/UL — SIGNIFICANT CHANGE UP (ref 0–0.5)
EOSINOPHIL NFR BLD AUTO: 3.2 % — SIGNIFICANT CHANGE UP (ref 0–6)
FOLATE SERPL-MCNC: 9.8 NG/ML — SIGNIFICANT CHANGE UP
GLUCOSE SERPL-MCNC: 97 MG/DL — SIGNIFICANT CHANGE UP (ref 70–99)
HCT VFR BLD CALC: 36.3 % — LOW (ref 39–50)
HGB BLD-MCNC: 12 G/DL — LOW (ref 13–17)
IMM GRANULOCYTES NFR BLD AUTO: 0.4 % — SIGNIFICANT CHANGE UP (ref 0–1.5)
LYMPHOCYTES # BLD AUTO: 1.59 K/UL — SIGNIFICANT CHANGE UP (ref 1–3.3)
LYMPHOCYTES # BLD AUTO: 29.8 % — SIGNIFICANT CHANGE UP (ref 13–44)
MCHC RBC-ENTMCNC: 32.7 PG — SIGNIFICANT CHANGE UP (ref 27–34)
MCHC RBC-ENTMCNC: 33.1 GM/DL — SIGNIFICANT CHANGE UP (ref 32–36)
MCV RBC AUTO: 98.9 FL — SIGNIFICANT CHANGE UP (ref 80–100)
MONOCYTES # BLD AUTO: 0.65 K/UL — SIGNIFICANT CHANGE UP (ref 0–0.9)
MONOCYTES NFR BLD AUTO: 12.2 % — SIGNIFICANT CHANGE UP (ref 2–14)
NEUTROPHILS # BLD AUTO: 2.86 K/UL — SIGNIFICANT CHANGE UP (ref 1.8–7.4)
NEUTROPHILS NFR BLD AUTO: 53.5 % — SIGNIFICANT CHANGE UP (ref 43–77)
NRBC # BLD: 0 /100 WBCS — SIGNIFICANT CHANGE UP (ref 0–0)
PLATELET # BLD AUTO: 267 K/UL — SIGNIFICANT CHANGE UP (ref 150–400)
POTASSIUM SERPL-MCNC: 3.9 MMOL/L — SIGNIFICANT CHANGE UP (ref 3.5–5.3)
POTASSIUM SERPL-SCNC: 3.9 MMOL/L — SIGNIFICANT CHANGE UP (ref 3.5–5.3)
PROT SERPL-MCNC: 7.4 G/DL — SIGNIFICANT CHANGE UP (ref 6–8.3)
RBC # BLD: 3.67 M/UL — LOW (ref 4.2–5.8)
RBC # FLD: 13.5 % — SIGNIFICANT CHANGE UP (ref 10.3–14.5)
SARS-COV-2 RNA SPEC QL NAA+PROBE: SIGNIFICANT CHANGE UP
SODIUM SERPL-SCNC: 140 MMOL/L — SIGNIFICANT CHANGE UP (ref 135–145)
TSH SERPL-MCNC: 5.08 UIU/ML — HIGH (ref 0.27–4.2)
VIT B12 SERPL-MCNC: 398 PG/ML — SIGNIFICANT CHANGE UP (ref 232–1245)
WBC # BLD: 5.34 K/UL — SIGNIFICANT CHANGE UP (ref 3.8–10.5)
WBC # FLD AUTO: 5.34 K/UL — SIGNIFICANT CHANGE UP (ref 3.8–10.5)

## 2021-05-13 PROCEDURE — 99233 SBSQ HOSP IP/OBS HIGH 50: CPT

## 2021-05-13 PROCEDURE — 73090 X-RAY EXAM OF FOREARM: CPT | Mod: 26,LT

## 2021-05-13 PROCEDURE — 72131 CT LUMBAR SPINE W/O DYE: CPT | Mod: 26

## 2021-05-13 PROCEDURE — 99222 1ST HOSP IP/OBS MODERATE 55: CPT

## 2021-05-13 RX ORDER — CEPHALEXIN 500 MG
500 CAPSULE ORAL
Refills: 0 | Status: DISCONTINUED | OUTPATIENT
Start: 2021-05-13 | End: 2021-05-14

## 2021-05-13 RX ORDER — THIAMINE MONONITRATE (VIT B1) 100 MG
100 TABLET ORAL DAILY
Refills: 0 | Status: DISCONTINUED | OUTPATIENT
Start: 2021-05-13 | End: 2021-05-14

## 2021-05-13 RX ORDER — ENOXAPARIN SODIUM 100 MG/ML
40 INJECTION SUBCUTANEOUS DAILY
Refills: 0 | Status: DISCONTINUED | OUTPATIENT
Start: 2021-05-14 | End: 2021-05-14

## 2021-05-13 RX ORDER — ACETAMINOPHEN 500 MG
650 TABLET ORAL EVERY 6 HOURS
Refills: 0 | Status: DISCONTINUED | OUTPATIENT
Start: 2021-05-13 | End: 2021-05-14

## 2021-05-13 RX ORDER — MUPIROCIN 20 MG/G
1 OINTMENT TOPICAL
Refills: 0 | Status: DISCONTINUED | OUTPATIENT
Start: 2021-05-13 | End: 2021-05-14

## 2021-05-13 RX ADMIN — Medication 500 MILLIGRAM(S): at 17:16

## 2021-05-13 RX ADMIN — MUPIROCIN 1 APPLICATION(S): 20 OINTMENT TOPICAL at 18:21

## 2021-05-13 RX ADMIN — Medication 500 MILLIGRAM(S): at 23:05

## 2021-05-13 RX ADMIN — Medication 500 MILLIGRAM(S): at 12:50

## 2021-05-13 RX ADMIN — Medication 500 MILLIGRAM(S): at 06:17

## 2021-05-13 RX ADMIN — Medication 50 MILLIGRAM(S): at 11:54

## 2021-05-13 RX ADMIN — Medication 50 MILLIGRAM(S): at 17:16

## 2021-05-13 RX ADMIN — Medication 1 MILLIGRAM(S): at 12:50

## 2021-05-13 RX ADMIN — Medication 50 MILLIGRAM(S): at 22:36

## 2021-05-13 RX ADMIN — Medication 50 MILLIGRAM(S): at 06:16

## 2021-05-13 RX ADMIN — Medication 100 MILLIGRAM(S): at 12:50

## 2021-05-13 RX ADMIN — Medication 1 TABLET(S): at 12:50

## 2021-05-13 NOTE — CONSULT NOTE ADULT - ASSESSMENT
59 yo male hx alcohol abuse w frequent falls here s/p fall. CT max face reads L nasal bone fx. No facial trauma (bruising, tenderness, swelling) on exam. Fracture likely old. No septal hematoma on exam.     Additionally, 4 (old) sutures just superior to R eye brow removed. Skin well- healed

## 2021-05-13 NOTE — PHYSICAL THERAPY INITIAL EVALUATION ADULT - GENERAL OBSERVATIONS, REHAB EVAL
Pt rec'd semi-supine in bed in NAD, VSS, +IVL, +[place] speaking  used, agreeable to PT

## 2021-05-13 NOTE — PHYSICAL THERAPY INITIAL EVALUATION ADULT - PRECAUTIONS/LIMITATIONS, REHAB EVAL
C/o of RUE pain. endorses that he drinks a bottle of alcohol a day, but cannot quantify the amount or state the type of alcohol he consumes. X-ray Pelvis 5/12: (-) X-ray Lumbar spine 5/12: Chronic moderate to severe L1 superior endplate compression deformity. Healing right 12th rib fracture. Transverse process fractures are suggested although better seen on the recent CT. L5-S1 degenerative disc disease. CXR 5/12:  Displaced right ninth rib fracture CT Maxillofacial 5/12: Left periorbital soft tissue swelling. Left nasal bone fracture. CT Chest 5/12: Acute right transverse process fracture of the L1 vertebra. Healing nondisplaced fractures of the posterolateral right 9th rib and posterior right 12th rib. No pneumothorax. Healing right transverse process fracture of L2. Multiple additional old fractures, as described above. 3 mm solid lung nodule in the right middle lobe, as above.  X-ray R forearm 5/13: (-) fxs  CT Lumbar spine 5/13: Acute fracture of the right L1 transverse process with slight anterior displacement of distal fracture fragment. Old fractures as described above. Redemonstration of chronic compression fracture/deformity of L1 with progressive vertebral body height loss to greater than 50% when compared to prior study 6/14/2019. Spondylosis as described above./fall precautions

## 2021-05-13 NOTE — PHYSICAL THERAPY INITIAL EVALUATION ADULT - ADDITIONAL COMMENTS
Pt reports he [place] "on the streets", no one to assist upon D/C. Was ambulating (I) without an AD and was (I) with all ADLS PTA

## 2021-05-13 NOTE — CONSULT NOTE ADULT - SUBJECTIVE AND OBJECTIVE BOX
CC: nasal bone fx    HPI: 60yom with PMH of EtOH abuse, chronic pancreatitisadmitted after an observed fall. The patient is undomiciled. He reports that he fell while walking around in [place]. He states that he has felt very weak and fell onto his back, but did not lose consciousness and did not hit his head. He complains of RUE pain. He also endorses dizziness and occasional dyspnea. ENT consulted afte CT shows concern for L nasal bone fx (full report below). Pt denies nasal pain. Keeps pointing to R face (where zygomatic abrasion is)        PAST MEDICAL & SURGICAL HISTORY:  Alcohol Abuse    ETOH abuse    Macrocytic anemia    Alcohol-induced chronic pancreatitis    History of left knee surgery      Allergies    No Known Allergies    Intolerances      MEDICATIONS  (STANDING):  cephalexin 500 milliGRAM(s) Oral four times a day  chlordiazePOXIDE   Oral   chlordiazePOXIDE 50 milliGRAM(s) Oral every 6 hours  folic acid 1 milliGRAM(s) Oral daily  multivitamin 1 Tablet(s) Oral daily  thiamine Injectable 100 milliGRAM(s) IV Push daily    MEDICATIONS  (PRN):  acetaminophen   Tablet .. 650 milliGRAM(s) Oral every 6 hours PRN Temp greater or equal to 38C (100.4F), Mild Pain (1 - 3), Moderate Pain (4 - 6)      Social History: The patient smokes 2 cigarettes daily.  He drinks a bottle of alcohol daily. His last drink was 3 days ago.  He is homeless.    Family history: No significant medical hx in first degree relatives      ROS:   ENT: all negative except as noted in HPI   Skin: No itching, dryness, rash, changes to hair, or skin masses  CV: denies palpitations  Pulm: denies SOB, cough, hemoptysis  GI: denies change in appetite, indigestion, n/v  : denies pertinent urinary symptoms, urgency  Neuro: denies numbness/tingling, loss of sensation  Psych: denies anxiety  MS: denies muscle weakness, instability  Heme: denies easy bruising or bleeding  Endo: denies heat/cold intolerance, excessive sweating  Vascular: denies LE edema    Vital Signs Last 24 Hrs  T(C): 37.1 (13 May 2021 13:34), Max: 37.2 (13 May 2021 11:43)  T(F): 98.8 (13 May 2021 13:34), Max: 99 (13 May 2021 11:43)  HR: 80 (13 May 2021 13:34) (70 - 89)  BP: 148/79 (13 May 2021 13:34) (121/69 - 158/87)  BP(mean): --  RR: 18 (13 May 2021 13:34) (16 - 20)  SpO2: 98% (13 May 2021 13:34) (94% - 98%)                          12.0   5.34  )-----------( 267      ( 13 May 2021 06:35 )             36.3    05-13    140  |  104  |  15  ----------------------------<  97  3.9   |  19<L>  |  0.81    Ca    9.1      13 May 2021 06:35    TPro  7.4  /  Alb  4.2  /  TBili  1.9<H>  /  DBili  x   /  AST  114<H>  /  ALT  64<H>  /  AlkPhos  79  05-13   PT/INR - ( 12 May 2021 16:19 )   PT: 10.7 sec;   INR: 0.89 ratio         PTT - ( 12 May 2021 16:19 )  PTT:31.0 sec    PHYSICAL EXAM:  Gen: NAD  Skin: No rashes, bruises, or lesions  Head: Normocephalic, Atraumatic  Face: no edema, erythema, or fluctuance. Parotid glands soft without mass, 4 (old) sutures over eyebrow removed, skin well-healed, abrasion to R zygomatic arch  Eyes: no scleral injection  Nose: Nares bilaterally patent, no discharge, no bruising or swelling. no septal hematoma  Mouth: No Stridor / Drooling / Trismus.  Mucosa moist, tongue/uvula midline, oropharynx clear  Neck: Flat, supple, no lymphadenopathy, trachea midline, no masses  Lymphatic: No lymphadenopathy  Resp: breathing easily, no stridor  CV: no peripheral edema/cyanosis  GI: nondistended   Peripheral vascular: no JVD or edema  Neuro: facial nerve intact, no facial droop    < from: CT Maxillofacial No Cont (05.12.21 @ 18:51) >  EXAM:  CT MAXILLOFACIAL                          EXAM:  CT 3D RECONSTRUCT [facility]3]                          EXAM:  CT CERVICAL SPINE                            PROCEDURE DATE:  05/12/2021            INTERPRETATION:  CT CERVICAL SPINE, CT 3D RECONSTRUCTION W WORKSTATION, CT MAXILLOFACIAL    INDICATIONS: fall, facial trauma, r/o cervical injury, No additional history was provided.    MAXILLOFACIAL CT:    TECHNIQUE: This examination consists of axial 1.25 mm sections from the frontal sinuses through the mandible.  The study was supplemented with coronal and sagittal reformatted images.  Dedicated 3-D images were made using dedicated software and viewed on a dedicated 3-D workstation in multiple planes.    Intravenous contrast was not administered.    FINDINGS:    Left periorbital soft tissue swelling.    Left nasal bone fracture.    The orbits are intact. Zygomatic arch is intact.    Mild focal mucoperiosteal thickening in the left maxillary sinus. Mild scattered inflammation in the ethmoid air cells.    The nasopharynx is normal in appearance.    The parotid glands are normal and symmetric in appearance.    The  space is normal bilaterally.    Parapharyngeal space is normal bilaterally.    The tongue base is normal in appearance.    The epiglottis is normal in thickness and appearance.    [place] tonsils are normal in appearance.    The submandibular glands are normal and symmetric in appearance bilaterally.    The platysma is normal in thickness.    Sternocleidomastoid muscles are normal and symmetric in appearance bilaterally where visualized.    Anterior strap muscles are unremarkable where visualized.    Carotid space is normal bilaterally where visualized.    No suspicious adenopathy.     CC: nasal bone fx    HPI: 60yom with PMH of EtOH abuse, chronic pancreatitis admitted after an observed fall. The patient is undomiciled. He reports that he fell while walking around in [place]. He states that he has felt very weak and fell onto his back, but did not lose consciousness and did not hit his head. He complains of RUE pain. He also endorses dizziness and occasional dyspnea. ENT consulted afte CT shows concern for L nasal bone fx (full report below). Pt denies nasal pain. Keeps pointing to L cheek  (where zygomatic abrasion is) as only site of pain.        PAST MEDICAL & SURGICAL HISTORY:  Alcohol Abuse    ETOH abuse    Macrocytic anemia    Alcohol-induced chronic pancreatitis    History of left knee surgery      Allergies    No Known Allergies    Intolerances      MEDICATIONS  (STANDING):  cephalexin 500 milliGRAM(s) Oral four times a day  chlordiazePOXIDE   Oral   chlordiazePOXIDE 50 milliGRAM(s) Oral every 6 hours  folic acid 1 milliGRAM(s) Oral daily  multivitamin 1 Tablet(s) Oral daily  thiamine Injectable 100 milliGRAM(s) IV Push daily    MEDICATIONS  (PRN):  acetaminophen   Tablet .. 650 milliGRAM(s) Oral every 6 hours PRN Temp greater or equal to 38C (100.4F), Mild Pain (1 - 3), Moderate Pain (4 - 6)      Social History: The patient smokes 2 cigarettes daily.  He drinks a bottle of alcohol daily. His last drink was 3 days ago.  He is homeless.    Family history: No significant medical hx in first degree relatives      ROS:   ENT: all negative except as noted in HPI   Skin: No itching, dryness, rash, changes to hair, or skin masses  CV: denies palpitations  Pulm: denies SOB, cough, hemoptysis  GI: denies change in appetite, indigestion, n/v  : denies pertinent urinary symptoms, urgency  Neuro: denies numbness/tingling, loss of sensation  Psych: denies anxiety  MS: denies muscle weakness, instability  Heme: denies easy bruising or bleeding  Endo: denies heat/cold intolerance, excessive sweating  Vascular: denies LE edema    Vital Signs Last 24 Hrs  T(C): 37.1 (13 May 2021 13:34), Max: 37.2 (13 May 2021 11:43)  T(F): 98.8 (13 May 2021 13:34), Max: 99 (13 May 2021 11:43)  HR: 80 (13 May 2021 13:34) (70 - 89)  BP: 148/79 (13 May 2021 13:34) (121/69 - 158/87)  BP(mean): --  RR: 18 (13 May 2021 13:34) (16 - 20)  SpO2: 98% (13 May 2021 13:34) (94% - 98%)                          12.0   5.34  )-----------( 267      ( 13 May 2021 06:35 )             36.3    05-13    140  |  104  |  15  ----------------------------<  97  3.9   |  19<L>  |  0.81    Ca    9.1      13 May 2021 06:35    TPro  7.4  /  Alb  4.2  /  TBili  1.9<H>  /  DBili  x   /  AST  114<H>  /  ALT  64<H>  /  AlkPhos  79  05-13   PT/INR - ( 12 May 2021 16:19 )   PT: 10.7 sec;   INR: 0.89 ratio         PTT - ( 12 May 2021 16:19 )  PTT:31.0 sec    PHYSICAL EXAM:  Gen: NAD  Skin: No rashes, bruises, or lesions  Head: Normocephalic, Atraumatic  Face: no edema, erythema, or fluctuance. Parotid glands soft without mass, 4 (old) sutures over eyebrow removed, skin well-healed, abrasion to L zygomatic arch with bone firm and no step-offs on palpation  Eyes: no scleral injection  Nose: Nares bilaterally patent, no discharge, no dorsal edema or ecchymosis, no mobility of nasal bones and no tenderness on firm palpation of left nasal bone, no septal hematoma  Mouth: No Stridor / Drooling / Trismus.  Mucosa moist, tongue/uvula midline, oropharynx clear  Neck: Flat, supple, no lymphadenopathy, trachea midline, no masses  Lymphatic: No lymphadenopathy  Resp: breathing easily, no stridor  CV: no peripheral edema/cyanosis  GI: nondistended   Peripheral vascular: no JVD or edema  Neuro: facial nerve intact, no facial droop    < from: CT Maxillofacial No Cont (05.12.21 @ 18:51) >  EXAM:  CT MAXILLOFACIAL                          EXAM:  CT 3D RECONSTRUCT W [facility]                          EXAM:  CT CERVICAL SPINE                            PROCEDURE DATE:  05/12/2021            INTERPRETATION:  CT CERVICAL SPINE, CT 3D RECONSTRUCTION W WORKSTATION, CT MAXILLOFACIAL    INDICATIONS: fall, facial trauma, r/o cervical injury, No additional history was provided.    MAXILLOFACIAL CT:    TECHNIQUE: This examination consists of axial 1.25 mm sections from the frontal sinuses through the mandible.  The study was supplemented with coronal and sagittal reformatted images.  Dedicated 3-D images were made using dedicated software and viewed on a dedicated 3-D workstation in multiple planes.    Intravenous contrast was not administered.    FINDINGS:    Left periorbital soft tissue swelling.    Left nasal bone fracture.    The orbits are intact. Zygomatic arch is intact.    Mild focal mucoperiosteal thickening in the left maxillary sinus. Mild scattered inflammation in the ethmoid air cells.    The nasopharynx is normal in appearance.    The parotid glands are normal and symmetric in appearance.    The  space is normal bilaterally.    Parapharyngeal space is normal bilaterally.    The tongue base is normal in appearance.    The epiglottis is normal in thickness and appearance.    [place] tonsils are normal in appearance.    The submandibular glands are normal and symmetric in appearance bilaterally.    The platysma is normal in thickness.    Sternocleidomastoid muscles are normal and symmetric in appearance bilaterally where visualized.    Anterior strap muscles are unremarkable where visualized.    Carotid space is normal bilaterally where visualized.    No suspicious adenopathy.

## 2021-05-13 NOTE — PATIENT PROFILE ADULT - CENTRAL VENOUS CATHETER/PICC LINE
Statement Selected Statement Selected Statement Selected Statement Selected Statement Selected Statement Selected Statement Selected Statement Selected Statement Selected Statement Selected Statement Selected no

## 2021-05-13 NOTE — PHYSICAL THERAPY INITIAL EVALUATION ADULT - PERTINENT HX OF CURRENT PROBLEM, REHAB EVAL
61 yo gentleman with PMH of EtOH abuse, chronic pancreatitis who presents after an observed fall. The patient is undomiciled. He reports that he fell while walking around in [place] earlier today. He states that he has felt very weak and fell onto his back, but did not lose consciousness and did not hit his head.

## 2021-05-13 NOTE — PHYSICAL THERAPY INITIAL EVALUATION ADULT - TRANSFER SAFETY CONCERNS NOTED: SIT/STAND, REHAB EVAL
decreased balance during turns/decreased safety awareness/decreased sequencing ability/decreased step length/decreased weight-shifting ability

## 2021-05-13 NOTE — CONSULT NOTE ADULT - ATTENDING COMMENTS
nasal fracture noted on CT however clinically no evidence of fracture (no edema/ecchymosis/tenderness or mobility) so suspect this is an old fracture. left zygomatic arch with overlying abrasion, no fractures clinically or on CT scan. Recommend antibiotic ointment to abrasion BID x 7 days. Also noted right forehead sutures in place over a healed laceration. These were removed. Patient can f/u with ENT PRN.

## 2021-05-13 NOTE — PHYSICAL THERAPY INITIAL EVALUATION ADULT - GAIT DEVIATIONS NOTED, PT EVAL
decreased kari/increased time in double stance/decreased step length/decreased stride length/decreased weight-shifting ability

## 2021-05-14 ENCOUNTER — TRANSCRIPTION ENCOUNTER (OUTPATIENT)
Age: 61
End: 2021-05-14

## 2021-05-14 VITALS
HEART RATE: 89 BPM | SYSTOLIC BLOOD PRESSURE: 153 MMHG | RESPIRATION RATE: 18 BRPM | TEMPERATURE: 98 F | OXYGEN SATURATION: 99 % | DIASTOLIC BLOOD PRESSURE: 87 MMHG

## 2021-05-14 DIAGNOSIS — R91.8 OTHER NONSPECIFIC ABNORMAL FINDING OF LUNG FIELD: ICD-10-CM

## 2021-05-14 LAB
ANION GAP SERPL CALC-SCNC: 17 MMOL/L — SIGNIFICANT CHANGE UP (ref 5–17)
BUN SERPL-MCNC: 17 MG/DL — SIGNIFICANT CHANGE UP (ref 7–23)
CALCIUM SERPL-MCNC: 9.4 MG/DL — SIGNIFICANT CHANGE UP (ref 8.4–10.5)
CHLORIDE SERPL-SCNC: 100 MMOL/L — SIGNIFICANT CHANGE UP (ref 96–108)
CO2 SERPL-SCNC: 20 MMOL/L — LOW (ref 22–31)
COVID-19 SPIKE DOMAIN AB INTERP: POSITIVE
COVID-19 SPIKE DOMAIN ANTIBODY RESULT: 140 U/ML — HIGH
CREAT SERPL-MCNC: 0.83 MG/DL — SIGNIFICANT CHANGE UP (ref 0.5–1.3)
GLUCOSE SERPL-MCNC: 97 MG/DL — SIGNIFICANT CHANGE UP (ref 70–99)
POTASSIUM SERPL-MCNC: 4.2 MMOL/L — SIGNIFICANT CHANGE UP (ref 3.5–5.3)
POTASSIUM SERPL-SCNC: 4.2 MMOL/L — SIGNIFICANT CHANGE UP (ref 3.5–5.3)
SARS-COV-2 IGG+IGM SERPL QL IA: 140 U/ML — HIGH
SARS-COV-2 IGG+IGM SERPL QL IA: POSITIVE
SODIUM SERPL-SCNC: 137 MMOL/L — SIGNIFICANT CHANGE UP (ref 135–145)

## 2021-05-14 PROCEDURE — 99239 HOSP IP/OBS DSCHRG MGMT >30: CPT

## 2021-05-14 RX ORDER — MUPIROCIN 20 MG/G
1 OINTMENT TOPICAL
Qty: 0 | Refills: 0 | DISCHARGE
Start: 2021-05-14

## 2021-05-14 RX ADMIN — Medication 50 MILLIGRAM(S): at 08:44

## 2021-05-14 RX ADMIN — Medication 500 MILLIGRAM(S): at 05:53

## 2021-05-14 RX ADMIN — MUPIROCIN 1 APPLICATION(S): 20 OINTMENT TOPICAL at 06:25

## 2021-05-14 NOTE — PROGRESS NOTE ADULT - PROBLEM SELECTOR PLAN 4
tylenol prn pain  xray of R forearm negative for fracture
tylenol prn pain  xray of R forearm pending r/o fracture

## 2021-05-14 NOTE — PROGRESS NOTE ADULT - PROBLEM SELECTOR PLAN 6
anemic. No history of recent overt bleeding. Anemia is likely related to alcohol abuse.   f/u iron, folate, B12 levels.  no n/v   outpatient anemia workup
anemic. No history of recent overt bleeding. Anemia is likely related to alcohol abuse.   f/u iron, folate, B12 levels.  no n/v   outpatient anemia workup

## 2021-05-14 NOTE — PROGRESS NOTE ADULT - PROBLEM SELECTOR PLAN 7
Improving: AST and ALT are elevated above 2:1 ratio which reflects alcohol use.  lipase negative  trend lfts
Improving: AST and ALT are elevated above 2:1 ratio which reflects alcohol use.  lipase negative  trend lfts

## 2021-05-14 NOTE — PROGRESS NOTE ADULT - PROBLEM SELECTOR PLAN 5
left nasal bone fracture on Ct  CT: per ENt, likely chronic fracture, can use topical antibiotic ointment to abrasion BID x 7 days.  Tylenol PRN for pain.
left nasal bone fracture.   consult ent or plastic pending   Tylenol PRN for pain.

## 2021-05-14 NOTE — PROGRESS NOTE ADULT - PROBLEM SELECTOR PLAN 9
Diet: regular  PT rec RICHARD but pt refusing, also no insurance and undocumented so does not want to qualify  Patient expressed wishes to sign out AMA and all risk were explained with  as above with SW, PCA, NP. Patient with full capacity to make decisions. Patient signed out AMA  spent 60 min on d/c/AMA process  discussed with [facility] NP Lazara
Diet: regular  PT eval  SW eval

## 2021-05-14 NOTE — PROGRESS NOTE ADULT - PROBLEM SELECTOR PLAN 3
no sign of cellulitis on face, likely due to trauma,   afebrile, no leukocytosis  d.c keflex
c/w keflex PO for left facial erythema and swelling, less likely cellulitis more likely to trauma and fracture  monitor  afebrile, no leukocytosis

## 2021-05-14 NOTE — PROGRESS NOTE ADULT - PROBLEM SELECTOR PLAN 8
CT with 3mm solid lung nodule.  Pt will need close follow up with PMD after discharge to arrange for repeat imaging given the patient's smoking history, pt aware
CT with 3mm solid lung nodule.  Pt will need close follow up with PMD after discharge to arrange for repeat imaging given the patient's smoking history, pt aware

## 2021-05-14 NOTE — PROGRESS NOTE ADULT - PROBLEM SELECTOR PLAN 2
CT with transverse process fracture of the L1 vertebra, healing transverse process fracture of L2 vertebra and healing fractures of right 9th and 12th ribs.   -per ortho: no need for surgery or need for TLSO brace, outpatient f/u  -c/w  tylenol PRN for pain control.
CT with transverse process fracture of the L1 vertebra, healing transverse process fracture of L2 vertebra and healing fractures of right 9th and 12th ribs.   -per patient no pain  -PT rec RICHARD but patient refusing and also has no insurance, undocumented  -per ortho: no need for surgery or need for TLSO brace, outpatient f/u  -c/w  tylenol PRN for pain control

## 2021-05-14 NOTE — PROGRESS NOTE ADULT - SUBJECTIVE AND OBJECTIVE BOX
Patient is a 60y old  Male who presents with a chief complaint of fall (12 May 2021 22:50)      SUBJECTIVE / OVERNIGHT EVENTS: No ON events. Reports having some back pain and R arm pain. Denies dizziness, anxiety, n./v, abd pain, cp, sob      ADDITIONAL REVIEW OF SYSTEMS: Negative except for above    MEDICATIONS  (STANDING):  cephalexin 500 milliGRAM(s) Oral four times a day  chlordiazePOXIDE   Oral   chlordiazePOXIDE 50 milliGRAM(s) Oral every 6 hours  folic acid 1 milliGRAM(s) Oral daily  multivitamin 1 Tablet(s) Oral daily  thiamine Injectable 100 milliGRAM(s) IV Push daily    MEDICATIONS  (PRN):  acetaminophen   Tablet .. 650 milliGRAM(s) Oral every 6 hours PRN Temp greater or equal to 38C (100.4F), Mild Pain (1 - 3), Moderate Pain (4 - 6)      CAPILLARY BLOOD GLUCOSE        I&O's Summary    13 May 2021 07:01  -  13 May 2021 13:40  --------------------------------------------------------  IN: 0 mL / OUT: 200 mL / NET: -200 mL        PHYSICAL EXAM:  Vital Signs Last 24 Hrs  T(C): 37.1 (13 May 2021 13:34), Max: 37.2 (12 May 2021 13:53)  T(F): 98.8 (13 May 2021 13:34), Max: 99 (13 May 2021 11:43)  HR: 80 (13 May 2021 13:34) (70 - 101)  BP: 148/79 (13 May 2021 13:34) (114/61 - 158/87)  BP(mean): --  RR: 18 (13 May 2021 13:34) (16 - 20)  SpO2: 98% (13 May 2021 13:34) (94% - 99%)    PHYSICAL EXAM:  GENERAL: NAD, disheveled, poorly groomed  HEAD:  Atraumatic, Normocephalic, b/l facial redness however no warm or tender, not consistent with cellulitis from likely trauama  NECK: Supple, No JVD  CHEST/LUNG: Clear to auscultation bilaterally; No wheeze  HEART: Regular rate and rhythm;   ABDOMEN: Soft, Nontender, Nondistended; Bowel sounds present  EXTREMITIES:  2+ Peripheral Pulses, No clubbing, cyanosis, or edema, mild R forearm tendernses  PSYCH: AAOx3  NEUROLOGY: non-focal, no tremors  SKIN: No rashes or lesions      LABS:                        12.0   5.34  )-----------( 267      ( 13 May 2021 06:35 )             36.3     05-13    140  |  104  |  15  ----------------------------<  97  3.9   |  19<L>  |  0.81    Ca    9.1      13 May 2021 06:35    TPro  7.4  /  Alb  4.2  /  TBili  1.9<H>  /  DBili  x   /  AST  114<H>  /  ALT  64<H>  /  AlkPhos  79  05-13    PT/INR - ( 12 May 2021 16:19 )   PT: 10.7 sec;   INR: 0.89 ratio         PTT - ( 12 May 2021 16:19 )  PTT:31.0 sec            RADIOLOGY & ADDITIONAL TESTS:    Imaging Personally Reviewed:    Electrocardiogram Personally Reviewed:    COORDINATION OF CARE:  Care Discussed with Consultants/Other Providers [Y/N]:  Prior or Outpatient Records Reviewed [Y/N]:  
Used  [id number] and assistance with NP, SW, PCA      Patient is a 60y old  Male who presents with a chief complaint of fall (14 May 2021 11:23)      SUBJECTIVE / OVERNIGHT EVENTS: No On events. Feels well. No complaints. Denies back pain, n/v, cp, sob, anxiety, dizziness, tremors, headache. Says he wants to leave hospital and go to his wagon [place] he [place] all his things      ADDITIONAL REVIEW OF SYSTEMS: Negative except for above    MEDICATIONS  (STANDING):  cephalexin 500 milliGRAM(s) Oral four times a day  chlordiazePOXIDE   Oral   chlordiazePOXIDE 50 milliGRAM(s) Oral every 8 hours  enoxaparin Injectable 40 milliGRAM(s) SubCutaneous daily  folic acid 1 milliGRAM(s) Oral daily  multivitamin 1 Tablet(s) Oral daily  mupirocin 2% Ointment 1 Application(s) Topical two times a day  thiamine Injectable 100 milliGRAM(s) IV Push daily    MEDICATIONS  (PRN):  acetaminophen   Tablet .. 650 milliGRAM(s) Oral every 6 hours PRN Temp greater or equal to 38C (100.4F), Mild Pain (1 - 3), Moderate Pain (4 - 6)      CAPILLARY BLOOD GLUCOSE        I&O's Summary    13 May 2021 07:01  -  14 May 2021 07:00  --------------------------------------------------------  IN: 200 mL / OUT: 2050 mL / NET: -1850 mL        PHYSICAL EXAM:  Vital Signs Last 24 Hrs  T(C): 36.8 (14 May 2021 11:38), Max: 37.1 (13 May 2021 13:34)  T(F): 98.2 (14 May 2021 11:38), Max: 98.8 (13 May 2021 13:34)  HR: 89 (14 May 2021 11:38) (72 - 90)  BP: 153/87 (14 May 2021 11:38) (130/78 - 153/87)  BP(mean): --  RR: 18 (14 May 2021 11:38) (18 - 18)  SpO2: 99% (14 May 2021 11:38) (97% - 99%)    PHYSICAL EXAM:  GENERAL: NAD, well-developed, not agitated  HEAD:  Atraumatic, Normocephalic, no tongue fasciculation, no sign of cellulitis on face  NECK: Supple, No JVD  CHEST/LUNG: Clear to auscultation bilaterally; No wheeze  HEART: Regular rate and rhythm; No murmurs, rubs, or gallops  ABDOMEN: Soft, Nontender, Nondistended; Bowel sounds present  Back: minimal tenderness  EXTREMITIES:  2+ Peripheral Pulses, No clubbing, cyanosis, or edema, no UE tremor  PSYCH: AAOx3, coherent, making sense, answering appropriately   NEUROLOGY: non-focal  SKIN: No rashes or lesions      LABS:                        12.0   5.34  )-----------( 267      ( 13 May 2021 06:35 )             36.3     05-14    137  |  100  |  17  ----------------------------<  97  4.2   |  20<L>  |  0.83    Ca    9.4      14 May 2021 06:45    TPro  7.4  /  Alb  4.2  /  TBili  1.9<H>  /  DBili  x   /  AST  114<H>  /  ALT  64<H>  /  AlkPhos  79  05-13    PT/INR - ( 12 May 2021 16:19 )   PT: 10.7 sec;   INR: 0.89 ratio         PTT - ( 12 May 2021 16:19 )  PTT:31.0 sec            RADIOLOGY & ADDITIONAL TESTS:    Imaging Personally Reviewed:    Electrocardiogram Personally Reviewed:    COORDINATION OF CARE:  Care Discussed with Consultants/Other Providers [Y/N]:  Prior or Outpatient Records Reviewed [Y/N]:

## 2021-05-14 NOTE — DISCHARGE NOTE NURSING/CASE MANAGEMENT/SOCIAL WORK - NSDCVIVACCINE_GEN_ALL_CORE_FT
Influenza , 2018/10/4 16:10 , Saniya Hodges (RN)  Td , 2016/2/1 06:50 , Melanie Moralez (RN)  Tdap , 2016/3/30 13:53 , Chrystal Jung (RN)  Tdap , 2019/11/8 19:35 , Genevieve Oswald (RN)  Tdap , 2021/4/16 20:55 , Nathaly Hinds (RN)

## 2021-05-14 NOTE — DISCHARGE NOTE NURSING/CASE MANAGEMENT/SOCIAL WORK - PATIENT PORTAL LINK FT
You can access the FollowMyHealth Patient Portal offered by [facility] by registering at the following website: [email or web address]. By joining [employer]’s FollowMyHealth portal, you will also be able to view your health information using other applications (apps) compatible with our system.

## 2021-05-14 NOTE — DISCHARGE NOTE PROVIDER - NSFOLLOWUPCLINICS_GEN_ALL_ED_FT
[facility] General Internal Medicine  General Internal Medicine  [address] [place] [address]  Phone: [phone]  Fax:

## 2021-05-14 NOTE — PROGRESS NOTE ADULT - PROBLEM SELECTOR PLAN 1
alc level 229 on admission but no s/s of withdrawal on exam, no hallucinations   CIWA 0, has not required any prns  patient wants to AMA thus will stop librium taper as likely to drink again   using  as above, explained risk of signing out AMA which includes but not limited due to seizure, falls, accidental injuries/death, stroke, heart attack, organ failure, and even death. Patient has capacity and
alc level 229 on admission but high risk of withdrawal.   c/w librium taper  monitor on CIWAS score 0  no sign of withdraw on exam at this point  c/w folate, thiamine and multivitamin.   fall precautions

## 2021-05-14 NOTE — DISCHARGE NOTE PROVIDER - NSDCFUADDINST_GEN_ALL_CORE_FT
You are leaving the hospital against medical advice.  You have verbalized understanding of the possible risks of discharge before completing medical treatment up to and including permanent disability and/or death.

## 2021-05-14 NOTE — DISCHARGE NOTE PROVIDER - NSDCCPCAREPLAN_GEN_ALL_CORE_FT
PRINCIPAL DISCHARGE DIAGNOSIS  Diagnosis: Alcohol intoxication  Assessment and Plan of Treatment: Your doctor has recommended that you stop drinking alcohol.  You have refused to stay in the hospital to complete your Librium taper.  There are programs availabe to help you.      SECONDARY DISCHARGE DIAGNOSES  Diagnosis: Lumbar vertebral fracture  Assessment and Plan of Treatment: Take tylenol as needed for pain management.  There are no surgical interventions that are needed.    Diagnosis: Facial cellulitis  Assessment and Plan of Treatment: Resolved.  You have completed your course of antibiotics.    Diagnosis: Nasal bone fracture  Assessment and Plan of Treatment: Likely chronic   No interventions are needed    Diagnosis: Lung nodule  Assessment and Plan of Treatment: You have a 3 mm nodule that was seen on CT scan.  You need to have a repeat CT scan in one year to evaluate this nodule.     PRINCIPAL DISCHARGE DIAGNOSIS  Diagnosis: Alcohol intoxication  Assessment and Plan of Treatment: Your doctor has recommended that you stop drinking alcohol.  You have refused to stay in the hospital to complete your Librium taper.  There are programs availabe to help you which you refused      SECONDARY DISCHARGE DIAGNOSES  Diagnosis: Lumbar vertebral fracture  Assessment and Plan of Treatment: Take tylenol as needed for pain management.  There are no surgical interventions that are needed.    Diagnosis: Facial cellulitis  Assessment and Plan of Treatment: Resolved.    Diagnosis: Nasal bone fracture  Assessment and Plan of Treatment: Likely chronic   use topical cream x 6 days    Diagnosis: Lung nodule  Assessment and Plan of Treatment: You have a 3 mm nodule that was seen on CT scan.  You need to have a repeat CT scan in one year to evaluate this nodule.  [facility]

## 2021-05-14 NOTE — DISCHARGE NOTE PROVIDER - HOSPITAL COURSE
This patient is a 59yo gentleman with PMH of EtOH abuse, chronic pancreatitis who presents to the ED after an observed fall, in the setting of alcohol use, found to have L nasal bone fracture, vertebral fracture of transverse process and left facial wound.      Problem/Plan - 1:  ·  Problem: Alcohol intoxication.  Plan: alc level 229 on admission but high risk of withdrawal.   c/w librium taper  monitor on CIWAS score 0  no sign of withdraw on exam at this point  c/w folate, thiamine and multivitamin.   fall precautions.      Problem/Plan - 2:  ·  Problem: Fracture of vertebra, lumbar.  Plan: CT with transverse process fracture of the L1 vertebra, healing transverse process fracture of L2 vertebra and healing fractures of right 9th and 12th ribs.   -per ortho: no need for surgery or need for TLSO brace, outpatient f/u  -c/w  tylenol PRN for pain control.      Problem/Plan - 3:  ·  Problem: Cellulitis.  Plan: c/w keflex PO for left facial erythema and swelling, less likely cellulitis more likely to trauma and fracture  monitor  afebrile, no leukocytosis.      Problem/Plan - 4:  ·  Problem: Arm pain.  Plan: tylenol prn pain  xray of R forearm pending r/o fracture.      Problem/Plan - 5:  ·  Problem: Nasal bone fracture.  Plan: left nasal bone fracture.   consult ent or plastic pending   Tylenol PRN for pain.      Problem/Plan - 6:  Problem: Anemia. Plan: anemic. No history of recent overt bleeding. Anemia is likely related to alcohol abuse.   f/u iron, folate, B12 levels.  no n/v   outpatient anemia workup.     Problem/Plan - 7:  ·  Problem: Elevated liver enzymes.  Plan: Improving: AST and ALT are elevated above 2:1 ratio which reflects alcohol use.  lipase negative  trend lfts.      Problem/Plan - 8:  ·  Problem: Lung nodule.  Plan: CT with 3mm solid lung nodule.  Pt will need close follow up with PMD after discharge to arrange for repeat imaging given the patient's smoking history, pt aware.      Problem/Plan - 9:  ·  Problem: Prophylactic measure.  Plan: Diet: regular  PT eval  SW eval.    This patient is a 59yo gentleman with PMH of EtOH abuse, chronic pancreatitis who presents to the ED after an observed fall, in the setting of alcohol use, found to have L nasal bone fracture, vertebral fracture of transverse process and left facial wound.      Problem/Plan - 1:  ·  Problem: Alcohol intoxication.  Plan: alc level 229 on admission but no s/s of withdrawal on exam, no hallucinations   CIWA 0, has not required any prns  patient wants to AMA thus will stop librium taper as likely to drink again   using  as above, explained risk of signing out AMA which includes but not limited due to seizure, falls, accidental injuries/death, stroke, heart attack, organ failure, and even death. Patient has capacity and.      Problem/Plan - 2:  ·  Problem: Fracture of vertebra, lumbar.  Plan: CT with transverse process fracture of the L1 vertebra, healing transverse process fracture of L2 vertebra and healing fractures of right 9th and 12th ribs.   -per patient no pain  -PT rec RICHARD but patient refusing and also has no insurance, undocumented  -per ortho: no need for surgery or need for TLSO brace, outpatient f/u  -c/w  tylenol PRN for pain control.      Problem/Plan - 3:  ·  Problem: Cellulitis.  Plan: no sign of cellulitis on face, likely due to trauma,   afebrile, no leukocytosis  d.c keflex.      Problem/Plan - 4:  ·  Problem: Arm pain.  Plan: tylenol prn pain  xray of R forearm negative for fracture.      Problem/Plan - 5:  ·  Problem: Nasal bone fracture.  Plan: left nasal bone fracture on Ct  CT: per ENt, likely chronic fracture, can use topical antibiotic ointment to abrasion BID x 7 days.  Tylenol PRN for pain.      Problem/Plan - 6:  Problem: Anemia. Plan: anemic. No history of recent overt bleeding. Anemia is likely related to alcohol abuse.   f/u iron, folate, B12 levels.  no n/v   outpatient anemia workup.     Problem/Plan - 7:  ·  Problem: Elevated liver enzymes.  Plan: Improving: AST and ALT are elevated above 2:1 ratio which reflects alcohol use.  lipase negative  trend lfts.      Problem/Plan - 8:  ·  Problem: Lung nodule.  Plan: CT with 3mm solid lung nodule.  Pt will need close follow up with PMD after discharge to arrange for repeat imaging given the patient's smoking history, pt aware.      Problem/Plan - 9:  ·  Problem: Prophylactic measure.  Plan: Diet: regular  PT rec RICHARD but pt refusing, also no insurance and undocumented so does not want to qualify  Patient expressed wishes to sign out AMA and all risk were explained with  as above with SW, PCA, NP. Patient with full capacity to make decisions. Patient signed out AMA. Patient says he will use his money to take a taxi to his wagon at [place] since he is homeless, refusing shelter, alc rehab as well.   spent 60 min on d/c/AMA process  discussed with ENRICO GODOY

## 2021-05-14 NOTE — PROGRESS NOTE ADULT - ASSESSMENT
This patient is a 59yo gentleman with PMH of EtOH abuse, chronic pancreatitis who presents to the ED after an observed fall, in the setting of alcohol use, found to have L nasal bone fracture, vertebral fracture of transverse process and left facial wound. 
This patient is a 61yo gentleman with PMH of EtOH abuse, chronic pancreatitis who presents to the ED after an observed fall, in the setting of alcohol use, found to have L nasal bone fracture, vertebral fracture of transverse process and left facial wound.

## 2021-05-15 ENCOUNTER — INPATIENT (INPATIENT)
Facility: HOSPITAL | Age: 61
LOS: 24 days | Discharge: HOME | DRG: 641 | End: 2021-06-09
Attending: HOSPITALIST | Admitting: INTERNAL MEDICINE
Payer: MEDICAID

## 2021-05-15 VITALS
TEMPERATURE: 98 F | SYSTOLIC BLOOD PRESSURE: 113 MMHG | RESPIRATION RATE: 16 BRPM | HEART RATE: 92 BPM | DIASTOLIC BLOOD PRESSURE: 75 MMHG | HEIGHT: 68 IN | OXYGEN SATURATION: 95 %

## 2021-05-15 DIAGNOSIS — Z98.89 OTHER SPECIFIED POSTPROCEDURAL STATES: Chronic | ICD-10-CM

## 2021-05-15 DIAGNOSIS — R27.0 ATAXIA, UNSPECIFIED: ICD-10-CM

## 2021-05-15 DIAGNOSIS — F10.10 ALCOHOL ABUSE, UNCOMPLICATED: ICD-10-CM

## 2021-05-15 DIAGNOSIS — Z00.00 ENCOUNTER FOR GENERAL ADULT MEDICAL EXAMINATION WITHOUT ABNORMAL FINDINGS: ICD-10-CM

## 2021-05-15 DIAGNOSIS — F17.200 NICOTINE DEPENDENCE, UNSPECIFIED, UNCOMPLICATED: ICD-10-CM

## 2021-05-15 DIAGNOSIS — R91.1 SOLITARY PULMONARY NODULE: ICD-10-CM
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
## 2021-05-15 LAB
ALBUMIN SERPL ELPH-MCNC: 4.3 G/DL — SIGNIFICANT CHANGE UP (ref 3.3–5)
ALP SERPL-CCNC: 76 U/L — SIGNIFICANT CHANGE UP (ref 40–120)
ALT FLD-CCNC: 64 U/L — HIGH (ref 10–45)
ANION GAP SERPL CALC-SCNC: 16 MMOL/L — SIGNIFICANT CHANGE UP (ref 5–17)
APPEARANCE UR: CLEAR — SIGNIFICANT CHANGE UP
AST SERPL-CCNC: 94 U/L — HIGH (ref 10–40)
BACTERIA # UR AUTO: NEGATIVE — SIGNIFICANT CHANGE UP
BASOPHILS # BLD AUTO: 0.03 K/UL — SIGNIFICANT CHANGE UP (ref 0–0.2)
BASOPHILS NFR BLD AUTO: 0.4 % — SIGNIFICANT CHANGE UP (ref 0–2)
BILIRUB SERPL-MCNC: 1.1 MG/DL — SIGNIFICANT CHANGE UP (ref 0.2–1.2)
BILIRUB UR-MCNC: NEGATIVE — SIGNIFICANT CHANGE UP
BUN SERPL-MCNC: 27 MG/DL — HIGH (ref 7–23)
CALCIUM SERPL-MCNC: 9.1 MG/DL — SIGNIFICANT CHANGE UP (ref 8.4–10.5)
CHLORIDE SERPL-SCNC: 101 MMOL/L — SIGNIFICANT CHANGE UP (ref 96–108)
CO2 SERPL-SCNC: 18 MMOL/L — LOW (ref 22–31)
COLOR SPEC: YELLOW — SIGNIFICANT CHANGE UP
CREAT SERPL-MCNC: 1.05 MG/DL — SIGNIFICANT CHANGE UP (ref 0.5–1.3)
DIFF PNL FLD: NEGATIVE — SIGNIFICANT CHANGE UP
EOSINOPHIL # BLD AUTO: 0.09 K/UL — SIGNIFICANT CHANGE UP (ref 0–0.5)
EOSINOPHIL NFR BLD AUTO: 1.2 % — SIGNIFICANT CHANGE UP (ref 0–6)
EPI CELLS # UR: 1 /HPF — SIGNIFICANT CHANGE UP
ETHANOL SERPL-MCNC: SIGNIFICANT CHANGE UP MG/DL (ref 0–10)
GLUCOSE BLDC GLUCOMTR-MCNC: 148 MG/DL — HIGH (ref 70–99)
GLUCOSE SERPL-MCNC: 106 MG/DL — HIGH (ref 70–99)
GLUCOSE UR QL: NEGATIVE — SIGNIFICANT CHANGE UP
HCT VFR BLD CALC: 35.8 % — LOW (ref 39–50)
HGB BLD-MCNC: 12.1 G/DL — LOW (ref 13–17)
HYALINE CASTS # UR AUTO: 0 /LPF — SIGNIFICANT CHANGE UP (ref 0–2)
IMM GRANULOCYTES NFR BLD AUTO: 1.1 % — SIGNIFICANT CHANGE UP (ref 0–1.5)
KETONES UR-MCNC: NEGATIVE — SIGNIFICANT CHANGE UP
LEUKOCYTE ESTERASE UR-ACNC: NEGATIVE — SIGNIFICANT CHANGE UP
LYMPHOCYTES # BLD AUTO: 1.14 K/UL — SIGNIFICANT CHANGE UP (ref 1–3.3)
LYMPHOCYTES # BLD AUTO: 15.3 % — SIGNIFICANT CHANGE UP (ref 13–44)
MCHC RBC-ENTMCNC: 33.8 GM/DL — SIGNIFICANT CHANGE UP (ref 32–36)
MCHC RBC-ENTMCNC: 33.9 PG — SIGNIFICANT CHANGE UP (ref 27–34)
MCV RBC AUTO: 100.3 FL — HIGH (ref 80–100)
MONOCYTES # BLD AUTO: 0.6 K/UL — SIGNIFICANT CHANGE UP (ref 0–0.9)
MONOCYTES NFR BLD AUTO: 8 % — SIGNIFICANT CHANGE UP (ref 2–14)
NEUTROPHILS # BLD AUTO: 5.52 K/UL — SIGNIFICANT CHANGE UP (ref 1.8–7.4)
NEUTROPHILS NFR BLD AUTO: 74 % — SIGNIFICANT CHANGE UP (ref 43–77)
NITRITE UR-MCNC: NEGATIVE — SIGNIFICANT CHANGE UP
NRBC # BLD: 0 /100 WBCS — SIGNIFICANT CHANGE UP (ref 0–0)
PH UR: 6.5 — SIGNIFICANT CHANGE UP (ref 5–8)
PLATELET # BLD AUTO: 267 K/UL — SIGNIFICANT CHANGE UP (ref 150–400)
POTASSIUM SERPL-MCNC: 4.1 MMOL/L — SIGNIFICANT CHANGE UP (ref 3.5–5.3)
POTASSIUM SERPL-SCNC: 4.1 MMOL/L — SIGNIFICANT CHANGE UP (ref 3.5–5.3)
PROT SERPL-MCNC: 7.7 G/DL — SIGNIFICANT CHANGE UP (ref 6–8.3)
PROT UR-MCNC: ABNORMAL
RBC # BLD: 3.57 M/UL — LOW (ref 4.2–5.8)
RBC # FLD: 13.3 % — SIGNIFICANT CHANGE UP (ref 10.3–14.5)
RBC CASTS # UR COMP ASSIST: 24 /HPF — HIGH (ref 0–4)
SARS-COV-2 RNA SPEC QL NAA+PROBE: SIGNIFICANT CHANGE UP
SODIUM SERPL-SCNC: 135 MMOL/L — SIGNIFICANT CHANGE UP (ref 135–145)
SP GR SPEC: 1.02 — SIGNIFICANT CHANGE UP (ref 1.01–1.02)
UROBILINOGEN FLD QL: NEGATIVE — SIGNIFICANT CHANGE UP
WBC # BLD: 7.46 K/UL — SIGNIFICANT CHANGE UP (ref 3.8–10.5)
WBC # FLD AUTO: 7.46 K/UL — SIGNIFICANT CHANGE UP (ref 3.8–10.5)
WBC UR QL: 1 /HPF — SIGNIFICANT CHANGE UP (ref 0–5)

## 2021-05-15 PROCEDURE — 72125 CT NECK SPINE W/O DYE: CPT | Mod: 26

## 2021-05-15 PROCEDURE — 99223 1ST HOSP IP/OBS HIGH 75: CPT | Mod: GC

## 2021-05-15 PROCEDURE — 99285 EMERGENCY DEPT VISIT HI MDM: CPT

## 2021-05-15 PROCEDURE — 93010 ELECTROCARDIOGRAM REPORT: CPT

## 2021-05-15 PROCEDURE — 70450 CT HEAD/BRAIN W/O DYE: CPT | Mod: 26

## 2021-05-15 RX ORDER — NICOTINE POLACRILEX 2 MG
1 GUM BUCCAL DAILY
Refills: 0 | Status: DISCONTINUED | OUTPATIENT
Start: 2021-05-15 | End: 2021-06-09

## 2021-05-15 RX ORDER — THIAMINE MONONITRATE (VIT B1) 100 MG
100 TABLET ORAL ONCE
Refills: 0 | Status: COMPLETED | OUTPATIENT
Start: 2021-05-15 | End: 2021-05-15

## 2021-05-15 RX ORDER — ENOXAPARIN SODIUM 100 MG/ML
40 INJECTION SUBCUTANEOUS DAILY
Refills: 0 | Status: DISCONTINUED | OUTPATIENT
Start: 2021-05-15 | End: 2021-06-09

## 2021-05-15 RX ORDER — MUPIROCIN 20 MG/G
1 OINTMENT TOPICAL
Refills: 0 | Status: DISCONTINUED | OUTPATIENT
Start: 2021-05-15 | End: 2021-05-25

## 2021-05-15 RX ORDER — SODIUM CHLORIDE 9 MG/ML
1000 INJECTION INTRAMUSCULAR; INTRAVENOUS; SUBCUTANEOUS ONCE
Refills: 0 | Status: COMPLETED | OUTPATIENT
Start: 2021-05-15 | End: 2021-05-15

## 2021-05-15 RX ORDER — FOLIC ACID 0.8 MG
1 TABLET ORAL DAILY
Refills: 0 | Status: DISCONTINUED | OUTPATIENT
Start: 2021-05-15 | End: 2021-06-09

## 2021-05-15 RX ORDER — SODIUM CHLORIDE 9 MG/ML
1000 INJECTION, SOLUTION INTRAVENOUS
Refills: 0 | Status: DISCONTINUED | OUTPATIENT
Start: 2021-05-15 | End: 2021-05-25

## 2021-05-15 RX ORDER — THIAMINE MONONITRATE (VIT B1) 100 MG
500 TABLET ORAL EVERY 8 HOURS
Refills: 0 | Status: DISCONTINUED | OUTPATIENT
Start: 2021-05-15 | End: 2021-05-19

## 2021-05-15 RX ORDER — SODIUM CHLORIDE 9 MG/ML
1000 INJECTION, SOLUTION INTRAVENOUS ONCE
Refills: 0 | Status: DISCONTINUED | OUTPATIENT
Start: 2021-05-15 | End: 2021-05-15

## 2021-05-15 RX ORDER — THIAMINE MONONITRATE (VIT B1) 100 MG
500 TABLET ORAL ONCE
Refills: 0 | Status: COMPLETED | OUTPATIENT
Start: 2021-05-15 | End: 2021-05-15

## 2021-05-15 RX ORDER — PREGABALIN 225 MG/1
1000 CAPSULE ORAL ONCE
Refills: 0 | Status: COMPLETED | OUTPATIENT
Start: 2021-05-15 | End: 2021-05-15

## 2021-05-15 RX ORDER — THIAMINE MONONITRATE (VIT B1) 100 MG
500 TABLET ORAL ONCE
Refills: 0 | Status: DISCONTINUED | OUTPATIENT
Start: 2021-05-15 | End: 2021-05-15

## 2021-05-15 RX ADMIN — Medication 1 PATCH: at 19:35

## 2021-05-15 RX ADMIN — Medication 105 MILLIGRAM(S): at 21:21

## 2021-05-15 RX ADMIN — SODIUM CHLORIDE 1000 MILLILITER(S): 9 INJECTION INTRAMUSCULAR; INTRAVENOUS; SUBCUTANEOUS at 08:13

## 2021-05-15 RX ADMIN — Medication 25 MILLIGRAM(S): at 10:31

## 2021-05-15 RX ADMIN — SODIUM CHLORIDE 75 MILLILITER(S): 9 INJECTION, SOLUTION INTRAVENOUS at 17:35

## 2021-05-15 RX ADMIN — Medication 105 MILLIGRAM(S): at 17:36

## 2021-05-15 RX ADMIN — Medication 2 MILLIGRAM(S): at 20:40

## 2021-05-15 RX ADMIN — Medication 1 TABLET(S): at 10:32

## 2021-05-15 RX ADMIN — Medication 1 MILLIGRAM(S): at 13:50

## 2021-05-15 RX ADMIN — PREGABALIN 1000 MICROGRAM(S): 225 CAPSULE ORAL at 10:30

## 2021-05-15 RX ADMIN — Medication 105 MILLIGRAM(S): at 10:31

## 2021-05-15 RX ADMIN — ENOXAPARIN SODIUM 40 MILLIGRAM(S): 100 INJECTION SUBCUTANEOUS at 13:49

## 2021-05-15 RX ADMIN — Medication 100 MILLIGRAM(S): at 08:13

## 2021-05-15 RX ADMIN — Medication 1 PATCH: at 17:34

## 2021-05-15 NOTE — H&P ADULT - PROBLEM/PLAN-5
[facility]    [address] [place] [address]    Phone:  [phone]    Fax:  [phone]       Thank You for choosing us for your health care visit. We are glad to serve you and happy to provide you with this summary of your visit. Please help us to ensure we have accurate records. If you find anything that needs to be changed, please let our staff know as soon as possible.          Your Demographic Information     Patient Name Sex     Danay Peters Female 1974       Ethnic Group Patient Race    Not of  or  Origin White      Your Visit Details     Date & Time Provider Department    2017 5:00 PM Hernandez Pineda LCSW [facility]      Your To Do List     Follow-Up    Return in about 6 weeks (around 2017).      We Ordered or Performed the Following     PSYTX PT 45 MINUTES       Conditions Discussed Today or Order-Related Diagnoses        Comments    Depression, major, recurrent, mild    -  Primary     Generalized anxiety disorder         Panic disorder           Your Vitals Were     Smoking Status                   Never Smoker           Medications Prescribed or Re-Ordered Today     None      Your Current Medications Are        Disp Refills Start End    ALPRAZolam (XANAX) 0.25 MG tablet 30 tablet 1 3/14/2017     Sig: One tab twice a day as needed for anxiety    venlafaxine XR (EFFEXOR XR) 37.5 MG 24 hr capsule 60 capsule 1 3/13/2017     Sig - Route: Take 1 capsule by mouth daily. Indications: Major Depressive Disorder After 2 weeks, take 2 daily - Oral    Class: Eprescribe    Notes to Pharmacy: Tapering off sertraline    sertraline (ZOLOFT) 100 MG tablet 180 tablet 0 2017     Sig: TAKE TWO TABLETS BY MOUTH DAILY    Class: Eprescribe    Naloxegol Oxalate (MOVANTIK) 25 MG Tab        Sig - Route: Take by mouth daily as needed. - Oral    Class: Historical Med    Tapentadol HCl (NUCYNTA) 75 MG Tab         Sig - Route: Take by mouth as needed. Indications: Pain - Oral    Class: Historical Med    fluticasone (FLONASE) 50 MCG/ACT nasal spray 16 g 10 7/1/2016     Sig: PUMP TWO SPRAYS NASALLY IN EACH NOSTRIL EVERY DAY    Class: Eprescribe    diclofenac (CATAFLAM) 50 MG tablet        Sig - Route: Take 50 mg by mouth. Pain Management - Oral    Class: Historical Med    tiZANidine (ZANAFLEX) 4 MG tablet        Sig - Route: Take 4 mg by mouth every 8 hours as needed. Pain Management - Oral    Class: Historical Med    gabapentin (NEURONTIN) 600 MG tablet   10/10/2013     Sig - Route: Take 1 tablet by mouth 3 times daily. Per Theresa Escamilla NP - Oral    Class: Historical Med    Daily Multiple Vitamins TABS        Sig - Route: Take 1 tablet by mouth daily. - Oral    Class: Historical Med      Allergies     Sulfonamide Derivatives HIVES    Tegretol RASH      Immunizations History as of 5/1/2017     Name Date    INFLUENZA QUADRIVALENT 11/2/2016    Influenza 10/2/2013 10:15 AM    PPD 8/28/2006    Tdap 4/3/2012    Tetanus 8/25/2008      Problem List as of 5/1/2017     Depression with anxiety    Trigeminal neuralgia    Pain, face, atypical    Pain in thoracic spine    Degeneration of lumbar or lumbosacral intervertebral disc    Scoliosis            Patient Instructions     None       DISPLAY PLAN FREE TEXT

## 2021-05-15 NOTE — ED PROVIDER NOTE - ATTENDING CONTRIBUTION TO CARE
I have personally performed a face to face medical and diagnostic evaluation of the patient. I have discussed with and reviewed the Resident's note and agree with the History, ROS, Physical Exam and MDM unless otherwise indicated. A brief summary of my personal evaluation and impression can be found below.    60M Portuguese speaking chronic etoh use and pancreatitis recently admitted to [facility] and left AMA for lumbar fx, per EMS was seen yesterday at OSH was discharged and was found down/fall this morning in front to EMS, pt appears clinically intoxicated? unable to provide coherent history. Unclear LOC. No other complaints, however pt reports last drink was "3 days ago"    ROS limited 2/2 clinical intoxication     VITALS: Initial triage and subsequent vitals have been reviewed by me.  GEN APPEARANCE: WDWN, alert, non-toxic, disheveled, smells of etoh, clinically intoxicated   HEAD: Atraumatic.  EYES: PERRLa, EOMI, vision grossly intact.   NECK: Supple  CV: RRR, S1S2, no c/r/m/g. Cap refill <2 seconds. No bruits.   LUNGS: CTAB. No abnormal breath sounds.  ABDOMEN: Soft, NTND. No guarding or rebound.   MSK/EXT: R knee ttp No CVA ttp. Pelvis stable. No peripheral edema.  NEURO: Alert, follows commands. Weight bearing normal. Speech normal. Sensation and motor normal x4 extremities.   SKIN: abrasion to R knee   PSYCH: intoxicated     Plan/MDM: 60M Portuguese speaking chronic etoh use and pancreatitis recently admitted to [facility] and left AMA for lumbar fx, per EMS was seen yesterday at OSH was discharged and was found down/fall this morning in front to EMS, pt appears clinically intoxicated? unable to provide coherent history, exam vss appears clinically intoxicated, w R knee ttp, ddx unclear if truly etoh+ this morning as was dc'd from OSH earlier this morning per EMS? vs possible wernicke w ataixa? given was recently AMA'd and seen at OSH earlier today? will check labs etoh level, get cth c spine xr, reassess when clinically sober.

## 2021-05-15 NOTE — ED PROVIDER NOTE - OBJECTIVE STATEMENT
61yo M Vietnamese speaking  ID [id number] with PMH of EtOH abuse, chronic pancreatitis s/p  L nasal bone fracture, vertebral fracture of transverse process recent admission for Etoh abuse BIBEMS witness fall infront of EMS this morning. Pt report right lower back pain and right knees pain. Report that last EtOH use was 3 days ago. Unclear if pt had LOC. Denies CP, SOB, abd pain.

## 2021-05-15 NOTE — H&P ADULT - PROBLEM SELECTOR PLAN 1
Fracture of vertebra, lumbar from previous fall. CT with transverse process fracture of the L1 vertebra, healing transverse process fracture of L2 vertebra and healing fractures of right 9th and 12th ribs. Pt with new fall now 5/15  -PT rec RICHARD but patient refusing and also has no insurance, undocumented  -per ortho: no need for surgery or need for TLSO brace, outpatient f/u  -c/w  tylenol PRN for pain control.   - unclear if new fall was due to syncope, patient appears altered   - CTH no sign of bleed or fracture  - CT cervical sign w/o cervical fracture   - UA negative

## 2021-05-15 NOTE — ED PROVIDER NOTE - PROGRESS NOTE DETAILS
Ben JIMENEZ: noted neg BAL, however pt appears confused? ddx c/f wernicke w ataxia, pt w unsteady gait in ed will admit to med.

## 2021-05-15 NOTE — ED PROVIDER NOTE - PHYSICAL EXAMINATION
Gen: AAOx3, non-toxic,   Head: NCAT  HEENT: EOMI, PERRLA, oral mucosa moist, normal conjunctiva  Lung: CTAB, no respiratory distress, no wheezes/rhonchi/rales B/L, speaking in full sentences  CV: RRR, no murmurs, rubs or gallops  Abd: soft, NTND, no guarding, no CVA tenderness, no rebound tenderness  MSK: no visible deformities, full range of motion of all 4 exts, TTP of the right knee, no mid TTP of the spine   Neuro: No focal sensory or motor deficits  Skin: Warm, well perfused, no rash abrasion over the right anterior right knee  Psych: normal affect.   ~Jason Sahu MD Gen: AAOx3, non-toxic,   Head: NCAT  HEENT: EOMI, PERRLA, oral mucosa moist, normal conjunctiva  Lung: CTAB, no respiratory distress, no wheezes/rhonchi/rales B/L, speaking in full sentences  CV: RRR, no murmurs, rubs or gallops  Abd: soft, NTND, no guarding, no CVA tenderness, no rebound tenderness  MSK: no visible deformities, full range of motion of all 4 exts, TTP of the right knee, no mid TTP of the spine   Neuro: mild confusion.  Cranial nerves 2-12 intact.  5/5 strength in all muscle groups.  .  Gait ataxic.  falling backward  Skin: Warm, well perfused, no rash abrasion over the right anterior right knee  Psych: normal affect.   ~Jason Sahu MD

## 2021-05-15 NOTE — ED ADULT NURSE REASSESSMENT NOTE - NS ED NURSE REASSESS COMMENT FT1
Report taken from DERIK Kothari. Pt resting in bed. PIV placed. Labs sent. Pt calm, speech not making sense, as per  (ID: [id number]). VSS. Will continue to monitor

## 2021-05-15 NOTE — H&P ADULT - PROBLEM SELECTOR PLAN 4
DVT: heparin   Diet: regular after thiamine given Pt found to have incidental finding of nodule In lung on imaging  - cont to monitor in setting of active smoker   - unknown weight loss or other associated symptoms of cancer  - follow up imaging needed

## 2021-05-15 NOTE — H&P ADULT - HISTORY OF PRESENT ILLNESS
59yo M Chilean speaking with PMH of EtOH abuse, chronic pancreatitis s/p  L nasal bone fracture, vertebral fracture of transverse process recent admission for Etoh abuse BIBEMS witness fall infront of EMS this morning. Pt report right lower back pain and right knees pain. Report that last EtOH use was 3 days ago. Unclear if pt had LOC. Denies CP, SOB, abd pain.    The patient has been hospitalized due to alcohol withdrawal in the past, but denies history of ICU stay or withdrawal seizures.   The patient has not received COVID19 vaccine. 61yo M Paraguayan speaking with PMH of EtOH abuse, chronic pancreatitis s/p  L nasal bone fracture, vertebral fracture of transverse process recent admission for Etoh abuse BIBEMS witness fall infront of EMS this morning. Pt report right lower back pain and right knees pain. Report that last EtOH use was 3 days ago. Unclear if pt had LOC. Per the ED resident, he denies CP, SOB, abd pain.    The patient has been hospitalized due to alcohol withdrawal in the past, but denies history of ICU stay or withdrawal seizures.   The patient has not received COVID19 vaccine.

## 2021-05-15 NOTE — ED PROVIDER NOTE - CLINICAL SUMMARY MEDICAL DECISION MAKING FREE TEXT BOX
59yo M German speaking  ID [id number] with PMH of EtOH abuse, chronic pancreatitis s/p  L nasal bone fracture, vertebral fracture of transverse process recent admission for Etoh abuse BIBEMS witness fall infront of EMS this morning. Etoh intoxication vs Wernicke. will eval for ICH or fx. with get basic labs, ekg, cth c-spine, cxr, xr pelvix xray knee. IVF, multivitamin, thiamine.

## 2021-05-15 NOTE — H&P ADULT - NSHPLABSRESULTS_GEN_ALL_CORE
12.1   7.46  )-----------( 267      ( 15 May 2021 07:45 )             35.8       -15    135  |  101  |  27<H>  ----------------------------<  106<H>  4.1   |  18<L>  |  1.05    Ca    9.1      15 May 2021 07:45  Phos  3.5     05-15  Mg     1.6     05-15    TPro  7.7  /  Alb  4.3  /  TBili  1.1  /  DBili  x   /  AST  94<H>  /  ALT  64<H>  /  AlkPhos  76  -15              Urinalysis Basic - ( 15 May 2021 08:31 )    Color: Yellow / Appearance: Clear / S.024 / pH: x  Gluc: x / Ketone: Negative  / Bili: Negative / Urobili: Negative   Blood: x / Protein: 30 mg/dL / Nitrite: Negative   Leuk Esterase: Negative / RBC: 24 /hpf / WBC 1 /HPF   Sq Epi: x / Non Sq Epi: 1 /hpf / Bacteria: Negative            Lactate Trend            CAPILLARY BLOOD GLUCOSE      POCT Blood Glucose.: 113 mg/dL (15 May 2021 06:57)

## 2021-05-15 NOTE — H&P ADULT - NSHPSOCIALHISTORY_GEN_ALL_CORE
The patient smokes 2 cigarettes daily.  He drinks a bottle of alcohol daily. His last drink was 3 days ago.  He is homeless.

## 2021-05-15 NOTE — H&P ADULT - NSHPREVIEWOFSYSTEMS_GEN_ALL_CORE
REVIEW OF SYSTEMS:    CONSTITUTIONAL: No weakness, fevers or chills  EYES/ENT: No visual changes;  No vertigo or throat pain   NECK: No pain or stiffness  BACK: no pain or lesions   RESPIRATORY: No cough, wheezing, hemoptysis; No shortness of breath  CARDIOVASCULAR: No chest pain or palpitations  GASTROINTESTINAL: No abdominal or epigastric pain. No nausea, vomiting, or hematemesis; No diarrhea or constipation. No melena or hematochezia.  GENITOURINARY: No dysuria, frequency or hematuria  NEUROLOGICAL: No numbness or weakness  EXTREMITIES: no varicose veins, no pain in UE and LE  SKIN: No itching, rashes REVIEW OF SYSTEMS:    Unable to obtain from patient, he repeatedly states that he is hungry, per the nurse he is able to state his name, where he is and date but is unable to explain why he is in the hospital

## 2021-05-15 NOTE — H&P ADULT - NSHPPHYSICALEXAM_GEN_ALL_CORE
PHYSICAL EXAM:  T(C): 36.7 (05-15-21 @ 07:33), Max: 36.8 (05-14-21 @ 11:38)  HR: 82 (05-15-21 @ 07:33) (82 - 92)  BP: 105/65 (05-15-21 @ 07:33) (105/65 - 153/87)  RR: 16 (05-15-21 @ 07:33) (16 - 18)  SpO2: 98% (05-15-21 @ 07:33) (95% - 99%)  GENERAL: NAD, well-developed  HEAD:  Atraumatic, Normocephalic  EYES: EOMI, PERRLA, conjunctiva and sclera clear  NECK: Supple, No JVD  CHEST/LUNG: Clear to auscultation bilaterally; No wheeze  HEART: Regular rate and rhythm; No murmurs, rubs, or gallops  ABDOMEN: Soft, Nontender, Nondistended; Bowel sounds present  EXTREMITIES:  2+ Peripheral Pulses, No clubbing, cyanosis, or edema  PSYCH: AAOx3  NEUROLOGY: non-focal  SKIN: No rashes or lesions PHYSICAL EXAM:  T(C): 36.7 (05-15-21 @ 07:33), Max: 36.8 (05-14-21 @ 11:38)  HR: 82 (05-15-21 @ 07:33) (82 - 92)  BP: 105/65 (05-15-21 @ 07:33) (105/65 - 153/87)  RR: 16 (05-15-21 @ 07:33) (16 - 18)  SpO2: 98% (05-15-21 @ 07:33) (95% - 99%)    GENERAL: disheveled with cuts and bruises   HEAD:  scab on his face, unopen lesion w/o pus, some eryethema noted   EYES: EOMI, PERRLA, conjunctiva and sclera clear  NECK: Supple, No JVD  CHEST/LUNG: Clear to auscultation bilaterally; No wheeze  HEART: Regular rate and rhythm; No murmurs, rubs, or gallops  ABDOMEN: Soft, Nontender, Nondistended; Bowel sounds present  EXTREMITIES:  2+ Peripheral Pulses, No clubbing, cyanosis, or edema  PSYCH: AAOx3  NEUROLOGY: unable to cooperate with neuro exam, patient noted to have right beating nystagmus, limited LE strength   SKIN: ecchymosis on limbs

## 2021-05-15 NOTE — ED PROVIDER NOTE - PRO INTERPRETER NEED 2
Problem: Patient Care Overview (Adult)  Goal: Discharge Needs Assessment  Outcome: Ongoing (interventions implemented as appropriate)    03/09/17 1042   Discharge Needs Assessment   Concerns To Be Addressed denies needs/concerns at this time   Readmission Within The Last 30 Days no previous admission in last 30 days   Equipment Needed After Discharge none   Discharge Planning Comments spoke with patient at bedside. patient is from the [facility]. patient needs assist with all ADL's. she states she gets up by a [facility] lift to a w/c. she states she wears O2. her plan is to return to [facility] when medically stable. per Cele @ [facility] patient will need a precert from [employer] prior to return. Cele to check bedhold status. will conitnue to follow.    Current Health   Outpatient/Agency/Support Group Needs skilled nursing facility (specify)  ([facility])   Living Environment   Transportation Available (will continue to assess, [facility] van vs Ambulance)   Self-Care   Equipment Currently Used at Home wheelchair;oxygen;lift device;hospital bed            [place]

## 2021-05-15 NOTE — H&P ADULT - PROBLEM SELECTOR PLAN 2
Altered, found down   - alcohol level 0  - concern for Wernike  - s/p thiamine, start on high dose thiamine  - f/u Utox   - CTH normal Altered, found down   - BAL 0  - concern for Wernike  - s/p thiamine, start on high dose thiamine  - CTH normal  - f/u MRI head    - f/u Utox to r/o other substances causing alteration ion gait   - FS in normal limits, will continue with FS, f/u A1C  - Infectious workup limited to UA (negative) as patients vitals WNL and normal WBC, low -no concern for infxn Altered, found down   - BAL 0  - concern for Wernicke  - s/p thiamine, start on high dose thiamine  - CTH normal  - f/u MRI head    - f/u Utox to r/o other substances causing alteration ion gait   - FS in normal limits, will continue with FS, f/u A1C  - Infectious workup limited to UA (negative) as patients vitals WNL and normal WBC, low -no concern for infxn

## 2021-05-15 NOTE — ED PROVIDER NOTE - NS ED ROS FT
GENERAL: No fever or chills, EYES: no change in vision, HEENT: no trouble speaking, CARDIAC: no chest pain, palpitation PULMONARY: no cough or SOB, GI: no abdominal pain, no nausea, no vomiting, no diarrhea or constipation, : No changes in urination, SKIN: no rashes, NEURO: no headache,  MSK: +back and knee pain ~Jason Sahu MD

## 2021-05-15 NOTE — ED ADULT NURSE NOTE - NSIMPLEMENTINTERV_GEN_ALL_ED
Implemented All Fall with Harm Risk Interventions:  [place] to call system. Call bell, personal items and telephone within reach. Instruct patient to call for assistance. Room bathroom lighting operational. Non-slip footwear when patient is off stretcher. Physically safe environment: no spills, clutter or unnecessary equipment. Stretcher in lowest position, wheels locked, appropriate side rails in place. Provide visual cue, wrist band, yellow gown, etc. Monitor gait and stability. Monitor for mental status changes and reorient to person, place, and time. Review medications for side effects contributing to fall risk. Reinforce activity limits and safety measures with patient and family. Provide visual clues: red socks.

## 2021-05-15 NOTE — H&P ADULT - ASSESSMENT
This patient is a 61yo gentleman with PMH of EtOH abuse, chronic pancreatitis who presents to the ED after an observed fall, in the setting of alcohol use, found to have L nasal bone fracture, vertebral fracture of transverse process and left facial wound now presenting back to the hospital for a fall, concern for possible Wernicke's.     Last drink 3 days ago, was on librium taper nhere with CIWA 0's.

## 2021-05-15 NOTE — ED ADULT NURSE NOTE - OBJECTIVE STATEMENT
0647 pt 60ym [place] spkg per ems [[place]] pt was dropped earlier and [place] intox stayed overnight and [place] police and fell fr standing position/per ems pt homeless

## 2021-05-15 NOTE — H&P ADULT - PROBLEM SELECTOR PLAN 3
last drink 3 days ago, patient w/o alcohol in system  - librium taper as patient is high risk  - seizure and fall precaution   - SBIRT consult last drink 3 days ago, patient w/o alcohol in system  - high risk CIWA   - librium taper as patient is high risk  - seizure and fall precaution   - SBIRT consult  -  consult

## 2021-05-16 LAB
ALBUMIN SERPL ELPH-MCNC: 4 G/DL — SIGNIFICANT CHANGE UP (ref 3.3–5)
ALP SERPL-CCNC: 72 U/L — SIGNIFICANT CHANGE UP (ref 40–120)
ALT FLD-CCNC: 66 U/L — HIGH (ref 10–45)
ANION GAP SERPL CALC-SCNC: 14 MMOL/L — SIGNIFICANT CHANGE UP (ref 5–17)
AST SERPL-CCNC: 92 U/L — HIGH (ref 10–40)
BILIRUB SERPL-MCNC: 0.8 MG/DL — SIGNIFICANT CHANGE UP (ref 0.2–1.2)
BUN SERPL-MCNC: 21 MG/DL — SIGNIFICANT CHANGE UP (ref 7–23)
CALCIUM SERPL-MCNC: 9 MG/DL — SIGNIFICANT CHANGE UP (ref 8.4–10.5)
CHLORIDE SERPL-SCNC: 107 MMOL/L — SIGNIFICANT CHANGE UP (ref 96–108)
CO2 SERPL-SCNC: 19 MMOL/L — LOW (ref 22–31)
COVID-19 SPIKE DOMAIN AB INTERP: POSITIVE
COVID-19 SPIKE DOMAIN ANTIBODY RESULT: 128 U/ML — HIGH
CREAT SERPL-MCNC: 1.06 MG/DL — SIGNIFICANT CHANGE UP (ref 0.5–1.3)
CULTURE RESULTS: NO GROWTH — SIGNIFICANT CHANGE UP
GLUCOSE SERPL-MCNC: 100 MG/DL — HIGH (ref 70–99)
HCT VFR BLD CALC: 34.6 % — LOW (ref 39–50)
HGB BLD-MCNC: 11.2 G/DL — LOW (ref 13–17)
MAGNESIUM SERPL-MCNC: 1.6 MG/DL — SIGNIFICANT CHANGE UP (ref 1.6–2.6)
MCHC RBC-ENTMCNC: 32.4 GM/DL — SIGNIFICANT CHANGE UP (ref 32–36)
MCHC RBC-ENTMCNC: 32.6 PG — SIGNIFICANT CHANGE UP (ref 27–34)
MCV RBC AUTO: 100.6 FL — HIGH (ref 80–100)
NRBC # BLD: 0 /100 WBCS — SIGNIFICANT CHANGE UP (ref 0–0)
PHOSPHATE SERPL-MCNC: 3.5 MG/DL — SIGNIFICANT CHANGE UP (ref 2.5–4.5)
PLATELET # BLD AUTO: 239 K/UL — SIGNIFICANT CHANGE UP (ref 150–400)
POTASSIUM SERPL-MCNC: 3.9 MMOL/L — SIGNIFICANT CHANGE UP (ref 3.5–5.3)
POTASSIUM SERPL-SCNC: 3.9 MMOL/L — SIGNIFICANT CHANGE UP (ref 3.5–5.3)
PROT SERPL-MCNC: 6.9 G/DL — SIGNIFICANT CHANGE UP (ref 6–8.3)
RBC # BLD: 3.44 M/UL — LOW (ref 4.2–5.8)
RBC # FLD: 13.5 % — SIGNIFICANT CHANGE UP (ref 10.3–14.5)
SARS-COV-2 IGG+IGM SERPL QL IA: 128 U/ML — HIGH
SARS-COV-2 IGG+IGM SERPL QL IA: POSITIVE
SODIUM SERPL-SCNC: 140 MMOL/L — SIGNIFICANT CHANGE UP (ref 135–145)
SPECIMEN SOURCE: SIGNIFICANT CHANGE UP
WBC # BLD: 7.49 K/UL — SIGNIFICANT CHANGE UP (ref 3.8–10.5)
WBC # FLD AUTO: 7.49 K/UL — SIGNIFICANT CHANGE UP (ref 3.8–10.5)

## 2021-05-16 PROCEDURE — 99232 SBSQ HOSP IP/OBS MODERATE 35: CPT | Mod: GC

## 2021-05-16 RX ORDER — PREGABALIN 225 MG/1
1000 CAPSULE ORAL DAILY
Refills: 0 | Status: DISCONTINUED | OUTPATIENT
Start: 2021-05-16 | End: 2021-06-09

## 2021-05-16 RX ORDER — ACETAMINOPHEN 500 MG
1000 TABLET ORAL ONCE
Refills: 0 | Status: COMPLETED | OUTPATIENT
Start: 2021-05-16 | End: 2021-05-16

## 2021-05-16 RX ADMIN — Medication 1 MILLIGRAM(S): at 11:26

## 2021-05-16 RX ADMIN — Medication 1 PATCH: at 17:11

## 2021-05-16 RX ADMIN — ENOXAPARIN SODIUM 40 MILLIGRAM(S): 100 INJECTION SUBCUTANEOUS at 11:27

## 2021-05-16 RX ADMIN — Medication 50 MILLIGRAM(S): at 21:15

## 2021-05-16 RX ADMIN — Medication 1000 MILLIGRAM(S): at 03:06

## 2021-05-16 RX ADMIN — MUPIROCIN 1 APPLICATION(S): 20 OINTMENT TOPICAL at 05:58

## 2021-05-16 RX ADMIN — Medication 1 PATCH: at 07:00

## 2021-05-16 RX ADMIN — Medication 50 MILLIGRAM(S): at 08:49

## 2021-05-16 RX ADMIN — Medication 1 PATCH: at 11:27

## 2021-05-16 RX ADMIN — Medication 1000 MILLIGRAM(S): at 03:38

## 2021-05-16 RX ADMIN — MUPIROCIN 1 APPLICATION(S): 20 OINTMENT TOPICAL at 17:12

## 2021-05-16 RX ADMIN — PREGABALIN 1000 MICROGRAM(S): 225 CAPSULE ORAL at 11:47

## 2021-05-16 RX ADMIN — Medication 105 MILLIGRAM(S): at 05:57

## 2021-05-16 RX ADMIN — Medication 105 MILLIGRAM(S): at 21:15

## 2021-05-16 RX ADMIN — Medication 105 MILLIGRAM(S): at 13:00

## 2021-05-16 NOTE — PHYSICAL THERAPY INITIAL EVALUATION ADULT - GAIT DEVIATIONS NOTED, PT EVAL
decreased kari/decreased step length/decreased stride length/increased stride width/decreased weight-shifting ability

## 2021-05-16 NOTE — PROGRESS NOTE ADULT - PROBLEM SELECTOR PLAN 4
Pt found to have incidental finding of nodule In lung on imaging  - cont to monitor in setting of active smoker   - unknown weight loss or other associated symptoms of cancer  - follow up imaging needed

## 2021-05-16 NOTE — PROGRESS NOTE ADULT - SUBJECTIVE AND OBJECTIVE BOX
PROGRESS NOTE:   Authored by Dr. Carole Garner, [facility] pager [phone]    Patient is a 60y old  Male who presents with a chief complaint of fall (15 May 2021 10:52)      SUBJECTIVE / OVERNIGHT EVENTS:    MEDICATIONS  (STANDING):  enoxaparin Injectable 40 milliGRAM(s) SubCutaneous daily  folic acid 1 milliGRAM(s) Oral daily  lactated ringers. 1000 milliLiter(s) (75 mL/Hr) IV Continuous <Continuous>  mupirocin 2% Ointment 1 Application(s) Topical two times a day  nicotine -   7 mG/24Hr(s) Patch 1 patch Transdermal daily  thiamine IVPB 500 milliGRAM(s) IV Intermittent every 8 hours    MEDICATIONS  (PRN):  chlordiazePOXIDE 50 milliGRAM(s) Oral every 1 hour PRN Symptom-triggered: each CIWA -Ar score 8 or GREATER  LORazepam     Tablet 2 milliGRAM(s) Oral every 2 hours PRN Symptom-triggered 2 point increase in CIWA-Ar      CAPILLARY BLOOD GLUCOSE      POCT Blood Glucose.: 148 mg/dL (15 May 2021 12:47)    I&O's Summary    15 May 2021 07:01  -  16 May 2021 07:00  --------------------------------------------------------  IN: 175 mL / OUT: 200 mL / NET: -25 mL        PHYSICAL EXAM:  Vital Signs Last 24 Hrs  T(C): 36.9 (16 May 2021 04:53), Max: 36.9 (16 May 2021 04:53)  T(F): 98.5 (16 May 2021 04:53), Max: 98.5 (16 May 2021 04:53)  HR: 75 (16 May 2021 04:53) (75 - 83)  BP: 130/69 (16 May 2021 04:53) (113/71 - 136/71)  BP(mean): --  RR: 18 (16 May 2021 04:53) (16 - 18)  SpO2: 96% (16 May 2021 04:53) (96% - 99%)    GENERAL: No acute distress, well-developed  HEAD:  Atraumatic, Normocephalic  EYES: EOMI, PERRLA, conjunctiva and sclera clear  NECK: Supple, no lymphadenopathy, no JVD  CHEST/LUNG: CTAB; No wheezes, rales, or rhonchi  HEART: Regular rate and rhythm; No murmurs, rubs, or gallops  ABDOMEN: Soft, non-tender, non-distended; normal bowel sounds, no organomegaly  EXTREMITIES:  2+ peripheral pulses b/l, No clubbing, cyanosis, or edema  NEUROLOGY: A&O x 3, no focal deficits  SKIN: No rashes or lesions    LABS:                        11.2   7.49  )-----------( 239      ( 16 May 2021 06:46 )             34.6     05-16    140  |  107  |  21  ----------------------------<  100<H>  3.9   |  19<L>  |  1.06    Ca    9.0      16 May 2021 06:43  Phos  3.5     -16  Mg     1.6     -16    TPro  6.9  /  Alb  4.0  /  TBili  0.8  /  DBili  x   /  AST  92<H>  /  ALT  66<H>  /  AlkPhos  72  05-16          Urinalysis Basic - ( 15 May 2021 08:31 )    Color: Yellow / Appearance: Clear / S.024 / pH: x  Gluc: x / Ketone: Negative  / Bili: Negative / Urobili: Negative   Blood: x / Protein: 30 mg/dL / Nitrite: Negative   Leuk Esterase: Negative / RBC: 24 /hpf / WBC 1 /HPF   Sq Epi: x / Non Sq Epi: 1 /hpf / Bacteria: Negative          RADIOLOGY & ADDITIONAL TESTS:  Results Reviewed:   Imaging Personally Reviewed:  Electrocardiogram Personally Reviewed:    COORDINATION OF CARE:  Care Discussed with Consultants/Other Providers [Y/N]:  Prior or Outpatient Records Reviewed [Y/N]:   PROGRESS NOTE:   Authored by Dr. Carole Garner, [facility] pager [phone]    Patient is a 60y old  Male who presents with a chief complaint of fall (15 May 2021 10:52)      SUBJECTIVE / OVERNIGHT EVENTS: patient examined at bedside. He stated that he felt well but didnt answer any other questions. He was eating very sloly while I was in the room. He appears weak     MEDICATIONS  (STANDING):  enoxaparin Injectable 40 milliGRAM(s) SubCutaneous daily  folic acid 1 milliGRAM(s) Oral daily  lactated ringers. 1000 milliLiter(s) (75 mL/Hr) IV Continuous <Continuous>  mupirocin 2% Ointment 1 Application(s) Topical two times a day  nicotine -   7 mG/24Hr(s) Patch 1 patch Transdermal daily  thiamine IVPB 500 milliGRAM(s) IV Intermittent every 8 hours    MEDICATIONS  (PRN):  chlordiazePOXIDE 50 milliGRAM(s) Oral every 1 hour PRN Symptom-triggered: each CIWA -Ar score 8 or GREATER  LORazepam     Tablet 2 milliGRAM(s) Oral every 2 hours PRN Symptom-triggered 2 point increase in CIWA-Ar      CAPILLARY BLOOD GLUCOSE      POCT Blood Glucose.: 148 mg/dL (15 May 2021 12:47)    I&O's Summary    15 May 2021 07:01  -  16 May 2021 07:00  --------------------------------------------------------  IN: 175 mL / OUT: 200 mL / NET: -25 mL        PHYSICAL EXAM:  Vital Signs Last 24 Hrs  T(C): 36.9 (16 May 2021 04:53), Max: 36.9 (16 May 2021 04:53)  T(F): 98.5 (16 May 2021 04:53), Max: 98.5 (16 May 2021 04:53)  HR: 75 (16 May 2021 04:53) (75 - 83)  BP: 130/69 (16 May 2021 04:53) (113/71 - 136/71)  BP(mean): --  RR: 18 (16 May 2021 04:53) (16 - 18)  SpO2: 96% (16 May 2021 04:53) (96% - 99%)    GENERAL: weak, with brusing and scabs on face and legs/arms   HEAD:  Atraumatic, Normocephalic  EYES: EOMI, PERRLA, conjunctiva and sclera clear  NECK: Supple, no lymphadenopathy, no JVD  CHEST/LUNG: CTAB; No wheezes, rales, or rhonchi  HEART: Regular rate and rhythm; No murmurs, rubs, or gallops  ABDOMEN: Soft, non-tender, non-distended; normal bowel sounds, no organomegaly  EXTREMITIES:  2+ peripheral pulses b/l, No clubbing, cyanosis, or edema  NEUROLOGY: A&O x 2, no focal deficits  SKIN: ecchymosis, scab on face     LABS:                        11.2   7.49  )-----------( 239      ( 16 May 2021 06:46 )             34.6     05-16    140  |  107  |  21  ----------------------------<  100<H>  3.9   |  19<L>  |  1.06    Ca    9.0      16 May 2021 06:43  Phos  3.5     05-16  Mg     1.6     05-16    TPro  6.9  /  Alb  4.0  /  TBili  0.8  /  DBili  x   /  AST  92<H>  /  ALT  66<H>  /  AlkPhos  72  05-16          Urinalysis Basic - ( 15 May 2021 08:31 )    Color: Yellow / Appearance: Clear / S.024 / pH: x  Gluc: x / Ketone: Negative  / Bili: Negative / Urobili: Negative   Blood: x / Protein: 30 mg/dL / Nitrite: Negative   Leuk Esterase: Negative / RBC: 24 /hpf / WBC 1 /HPF   Sq Epi: x / Non Sq Epi: 1 /hpf / Bacteria: Negative          RADIOLOGY & ADDITIONAL TESTS:  Results Reviewed:   Imaging Personally Reviewed:  Electrocardiogram Personally Reviewed:    COORDINATION OF CARE:  Care Discussed with Consultants/Other Providers [Y/N]:  Prior or Outpatient Records Reviewed [Y/N]:

## 2021-05-16 NOTE — PHYSICAL THERAPY INITIAL EVALUATION ADULT - PERTINENT HX OF CURRENT PROBLEM, REHAB EVAL
61yo gentleman  admitted no 5/15/21 with PMH of EtOH abuse, chronic pancreatitis who presents to the ED after an observed fall, in the setting of alcohol use, found to have L nasal bone fracture, vertebral fracture of transverse process and left facial wound now presenting back to the hospital for a fall, concern for possible Wernicke's. 59yo gentleman  admitted no 5/15/21 with PMH of EtOH abuse, chronic pancreatitis who presents to the ED after an observed fall, in the setting of alcohol use, found to have L nasal bone fracture, vertebral fracture of transverse process and left facial wound now presenting back to the hospital for a fall, concern for possible Wernicke's. Ct head (-), CT c/s spine (-) fx incidental velma nodule 59 y/o male admitted to [facility] on 5/15/21 with PMH of EtOH abuse, chronic pancreatitis who presents to the ED after an observed fall, in the setting of alcohol use, found to have L nasal bone fracture, vertebral fracture of transverse process and left facial wound now presenting back to the hospital for a fall, concern for possible Wernicke's. Ct head (-), CT c/s spine (-) fx incidental velma nodule

## 2021-05-16 NOTE — PHYSICAL THERAPY INITIAL EVALUATION ADULT - SITTING BALANCE: DYNAMIC
PULMONARY HYPERTENSION CLINIC CONSULT/TRANSFER OF CARE    Date of visit: 3/25/2019  Patient Name: Brendan Katz  Medical Record Number: [id number]  YOB: 1954     Reason for Visit: Pulmonary Hypertension Evaluation    History of Present Illness:  Brendan Katz is a 64 year old male who is seen today for pulmonary hypertension. He is accompanied by his wife. He has a PMH of morbid obesity, CHANI diagnosed 15 years ago -on CPAP machine regularly, chronic hypoxia on O2, frequent admission for hypoxia and PNAs, CAD s/p PCI to RCA and LCx 2014.     He appears to be at WHO FC III, with dyspnea with minimal walking. He says that he can cut wood every day but, what he used to do it in one hour in the past, now it'll take him an entire day to do. He can do his ADLs without any issues.     His symptoms started around 8/2017 when he got admitted for PNA and his dyspnea and oxygen requirements persisted after that. He follows up with cardiology, EP and the HF clinic at [facility], and his first RHC was done in 1/2019 (MPAP 48 mmhg, PCWP 13 mmhg, PVR 6.1 tabor).     He has never been on PH specific therapy before.   He is not sure about his O2 therapy-usually 2-3 liters, he does wear it at night with his CPAP and lately he has been wearing it throughout the day, because of increased SOB.    Never smoked before.        Brendan Katz is currently a [place] Heart Association class III-IV.     Past Medical History:   Diagnosis Date   • Acute dyspnea 4/11/2014   • Atrial fibrillation (CMS/HCC)    • Colon polyp    • Congestive heart failure (CMS/HCC)     with lower extremity edema   • DMII (diabetes mellitus, type 2) (CMS/HCC) 6/11/2018   • Dyslipidemia    • Dyspnea     chronic shortness of breath   • Essential hypertension, benign    • Hypertension    • Internal hemorrhoids    • Morbid obesity (CMS/HCC)    • Obstructive sleep apnea     compliant to C Pap   • Osteoarthritis    • Sepsis(995.91) 8/10/2017     Past Surgical  History:   Procedure Laterality Date   • Colonoscopy diagnostic  02/19/2007   • Coronary angioplasty with stent placement  3/3/14    JHONATAN distal RCA, Mid RCA, proximal OM1   • Echo m-mode/2d/doppler (routine)  4/11/2014   • Hernia repair     • Stress test  02/11/2014     Family History   Problem Relation Age of Onset   • Cancer Mother    • Eczema Sister    • Cancer Brother    • Substance Abuse Brother      Social History     Socioeconomic History   • Marital status: /Civil Union     Spouse name: Not on file   • Number of children: Not on file   • Years of education: Not on file   • Highest education level: Not on file   Social Needs   • Financial resource strain: Not on file   • Food insecurity - worry: Not on file   • Food insecurity - inability: Not on file   • Transportation needs - medical: Not on file   • Transportation needs - non-medical: Not on file   Occupational History   • Not on file   Tobacco Use   • Smoking status: Never Smoker   • Smokeless tobacco: Never Used   Substance and Sexual Activity   • Alcohol use: Yes     Comment: \"rarely, last in December\"   • Drug use: No     Comment: 12/12/18: no   • Sexual activity: Yes     Partners: Female     Birth control/protection: Post-menopausal   Other Topics Concern   • Not on file   Social History Narrative   • Not on file     ALLERGIES: no known allergies.    Review of Systems: 12 point ROS was performed and negative except as stated above in HPI and past medical history.    Physical Examination:  Visit Vitals  /67 (BP Location: [facility] [place], Patient Position: Sitting, Cuff Size: Large Adult)   Pulse 70   Ht 6' (1.829 m)   Wt (!) 153.3 kg   SpO2 91%   BMI 45.83 kg/m²     Constitutional: well nourished 64 year old morbidly obese male in no acute distress.  Skin: Warm, dry, intact and no lesions.  HEENT: Normocephalic, atraumatic. Mucous membranes moist, EOMs intact.  Neck: Supple, trachea midline, RAP around 6-7 mmhg, No HJR at 90 degrees.  J  Cardiovascular: Normal S1, S2. Irregular rhythm.  Respiratory: Anterior/posterior lung sounds clear to auscultation bilaterally.   Abdomen: Soft, nontender, normal bowel sounds, negative hepatosplenomegaly.  Musculoskeletal/Extremities: no clubbing, no cyanosis, 1+ pitting BLE (bilateral lower extremities).  Neurological: No focal abnormalities and normal speech. Sensation grossly intact.  Psych: Alert and oriented to person, place and time.    Laboratory Data:  Recent Labs   Lab 01/11/19  1312 01/04/19  1205 06/05/18  1112   Sodium 138 135 143   Potassium 4.1 4.5 3.6   Chloride 96* 96* 103   Carbon Dioxide 32 32 28   BUN 79* 105* 23*   CALCIUM 9.7 9.7 9.0   Glucose 199* 170* 120*   Creatinine 1.77* 1.82* 1.11     Recent Labs   Lab 06/05/18  1112   CHOLESTEROL 174   HDL 44   CHOL/HDL 4.0     Recent Labs   Lab 01/29/19  0935 12/11/18  0930 06/05/18  1112   WBC 7.0 13.6* 8.4   RBC 5.44 5.94* 6.08*   HGB 15.6 16.9 17.9*   HCT 46.8 51.4* 52.8*    237 199     Diagnostics:    The following imaging were reviewed ECHO, NATALIO, coronary angiography and right heart cath.    Other pertinent labs and diagnostic testing were reviewed.    RHC 1/22/2019:  RV pressure: 75/10 mmHg  PA 70/36/48 mmHg  Pulmonary capillary wedge pressure: 13 mmHg  Cardiac output thermodilution: 4.96 L/m, index 1.9 L/m/m²  Cardiac output by Margot: 4.73 L/m, index 1.8 L/m/m²  PA sat 60%  RA sat 63%  AO sat 96%  PVR: 6.1 tabor  TPR 8.9 tabor     Nitric oxide, 20 ppm  PA 64/31/42 mmHg     Nitric oxide 40 ppm  PA 61/32/42 mmHg  Thermodilution cardiac output: 5.28 L/m  RA pressure: 11 mmHg    PFTs 5/21/2018:  Obstructive changes which are severe with partial improvement after bronchodilators.    V/Q scan 2/12/19:  Normal    TTE 1/4/2019:  Normal left ventricular size and systolic function.  Flattened septum in systole consistent with right ventricle pressure overload.  Severely increased right ventricular size.  Moderately decreased right ventricular systolic  function.  Severely increased left atrial size.  Severely increased right atrial size.  Mild-to-moderate tricuspid valve regurgitation.  Right ventricular systolic pressure 70.7 mmHg.    Assessment:  1. Severe PH, WHO group III-IV- WHO group I severity of his PAH and symptoms.  2. Permenant AF f/u with EP on Xarelto.  3. CAD s/p PCI to RCA and LCx 2014.  4. CHANI on CPAP.  5. Chronic hypoxia on O2, follows up with pulmonary-on 2-3 liters nearly continuously (doesn't use with routine walking)..  6. History of multiple lung infections.  7. Morbid obesity.    Plan:  · He is quite symptomatic and his PAH is in the severe range, he would benefit from IV therapy with Remodulin.   · We will admit him today , place a triple lumen PICC cath (Remodukin, lasix, KCL replacement) and start IV remodulin at 1ng and titrate up. Tunnel single lumen Blank just prior to discharge.  · He will also benefit from ERA (no significant LE swelling, last LFTs normal)-will start as an outpatient in a few weeks-when he returns for F/U.  · He would benefit from repeat the RHC at a later date, when on stable drug.  · Needs autoimmune workup drawn.  · Continue CPAP machine at night-they brought it with to the hospital.  · Continue the rest of his medications.   · He was taught on the side effects of the Remodulin including headache, flushing and occasional jaw pain, N/V/D.   · He was also educated on the line management and how to keep dry and clean.  · Will discuss these recommendation with the inpatient team.     Rox Dunlap MD  Cardiology fellow, PGY-VI  11:26 AM  3/25/2019    Greater than 50% of this 60 minute visit was spent in education, counseling and coordination of care. Patient understands he can return sooner if any issues should arise.      I have examined the patient, reviewed the pertinent diagnostic studies and medical data, and have participated in the development of the treatment plan with the Cardiology Fellow. I have had a  face-to-face encounter with the patient and agree with the documentation stated above. I have made the following addendums: Lungs with decreased breath sounds in the bases, heart with normal sounds, and LE with 1+ edema. Questions answered. Extensive questions answered and they understand the treatment plan.    Dr. Valery Allen MD           fair balance

## 2021-05-16 NOTE — PROGRESS NOTE ADULT - PROBLEM SELECTOR PLAN 3
last drink 3 days ago, patient w/o alcohol in system  - high risk CIWA   - librium taper as patient is high risk  - seizure and fall precaution   - SBIRT consult  -  consult

## 2021-05-16 NOTE — PROGRESS NOTE ADULT - ATTENDING COMMENTS
Mica Hagen MD  Division of Hospital Medicine  [facility]   Pager: [phone]    Patient seen and examined today with Team 3 Resident and Interns. Agree with above findings, assessment, and plan with the following additions/exceptions:    1. Alcohol abuse/withdrawal: last drink day of last admission 5/12/21. BAL neg. c/w librium taper from last hospitalization yesterday - to end tomorrow 5/17. monitor on CIWA.   2. Fall: per patient, felt weak from his back pain and fell though story seems to vary from provder to provider. CT Head and C-spine unremarkable. f/u XR of chest, pelvis, knees, tib/fibs to evaluate for new fractures (has known previous nasal fracture and lumbar vertebral fracture from last admission) - amenable to go down for imaging today.  3. Ataxia: report of confusion (resolved now) associated with ataxia and horizontal nystagmus concerning for Wernicke's in setting of EtOH abuse. f/u  thiamine level - pending in lab. c/w high dose thiamine. f/u MRI brain.    SW consult    Rest as detailed in note above.    Plan discussed with patient via  and Team 3 Intern Dr. Garenr.

## 2021-05-16 NOTE — PROGRESS NOTE ADULT - ASSESSMENT
This patient is a 59yo gentleman with PMH of EtOH abuse, chronic pancreatitis who presents to the ED after an observed fall, in the setting of alcohol use, found to have L nasal bone fracture, vertebral fracture of transverse process and left facial wound now presenting back to the hospital for a fall, concern for possible Wernicke's.     Last drink 3 days ago, was on librium taper nhere with CIWA 0's.

## 2021-05-16 NOTE — PROGRESS NOTE ADULT - PROBLEM SELECTOR PLAN 2
Altered, found down   - BAL 0  - concern for Wernicke  - s/p thiamine, start on high dose thiamine  - CTH normal  - f/u MRI head    - f/u Utox to r/o other substances causing alteration in gait   - FS in normal limits, will continue with FS, f/u A1C  - Infectious workup limited to UA (negative) as patients vitals WNL and normal WBC, low -no concern for infxn

## 2021-05-17 LAB
ALBUMIN SERPL ELPH-MCNC: 4.1 G/DL — SIGNIFICANT CHANGE UP (ref 3.3–5)
ALP SERPL-CCNC: 73 U/L — SIGNIFICANT CHANGE UP (ref 40–120)
ALT FLD-CCNC: 81 U/L — HIGH (ref 10–45)
ANION GAP SERPL CALC-SCNC: 15 MMOL/L — SIGNIFICANT CHANGE UP (ref 5–17)
AST SERPL-CCNC: 94 U/L — HIGH (ref 10–40)
BILIRUB SERPL-MCNC: 0.7 MG/DL — SIGNIFICANT CHANGE UP (ref 0.2–1.2)
BUN SERPL-MCNC: 18 MG/DL — SIGNIFICANT CHANGE UP (ref 7–23)
CALCIUM SERPL-MCNC: 9.3 MG/DL — SIGNIFICANT CHANGE UP (ref 8.4–10.5)
CHLORIDE SERPL-SCNC: 101 MMOL/L — SIGNIFICANT CHANGE UP (ref 96–108)
CO2 SERPL-SCNC: 20 MMOL/L — LOW (ref 22–31)
CREAT SERPL-MCNC: 0.99 MG/DL — SIGNIFICANT CHANGE UP (ref 0.5–1.3)
GLUCOSE SERPL-MCNC: 105 MG/DL — HIGH (ref 70–99)
HCT VFR BLD CALC: 35.4 % — LOW (ref 39–50)
HGB BLD-MCNC: 11.4 G/DL — LOW (ref 13–17)
MAGNESIUM SERPL-MCNC: 1.8 MG/DL — SIGNIFICANT CHANGE UP (ref 1.6–2.6)
MCHC RBC-ENTMCNC: 32.2 GM/DL — SIGNIFICANT CHANGE UP (ref 32–36)
MCHC RBC-ENTMCNC: 32.4 PG — SIGNIFICANT CHANGE UP (ref 27–34)
MCV RBC AUTO: 100.6 FL — HIGH (ref 80–100)
NRBC # BLD: 0 /100 WBCS — SIGNIFICANT CHANGE UP (ref 0–0)
PHOSPHATE SERPL-MCNC: 3.7 MG/DL — SIGNIFICANT CHANGE UP (ref 2.5–4.5)
PLATELET # BLD AUTO: 261 K/UL — SIGNIFICANT CHANGE UP (ref 150–400)
POTASSIUM SERPL-MCNC: 3.9 MMOL/L — SIGNIFICANT CHANGE UP (ref 3.5–5.3)
POTASSIUM SERPL-SCNC: 3.9 MMOL/L — SIGNIFICANT CHANGE UP (ref 3.5–5.3)
PROT SERPL-MCNC: 7.3 G/DL — SIGNIFICANT CHANGE UP (ref 6–8.3)
RBC # BLD: 3.52 M/UL — LOW (ref 4.2–5.8)
RBC # FLD: 13.4 % — SIGNIFICANT CHANGE UP (ref 10.3–14.5)
SARS-COV-2 RNA SPEC QL NAA+PROBE: SIGNIFICANT CHANGE UP
SODIUM SERPL-SCNC: 136 MMOL/L — SIGNIFICANT CHANGE UP (ref 135–145)
T PALLIDUM AB TITR SER: NEGATIVE — SIGNIFICANT CHANGE UP
WBC # BLD: 7.03 K/UL — SIGNIFICANT CHANGE UP (ref 3.8–10.5)
WBC # FLD AUTO: 7.03 K/UL — SIGNIFICANT CHANGE UP (ref 3.8–10.5)

## 2021-05-17 PROCEDURE — 99232 SBSQ HOSP IP/OBS MODERATE 35: CPT | Mod: GC

## 2021-05-17 RX ADMIN — Medication 105 MILLIGRAM(S): at 05:10

## 2021-05-17 RX ADMIN — Medication 1 PATCH: at 19:54

## 2021-05-17 RX ADMIN — Medication 105 MILLIGRAM(S): at 13:46

## 2021-05-17 RX ADMIN — MUPIROCIN 1 APPLICATION(S): 20 OINTMENT TOPICAL at 17:16

## 2021-05-17 RX ADMIN — Medication 1 PATCH: at 12:51

## 2021-05-17 RX ADMIN — PREGABALIN 1000 MICROGRAM(S): 225 CAPSULE ORAL at 12:58

## 2021-05-17 RX ADMIN — Medication 105 MILLIGRAM(S): at 21:19

## 2021-05-17 RX ADMIN — Medication 1 MILLIGRAM(S): at 12:51

## 2021-05-17 RX ADMIN — Medication 1 PATCH: at 12:50

## 2021-05-17 RX ADMIN — MUPIROCIN 1 APPLICATION(S): 20 OINTMENT TOPICAL at 05:10

## 2021-05-17 RX ADMIN — ENOXAPARIN SODIUM 40 MILLIGRAM(S): 100 INJECTION SUBCUTANEOUS at 12:49

## 2021-05-17 RX ADMIN — Medication 1 PATCH: at 09:35

## 2021-05-17 NOTE — DIETITIAN INITIAL EVALUATION ADULT. - ADD RECOMMEND
Suggest multivitamin supplementation if no medical contraindications, continue thiamine and folic acid as able in setting of EtOH abuse. Monitor PO intake, weight, labs, skin, GI status, diet.

## 2021-05-17 NOTE — DIETITIAN INITIAL EVALUATION ADULT. - PROBLEM SELECTOR PLAN 2
Altered, found down   - BAL 0  - concern for Wernicke  - s/p thiamine, start on high dose thiamine  - CTH normal  - f/u MRI head    - f/u Utox to r/o other substances causing alteration ion gait   - FS in normal limits, will continue with FS, f/u A1C  - Infectious workup limited to UA (negative) as patients vitals WNL and normal WBC, low -no concern for infxn

## 2021-05-17 NOTE — DIETITIAN INITIAL EVALUATION ADULT. - OTHER INFO
Pt visited, noted to be confused, disoriented. [place]-speaking. Unable to participate with  at this time in setting of AMS. PCA reports pt eating well. No acute GI distress noted. Last documented BM yesterday (5/16). Per Patient Profile, pt swallows foods/liquids without difficulty. NKFA per chart.     Per previous RD note, pt weighed 144.8 pounds/65.7 kg 6/2019. Dosing wt (69.8 kg) and bed scale wt obtained at time of visit (69.9 kg) suggest wt gain since then. Will continue to monitor and trend weights.

## 2021-05-17 NOTE — DIETITIAN INITIAL EVALUATION ADULT. - REASON INDICATOR FOR ASSESSMENT
Consult received. Source: EMR, PCA. Pt is a 59 yo male with PMH of EtOH abuse, chronic pancreatitis, s/p L nasal bone fracture, vertebral fracture of transverse process and recent admission for EtOH abuse now BIBEMS s/p fall in front of EMS. Admitted 5/15.

## 2021-05-17 NOTE — PROGRESS NOTE ADULT - PROBLEM SELECTOR PLAN 2
Altered, found down   - BAL 0  - concern for Wernicke  - s/p thiamine, start on high dose thiamine  - CTH normal  - f/u MRI head    - f/u Utox to r/o other substances causing alteration in gait   - FS in normal limits, will continue with FS, f/u A1C  - Infectious workup limited to UA (negative) as patients vitals WNL and normal WBC, low -no concern for infxn Altered, found down   - BAL 0  - concern for Wernicke  - s/p thiamine, start on high dose thiamine  - CTH normal  - f/u MRI head  - may have some underlying dementia/cognitive change from chronic alcohol use.     - f/u Utox to r/o other substances causing alteration in gait   - FS in normal limits, will continue with FS, f/u A1C  - Infectious workup limited to UA (negative) as patients vitals WNL and normal WBC, low -no concern for infxn

## 2021-05-17 NOTE — DIETITIAN INITIAL EVALUATION ADULT. - ORAL INTAKE PTA/DIET HISTORY
Per previous RD note (6/2019), pt with poor PO intake prior to that admission, "pt does not eat, he has no money for food; pt is missing teeth when offered if he prefers be changed to Soft diet, he refused." Eating well at time of that visit.

## 2021-05-17 NOTE — PROGRESS NOTE ADULT - SUBJECTIVE AND OBJECTIVE BOX
PROGRESS NOTE:   Authored by Dr. Carole Garner, [facility] pager [phone]    Patient is a 60y old  Male who presents with a chief complaint of fall (16 May 2021 08:24)      SUBJECTIVE / OVERNIGHT EVENTS:    MEDICATIONS  (STANDING):  chlordiazePOXIDE 50 milliGRAM(s) Oral once  cyanocobalamin 1000 MICROGram(s) Oral daily  enoxaparin Injectable 40 milliGRAM(s) SubCutaneous daily  folic acid 1 milliGRAM(s) Oral daily  lactated ringers. 1000 milliLiter(s) (75 mL/Hr) IV Continuous <Continuous>  mupirocin 2% Ointment 1 Application(s) Topical two times a day  nicotine -   7 mG/24Hr(s) Patch 1 patch Transdermal daily  thiamine IVPB 500 milliGRAM(s) IV Intermittent every 8 hours    MEDICATIONS  (PRN):  chlordiazePOXIDE 50 milliGRAM(s) Oral every 1 hour PRN Symptom-triggered: each CIWA -Ar score 8 or GREATER  LORazepam     Tablet 2 milliGRAM(s) Oral every 2 hours PRN Symptom-triggered 2 point increase in CIWA-Ar      CAPILLARY BLOOD GLUCOSE        I&O's Summary    16 May 2021 07:01  -  17 May 2021 07:00  --------------------------------------------------------  IN: 600 mL / OUT: 250 mL / NET: 350 mL        PHYSICAL EXAM:  Vital Signs Last 24 Hrs  T(C): 37 (17 May 2021 04:40), Max: 37.2 (16 May 2021 20:53)  T(F): 98.6 (17 May 2021 04:40), Max: 98.9 (16 May 2021 20:53)  HR: 76 (17 May 2021 04:40) (74 - 79)  BP: 112/70 (17 May 2021 04:40) (111/69 - 128/72)  BP(mean): --  RR: 18 (17 May 2021 04:40) (18 - 18)  SpO2: 96% (17 May 2021 04:40) (96% - 98%)    GENERAL: No acute distress, well-developed  HEAD:  Atraumatic, Normocephalic  EYES: EOMI, PERRLA, conjunctiva and sclera clear  NECK: Supple, no lymphadenopathy, no JVD  CHEST/LUNG: CTAB; No wheezes, rales, or rhonchi  HEART: Regular rate and rhythm; No murmurs, rubs, or gallops  ABDOMEN: Soft, non-tender, non-distended; normal bowel sounds, no organomegaly  EXTREMITIES:  2+ peripheral pulses b/l, No clubbing, cyanosis, or edema  NEUROLOGY: A&O x 3, no focal deficits  SKIN: No rashes or lesions    LABS:                        11.4   7.03  )-----------( 261      ( 17 May 2021 07:05 )             35.4     -    136  |  101  |  18  ----------------------------<  105<H>  3.9   |  20<L>  |  0.99    Ca    9.3      17 May 2021 07:14  Phos  3.7     -  Mg     1.8         TPro  7.3  /  Alb  4.1  /  TBili  0.7  /  DBili  x   /  AST  94<H>  /  ALT  81<H>  /  AlkPhos  73  -17          Urinalysis Basic - ( 15 May 2021 08:31 )    Color: Yellow / Appearance: Clear / S.024 / pH: x  Gluc: x / Ketone: Negative  / Bili: Negative / Urobili: Negative   Blood: x / Protein: 30 mg/dL / Nitrite: Negative   Leuk Esterase: Negative / RBC: 24 /hpf / WBC 1 /HPF   Sq Epi: x / Non Sq Epi: 1 /hpf / Bacteria: Negative        Culture - Urine (collected 15 May 2021 13:25)  Source: .Urine Clean Catch (Midstream)  Final Report (16 May 2021 14:28):    No growth        RADIOLOGY & ADDITIONAL TESTS:  Results Reviewed:   Imaging Personally Reviewed:  Electrocardiogram Personally Reviewed:    COORDINATION OF CARE:  Care Discussed with Consultants/Other Providers [Y/N]:  Prior or Outpatient Records Reviewed [Y/N]:   PROGRESS NOTE:   Authored by Dr. Carole Garner, [facility] pager [phone]    Patient is a 60y old  Male who presents with a chief complaint of fall (16 May 2021 08:24)      SUBJECTIVE / OVERNIGHT EVENTS: Patient examined at bedside. He was eating very slowly. He states that he feels well but his back hurts    MEDICATIONS  (STANDING):  chlordiazePOXIDE 50 milliGRAM(s) Oral once  cyanocobalamin 1000 MICROGram(s) Oral daily  enoxaparin Injectable 40 milliGRAM(s) SubCutaneous daily  folic acid 1 milliGRAM(s) Oral daily  lactated ringers. 1000 milliLiter(s) (75 mL/Hr) IV Continuous <Continuous>  mupirocin 2% Ointment 1 Application(s) Topical two times a day  nicotine -   7 mG/24Hr(s) Patch 1 patch Transdermal daily  thiamine IVPB 500 milliGRAM(s) IV Intermittent every 8 hours    MEDICATIONS  (PRN):  chlordiazePOXIDE 50 milliGRAM(s) Oral every 1 hour PRN Symptom-triggered: each CIWA -Ar score 8 or GREATER  LORazepam     Tablet 2 milliGRAM(s) Oral every 2 hours PRN Symptom-triggered 2 point increase in CIWA-Ar      CAPILLARY BLOOD GLUCOSE        I&O's Summary    16 May 2021 07:01  -  17 May 2021 07:00  --------------------------------------------------------  IN: 600 mL / OUT: 250 mL / NET: 350 mL        PHYSICAL EXAM:  Vital Signs Last 24 Hrs  T(C): 37 (17 May 2021 04:40), Max: 37.2 (16 May 2021 20:53)  T(F): 98.6 (17 May 2021 04:40), Max: 98.9 (16 May 2021 20:53)  HR: 76 (17 May 2021 04:40) (74 - 79)  BP: 112/70 (17 May 2021 04:40) (111/69 - 128/72)  BP(mean): --  RR: 18 (17 May 2021 04:40) (18 - 18)  SpO2: 96% (17 May 2021 04:40) (96% - 98%)    GENERAL: No acute distress, well-developed  HEAD:  Atraumatic, Normocephalic  EYES: EOMI, PERRLA, conjunctiva and sclera clear  NECK: Supple, no lymphadenopathy, no JVD  CHEST/LUNG: CTAB; No wheezes, rales, or rhonchi  HEART: Regular rate and rhythm; No murmurs, rubs, or gallops  ABDOMEN: Soft, non-tender, non-distended; normal bowel sounds, no organomegaly  EXTREMITIES:  2+ peripheral pulses b/l, No clubbing, cyanosis, or edema  NEUROLOGY: A&O x 3, no focal deficits  SKIN: No rashes or lesions    LABS:                        11.4   7.03  )-----------( 261      ( 17 May 2021 07:05 )             35.4         136  |  101  |  18  ----------------------------<  105<H>  3.9   |  20<L>  |  0.99    Ca    9.3      17 May 2021 07:14  Phos  3.7     -  Mg     1.8         TPro  7.3  /  Alb  4.1  /  TBili  0.7  /  DBili  x   /  AST  94<H>  /  ALT  81<H>  /  AlkPhos  73  -          Urinalysis Basic - ( 15 May 2021 08:31 )    Color: Yellow / Appearance: Clear / S.024 / pH: x  Gluc: x / Ketone: Negative  / Bili: Negative / Urobili: Negative   Blood: x / Protein: 30 mg/dL / Nitrite: Negative   Leuk Esterase: Negative / RBC: 24 /hpf / WBC 1 /HPF   Sq Epi: x / Non Sq Epi: 1 /hpf / Bacteria: Negative        Culture - Urine (collected 15 May 2021 13:25)  Source: .Urine Clean Catch (Midstream)  Final Report (16 May 2021 14:28):    No growth        RADIOLOGY & ADDITIONAL TESTS:  Results Reviewed:   Imaging Personally Reviewed:  Electrocardiogram Personally Reviewed:    COORDINATION OF CARE:  Care Discussed with Consultants/Other Providers [Y/N]:  Prior or Outpatient Records Reviewed [Y/N]:   PROGRESS NOTE:   Authored by Dr. Carole Garner, [facility] pager [phone]    Patient is a 60y old  Male who presents with a chief complaint of fall (16 May 2021 08:24)      SUBJECTIVE / OVERNIGHT EVENTS: Patient examined at bedside. He was eating very slowly. He states that he feels well but his back hurts    MEDICATIONS  (STANDING):  chlordiazePOXIDE 50 milliGRAM(s) Oral once  cyanocobalamin 1000 MICROGram(s) Oral daily  enoxaparin Injectable 40 milliGRAM(s) SubCutaneous daily  folic acid 1 milliGRAM(s) Oral daily  lactated ringers. 1000 milliLiter(s) (75 mL/Hr) IV Continuous <Continuous>  mupirocin 2% Ointment 1 Application(s) Topical two times a day  nicotine -   7 mG/24Hr(s) Patch 1 patch Transdermal daily  thiamine IVPB 500 milliGRAM(s) IV Intermittent every 8 hours    MEDICATIONS  (PRN):  chlordiazePOXIDE 50 milliGRAM(s) Oral every 1 hour PRN Symptom-triggered: each CIWA -Ar score 8 or GREATER  LORazepam     Tablet 2 milliGRAM(s) Oral every 2 hours PRN Symptom-triggered 2 point increase in CIWA-Ar      CAPILLARY BLOOD GLUCOSE        I&O's Summary    16 May 2021 07:01  -  17 May 2021 07:00  --------------------------------------------------------  IN: 600 mL / OUT: 250 mL / NET: 350 mL        PHYSICAL EXAM:  Vital Signs Last 24 Hrs  T(C): 37 (17 May 2021 04:40), Max: 37.2 (16 May 2021 20:53)  T(F): 98.6 (17 May 2021 04:40), Max: 98.9 (16 May 2021 20:53)  HR: 76 (17 May 2021 04:40) (74 - 79)  BP: 112/70 (17 May 2021 04:40) (111/69 - 128/72)  BP(mean): --  RR: 18 (17 May 2021 04:40) (18 - 18)  SpO2: 96% (17 May 2021 04:40) (96% - 98%)    GENERAL: No acute distress  HEAD:  Atraumatic, Normocephalic  EYES: EOMI, PERRLA, conjunctiva and sclera clear  NECK: Supple, no lymphadenopathy, no JVD  CHEST/LUNG: CTAB; No wheezes, rales, or rhonchi  HEART: Regular rate and rhythm; No murmurs, rubs, or gallops  ABDOMEN: Soft, non-tender, non-distended; normal bowel sounds, no organomegaly  EXTREMITIES:  2+ peripheral pulses b/l, No clubbing, cyanosis, or edema  NEUROLOGY: A&O x 1, generalized weakness  SKIN: scab on face, some brusising on extremities     LABS:                        11.4   7.03  )-----------( 261      ( 17 May 2021 07:05 )             35.4     -    136  |  101  |  18  ----------------------------<  105<H>  3.9   |  20<L>  |  0.99    Ca    9.3      17 May 2021 07:14  Phos  3.7     -  Mg     1.8         TPro  7.3  /  Alb  4.1  /  TBili  0.7  /  DBili  x   /  AST  94<H>  /  ALT  81<H>  /  AlkPhos  73  -17          Urinalysis Basic - ( 15 May 2021 08:31 )    Color: Yellow / Appearance: Clear / S.024 / pH: x  Gluc: x / Ketone: Negative  / Bili: Negative / Urobili: Negative   Blood: x / Protein: 30 mg/dL / Nitrite: Negative   Leuk Esterase: Negative / RBC: 24 /hpf / WBC 1 /HPF   Sq Epi: x / Non Sq Epi: 1 /hpf / Bacteria: Negative        Culture - Urine (collected 15 May 2021 13:25)  Source: .Urine Clean Catch (Midstream)  Final Report (16 May 2021 14:28):    No growth        RADIOLOGY & ADDITIONAL TESTS:  Results Reviewed:   Imaging Personally Reviewed:  Electrocardiogram Personally Reviewed:    COORDINATION OF CARE:  Care Discussed with Consultants/Other Providers [Y/N]:  Prior or Outpatient Records Reviewed [Y/N]:

## 2021-05-17 NOTE — PROGRESS NOTE ADULT - ATTENDING COMMENTS
Patient seen and examined today with Team 3 Resident and Interns. Agree with above findings, assessment, and plan with the following additions/exceptions:    1. Encephalopathy, confusion, ataxia, in setting of Alcohol abuse/withdrawal: last drink day of last admission 5/12/21. BAL neg. c/w librium taper from last hospitalization yesterday - ends today, unlikely withdrawal but more likely due to chronic cognitive impairment and Wenickes encephalopathy.   Supportive care, c/w high dose thiamine. Hold on MRI as unlikely to . Will need to monitor and see if this is a reversible process vs. chronic. Patient is homeless, undocumented, no family, no insurance. If improves will likely be discharged to homeless shelter, PT recommended [facility], however, with no insurance, unsure if that is possible. If patient does not improve, cannot perform ADLs, will likely need to assess capacity for making discharge decisions. SW will need to investigate if their are family/friends who can be involved in his care.   2. Fall: per patient, felt weak from his back pain and fell. CT Head and C-spine unremarkable. Lumbar vertebral fracture from last admission - TLSO brace, no surgical intervention per that admission    SW consult    Rest as detailed in note above.    Plan discussed with patient via  and Team 3 Intern Dr. Garner.

## 2021-05-18 PROCEDURE — 73564 X-RAY EXAM KNEE 4 OR MORE: CPT | Mod: 26,RT

## 2021-05-18 PROCEDURE — 99254 IP/OBS CNSLTJ NEW/EST MOD 60: CPT

## 2021-05-18 PROCEDURE — 71045 X-RAY EXAM CHEST 1 VIEW: CPT | Mod: 26

## 2021-05-18 PROCEDURE — 72170 X-RAY EXAM OF PELVIS: CPT | Mod: 26

## 2021-05-18 PROCEDURE — 99233 SBSQ HOSP IP/OBS HIGH 50: CPT | Mod: GC

## 2021-05-18 PROCEDURE — 73590 X-RAY EXAM OF LOWER LEG: CPT | Mod: 26,RT

## 2021-05-18 RX ADMIN — ENOXAPARIN SODIUM 40 MILLIGRAM(S): 100 INJECTION SUBCUTANEOUS at 12:27

## 2021-05-18 RX ADMIN — Medication 105 MILLIGRAM(S): at 05:10

## 2021-05-18 RX ADMIN — Medication 105 MILLIGRAM(S): at 21:11

## 2021-05-18 RX ADMIN — Medication 1 PATCH: at 06:44

## 2021-05-18 RX ADMIN — Medication 1 PATCH: at 19:57

## 2021-05-18 RX ADMIN — Medication 1 MILLIGRAM(S): at 12:27

## 2021-05-18 RX ADMIN — Medication 1 PATCH: at 12:29

## 2021-05-18 RX ADMIN — Medication 105 MILLIGRAM(S): at 13:30

## 2021-05-18 RX ADMIN — Medication 1 PATCH: at 12:27

## 2021-05-18 RX ADMIN — MUPIROCIN 1 APPLICATION(S): 20 OINTMENT TOPICAL at 18:17

## 2021-05-18 RX ADMIN — PREGABALIN 1000 MICROGRAM(S): 225 CAPSULE ORAL at 12:27

## 2021-05-18 RX ADMIN — MUPIROCIN 1 APPLICATION(S): 20 OINTMENT TOPICAL at 05:10

## 2021-05-18 NOTE — PROGRESS NOTE ADULT - PROBLEM SELECTOR PLAN 3
last drink 3 days ago, patient w/o alcohol in system  - high risk CIWA   - librium taper as patient is high risk  - seizure and fall precaution   - SBIRT consult  -  consult last drink 3 days ago, patient w/o alcohol in system  - high risk CIWA   - librium taper as patient is high risk  - seizure and fall precaution   - SBIRT consult  -  consult  - psych eval for capacity

## 2021-05-18 NOTE — BH CONSULTATION LIAISON ASSESSMENT NOTE - SUMMARY
Patient is a 60M Bermudian-speaking, lives in [place], single, unemployed, denies substance use, PPH alcohol abuse (which patient denies), PMH pancreatitis, L nasal bone fracture, vertebral fractures. Patient was BIBEMS after witnessed fall. Psychiatry was consulted to evalaute capacity to refuse rehab and questionable underlying dementia.  On exam, Patient was AOx2 (oriented to self and hospital, not day/month/reason for visit/current events).  used [id number]. Patient was superficially cooperative with interview, answering only in short sentences. Patient denied alcohol use, when asked about substances including cocaine, marijuana, heroin patient responded he "did not know" what those were. The patient consistently communicated he did not want to go to rehab. His understanding for the hospital visit was that he is being treated for pain only. The patient's rationalization for leaving is that "I've been in the hospital too long." He also repeatedly stated "I need to  something" during the interview. When asked, the patient would not clarify what he needed to . The patient did not appreciate the risks of not attending rehab, saying "I'll find somewhere to stay where I do my own rehab." Patient denies SI/HI/intent/plan. No AVH.  Patient presentation is consistent with dementia vs Wernicke/Korsakoff. The patient does not have capacity to engage in dispo planning

## 2021-05-18 NOTE — BH CONSULTATION LIAISON ASSESSMENT NOTE - HPI (INCLUDE ILLNESS QUALITY, SEVERITY, DURATION, TIMING, CONTEXT, MODIFYING FACTORS, ASSOCIATED SIGNS AND SYMPTOMS)
Patient is a 60M Kittitian-speaking, lives in [place], single, unemployed, denies substance use, PPH alcohol abuse (which patient denies), PMH pancreatitis, L nasal bone fracture, vertebral fractures. Patient was BIBEMS after witnessed fall. Psychiatry was consulted to evalaute capacity to refuse rehab and questionable underlying dementia.  On exam, Patient was AOx2 (oriented to self and hospital, not day/month/reason for visit/current events).  used [id number]. Patient was superficially cooperative with interview, answering only in short sentences. Patient denied alcohol use, when asked about substances including cocaine, marijuana, heroin patient responded he "did not know" what those were. The patient consistently communicated he did not want to go to rehab. His understanding for the hospital visit was that he is being treated for pain only. The patient's rationalization for leaving is that "I've been in the hospital too long." He also repeatedly stated "I need to  something" during the interview. When asked, the patient would not clarify what he needed to . The patient did not appreciate the risks of not attending rehab, saying "I'll find somewhere to stay where I do my own rehab." Patient denies SI/HI/intent/plan. No AVH.

## 2021-05-18 NOTE — BH CONSULTATION LIAISON ASSESSMENT NOTE - CURRENT MEDICATION
MEDICATIONS  (STANDING):  cyanocobalamin 1000 MICROGram(s) Oral daily  enoxaparin Injectable 40 milliGRAM(s) SubCutaneous daily  folic acid 1 milliGRAM(s) Oral daily  lactated ringers. 1000 milliLiter(s) (75 mL/Hr) IV Continuous <Continuous>  mupirocin 2% Ointment 1 Application(s) Topical two times a day  nicotine -   7 mG/24Hr(s) Patch 1 patch Transdermal daily  thiamine IVPB 500 milliGRAM(s) IV Intermittent every 8 hours    MEDICATIONS  (PRN):  chlordiazePOXIDE 50 milliGRAM(s) Oral every 1 hour PRN Symptom-triggered: each CIWA -Ar score 8 or GREATER  LORazepam     Tablet 2 milliGRAM(s) Oral every 2 hours PRN Symptom-triggered 2 point increase in CIWA-Ar

## 2021-05-18 NOTE — PROGRESS NOTE ADULT - SUBJECTIVE AND OBJECTIVE BOX
PROGRESS NOTE:   Authored by Dr. Carole Garner, [facility] pager [phone]    Patient is a 60y old  Male who presents with a chief complaint of fall (17 May 2021 11:11)      SUBJECTIVE / OVERNIGHT EVENTS:    MEDICATIONS  (STANDING):  cyanocobalamin 1000 MICROGram(s) Oral daily  enoxaparin Injectable 40 milliGRAM(s) SubCutaneous daily  folic acid 1 milliGRAM(s) Oral daily  lactated ringers. 1000 milliLiter(s) (75 mL/Hr) IV Continuous <Continuous>  mupirocin 2% Ointment 1 Application(s) Topical two times a day  nicotine -   7 mG/24Hr(s) Patch 1 patch Transdermal daily  thiamine IVPB 500 milliGRAM(s) IV Intermittent every 8 hours    MEDICATIONS  (PRN):  chlordiazePOXIDE 50 milliGRAM(s) Oral every 1 hour PRN Symptom-triggered: each CIWA -Ar score 8 or GREATER  LORazepam     Tablet 2 milliGRAM(s) Oral every 2 hours PRN Symptom-triggered 2 point increase in CIWA-Ar      CAPILLARY BLOOD GLUCOSE        I&O's Summary    17 May 2021 07:01  -  18 May 2021 07:00  --------------------------------------------------------  IN: 1020 mL / OUT: 1220 mL / NET: -200 mL        PHYSICAL EXAM:  Vital Signs Last 24 Hrs  T(C): 36.7 (18 May 2021 04:08), Max: 36.9 (17 May 2021 21:18)  T(F): 98.1 (18 May 2021 04:08), Max: 98.4 (17 May 2021 21:18)  HR: 77 (18 May 2021 04:08) (75 - 80)  BP: 109/67 (18 May 2021 04:08) (106/69 - 117/72)  BP(mean): --  RR: 18 (18 May 2021 04:08) (18 - 18)  SpO2: 97% (18 May 2021 04:08) (96% - 98%)    GENERAL: No acute distress, well-developed  HEAD:  Atraumatic, Normocephalic  EYES: EOMI, PERRLA, conjunctiva and sclera clear  NECK: Supple, no lymphadenopathy, no JVD  CHEST/LUNG: CTAB; No wheezes, rales, or rhonchi  HEART: Regular rate and rhythm; No murmurs, rubs, or gallops  ABDOMEN: Soft, non-tender, non-distended; normal bowel sounds, no organomegaly  EXTREMITIES:  2+ peripheral pulses b/l, No clubbing, cyanosis, or edema  NEUROLOGY: A&O x 3, no focal deficits  SKIN: No rashes or lesions    LABS:                        11.4   7.03  )-----------( 261      ( 17 May 2021 07:05 )             35.4     05-17    136  |  101  |  18  ----------------------------<  105<H>  3.9   |  20<L>  |  0.99    Ca    9.3      17 May 2021 07:14  Phos  3.7     05-17  Mg     1.8     05-17    TPro  7.3  /  Alb  4.1  /  TBili  0.7  /  DBili  x   /  AST  94<H>  /  ALT  81<H>  /  AlkPhos  73  05-17              Culture - Urine (collected 15 May 2021 13:25)  Source: .Urine Clean Catch (Midstream)  Final Report (16 May 2021 14:28):    No growth        RADIOLOGY & ADDITIONAL TESTS:  Results Reviewed:   Imaging Personally Reviewed:  Electrocardiogram Personally Reviewed:    COORDINATION OF CARE:  Care Discussed with Consultants/Other Providers [Y/N]:  Prior or Outpatient Records Reviewed [Y/N]:   PROGRESS NOTE:   Authored by Dr. Carole Garner, [facility] pager [phone]    Patient is a 60y old  Male who presents with a chief complaint of fall (17 May 2021 11:11)      SUBJECTIVE / OVERNIGHT EVENTS: Patient examined at bedside. He appeared calm. Patient did not answer questions but stated that he felt ok.     MEDICATIONS  (STANDING):  cyanocobalamin 1000 MICROGram(s) Oral daily  enoxaparin Injectable 40 milliGRAM(s) SubCutaneous daily  folic acid 1 milliGRAM(s) Oral daily  lactated ringers. 1000 milliLiter(s) (75 mL/Hr) IV Continuous <Continuous>  mupirocin 2% Ointment 1 Application(s) Topical two times a day  nicotine -   7 mG/24Hr(s) Patch 1 patch Transdermal daily  thiamine IVPB 500 milliGRAM(s) IV Intermittent every 8 hours    MEDICATIONS  (PRN):  chlordiazePOXIDE 50 milliGRAM(s) Oral every 1 hour PRN Symptom-triggered: each CIWA -Ar score 8 or GREATER  LORazepam     Tablet 2 milliGRAM(s) Oral every 2 hours PRN Symptom-triggered 2 point increase in CIWA-Ar      CAPILLARY BLOOD GLUCOSE        I&O's Summary    17 May 2021 07:01  -  18 May 2021 07:00  --------------------------------------------------------  IN: 1020 mL / OUT: 1220 mL / NET: -200 mL        PHYSICAL EXAM:  Vital Signs Last 24 Hrs  T(C): 36.7 (18 May 2021 04:08), Max: 36.9 (17 May 2021 21:18)  T(F): 98.1 (18 May 2021 04:08), Max: 98.4 (17 May 2021 21:18)  HR: 77 (18 May 2021 04:08) (75 - 80)  BP: 109/67 (18 May 2021 04:08) (106/69 - 117/72)  BP(mean): --  RR: 18 (18 May 2021 04:08) (18 - 18)  SpO2: 97% (18 May 2021 04:08) (96% - 98%)    GENERAL: No acute distress  HEAD:  Atraumatic, Normocephalic  EYES: EOMI, PERRLA, conjunctiva and sclera clear  NECK: Supple, no lymphadenopathy, no JVD  CHEST/LUNG: CTAB; No wheezes, rales, or rhonchi  HEART: Regular rate and rhythm; No murmurs, rubs, or gallops  ABDOMEN: Soft, non-tender, non-distended; normal bowel sounds, no organomegaly  EXTREMITIES:  2+ peripheral pulses b/l, No clubbing, cyanosis, or edema  NEUROLOGY: A&O x 1, slow movements, pain in back   SKIN: some bruises and flaking skin     LABS:                        11.4   7.03  )-----------( 261      ( 17 May 2021 07:05 )             35.4     05-17    136  |  101  |  18  ----------------------------<  105<H>  3.9   |  20<L>  |  0.99    Ca    9.3      17 May 2021 07:14  Phos  3.7     05-17  Mg     1.8     05-17    TPro  7.3  /  Alb  4.1  /  TBili  0.7  /  DBili  x   /  AST  94<H>  /  ALT  81<H>  /  AlkPhos  73  05-17              Culture - Urine (collected 15 May 2021 13:25)  Source: .Urine Clean Catch (Midstream)  Final Report (16 May 2021 14:28):    No growth        RADIOLOGY & ADDITIONAL TESTS:  Results Reviewed:   Imaging Personally Reviewed:  Electrocardiogram Personally Reviewed:    COORDINATION OF CARE:  Care Discussed with Consultants/Other Providers [Y/N]:  Prior or Outpatient Records Reviewed [Y/N]:

## 2021-05-18 NOTE — BH CONSULTATION LIAISON ASSESSMENT NOTE - CASE SUMMARY
This is a 60-y.o. HM patient, Bahamian-speaking, lives in [place], single, unemployed, denies substance use, PPH alcohol abuse (which patient denies), PMH pancreatitis, L nasal bone fracture, vertebral fractures. Patient was BIBEMS after witnessed fall. Psychiatry was consulted to evalaute capacity to refuse rehab and questionable underlying dementia.    I have seen and evaluated this patient myself. Chart, labs, meds reviewed. Patient presents with notable cognitive deficits, likely associated with alcohol use. I agree with trainee's assessment and plan.

## 2021-05-18 NOTE — BH CONSULTATION LIAISON ASSESSMENT NOTE - NSBHCHARTREVIEWINVESTIGATE_PSY_A_CORE FT
< from: 12 Lead ECG (05.12.21 @ 23:17) >      Ventricular Rate 66 BPM    Atrial Rate 66 BPM    P-R Interval 132 ms    QRS Duration 120 ms    Q-T Interval 442 ms    QTC Calculation(Bazett) 463 ms    P Axis 54 degrees    R Axis 18 degrees    T Axis 35 degrees    Diagnosis Line NORMAL SINUS RHYTHM  RIGHT BUNDLE BRANCH BLOCK  ABNORMAL ECG  WHEN COMPARED WITH ECG OF 23-FEB-2020 18:04,  NO SIGNIFICANT CHANGE WAS FOUND  Confirmed by REBECCA KAPLAN, GILBERT BYRD ([id number]) on 5/13/2021 2:39:21 PM    < end of copied text >

## 2021-05-18 NOTE — PROGRESS NOTE ADULT - ATTENDING COMMENTS
chronic alcohol abuse  ?dementia  no acute withdrawal signs  poor compliance  poor social support  f/up psych/SW eval  complex discharge planning

## 2021-05-18 NOTE — BH CONSULTATION LIAISON ASSESSMENT NOTE - DESCRIPTION
Patient is a 60M, Portuguese speaking, undomociled per note (refused to answer on exam), lives in [place], never marreid, unemployed, denies alcohol use despite documented AUD, denies substance use.

## 2021-05-18 NOTE — BH CONSULTATION LIAISON ASSESSMENT NOTE - NSBHCHARTREVIEWLAB_PSY_A_CORE FT
11.4   7.03  )-----------( 261      ( 17 May 2021 07:05 )             35.4   05-17    136  |  101  |  18  ----------------------------<  105<H>  3.9   |  20<L>  |  0.99    Ca    9.3      17 May 2021 07:14  Phos  3.7     05-17  Mg     1.8     05-17    TPro  7.3  /  Alb  4.1  /  TBili  0.7  /  DBili  x   /  AST  94<H>  /  ALT  81<H>  /  AlkPhos  73  05-17

## 2021-05-18 NOTE — BH CONSULTATION LIAISON ASSESSMENT NOTE - NSBHMSEMOOD_PSY_A_CORE
SUBJECTIVE:   CC: Darian Lim is an 23 year old male who presents for preventative health visit. Patient needs an admission physical to [facility] chemical dependency treatment facility and was admitted 1/18/17. Patient has a history of chemical dependency to methamphetamine (IV, inhalation), clean date 7/22/17.    Reports that his transition into [facility] has been going smoothly. He has no concerns today. Was most recently in treatment up in [place] at [facility]. He found treatment there challenging as they would not allow him to smoke cigarettes while he was there. He was in treatment there for 78 days. Admits that he continued to smoke while he was there and tried his best to hide it. Eventually got caught and was subsequently dismissed from treatment.     Migraine:  Has been having migraines since being sober. Believes he has an aura, can sometimes tell when he is about to get a migraine. Has had one migraine since being at [facility]. Well controlled after administration of ibuprofen. He is requesting a refill on ibuprofen today.     ADHD:  Was on medication as a child, age 7-8. Was managed with Adderall at that time. When he started using meth, he no longer needed the Adderall so has been off it ever since. Since being sober, he has noticed his ADHD symptoms have returned.      Anxiety/Depression:  Currently on celexa 20 mg and buspirone to manage anxiety. Denies symptoms of depression. Feels he would benefit from and increase in dosage on these medications. Has been on higher doses previously. Denies lapse in medication compliance with recent transition into treatment.   PHQ-9 SCORE 1/25/2018   Total Score 9     IBETH-7 SCORE 1/25/2018   Total Score 6     Insomnia:  Reports trouble sleeping at night. This includes falling asleep and staying asleep. States he has restless legs. Usually goes to bed around 11 pm and wakes up at 4 am. Has been taking trazadone 100 mg at bedtime, this is ineffective. Has  taken Seroquel in the past and that has worked well for him. He is interested in restarting this. Most recently tried melatonin, this was also ineffective.     Tobacco abuse:  Currently smoking 3-6 cigarettes/day, has been trying to cut down. Is interested in nicotine replacement. Has tried the patches previously, however experienced skin irritation and localized rash despite rotating sites. Is interested in the nicotrol inhaler. Does not have interest in the nicotine gum.     Near-sightedness:  Saw an eye provider in Oct./2017. Was given an updated prescription. His glasses are currently at home with no way to obtain them. Poor vision causes headaches. He would like a new pair of glasses if possible. Is interested in seeing an eye provider again.       Healthy Habits:    Amount of exercise or daily activities, outside of work: Prior to treatment, didn't exercise. Went to [facility] three times weekly while at [facility].     Problems taking medications regularly? No    Medication side effects: No    Have you had an eye exam in the past two years? Yes. Has glasses, does not have them currently. Near-sighted. Does get headaches. Last appointment with eye provider was Oct. 2017. New prescription at that time.     Do you see a dentist twice per year? Yes. Was seen in Oct. 2017, was suppose to follow-up to have wisdom teeth out, never did. They are not bothering him.     Sleep: Trazadone, has a hard time falling asleep and staying asleep. Gets 5 hours sleep/night.      Today's PHQ-2 Score:   PHQ-2 ( 1999 Pfizer) 1/25/2018   Q1: Little interest or pleasure in doing things 0   Q2: Feeling down, depressed or hopeless 0   PHQ-2 Score 0     Abuse: Current or Past (Physical, Sexual or Emotional) - No  Do you feel safe in your environment - Yes    Social History     Social History     Marital status: Single     Spouse name: N/A     Number of children: 0     Years of education: 12     Occupational History       [employer]     Social History Main Topics     Smoking status: Current Every Day Smoker     Packs/day: 0.10     Years: 9.00     Types: Cigarettes     Smokeless tobacco: Never Used     Alcohol use Yes      Comment: Heavier drinking on weekends, can't remember how much     Drug use: No      Comment: Sober from meth 7/22/17     Sexual activity: Not Currently     Partners: Female     Other Topics Concern     Not on file     Social History Narrative    Grew up in [place], parents still live there. Sister lives there as well.  with children. Feels good support from his family. Biggest support is his older sister, she is three years older. She is , patient gets along well with his brother in law. 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         Single. No children. Hopes to move back to [place] after finishing treatment.       If you drink alcohol do you typically have >3 drinks per day or >7 drinks per week? Not Applicable                      Last PSA: No results found for: PSA    Reviewed orders with patient. Reviewed health maintenance and updated orders accordingly - Yes    Reviewed and updated as needed this visit by clinical staff  Tobacco  Allergies  Meds  Soc Hx      Reviewed and updated as needed this visit by Provider        ROS:  C: NEGATIVE for fever, chills, change in weight  I: NEGATIVE for worrisome rashes, moles or lesions  E: NEGATIVE for vision changes or irritation  ENT: NEGATIVE for ear, mouth and throat problems  R: NEGATIVE for significant cough or SOB  CV: NEGATIVE for chest pain, palpitations or peripheral edema  GI: NEGATIVE for nausea, abdominal pain, heartburn, or change in bowel habits   male: negative for dysuria, hematuria, decreased urinary stream, erectile dysfunction, urethral discharge  M: NEGATIVE for significant arthralgias or myalgia  N: NEGATIVE for weakness, dizziness or paresthesias  P: NEGATIVE for changes in mood or  "affect    OBJECTIVE:   /73 (BP Location: Right arm, Patient Position: Chair, Cuff Size: Adult Regular)  Pulse 79  Temp 98  F (36.7  C) (Oral)  Resp 16  Ht 5' 9.3\" (176 cm)  Wt 165 lb 14.4 oz (75.3 kg)  SpO2 98%  BMI 24.29 kg/m2    EXAM:  Constitutional: appears to be in no acute distress, comfortable, pleasant.   Eyes: anicteric, conjunctiva clear without drainage or redness, red reflex present bilaterally.   Ears, Nose and Throat: tympanic membranes gray with LR,  nose without nasal discharge. OP: no erythema to posterior pharynx, negative post nasal drainage, tonsils +1 no erythema or exudate.  Cardiovascular: regular rate and rhythm, normal S1 and S2, no murmurs, rubs or gallops.  Respiratory: Air entry throughout. Breathing pattern unlabored without the use of accessory muscles. Clear to auscultation A and P, no wheezes or crackles, normal breath sounds.    Gastrointestinal: flat, soft. Positive bowel sounds x4, nontender, no masses.   Musculoskeletal: full range of motion, no edema.   Skin: pink, turgor smooth and elastic. Negative for lesions or dryness.  Neurological: normal gait, no tremor.   Psychological: appropriate mood and affect.   Lymphatic: no cervical, axillary, supraclavicular, or infraclavicular lymphadenopathy.      ASSESSMENT/PLAN:     1. Encounter for preventive health examination  2. Encounter for administration of vaccine  3. Need for hepatitis B screening test  Comment: Last physical exam 10/2017. Today's visit is routine for admission into treatment at [facility]. Unknown when last Td booster was, no records on [facility].   Plan:   - Lipid panel reflex to direct LDL Fasting   - Glucose Fasting ([facility])   - Declines Influenza and pneumonia vaccine today   - TDAP VACCINE (BOOSTRIX)   - VACCINE ADMINISTRATION, INITIAL   - Hepatitis B Surface Antibody    4. Migraine with aura and without status migrainosus, not intractable  Comment: Has been having migraines since being sober. " Believes he has an aura, can sometimes tell when he is about to get one. Has had a migraine since being at [facility]. Well controlled after administrations of ibuprofen. He is requesting a refill on ibuprofen today.  Plan:   - naproxen (NAPROSYN) 500 MG tablet    5. Chemical dependency (H)  Comment: Patient admitted to [facility] chemical dependency treatment facility on 1/18/17. History of chemical dependency to methamphetamine (IV, inhalation), clean date 7/22/17. Patient encouraged in his sobriety.   Plan:   - Continue treatment at [facility].    6. Insomnia, unspecified type  Comment: Reports trouble falling asleep and staying asleep, has restless legs. Usually gets 5 hours sleep/night. Taking trazadone 100 mg at bedtime, ineffective. Took Seroquel in the past, worked well for him. Interested in restarting. Most recently tried melatonin, ineffective.    Would consider discontinuing Trazodone and starting low-dose Seroquel today. However risk of QT prolongation with concurrent Celexa/Seroquel administration. Same risk with Celexa/Trazodone, however patient has tolerated this medication without issues since May 2017. Will increase dose today. Recommend patient be seen by [facility] for initiation of Seroquel.   Plan:   - traZODone (DESYREL) 150 MG tablet; Take 1 tablet (150 mg) by mouth At Bedtime  Dispense: 30 tablet; Refill: 1   - Referral to [facility] for medication management.    7. Anxiety  Comment: Currently on celexa 20 mg and Buspirone to manage anxiety. Denies symptoms of depression. Feels he would benefit increased in dosage on these medications. Has been on higher doses previously. Denies lapse in medication compliance with recent transition into treatment.   PHQ-9 SCORE 1/25/2018   Total Score 9     IBETH-7 SCORE 1/25/2018   Total Score 6   Plan:   - Referral to [facility] for medication management.    8. Attention deficit hyperactivity disorder (ADHD), unspecified ADHD type  Comment: Was on  medication as a child, age 5-8. Was managed with Adderall at that time. When he started using meth, he no longer needed the Adderall so has been off it ever since. Since being sober, he has noticed his ADHD symptoms have returned.    Plan:   - Referral to [facility] for medication management.    9. Tobacco abuse  Comment: Currently smoking 3-6 cigarettes/day, has been trying to cut down. Is interested in nicotine replacement. Has tried the patches previously, however experienced skin irritation and localized rash despite rotating sites. Is interested in the nicotrol inhaler. Does not have interest in the nicotine gum.   Plan:   - nicotine (NICOTROL) 10 MG Inhaler; Inhale 6-16 Cartridges into the lungs daily as needed for smoking cessation     - SMOKING CESSATION COUNSELING 3-10 MIN (Tobacco Nicotine) [18717]    10. Routine screening for STI (sexually transmitted infection)  Comment: Denies signs/symptoms. Not currently sexually active. Not in a relationship.  Plan:   - Hepatitis C Screen Reflex to HCV RNA Quant and Genotype   - HIV Antigen Antibody Combo   - NEISSERIA GONORRHOEA PCR   - CHLAMYDIA TRACHOMATIS PCR    11. Myopia of both eyes  Comment: Saw an eye provider in Oct./2017. Was given an updated prescription. Glasses are currently at home with no way to obtain them. His poor vision causes headaches. Would like a new pair of glasses if possible. Is interested in seeing an eye provider again.   Plan:   - OPHTHALMOLOGY ADULT REFERRAL      Follow-up: Lab work pending, no follow-up indicated at this time. Return to clinic if abnormal lab results indicate this is necessary.        COUNSELING:  Reviewed preventive health counseling, as reflected in patient instructions  Special attention given to:        Regular exercise       Healthy diet/nutrition       Vision screening       Immunizations    Declined: Influenza and Pneumococcal due to Conscientious objector               Alcohol Use       Safe sex practices/STD  "prevention       Consider Hep C screening for patients born between 1945 and 1965       HIV screeninx in teen years, 1x in adult years, and at intervals if high risk       One time pneumovax for smokers    BP Screening:   Last 3 BP Readings:    BP Readings from Last 3 Encounters:   18 123/73       The following was recommended to the patient:  Re-screen BP within a year and recommended lifestyle modifications   reports that he has been smoking Cigarettes.  He has been smoking about 1.00 pack per day. He has never used smokeless tobacco.  Tobacco Cessation Action Plan: Pharmacotherapies : other Nicotine replacement  Estimated body mass index is 24.29 kg/(m^2) as calculated from the following:    Height as of this encounter: 5' 9.3\" (176 cm).    Weight as of this encounter: 165 lb 14.4 oz (75.3 kg).       Counseling Resources:  ATP IV Guidelines  Pooled Cohorts Equation Calculator  FRAX Risk Assessment  ICSI Preventive Guidelines  Dietary Guidelines for Americans,   USDA's MyPlate  ASA Prophylaxis  Lung CA Screening    Lynne Davis, DNP, APRN, CNP  [facility]  " Normal

## 2021-05-18 NOTE — BH CONSULTATION LIAISON ASSESSMENT NOTE - NSBHCHARTREVIEWVS_PSY_A_CORE FT
Vital Signs Last 24 Hrs  T(C): 36.8 (18 May 2021 10:12), Max: 36.9 (17 May 2021 21:18)  T(F): 98.3 (18 May 2021 10:12), Max: 98.4 (17 May 2021 21:18)  HR: 69 (18 May 2021 10:12) (69 - 80)  BP: 113/72 (18 May 2021 10:12) (106/69 - 117/72)  BP(mean): --  RR: 18 (18 May 2021 10:12) (18 - 18)  SpO2: 95% (18 May 2021 10:12) (95% - 97%)

## 2021-05-18 NOTE — PROGRESS NOTE ADULT - PROBLEM SELECTOR PLAN 2
Altered, found down   - BAL 0  - concern for Wernicke  - s/p thiamine, start on high dose thiamine  - CTH normal  - f/u MRI head  - may have some underlying dementia/cognitive change from chronic alcohol use.     - f/u Utox to r/o other substances causing alteration in gait   - FS in normal limits, will continue with FS, f/u A1C  - Infectious workup limited to UA (negative) as patients vitals WNL and normal WBC, low -no concern for infxn

## 2021-05-18 NOTE — BH CONSULTATION LIAISON ASSESSMENT NOTE - NSBHCONSULTRECOMMENDOTHER_PSY_A_CORE FT
Wernicke/Korsakoff  -recommend thiamine, folate supplementation. Wernicke/Korsakoff  -continue thiamine, folate supplementation.  Monitor for the unlikely withdrawal (symptom-triggered CIWA).

## 2021-05-19 LAB
ALBUMIN SERPL ELPH-MCNC: 4.1 G/DL — SIGNIFICANT CHANGE UP (ref 3.3–5)
ALP SERPL-CCNC: 78 U/L — SIGNIFICANT CHANGE UP (ref 40–120)
ALT FLD-CCNC: 109 U/L — HIGH (ref 10–45)
ANION GAP SERPL CALC-SCNC: 13 MMOL/L — SIGNIFICANT CHANGE UP (ref 5–17)
AST SERPL-CCNC: 99 U/L — HIGH (ref 10–40)
BASOPHILS # BLD AUTO: 0.04 K/UL — SIGNIFICANT CHANGE UP (ref 0–0.2)
BASOPHILS NFR BLD AUTO: 0.7 % — SIGNIFICANT CHANGE UP (ref 0–2)
BILIRUB SERPL-MCNC: 0.4 MG/DL — SIGNIFICANT CHANGE UP (ref 0.2–1.2)
BUN SERPL-MCNC: 29 MG/DL — HIGH (ref 7–23)
CALCIUM SERPL-MCNC: 9.5 MG/DL — SIGNIFICANT CHANGE UP (ref 8.4–10.5)
CHLORIDE SERPL-SCNC: 101 MMOL/L — SIGNIFICANT CHANGE UP (ref 96–108)
CO2 SERPL-SCNC: 19 MMOL/L — LOW (ref 22–31)
CREAT SERPL-MCNC: 1.15 MG/DL — SIGNIFICANT CHANGE UP (ref 0.5–1.3)
EOSINOPHIL # BLD AUTO: 0.22 K/UL — SIGNIFICANT CHANGE UP (ref 0–0.5)
EOSINOPHIL NFR BLD AUTO: 3.8 % — SIGNIFICANT CHANGE UP (ref 0–6)
GLUCOSE SERPL-MCNC: 101 MG/DL — HIGH (ref 70–99)
HCT VFR BLD CALC: 35.2 % — LOW (ref 39–50)
HGB BLD-MCNC: 11.6 G/DL — LOW (ref 13–17)
IMM GRANULOCYTES NFR BLD AUTO: 0.9 % — SIGNIFICANT CHANGE UP (ref 0–1.5)
LYMPHOCYTES # BLD AUTO: 1.38 K/UL — SIGNIFICANT CHANGE UP (ref 1–3.3)
LYMPHOCYTES # BLD AUTO: 23.6 % — SIGNIFICANT CHANGE UP (ref 13–44)
MAGNESIUM SERPL-MCNC: 1.8 MG/DL — SIGNIFICANT CHANGE UP (ref 1.6–2.6)
MCHC RBC-ENTMCNC: 33 GM/DL — SIGNIFICANT CHANGE UP (ref 32–36)
MCHC RBC-ENTMCNC: 33.2 PG — SIGNIFICANT CHANGE UP (ref 27–34)
MCV RBC AUTO: 100.9 FL — HIGH (ref 80–100)
MONOCYTES # BLD AUTO: 0.71 K/UL — SIGNIFICANT CHANGE UP (ref 0–0.9)
MONOCYTES NFR BLD AUTO: 12.2 % — SIGNIFICANT CHANGE UP (ref 2–14)
NEUTROPHILS # BLD AUTO: 3.44 K/UL — SIGNIFICANT CHANGE UP (ref 1.8–7.4)
NEUTROPHILS NFR BLD AUTO: 58.8 % — SIGNIFICANT CHANGE UP (ref 43–77)
NRBC # BLD: 0 /100 WBCS — SIGNIFICANT CHANGE UP (ref 0–0)
PHOSPHATE SERPL-MCNC: 4.6 MG/DL — HIGH (ref 2.5–4.5)
PLATELET # BLD AUTO: 288 K/UL — SIGNIFICANT CHANGE UP (ref 150–400)
POTASSIUM SERPL-MCNC: 4.1 MMOL/L — SIGNIFICANT CHANGE UP (ref 3.5–5.3)
POTASSIUM SERPL-SCNC: 4.1 MMOL/L — SIGNIFICANT CHANGE UP (ref 3.5–5.3)
PROT SERPL-MCNC: 7.3 G/DL — SIGNIFICANT CHANGE UP (ref 6–8.3)
RBC # BLD: 3.49 M/UL — LOW (ref 4.2–5.8)
RBC # FLD: 13.2 % — SIGNIFICANT CHANGE UP (ref 10.3–14.5)
SODIUM SERPL-SCNC: 133 MMOL/L — LOW (ref 135–145)
WBC # BLD: 5.84 K/UL — SIGNIFICANT CHANGE UP (ref 3.8–10.5)
WBC # FLD AUTO: 5.84 K/UL — SIGNIFICANT CHANGE UP (ref 3.8–10.5)

## 2021-05-19 PROCEDURE — 99233 SBSQ HOSP IP/OBS HIGH 50: CPT | Mod: GC

## 2021-05-19 RX ORDER — THIAMINE MONONITRATE (VIT B1) 100 MG
100 TABLET ORAL DAILY
Refills: 0 | Status: DISCONTINUED | OUTPATIENT
Start: 2021-05-19 | End: 2021-05-27

## 2021-05-19 RX ADMIN — Medication 1 MILLIGRAM(S): at 12:12

## 2021-05-19 RX ADMIN — Medication 1 PATCH: at 12:16

## 2021-05-19 RX ADMIN — Medication 1 PATCH: at 07:21

## 2021-05-19 RX ADMIN — ENOXAPARIN SODIUM 40 MILLIGRAM(S): 100 INJECTION SUBCUTANEOUS at 12:14

## 2021-05-19 RX ADMIN — Medication 100 MILLIGRAM(S): at 12:12

## 2021-05-19 RX ADMIN — PREGABALIN 1000 MICROGRAM(S): 225 CAPSULE ORAL at 12:13

## 2021-05-19 RX ADMIN — MUPIROCIN 1 APPLICATION(S): 20 OINTMENT TOPICAL at 05:16

## 2021-05-19 RX ADMIN — Medication 1 PATCH: at 12:14

## 2021-05-19 RX ADMIN — MUPIROCIN 1 APPLICATION(S): 20 OINTMENT TOPICAL at 17:06

## 2021-05-19 RX ADMIN — Medication 105 MILLIGRAM(S): at 05:15

## 2021-05-19 NOTE — PROGRESS NOTE ADULT - ATTENDING COMMENTS
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

## 2021-05-19 NOTE — PROGRESS NOTE ADULT - PROBLEM SELECTOR PLAN 3
last drink 3 days ago, patient w/o alcohol in system  - high risk CIWA   - librium taper as patient is high risk  - seizure and fall precaution   - SBIRT consult  -  consult  - psych eval for capacity

## 2021-05-19 NOTE — PROGRESS NOTE ADULT - SUBJECTIVE AND OBJECTIVE BOX
PROGRESS NOTE:   Authored by Dr. Carole Garner, CHENG pager [phone]    Patient is a 60y old  Male who presents with a chief complaint of fall (18 May 2021 07:52)      SUBJECTIVE / OVERNIGHT EVENTS:    MEDICATIONS  (STANDING):  cyanocobalamin 1000 MICROGram(s) Oral daily  enoxaparin Injectable 40 milliGRAM(s) SubCutaneous daily  folic acid 1 milliGRAM(s) Oral daily  lactated ringers. 1000 milliLiter(s) (75 mL/Hr) IV Continuous <Continuous>  mupirocin 2% Ointment 1 Application(s) Topical two times a day  nicotine -   7 mG/24Hr(s) Patch 1 patch Transdermal daily  thiamine IVPB 500 milliGRAM(s) IV Intermittent every 8 hours    MEDICATIONS  (PRN):      CAPILLARY BLOOD GLUCOSE        I&O's Summary    18 May 2021 07:01  -  19 May 2021 07:00  --------------------------------------------------------  IN: 950 mL / OUT: 1500 mL / NET: -550 mL        PHYSICAL EXAM:  Vital Signs Last 24 Hrs  T(C): 36.4 (19 May 2021 04:22), Max: 36.8 (18 May 2021 10:12)  T(F): 97.5 (19 May 2021 04:22), Max: 98.3 (18 May 2021 10:12)  HR: 68 (19 May 2021 04:22) (68 - 80)  BP: 117/75 (19 May 2021 04:22) (112/73 - 117/75)  BP(mean): --  RR: 18 (19 May 2021 04:22) (18 - 18)  SpO2: 96% (19 May 2021 04:22) (95% - 97%)    GENERAL: No acute distress, well-developed  HEAD:  Atraumatic, Normocephalic  EYES: EOMI, PERRLA, conjunctiva and sclera clear  NECK: Supple, no lymphadenopathy, no JVD  CHEST/LUNG: CTAB; No wheezes, rales, or rhonchi  HEART: Regular rate and rhythm; No murmurs, rubs, or gallops  ABDOMEN: Soft, non-tender, non-distended; normal bowel sounds, no organomegaly  EXTREMITIES:  2+ peripheral pulses b/l, No clubbing, cyanosis, or edema  NEUROLOGY: A&O x 3, no focal deficits  SKIN: No rashes or lesions    LABS:                      RADIOLOGY & ADDITIONAL TESTS:  Results Reviewed:   Imaging Personally Reviewed:  Electrocardiogram Personally Reviewed:    COORDINATION OF CARE:  Care Discussed with Consultants/Other Providers [Y/N]:  Prior or Outpatient Records Reviewed [Y/N]:   PROGRESS NOTE:   Authored by Dr. Carole Garner, CHENG pager [phone]    Patient is a 60y old  Male who presents with a chief complaint of fall (18 May 2021 07:52)      SUBJECTIVE / OVERNIGHT EVENTS: Patient examined at bedside. He appeared well and stated that he had no pain or acute complaints. He appears slightly confused but pleasant     MEDICATIONS  (STANDING):  cyanocobalamin 1000 MICROGram(s) Oral daily  enoxaparin Injectable 40 milliGRAM(s) SubCutaneous daily  folic acid 1 milliGRAM(s) Oral daily  lactated ringers. 1000 milliLiter(s) (75 mL/Hr) IV Continuous <Continuous>  mupirocin 2% Ointment 1 Application(s) Topical two times a day  nicotine -   7 mG/24Hr(s) Patch 1 patch Transdermal daily  thiamine IVPB 500 milliGRAM(s) IV Intermittent every 8 hours    MEDICATIONS  (PRN):      CAPILLARY BLOOD GLUCOSE        I&O's Summary    18 May 2021 07:01  -  19 May 2021 07:00  --------------------------------------------------------  IN: 950 mL / OUT: 1500 mL / NET: -550 mL        PHYSICAL EXAM:  Vital Signs Last 24 Hrs  T(C): 36.4 (19 May 2021 04:22), Max: 36.8 (18 May 2021 10:12)  T(F): 97.5 (19 May 2021 04:22), Max: 98.3 (18 May 2021 10:12)  HR: 68 (19 May 2021 04:22) (68 - 80)  BP: 117/75 (19 May 2021 04:22) (112/73 - 117/75)  BP(mean): --  RR: 18 (19 May 2021 04:22) (18 - 18)  SpO2: 96% (19 May 2021 04:22) (95% - 97%)    GENERAL: No acute distress  HEAD:  Atraumatic, Normocephalic, scab on face   EYES: EOMI, PERRLA, conjunctiva and sclera clear  NECK: Supple, no lymphadenopathy, no JVD  CHEST/LUNG: CTAB; No wheezes, rales, or rhonchi  HEART: Regular rate and rhythm; No murmurs, rubs, or gallops  ABDOMEN: Soft, non-tender, non-distended; normal bowel sounds, no organomegaly  EXTREMITIES:  2+ peripheral pulses b/l, No clubbing, cyanosis, or edema  NEUROLOGY: A&O x 2, no focal deficits  SKIN: scab on left side of face, some bruising on legs     LABS:                      RADIOLOGY & ADDITIONAL TESTS:  Results Reviewed:   Imaging Personally Reviewed:  Electrocardiogram Personally Reviewed:    COORDINATION OF CARE:  Care Discussed with Consultants/Other Providers [Y/N]:  Prior or Outpatient Records Reviewed [Y/N]:   decreased strength

## 2021-05-20 LAB
ALBUMIN SERPL ELPH-MCNC: 4.1 G/DL — SIGNIFICANT CHANGE UP (ref 3.3–5)
ALP SERPL-CCNC: 76 U/L — SIGNIFICANT CHANGE UP (ref 40–120)
ALT FLD-CCNC: 115 U/L — HIGH (ref 10–45)
ANION GAP SERPL CALC-SCNC: 14 MMOL/L — SIGNIFICANT CHANGE UP (ref 5–17)
AST SERPL-CCNC: 91 U/L — HIGH (ref 10–40)
BILIRUB SERPL-MCNC: 0.4 MG/DL — SIGNIFICANT CHANGE UP (ref 0.2–1.2)
BUN SERPL-MCNC: 32 MG/DL — HIGH (ref 7–23)
CALCIUM SERPL-MCNC: 9.6 MG/DL — SIGNIFICANT CHANGE UP (ref 8.4–10.5)
CHLORIDE SERPL-SCNC: 100 MMOL/L — SIGNIFICANT CHANGE UP (ref 96–108)
CO2 SERPL-SCNC: 20 MMOL/L — LOW (ref 22–31)
CREAT SERPL-MCNC: 1.18 MG/DL — SIGNIFICANT CHANGE UP (ref 0.5–1.3)
GLUCOSE SERPL-MCNC: 94 MG/DL — SIGNIFICANT CHANGE UP (ref 70–99)
HCT VFR BLD CALC: 34.8 % — LOW (ref 39–50)
HGB BLD-MCNC: 11.2 G/DL — LOW (ref 13–17)
MAGNESIUM SERPL-MCNC: 1.9 MG/DL — SIGNIFICANT CHANGE UP (ref 1.6–2.6)
MCHC RBC-ENTMCNC: 32.2 GM/DL — SIGNIFICANT CHANGE UP (ref 32–36)
MCHC RBC-ENTMCNC: 32.7 PG — SIGNIFICANT CHANGE UP (ref 27–34)
MCV RBC AUTO: 101.5 FL — HIGH (ref 80–100)
NRBC # BLD: 0 /100 WBCS — SIGNIFICANT CHANGE UP (ref 0–0)
PHOSPHATE SERPL-MCNC: 4.3 MG/DL — SIGNIFICANT CHANGE UP (ref 2.5–4.5)
PLATELET # BLD AUTO: 315 K/UL — SIGNIFICANT CHANGE UP (ref 150–400)
POTASSIUM SERPL-MCNC: 4.4 MMOL/L — SIGNIFICANT CHANGE UP (ref 3.5–5.3)
POTASSIUM SERPL-SCNC: 4.4 MMOL/L — SIGNIFICANT CHANGE UP (ref 3.5–5.3)
PROT SERPL-MCNC: 7.4 G/DL — SIGNIFICANT CHANGE UP (ref 6–8.3)
RBC # BLD: 3.43 M/UL — LOW (ref 4.2–5.8)
RBC # FLD: 13.1 % — SIGNIFICANT CHANGE UP (ref 10.3–14.5)
SODIUM SERPL-SCNC: 134 MMOL/L — LOW (ref 135–145)
WBC # BLD: 6.03 K/UL — SIGNIFICANT CHANGE UP (ref 3.8–10.5)
WBC # FLD AUTO: 6.03 K/UL — SIGNIFICANT CHANGE UP (ref 3.8–10.5)

## 2021-05-20 PROCEDURE — 99233 SBSQ HOSP IP/OBS HIGH 50: CPT | Mod: GC

## 2021-05-20 RX ADMIN — Medication 1 MILLIGRAM(S): at 11:31

## 2021-05-20 RX ADMIN — Medication 1 PATCH: at 11:32

## 2021-05-20 RX ADMIN — MUPIROCIN 1 APPLICATION(S): 20 OINTMENT TOPICAL at 05:36

## 2021-05-20 RX ADMIN — Medication 1 PATCH: at 04:27

## 2021-05-20 RX ADMIN — ENOXAPARIN SODIUM 40 MILLIGRAM(S): 100 INJECTION SUBCUTANEOUS at 11:33

## 2021-05-20 RX ADMIN — PREGABALIN 1000 MICROGRAM(S): 225 CAPSULE ORAL at 11:31

## 2021-05-20 RX ADMIN — MUPIROCIN 1 APPLICATION(S): 20 OINTMENT TOPICAL at 17:55

## 2021-05-20 RX ADMIN — Medication 100 MILLIGRAM(S): at 11:31

## 2021-05-20 RX ADMIN — Medication 1 PATCH: at 07:41

## 2021-05-20 RX ADMIN — Medication 1 PATCH: at 21:58

## 2021-05-20 RX ADMIN — Medication 1 PATCH: at 11:34

## 2021-05-20 NOTE — PROGRESS NOTE ADULT - ATTENDING COMMENTS
appreciate SW input  continue PT to improve ADL functionality  frequent readmissions  complex discharge planning  poor social support

## 2021-05-20 NOTE — PROGRESS NOTE ADULT - SUBJECTIVE AND OBJECTIVE BOX
PROGRESS NOTE:   Authored by Dr. Carole Garner, [facility] pager [phone]    Patient is a 60y old  Male who presents with a chief complaint of fall (19 May 2021 07:34)      SUBJECTIVE / OVERNIGHT EVENTS:    MEDICATIONS  (STANDING):  cyanocobalamin 1000 MICROGram(s) Oral daily  enoxaparin Injectable 40 milliGRAM(s) SubCutaneous daily  folic acid 1 milliGRAM(s) Oral daily  lactated ringers. 1000 milliLiter(s) (75 mL/Hr) IV Continuous <Continuous>  mupirocin 2% Ointment 1 Application(s) Topical two times a day  nicotine -   7 mG/24Hr(s) Patch 1 patch Transdermal daily  thiamine 100 milliGRAM(s) Oral daily    MEDICATIONS  (PRN):      CAPILLARY BLOOD GLUCOSE        I&O's Summary    19 May 2021 07:01  -  20 May 2021 07:00  --------------------------------------------------------  IN: 720 mL / OUT: 2100 mL / NET: -1380 mL    20 May 2021 07:01  -  20 May 2021 08:15  --------------------------------------------------------  IN: 0 mL / OUT: 150 mL / NET: -150 mL        PHYSICAL EXAM:  Vital Signs Last 24 Hrs  T(C): 36.4 (20 May 2021 04:05), Max: 36.9 (19 May 2021 13:41)  T(F): 97.5 (20 May 2021 04:05), Max: 98.4 (19 May 2021 13:41)  HR: 73 (20 May 2021 04:05) (73 - 78)  BP: 106/62 (20 May 2021 04:05) (106/62 - 109/71)  BP(mean): --  RR: 18 (20 May 2021 04:05) (18 - 18)  SpO2: 97% (20 May 2021 04:05) (97% - 97%)    GENERAL: No acute distress, well-developed  HEAD:  Atraumatic, Normocephalic  EYES: EOMI, PERRLA, conjunctiva and sclera clear  NECK: Supple, no lymphadenopathy, no JVD  CHEST/LUNG: CTAB; No wheezes, rales, or rhonchi  HEART: Regular rate and rhythm; No murmurs, rubs, or gallops  ABDOMEN: Soft, non-tender, non-distended; normal bowel sounds, no organomegaly  EXTREMITIES:  2+ peripheral pulses b/l, No clubbing, cyanosis, or edema  NEUROLOGY: A&O x 3, no focal deficits  SKIN: No rashes or lesions    LABS:                        11.2   6.03  )-----------( 315      ( 20 May 2021 07:20 )             34.8     05-20    134<L>  |  100  |  32<H>  ----------------------------<  94  4.4   |  20<L>  |  1.18    Ca    9.6      20 May 2021 07:17  Phos  4.3     05-20  Mg     1.9     05-20    TPro  7.4  /  Alb  4.1  /  TBili  0.4  /  DBili  x   /  AST  91<H>  /  ALT  115<H>  /  AlkPhos  76  05-20                RADIOLOGY & ADDITIONAL TESTS:  Results Reviewed:   Imaging Personally Reviewed:  Electrocardiogram Personally Reviewed:    COORDINATION OF CARE:  Care Discussed with Consultants/Other Providers [Y/N]:  Prior or Outpatient Records Reviewed [Y/N]:   PROGRESS NOTE:   Authored by Dr. Carole Garner, [facility] pager [phone]    Patient is a 60y old  Male who presents with a chief complaint of fall (19 May 2021 07:34)      SUBJECTIVE / OVERNIGHT EVENTS: Patient examined at bedside. He appeared well and was eating breakfast. He was moving slowly and states he feels well.     MEDICATIONS  (STANDING):  cyanocobalamin 1000 MICROGram(s) Oral daily  enoxaparin Injectable 40 milliGRAM(s) SubCutaneous daily  folic acid 1 milliGRAM(s) Oral daily  lactated ringers. 1000 milliLiter(s) (75 mL/Hr) IV Continuous <Continuous>  mupirocin 2% Ointment 1 Application(s) Topical two times a day  nicotine -   7 mG/24Hr(s) Patch 1 patch Transdermal daily  thiamine 100 milliGRAM(s) Oral daily    MEDICATIONS  (PRN):      CAPILLARY BLOOD GLUCOSE        I&O's Summary    19 May 2021 07:01  -  20 May 2021 07:00  --------------------------------------------------------  IN: 720 mL / OUT: 2100 mL / NET: -1380 mL    20 May 2021 07:01  -  20 May 2021 08:15  --------------------------------------------------------  IN: 0 mL / OUT: 150 mL / NET: -150 mL        PHYSICAL EXAM:  Vital Signs Last 24 Hrs  T(C): 36.4 (20 May 2021 04:05), Max: 36.9 (19 May 2021 13:41)  T(F): 97.5 (20 May 2021 04:05), Max: 98.4 (19 May 2021 13:41)  HR: 73 (20 May 2021 04:05) (73 - 78)  BP: 106/62 (20 May 2021 04:05) (106/62 - 109/71)  BP(mean): --  RR: 18 (20 May 2021 04:05) (18 - 18)  SpO2: 97% (20 May 2021 04:05) (97% - 97%)    GENERAL: No acute distress, well-developed  HEAD:  Atraumatic, Normocephalic  EYES: EOMI, PERRLA, conjunctiva and sclera clear  NECK: Supple, no lymphadenopathy, no JVD  CHEST/LUNG: CTAB; No wheezes, rales, or rhonchi  HEART: Regular rate and rhythm; No murmurs, rubs, or gallops  ABDOMEN: Soft, non-tender, non-distended; normal bowel sounds, no organomegaly  EXTREMITIES:  2+ peripheral pulses b/l, No clubbing, cyanosis, or edema, pain in back and in legs   NEUROLOGY: A&O x 3, no focal deficits  SKIN: No rashes or lesions    LABS:                        11.2   6.03  )-----------( 315      ( 20 May 2021 07:20 )             34.8     05-20    134<L>  |  100  |  32<H>  ----------------------------<  94  4.4   |  20<L>  |  1.18    Ca    9.6      20 May 2021 07:17  Phos  4.3     05-20  Mg     1.9     05-20    TPro  7.4  /  Alb  4.1  /  TBili  0.4  /  DBili  x   /  AST  91<H>  /  ALT  115<H>  /  AlkPhos  76  05-20                RADIOLOGY & ADDITIONAL TESTS:  Results Reviewed:   Imaging Personally Reviewed:  Electrocardiogram Personally Reviewed:    COORDINATION OF CARE:  Care Discussed with Consultants/Other Providers [Y/N]:  Prior or Outpatient Records Reviewed [Y/N]:

## 2021-05-21 PROCEDURE — 99232 SBSQ HOSP IP/OBS MODERATE 35: CPT | Mod: GC

## 2021-05-21 RX ADMIN — Medication 1 PATCH: at 12:00

## 2021-05-21 RX ADMIN — Medication 1 MILLIGRAM(S): at 12:31

## 2021-05-21 RX ADMIN — MUPIROCIN 1 APPLICATION(S): 20 OINTMENT TOPICAL at 05:27

## 2021-05-21 RX ADMIN — Medication 1 PATCH: at 20:52

## 2021-05-21 RX ADMIN — Medication 100 MILLIGRAM(S): at 12:31

## 2021-05-21 RX ADMIN — MUPIROCIN 1 APPLICATION(S): 20 OINTMENT TOPICAL at 17:16

## 2021-05-21 RX ADMIN — Medication 1 PATCH: at 12:32

## 2021-05-21 RX ADMIN — PREGABALIN 1000 MICROGRAM(S): 225 CAPSULE ORAL at 15:52

## 2021-05-21 RX ADMIN — ENOXAPARIN SODIUM 40 MILLIGRAM(S): 100 INJECTION SUBCUTANEOUS at 12:31

## 2021-05-21 NOTE — PROGRESS NOTE ADULT - SUBJECTIVE AND OBJECTIVE BOX
PROGRESS NOTE:   Authored by Dr. Carole Garner, [facility] pager [phone]    Patient is a 60y old  Male who presents with a chief complaint of fall (20 May 2021 08:15)      SUBJECTIVE / OVERNIGHT EVENTS: Patient examined at bedside. He seems to still move slowly and respond one word answers. Yesterday he was walking with a walker with T and appeared stable with assistance.     MEDICATIONS  (STANDING):  cyanocobalamin 1000 MICROGram(s) Oral daily  enoxaparin Injectable 40 milliGRAM(s) SubCutaneous daily  folic acid 1 milliGRAM(s) Oral daily  lactated ringers. 1000 milliLiter(s) (75 mL/Hr) IV Continuous <Continuous>  mupirocin 2% Ointment 1 Application(s) Topical two times a day  nicotine -   7 mG/24Hr(s) Patch 1 patch Transdermal daily  thiamine 100 milliGRAM(s) Oral daily    MEDICATIONS  (PRN):      CAPILLARY BLOOD GLUCOSE        I&O's Summary    20 May 2021 07:01  -  21 May 2021 07:00  --------------------------------------------------------  IN: 1200 mL / OUT: 1800 mL / NET: -600 mL        PHYSICAL EXAM:  Vital Signs Last 24 Hrs  T(C): 36.9 (21 May 2021 04:21), Max: 36.9 (21 May 2021 04:21)  T(F): 98.4 (21 May 2021 04:21), Max: 98.4 (21 May 2021 04:21)  HR: 71 (21 May 2021 04:21) (71 - 78)  BP: 129/78 (21 May 2021 04:21) (110/73 - 129/78)  BP(mean): --  RR: 18 (21 May 2021 04:21) (18 - 18)  SpO2: 99% (21 May 2021 04:21) (96% - 99%)    GENERAL: No acute distress, moves very slowly   HEAD:  Atraumatic, Normocephalic  EYES: EOMI, PERRLA, conjunctiva and sclera clear  NECK: Supple, no lymphadenopathy, no JVD  CHEST/LUNG: CTAB; No wheezes, rales, or rhonchi  HEART: Regular rate and rhythm; No murmurs, rubs, or gallops  ABDOMEN: Soft, non-tender, non-distended; normal bowel sounds, no organomegaly  EXTREMITIES:  2+ peripheral pulses b/l, No clubbing, cyanosis, or edema  NEUROLOGY: A&O x 2, generalized weakness   SKIN: No rashes or lesions    LABS:                        11.2   6.03  )-----------( 315      ( 20 May 2021 07:20 )             34.8     05-20    134<L>  |  100  |  32<H>  ----------------------------<  94  4.4   |  20<L>  |  1.18    Ca    9.6      20 May 2021 07:17  Phos  4.3     05-20  Mg     1.9     05-20    TPro  7.4  /  Alb  4.1  /  TBili  0.4  /  DBili  x   /  AST  91<H>  /  ALT  115<H>  /  AlkPhos  76  05-20                RADIOLOGY & ADDITIONAL TESTS:  Results Reviewed:   Imaging Personally Reviewed:  Electrocardiogram Personally Reviewed:    COORDINATION OF CARE:  Care Discussed with Consultants/Other Providers [Y/N]:  Prior or Outpatient Records Reviewed [Y/N]:

## 2021-05-22 LAB
ALBUMIN SERPL ELPH-MCNC: 4.5 G/DL — SIGNIFICANT CHANGE UP (ref 3.3–5)
ALP SERPL-CCNC: 84 U/L — SIGNIFICANT CHANGE UP (ref 40–120)
ALT FLD-CCNC: 149 U/L — HIGH (ref 10–45)
ANION GAP SERPL CALC-SCNC: 16 MMOL/L — SIGNIFICANT CHANGE UP (ref 5–17)
AST SERPL-CCNC: 100 U/L — HIGH (ref 10–40)
BILIRUB SERPL-MCNC: 0.5 MG/DL — SIGNIFICANT CHANGE UP (ref 0.2–1.2)
BUN SERPL-MCNC: 35 MG/DL — HIGH (ref 7–23)
CALCIUM SERPL-MCNC: 10.2 MG/DL — SIGNIFICANT CHANGE UP (ref 8.4–10.5)
CHLORIDE SERPL-SCNC: 101 MMOL/L — SIGNIFICANT CHANGE UP (ref 96–108)
CO2 SERPL-SCNC: 20 MMOL/L — LOW (ref 22–31)
CREAT SERPL-MCNC: 1.17 MG/DL — SIGNIFICANT CHANGE UP (ref 0.5–1.3)
GLUCOSE SERPL-MCNC: 91 MG/DL — SIGNIFICANT CHANGE UP (ref 70–99)
HCT VFR BLD CALC: 38.4 % — LOW (ref 39–50)
HGB BLD-MCNC: 12.2 G/DL — LOW (ref 13–17)
MAGNESIUM SERPL-MCNC: 2.2 MG/DL — SIGNIFICANT CHANGE UP (ref 1.6–2.6)
MCHC RBC-ENTMCNC: 31.8 GM/DL — LOW (ref 32–36)
MCHC RBC-ENTMCNC: 32.3 PG — SIGNIFICANT CHANGE UP (ref 27–34)
MCV RBC AUTO: 101.6 FL — HIGH (ref 80–100)
NRBC # BLD: 0 /100 WBCS — SIGNIFICANT CHANGE UP (ref 0–0)
PHOSPHATE SERPL-MCNC: 4.2 MG/DL — SIGNIFICANT CHANGE UP (ref 2.5–4.5)
PLATELET # BLD AUTO: 365 K/UL — SIGNIFICANT CHANGE UP (ref 150–400)
POTASSIUM SERPL-MCNC: 4.4 MMOL/L — SIGNIFICANT CHANGE UP (ref 3.5–5.3)
POTASSIUM SERPL-SCNC: 4.4 MMOL/L — SIGNIFICANT CHANGE UP (ref 3.5–5.3)
PROT SERPL-MCNC: 8.2 G/DL — SIGNIFICANT CHANGE UP (ref 6–8.3)
RBC # BLD: 3.78 M/UL — LOW (ref 4.2–5.8)
RBC # FLD: 12.8 % — SIGNIFICANT CHANGE UP (ref 10.3–14.5)
SODIUM SERPL-SCNC: 137 MMOL/L — SIGNIFICANT CHANGE UP (ref 135–145)
VIT B1 SERPL-MCNC: 349.6 NMOL/L — HIGH (ref 66.5–200)
WBC # BLD: 6.06 K/UL — SIGNIFICANT CHANGE UP (ref 3.8–10.5)
WBC # FLD AUTO: 6.06 K/UL — SIGNIFICANT CHANGE UP (ref 3.8–10.5)

## 2021-05-22 PROCEDURE — 99233 SBSQ HOSP IP/OBS HIGH 50: CPT | Mod: GC

## 2021-05-22 RX ORDER — OLANZAPINE 15 MG/1
2.5 TABLET, FILM COATED ORAL EVERY 6 HOURS
Refills: 0 | Status: DISCONTINUED | OUTPATIENT
Start: 2021-05-22 | End: 2021-05-26

## 2021-05-22 RX ORDER — HALOPERIDOL DECANOATE 100 MG/ML
5 INJECTION INTRAMUSCULAR EVERY 6 HOURS
Refills: 0 | Status: DISCONTINUED | OUTPATIENT
Start: 2021-05-22 | End: 2021-05-22

## 2021-05-22 RX ADMIN — Medication 2 MILLIGRAM(S): at 07:42

## 2021-05-22 RX ADMIN — Medication 1 PATCH: at 12:37

## 2021-05-22 RX ADMIN — Medication 1 MILLIGRAM(S): at 12:36

## 2021-05-22 RX ADMIN — OLANZAPINE 2.5 MILLIGRAM(S): 15 TABLET, FILM COATED ORAL at 09:32

## 2021-05-22 RX ADMIN — PREGABALIN 1000 MICROGRAM(S): 225 CAPSULE ORAL at 12:36

## 2021-05-22 RX ADMIN — ENOXAPARIN SODIUM 40 MILLIGRAM(S): 100 INJECTION SUBCUTANEOUS at 12:37

## 2021-05-22 RX ADMIN — MUPIROCIN 1 APPLICATION(S): 20 OINTMENT TOPICAL at 05:27

## 2021-05-22 RX ADMIN — Medication 100 MILLIGRAM(S): at 12:36

## 2021-05-22 NOTE — PROGRESS NOTE ADULT - ATTENDING COMMENTS
required Ativan for agitation earlier  calm when seen  unable to properly care for self  PT working on improving mobility  enhanced supervision  fall precautions

## 2021-05-22 NOTE — PROGRESS NOTE ADULT - SUBJECTIVE AND OBJECTIVE BOX
Carroll Wong MD PGY3  Pager: [phone] [facility], [phone] [facility]  After 7: Night Float pager    Patient is a 60y old  Male who presents with a chief complaint of fall (21 May 2021 07:22)      SUBJECTIVE / OVERNIGHT EVENTS: Overnight, no acute events. Patient seen and examined at bedside this AM.     MEDICATIONS  (STANDING):  cyanocobalamin 1000 MICROGram(s) Oral daily  enoxaparin Injectable 40 milliGRAM(s) SubCutaneous daily  folic acid 1 milliGRAM(s) Oral daily  lactated ringers. 1000 milliLiter(s) (75 mL/Hr) IV Continuous <Continuous>  mupirocin 2% Ointment 1 Application(s) Topical two times a day  nicotine -   7 mG/24Hr(s) Patch 1 patch Transdermal daily  thiamine 100 milliGRAM(s) Oral daily    MEDICATIONS  (PRN):      Vital Signs Last 24 Hrs  T(C): 36.7 (22 May 2021 04:34), Max: 36.9 (21 May 2021 20:51)  T(F): 98.1 (22 May 2021 04:34), Max: 98.4 (21 May 2021 20:51)  HR: 70 (22 May 2021 04:34) (70 - 75)  BP: 115/77 (22 May 2021 04:34) (110/75 - 117/74)  BP(mean): --  RR: 18 (22 May 2021 04:34) (18 - 18)  SpO2: 97% (22 May 2021 04:34) (97% - 98%)  CAPILLARY BLOOD GLUCOSE        I&O's Summary    20 May 2021 07:01  -  21 May 2021 07:00  --------------------------------------------------------  IN: 1200 mL / OUT: 1800 mL / NET: -600 mL    21 May 2021 07:01  -  22 May 2021 06:44  --------------------------------------------------------  IN: 1060 mL / OUT: 2850 mL / NET: -1790 mL        PHYSICAL EXAM:  GENERAL: NAD, well-developed  EYES: EOMI, PERRLA, conjunctiva and sclera clear  NECK:  No JVD  CHEST/LUNG: CTABL ; No wheeze  HEART: RRR; No murmurs  ABDOMEN: Soft, Nontender, Nondistended; Bowel sounds present  : No Mcclellan  EXTREMITIES:  2+ Peripheral Pulses, No edema  PSYCH: AAOx2  NEUROLOGY: non-focal  SKIN: No rashes or lesions. No sacral ulcer    LABS:                        11.2   6.03  )-----------( 315      ( 20 May 2021 07:20 )             34.8     05-20    134<L>  |  100  |  32<H>  ----------------------------<  94  4.4   |  20<L>  |  1.18    Ca    9.6      20 May 2021 07:17  Phos  4.3     05-20  Mg     1.9     05-20    TPro  7.4  /  Alb  4.1  /  TBili  0.4  /  DBili  x   /  AST  91<H>  /  ALT  115<H>  /  AlkPhos  76  05-20              Microbiology;        RADIOLOGY & ADDITIONAL TESTS:    Imaging Personally Reviewed:          Consultant(s) Notes Reviewed: psychiatry    Care Discussed with Consultants/Other Providers: psychiatry       Carroll Wong MD PGY3  Pager: [phone] [facility], [phone] [facility]  After 7: Night Float pager    Patient is a 60y old  Male who presents with a chief complaint of fall (21 May 2021 07:22)      SUBJECTIVE / OVERNIGHT EVENTS: Overnight, no acute events. Patient seen and examined at bedside this AM.     MEDICATIONS  (STANDING):  cyanocobalamin 1000 MICROGram(s) Oral daily  enoxaparin Injectable 40 milliGRAM(s) SubCutaneous daily  folic acid 1 milliGRAM(s) Oral daily  lactated ringers. 1000 milliLiter(s) (75 mL/Hr) IV Continuous <Continuous>  mupirocin 2% Ointment 1 Application(s) Topical two times a day  nicotine -   7 mG/24Hr(s) Patch 1 patch Transdermal daily  thiamine 100 milliGRAM(s) Oral daily    MEDICATIONS  (PRN):      Vital Signs Last 24 Hrs  T(C): 36.7 (22 May 2021 04:34), Max: 36.9 (21 May 2021 20:51)  T(F): 98.1 (22 May 2021 04:34), Max: 98.4 (21 May 2021 20:51)  HR: 70 (22 May 2021 04:34) (70 - 75)  BP: 115/77 (22 May 2021 04:34) (110/75 - 117/74)  BP(mean): --  RR: 18 (22 May 2021 04:34) (18 - 18)  SpO2: 97% (22 May 2021 04:34) (97% - 98%)  CAPILLARY BLOOD GLUCOSE        I&O's Summary    20 May 2021 07:01  -  21 May 2021 07:00  --------------------------------------------------------  IN: 1200 mL / OUT: 1800 mL / NET: -600 mL    21 May 2021 07:01  -  22 May 2021 06:44  --------------------------------------------------------  IN: 1060 mL / OUT: 2850 mL / NET: -1790 mL        PHYSICAL EXAM:  GENERAL: NAD, well-developed, disheveled  EYES: EOMI, PERRLA, conjunctiva and sclera clear  NECK:  No JVD  CHEST/LUNG: CTABL ; No wheeze  HEART: RRR; No murmurs  ABDOMEN: Soft, Nontender, Nondistended; Bowel sounds present  : No Mcclelaln  EXTREMITIES:  2+ Peripheral Pulses, No edema  PSYCH: AAOx2  NEUROLOGY: non-focal  SKIN: No rashes or lesions. No sacral ulcer    LABS:                        11.2   6.03  )-----------( 315      ( 20 May 2021 07:20 )             34.8     05-20    134<L>  |  100  |  32<H>  ----------------------------<  94  4.4   |  20<L>  |  1.18    Ca    9.6      20 May 2021 07:17  Phos  4.3     05-20  Mg     1.9     05-20    TPro  7.4  /  Alb  4.1  /  TBili  0.4  /  DBili  x   /  AST  91<H>  /  ALT  115<H>  /  AlkPhos  76  05-20              Microbiology;        RADIOLOGY & ADDITIONAL TESTS:    Imaging Personally Reviewed:          Consultant(s) Notes Reviewed: psychiatry    Care Discussed with Consultants/Other Providers: psychiatry

## 2021-05-22 NOTE — PROVIDER CONTACT NOTE (OTHER) - ASSESSMENT
pt. confused, agitated , fully dressed, elopement issue, confused, speaks only spanisg. janey estrada spoke with him in [place] but patient confused. pt. pulled out iv.

## 2021-05-23 LAB — SARS-COV-2 RNA SPEC QL NAA+PROBE: DETECTED

## 2021-05-23 PROCEDURE — 99232 SBSQ HOSP IP/OBS MODERATE 35: CPT | Mod: GC

## 2021-05-23 RX ADMIN — Medication 100 MILLIGRAM(S): at 13:02

## 2021-05-23 RX ADMIN — PREGABALIN 1000 MICROGRAM(S): 225 CAPSULE ORAL at 13:02

## 2021-05-23 RX ADMIN — Medication 1 PATCH: at 19:24

## 2021-05-23 RX ADMIN — Medication 1 PATCH: at 07:37

## 2021-05-23 RX ADMIN — ENOXAPARIN SODIUM 40 MILLIGRAM(S): 100 INJECTION SUBCUTANEOUS at 13:04

## 2021-05-23 RX ADMIN — Medication 1 MILLIGRAM(S): at 13:02

## 2021-05-23 RX ADMIN — MUPIROCIN 1 APPLICATION(S): 20 OINTMENT TOPICAL at 05:50

## 2021-05-23 RX ADMIN — Medication 1 PATCH: at 13:02

## 2021-05-23 NOTE — PROGRESS NOTE ADULT - ASSESSMENT
This patient is a 61yo gentleman with PMH of EtOH abuse, chronic pancreatitis who presents to the ED after an observed fall, in the setting of alcohol use, found to have L nasal bone fracture, vertebral fracture of transverse process and left facial wound now presenting back to the hospital for a fall, concern for possible Wernicke's.     Last drink 3 days ago, was on librium taper nhere with CIWA 0's.  This patient is a 59yo gentleman with PMH of EtOH abuse, chronic pancreatitis who presents to the ED after an observed fall, in the setting of alcohol use, found to have L nasal bone fracture, vertebral fracture of transverse process and left facial wound now presenting back to the hospital for a fall, concern for possible Wernicke's, s/p 3 days of thiamine, now pending dispo

## 2021-05-23 NOTE — PROGRESS NOTE ADULT - SUBJECTIVE AND OBJECTIVE BOX
PROGRESS NOTE:   Authored by Dr. Carole Garner, [facility] pager [phone]    Patient is a 60y old  Male who presents with a chief complaint of fall (22 May 2021 06:44)      SUBJECTIVE / OVERNIGHT EVENTS:    MEDICATIONS  (STANDING):  cyanocobalamin 1000 MICROGram(s) Oral daily  enoxaparin Injectable 40 milliGRAM(s) SubCutaneous daily  folic acid 1 milliGRAM(s) Oral daily  lactated ringers. 1000 milliLiter(s) (75 mL/Hr) IV Continuous <Continuous>  mupirocin 2% Ointment 1 Application(s) Topical two times a day  nicotine -   7 mG/24Hr(s) Patch 1 patch Transdermal daily  thiamine 100 milliGRAM(s) Oral daily    MEDICATIONS  (PRN):  OLANZapine Injectable 2.5 milliGRAM(s) IntraMuscular every 6 hours PRN agitation      CAPILLARY BLOOD GLUCOSE        I&O's Summary    22 May 2021 07:01  -  23 May 2021 07:00  --------------------------------------------------------  IN: 0 mL / OUT: 1450 mL / NET: -1450 mL        PHYSICAL EXAM:  Vital Signs Last 24 Hrs  T(C): 36.4 (23 May 2021 04:26), Max: 36.6 (22 May 2021 20:52)  T(F): 97.5 (23 May 2021 04:26), Max: 97.8 (22 May 2021 20:52)  HR: 66 (23 May 2021 04:26) (66 - 73)  BP: 110/66 (23 May 2021 04:26) (99/62 - 122/67)  BP(mean): --  RR: 18 (23 May 2021 04:26) (18 - 18)  SpO2: 96% (23 May 2021 04:26) (96% - 98%)    GENERAL: No acute distress, well-developed  HEAD:  Atraumatic, Normocephalic  EYES: EOMI, PERRLA, conjunctiva and sclera clear  NECK: Supple, no lymphadenopathy, no JVD  CHEST/LUNG: CTAB; No wheezes, rales, or rhonchi  HEART: Regular rate and rhythm; No murmurs, rubs, or gallops  ABDOMEN: Soft, non-tender, non-distended; normal bowel sounds, no organomegaly  EXTREMITIES:  2+ peripheral pulses b/l, No clubbing, cyanosis, or edema  NEUROLOGY: A&O x 3, no focal deficits  SKIN: No rashes or lesions    LABS:                        12.2   6.06  )-----------( 365      ( 22 May 2021 07:26 )             38.4     05-22    137  |  101  |  35<H>  ----------------------------<  91  4.4   |  20<L>  |  1.17    Ca    10.2      22 May 2021 07:30  Phos  4.2     05-22  Mg     2.2     05-22    TPro  8.2  /  Alb  4.5  /  TBili  0.5  /  DBili  x   /  AST  100<H>  /  ALT  149<H>  /  AlkPhos  84  05-22                RADIOLOGY & ADDITIONAL TESTS:  Results Reviewed:   Imaging Personally Reviewed:  Electrocardiogram Personally Reviewed:    COORDINATION OF CARE:  Care Discussed with Consultants/Other Providers [Y/N]:  Prior or Outpatient Records Reviewed [Y/N]:   PROGRESS NOTE:   Authored by Dr. Carole Garner, [facility] pager [phone]    Patient is a 60y old  Male who presents with a chief complaint of fall (22 May 2021 06:44)      SUBJECTIVE / OVERNIGHT EVENTS: No events overnight. Patient did not appear agitated and was resting comfortably in bed. He has no acute issues this morning     MEDICATIONS  (STANDING):  cyanocobalamin 1000 MICROGram(s) Oral daily  enoxaparin Injectable 40 milliGRAM(s) SubCutaneous daily  folic acid 1 milliGRAM(s) Oral daily  lactated ringers. 1000 milliLiter(s) (75 mL/Hr) IV Continuous <Continuous>  mupirocin 2% Ointment 1 Application(s) Topical two times a day  nicotine -   7 mG/24Hr(s) Patch 1 patch Transdermal daily  thiamine 100 milliGRAM(s) Oral daily    MEDICATIONS  (PRN):  OLANZapine Injectable 2.5 milliGRAM(s) IntraMuscular every 6 hours PRN agitation      CAPILLARY BLOOD GLUCOSE        I&O's Summary    22 May 2021 07:01  -  23 May 2021 07:00  --------------------------------------------------------  IN: 0 mL / OUT: 1450 mL / NET: -1450 mL        PHYSICAL EXAM:  Vital Signs Last 24 Hrs  T(C): 36.4 (23 May 2021 04:26), Max: 36.6 (22 May 2021 20:52)  T(F): 97.5 (23 May 2021 04:26), Max: 97.8 (22 May 2021 20:52)  HR: 66 (23 May 2021 04:26) (66 - 73)  BP: 110/66 (23 May 2021 04:26) (99/62 - 122/67)  BP(mean): --  RR: 18 (23 May 2021 04:26) (18 - 18)  SpO2: 96% (23 May 2021 04:26) (96% - 98%)    GENERAL: No acute distress, well-developed  HEAD:  Atraumatic, Normocephalic  EYES: EOMI, PERRLA, conjunctiva and sclera clear  NECK: Supple, no lymphadenopathy, no JVD  CHEST/LUNG: CTAB; No wheezes, rales, or rhonchi  HEART: Regular rate and rhythm; No murmurs, rubs, or gallops  ABDOMEN: Soft, non-tender, non-distended; normal bowel sounds, no organomegaly  EXTREMITIES:  2+ peripheral pulses b/l, No clubbing, cyanosis, or edema  NEUROLOGY: A&O x 3,some mild weakness in LE and pain in back   SKIN: No rashes or lesions    LABS:                        12.2   6.06  )-----------( 365      ( 22 May 2021 07:26 )             38.4     05-22    137  |  101  |  35<H>  ----------------------------<  91  4.4   |  20<L>  |  1.17    Ca    10.2      22 May 2021 07:30  Phos  4.2     05-22  Mg     2.2     05-22    TPro  8.2  /  Alb  4.5  /  TBili  0.5  /  DBili  x   /  AST  100<H>  /  ALT  149<H>  /  AlkPhos  84  05-22                RADIOLOGY & ADDITIONAL TESTS:  Results Reviewed:   Imaging Personally Reviewed:  Electrocardiogram Personally Reviewed:    COORDINATION OF CARE:  Care Discussed with Consultants/Other Providers [Y/N]:  Prior or Outpatient Records Reviewed [Y/N]:

## 2021-05-23 NOTE — PROGRESS NOTE ADULT - PROBLEM SELECTOR PLAN 3
last drink 3 days ago, patient w/o alcohol in system  - high risk CIWA   - librium taper as patient is high risk  - seizure and fall precaution   - SBIRT consult  -  consult  - psych eval for capacity - s/p librium taper with highest CIWA 3-4  - fall precaution   - SBIRT consult  -  consult--> working on placement   - psych eval for capacity--> no capacity

## 2021-05-23 NOTE — PROVIDER CONTACT NOTE (OTHER) - NAME OF MD/NP/PA/DO NOTIFIED:
dr. shirin rankin , assistange manager viv , infection control ,[facility] dr. shirin rankin , assistange manager viv infection control krystin

## 2021-05-24 PROCEDURE — 99232 SBSQ HOSP IP/OBS MODERATE 35: CPT

## 2021-05-24 RX ADMIN — Medication 1 PATCH: at 06:52

## 2021-05-24 RX ADMIN — Medication 100 MILLIGRAM(S): at 13:26

## 2021-05-24 RX ADMIN — ENOXAPARIN SODIUM 40 MILLIGRAM(S): 100 INJECTION SUBCUTANEOUS at 13:26

## 2021-05-24 RX ADMIN — Medication 1 PATCH: at 13:27

## 2021-05-24 RX ADMIN — Medication 1 PATCH: at 19:10

## 2021-05-24 RX ADMIN — Medication 1 MILLIGRAM(S): at 17:21

## 2021-05-24 RX ADMIN — PREGABALIN 1000 MICROGRAM(S): 225 CAPSULE ORAL at 13:26

## 2021-05-24 NOTE — PROGRESS NOTE ADULT - PROBLEM SELECTOR PLAN 2
Altered, found down   - BAL 0  - concern for Wernicke  - s/p high dose thiamine  - CTH normal  - may have some underlying dementia/cognitive change from chronic alcohol use.   - Infectious workup limited to UA (negative) as patients vitals WNL and normal WBC, low -no concern for infxn

## 2021-05-24 NOTE — PROGRESS NOTE ADULT - ASSESSMENT
This patient is a 61yo gentleman with PMH of EtOH abuse, chronic pancreatitis who presents to the ED after an observed fall, in the setting of alcohol use, found to have L nasal bone fracture, vertebral fracture of transverse process and left facial wound now presenting back to the hospital for a fall, concern for possible Wernicke's, s/p 3 days of thiamine, now pending dispo

## 2021-05-24 NOTE — PROGRESS NOTE ADULT - PROBLEM SELECTOR PLAN 3
- s/p librium taper with highest CIWA 3-4  - fall precaution   - SBIRT consult  -  consult--> working on placement   - psych eval for capacity--> no capacity

## 2021-05-24 NOTE — PROGRESS NOTE ADULT - ATTENDING COMMENTS
discussed above plan with resident on rounds. patient seen and examined.   labs and imaging reviewed.  I agree with the above findings, assessment, and plan with the following additions and exceptions:  alcohol abuse with concern for wernickes s/p high dose thiamine with improvement in mental status   incidental lung nodule found on ct- will need f/u outpatient   c/o back pain - CT with transverse process fracture of the L1 vertebra, healing transverse process fracture of L2 vertebra and healing fractures of right 9th and 12th ribs.   await safe dc plan given homeless with no social support

## 2021-05-25 LAB
ALBUMIN SERPL ELPH-MCNC: 4.1 G/DL — SIGNIFICANT CHANGE UP (ref 3.3–5)
ALP SERPL-CCNC: 80 U/L — SIGNIFICANT CHANGE UP (ref 40–120)
ALT FLD-CCNC: 97 U/L — HIGH (ref 10–45)
ANION GAP SERPL CALC-SCNC: 16 MMOL/L — SIGNIFICANT CHANGE UP (ref 5–17)
AST SERPL-CCNC: 50 U/L — HIGH (ref 10–40)
BILIRUB SERPL-MCNC: 0.4 MG/DL — SIGNIFICANT CHANGE UP (ref 0.2–1.2)
BUN SERPL-MCNC: 28 MG/DL — HIGH (ref 7–23)
CALCIUM SERPL-MCNC: 9.6 MG/DL — SIGNIFICANT CHANGE UP (ref 8.4–10.5)
CHLORIDE SERPL-SCNC: 102 MMOL/L — SIGNIFICANT CHANGE UP (ref 96–108)
CO2 SERPL-SCNC: 18 MMOL/L — LOW (ref 22–31)
CREAT SERPL-MCNC: 1.24 MG/DL — SIGNIFICANT CHANGE UP (ref 0.5–1.3)
GLUCOSE SERPL-MCNC: 85 MG/DL — SIGNIFICANT CHANGE UP (ref 70–99)
MAGNESIUM SERPL-MCNC: 2.1 MG/DL — SIGNIFICANT CHANGE UP (ref 1.6–2.6)
PHOSPHATE SERPL-MCNC: 4.2 MG/DL — SIGNIFICANT CHANGE UP (ref 2.5–4.5)
POTASSIUM SERPL-MCNC: 4.4 MMOL/L — SIGNIFICANT CHANGE UP (ref 3.5–5.3)
POTASSIUM SERPL-SCNC: 4.4 MMOL/L — SIGNIFICANT CHANGE UP (ref 3.5–5.3)
PROT SERPL-MCNC: 7.5 G/DL — SIGNIFICANT CHANGE UP (ref 6–8.3)
SODIUM SERPL-SCNC: 136 MMOL/L — SIGNIFICANT CHANGE UP (ref 135–145)

## 2021-05-25 PROCEDURE — 99232 SBSQ HOSP IP/OBS MODERATE 35: CPT | Mod: GC

## 2021-05-25 RX ADMIN — ENOXAPARIN SODIUM 40 MILLIGRAM(S): 100 INJECTION SUBCUTANEOUS at 12:16

## 2021-05-25 RX ADMIN — Medication 100 MILLIGRAM(S): at 12:16

## 2021-05-25 RX ADMIN — Medication 1 MILLIGRAM(S): at 12:16

## 2021-05-25 RX ADMIN — Medication 1 PATCH: at 17:33

## 2021-05-25 RX ADMIN — Medication 1 PATCH: at 07:00

## 2021-05-25 RX ADMIN — Medication 1 PATCH: at 12:16

## 2021-05-25 RX ADMIN — Medication 1 PATCH: at 12:19

## 2021-05-25 RX ADMIN — PREGABALIN 1000 MICROGRAM(S): 225 CAPSULE ORAL at 12:16

## 2021-05-25 NOTE — PROGRESS NOTE ADULT - ASSESSMENT
This patient is a 59yo gentleman with PMH of EtOH abuse, chronic pancreatitis who presents to the ED after an observed fall, in the setting of alcohol use, found to have L nasal bone fracture, vertebral fracture of transverse process and left facial wound now presenting back to the hospital for a fall, concern for possible Wernicke's, s/p 3 days of thiamine, now pending dispo

## 2021-05-25 NOTE — PROGRESS NOTE ADULT - ATTENDING COMMENTS
discussed above plan with resident on rounds. patient seen and examined.  ID#[id number]. c/o right arm pain and low back pain - overall unchanged. no focal weakness on exam. full rom right arm.   labs and imaging reviewed.  I agree with the above findings, assessment, and plan with the following additions and exceptions:  alcohol abuse with concern for wernickes s/p high dose thiamine with improvement in mental status   psych follow up regarding capacity  incidental lung nodule found on ct- will need f/u outpatient   c/o back pain - CT with transverse process fracture of the L1 vertebra, healing transverse process fracture of L2 vertebra and healing fractures of right 9th and 12th ribs. no focal deficits on exam  await safe dc plan given homeless with no social support - may need guardianship

## 2021-05-25 NOTE — PROGRESS NOTE ADULT - SUBJECTIVE AND OBJECTIVE BOX
PROGRESS NOTE:   Authored by Dr. Carole Garner, CHENG pager [phone]    Patient is a 60y old  Male who presents with a chief complaint of fall (24 May 2021 07:19)      SUBJECTIVE / OVERNIGHT EVENTS: Patient examined at bedside. He would like to leave to go to [facility] where his "stuff" is. He denies pain but has trouble with ambulating     MEDICATIONS  (STANDING):  cyanocobalamin 1000 MICROGram(s) Oral daily  enoxaparin Injectable 40 milliGRAM(s) SubCutaneous daily  folic acid 1 milliGRAM(s) Oral daily  mupirocin 2% Ointment 1 Application(s) Topical two times a day  nicotine -   7 mG/24Hr(s) Patch 1 patch Transdermal daily  thiamine 100 milliGRAM(s) Oral daily    MEDICATIONS  (PRN):  OLANZapine Injectable 2.5 milliGRAM(s) IntraMuscular every 6 hours PRN agitation      CAPILLARY BLOOD GLUCOSE        I&O's Summary    24 May 2021 07:01  -  25 May 2021 07:00  --------------------------------------------------------  IN: 1240 mL / OUT: 2700 mL / NET: -1460 mL        PHYSICAL EXAM:  Vital Signs Last 24 Hrs  T(C): 36.4 (25 May 2021 05:03), Max: 36.9 (24 May 2021 20:42)  T(F): 97.6 (25 May 2021 05:03), Max: 98.4 (24 May 2021 20:42)  HR: 67 (25 May 2021 05:03) (67 - 76)  BP: 125/68 (25 May 2021 05:03) (117/77 - 125/68)  BP(mean): --  RR: 18 (25 May 2021 05:03) (18 - 18)  SpO2: 96% (25 May 2021 05:03) (96% - 97%)    GENERAL: No acute distress, well-developed  HEAD:  Atraumatic, Normocephalic  EYES: EOMI, PERRLA, conjunctiva and sclera clear  NECK: Supple, no lymphadenopathy, no JVD  CHEST/LUNG: CTAB; No wheezes, rales, or rhonchi  HEART: Regular rate and rhythm; No murmurs, rubs, or gallops  ABDOMEN: Soft, non-tender, non-distended; normal bowel sounds, no organomegaly  EXTREMITIES:  2+ peripheral pulses b/l, No clubbing, cyanosis, or edema  NEUROLOGY: A&O x 3, generalized slowing and weakness   SKIN: No rashes or lesions    LABS:    05-25    136  |  102  |  28<H>  ----------------------------<  85  4.4   |  18<L>  |  1.24    Ca    9.6      25 May 2021 07:03  Phos  4.2     05-25  Mg     2.1     05-25    TPro  7.5  /  Alb  4.1  /  TBili  0.4  /  DBili  x   /  AST  50<H>  /  ALT  97<H>  /  AlkPhos  80  05-25                RADIOLOGY & ADDITIONAL TESTS:  Results Reviewed:   Imaging Personally Reviewed:  Electrocardiogram Personally Reviewed:    COORDINATION OF CARE:  Care Discussed with Consultants/Other Providers [Y/N]:  Prior or Outpatient Records Reviewed [Y/N]:

## 2021-05-26 PROCEDURE — 99232 SBSQ HOSP IP/OBS MODERATE 35: CPT | Mod: GC

## 2021-05-26 RX ADMIN — ENOXAPARIN SODIUM 40 MILLIGRAM(S): 100 INJECTION SUBCUTANEOUS at 11:53

## 2021-05-26 RX ADMIN — Medication 1 PATCH: at 11:50

## 2021-05-26 RX ADMIN — Medication 1 PATCH: at 06:11

## 2021-05-26 RX ADMIN — Medication 1 MILLIGRAM(S): at 11:49

## 2021-05-26 RX ADMIN — PREGABALIN 1000 MICROGRAM(S): 225 CAPSULE ORAL at 11:49

## 2021-05-26 RX ADMIN — Medication 1 PATCH: at 11:52

## 2021-05-26 RX ADMIN — Medication 1 PATCH: at 17:15

## 2021-05-26 RX ADMIN — Medication 100 MILLIGRAM(S): at 11:49

## 2021-05-26 NOTE — PROGRESS NOTE ADULT - SUBJECTIVE AND OBJECTIVE BOX
PROGRESS NOTE:   Authored by Dr. Carole Garner, CHENG pager [phone]    Patient is a 60y old  Male who presents with a chief complaint of fall (25 May 2021 11:13)      SUBJECTIVE / OVERNIGHT EVENTS: No acute events over night. Patient appeared well today. He is still slow to respond and has some difficulty with coordination.     MEDICATIONS  (STANDING):  cyanocobalamin 1000 MICROGram(s) Oral daily  enoxaparin Injectable 40 milliGRAM(s) SubCutaneous daily  folic acid 1 milliGRAM(s) Oral daily  nicotine -   7 mG/24Hr(s) Patch 1 patch Transdermal daily  thiamine 100 milliGRAM(s) Oral daily    MEDICATIONS  (PRN):      CAPILLARY BLOOD GLUCOSE        I&O's Summary    25 May 2021 07:01  -  26 May 2021 07:00  --------------------------------------------------------  IN: 600 mL / OUT: 1450 mL / NET: -850 mL        PHYSICAL EXAM:  Vital Signs Last 24 Hrs  T(C): 36.3 (26 May 2021 04:47), Max: 36.8 (25 May 2021 12:10)  T(F): 97.3 (26 May 2021 04:47), Max: 98.2 (25 May 2021 12:10)  HR: 62 (26 May 2021 04:47) (62 - 86)  BP: 104/63 (26 May 2021 04:47) (104/63 - 120/75)  BP(mean): --  RR: 18 (26 May 2021 04:47) (18 - 18)  SpO2: 96% (26 May 2021 04:47) (96% - 98%)    GENERAL: No acute distress, well-developed  HEAD:  Atraumatic, Normocephalic  EYES: EOMI, PERRLA, conjunctiva and sclera clear  NECK: Supple, no lymphadenopathy, no JVD  CHEST/LUNG: CTAB; No wheezes, rales, or rhonchi  HEART: Regular rate and rhythm; No murmurs, rubs, or gallops  ABDOMEN: Soft, non-tender, non-distended; normal bowel sounds, no organomegaly  EXTREMITIES:  2+ peripheral pulses b/l, No clubbing, cyanosis, or edema  NEUROLOGY: A&O x 2-3, generalized weakness   SKIN: No rashes or lesions    LABS:    05-25    136  |  102  |  28<H>  ----------------------------<  85  4.4   |  18<L>  |  1.24    Ca    9.6      25 May 2021 07:03  Phos  4.2     05-25  Mg     2.1     05-25    TPro  7.5  /  Alb  4.1  /  TBili  0.4  /  DBili  x   /  AST  50<H>  /  ALT  97<H>  /  AlkPhos  80  05-25                RADIOLOGY & ADDITIONAL TESTS:  Results Reviewed:   Imaging Personally Reviewed:  Electrocardiogram Personally Reviewed:    COORDINATION OF CARE:  Care Discussed with Consultants/Other Providers [Y/N]:  Prior or Outpatient Records Reviewed [Y/N]:

## 2021-05-26 NOTE — PROGRESS NOTE ADULT - ATTENDING COMMENTS
discussed above plan with resident on rounds. patient seen and examined. states right arm pain improved. no focal weakness on exam. full rom right arm.   labs and imaging reviewed.  I agree with the above findings, assessment, and plan with the following additions and exceptions:  alcohol abuse with concern for wernickes s/p high dose thiamine with improvement in mental status   psych follow up regarding capacity  incidental lung nodule found on ct- will need f/u outpatient   c/o back pain - CT with transverse process fracture of the L1 vertebra, healing transverse process fracture of L2 vertebra and healing fractures of right 9th and 12th ribs. no focal deficits on exam  await safe dc plan given homeless with no social support - may need guardianship

## 2021-05-27 ENCOUNTER — TRANSCRIPTION ENCOUNTER (OUTPATIENT)
Age: 61
End: 2021-05-27

## 2021-05-27 PROCEDURE — 99232 SBSQ HOSP IP/OBS MODERATE 35: CPT | Mod: GC

## 2021-05-27 RX ORDER — THIAMINE MONONITRATE (VIT B1) 100 MG
500 TABLET ORAL EVERY 8 HOURS
Refills: 0 | Status: DISCONTINUED | OUTPATIENT
Start: 2021-05-27 | End: 2021-05-27

## 2021-05-27 RX ORDER — ACETAMINOPHEN 500 MG
650 TABLET ORAL ONCE
Refills: 0 | Status: COMPLETED | OUTPATIENT
Start: 2021-05-27 | End: 2021-05-27

## 2021-05-27 RX ORDER — THIAMINE MONONITRATE (VIT B1) 100 MG
500 TABLET ORAL EVERY 8 HOURS
Refills: 0 | Status: COMPLETED | OUTPATIENT
Start: 2021-05-27 | End: 2021-05-30

## 2021-05-27 RX ADMIN — Medication 1 PATCH: at 11:52

## 2021-05-27 RX ADMIN — Medication 1 PATCH: at 11:53

## 2021-05-27 RX ADMIN — Medication 650 MILLIGRAM(S): at 21:37

## 2021-05-27 RX ADMIN — Medication 105 MILLIGRAM(S): at 21:19

## 2021-05-27 RX ADMIN — PREGABALIN 1000 MICROGRAM(S): 225 CAPSULE ORAL at 11:52

## 2021-05-27 RX ADMIN — Medication 1 PATCH: at 06:15

## 2021-05-27 RX ADMIN — Medication 1 PATCH: at 17:20

## 2021-05-27 RX ADMIN — ENOXAPARIN SODIUM 40 MILLIGRAM(S): 100 INJECTION SUBCUTANEOUS at 11:53

## 2021-05-27 RX ADMIN — Medication 1 MILLIGRAM(S): at 11:52

## 2021-05-27 RX ADMIN — Medication 105 MILLIGRAM(S): at 13:22

## 2021-05-27 RX ADMIN — Medication 650 MILLIGRAM(S): at 22:00

## 2021-05-27 NOTE — DISCHARGE NOTE PROVIDER - NSFOLLOWUPCLINICS_GEN_ALL_ED_FT
[facility] Orthopedic Surgery  Orthopedic Surgery  [address], 3rd & 4th [place] [address]  Phone: [phone]  Fax:   Follow Up Time: 2 weeks    [facility] General Internal Medicine  General Internal Medicine  [address] [place] [id number]  Phone: [phone]  Fax:   Follow Up Time: 2 weeks

## 2021-05-27 NOTE — DISCHARGE NOTE PROVIDER - NSDCMRMEDTOKEN_GEN_ALL_CORE_FT
mupirocin 2% topical ointment: Apply topically to affected area 2 times a day x6 days   cyanocobalamin 1000 mcg oral tablet: 1 tab(s) orally once a day  folic acid 1 mg oral tablet: 1 tab(s) orally once a day  sulfamethoxazole-trimethoprim 800 mg-160 mg oral tablet: 1 tab(s) orally 2 times a day  thiamine 100 mg oral tablet: 1 tab(s) orally once a day

## 2021-05-27 NOTE — DISCHARGE NOTE PROVIDER - HOSPITAL COURSE
This patient is a 59yo gentleman with PMH of EtOH abuse, chronic pancreatitis who presents to the ED after an observed fall, in the setting of alcohol use, found to have L nasal bone fracture, vertebral fracture of transverse process and left facial wound now presenting back to the hospital for a fall, concern for possible Wernicke's. Patient with ataxia and horizontal nystagmus. Patient placed on 3 days of high dose thiamine x2 trials. He was able to walk with PT but limited due to pain. CT head showed no fractures, bleed, mass effect, cervical spine fracture. X rays of the leg were negative for fractures or dislocations. Patient likliy to have some dementia 2/2 chronic alcohol abuse. Psych saw patient and deemed him uncapable of contributing to discharge planning. _________________________    INCOMPLETE        59yo Persian-speaking M with PMH of EtOH abuse, chronic pancreatitis presenting after observed fall, likely in the setting of alcohol use. Found to have L nasal bone fracture and CT with transverse process fracture of the L1 vertebra, healing transverse process fracture of L2 vertebra and healing fractures of right 9th and 12th ribs. Evaluated by Ortho, who recommended WBAT and no TLSO brace. Patient to f/u with Ortho as an outpatient. PT evaluated pt and recommended RICHARD, which pt refused. Initially, c/f possible Wernicke's, s/p 3 days of IV thiamine. Pt will continue with oral thiamine as outpatient. Psych evaluated pt and deemed pt with no capacity initially. However, throughout hospitalization, pt's capacity recovered.  Patient refused all help from SW including shelter placement.     Of note, pt found to have R forearm swelling/erythema/ttp. Treated with IV ancef x 5d for cellulitis. S/p I&D of abscess by plastic surgery. Wound cx MRSA+. Plan to continue bactrim 6/7-6/11 as outpatient.     Additionally, pt found to have incidental 3 mm solid lung nodule in the right middle lobe on CT Chest. Will require repeat imaging in 1 year as outpatient.     Pt hemodynamically stable for discharge to the street. Will require outpatient follow up with his PMD and ortho within 2 weeks of discharge.          59yo Yoruba-speaking M with PMH of EtOH abuse, chronic pancreatitis presenting after observed fall, likely in the setting of alcohol use. Found to have L nasal bone fracture and CT with transverse process fracture of the L1 vertebra, healing transverse process fracture of L2 vertebra and healing fractures of right 9th and 12th ribs. Evaluated by Ortho, who recommended WBAT and no TLSO brace. Patient to f/u with Ortho as an outpatient. PT evaluated pt and recommended RICHARD, which pt refused. Initially, c/f possible Wernicke's, s/p 3 days of IV thiamine. Pt will continue with oral thiamine as outpatient. Psych evaluated pt and deemed pt with no capacity initially. However, throughout hospitalization, pt's capacity recovered.  Patient refused all help from SW including shelter placement.     Of note, pt found to have R forearm swelling/erythema/ttp. Treated with IV ancef x 5d for cellulitis. S/p I&D of abscess by plastic surgery. Wound cx MRSA+. Seen by ID and recommended to continue bactrim 6/7-6/11 as outpatient. patient counselled on importance of daily dressing changes, taught by nurse and supplies also given to patient.     Additionally, pt found to have incidental 3 mm solid lung nodule in the right middle lobe on CT Chest. Will require repeat imaging in 1 year as outpatient.     Pt hemodynamically stable for discharge to the street. Will require outpatient follow up with his PMD and ortho within 2 weeks of discharge.

## 2021-05-27 NOTE — PROGRESS NOTE ADULT - SUBJECTIVE AND OBJECTIVE BOX
PROGRESS NOTE:   Authored by Dr. Carole Garner, CHENG pager [phone]    Patient is a 60y old  Male who presents with a chief complaint of fall (26 May 2021 09:52)      SUBJECTIVE / OVERNIGHT EVENTS: Patient examined at bedside. he appeared well. He denies any acute issues or pain     MEDICATIONS  (STANDING):  cyanocobalamin 1000 MICROGram(s) Oral daily  enoxaparin Injectable 40 milliGRAM(s) SubCutaneous daily  folic acid 1 milliGRAM(s) Oral daily  nicotine -   7 mG/24Hr(s) Patch 1 patch Transdermal daily  thiamine IVPB 500 milliGRAM(s) IV Intermittent every 8 hours    MEDICATIONS  (PRN):      CAPILLARY BLOOD GLUCOSE        I&O's Summary    26 May 2021 07:01  -  27 May 2021 07:00  --------------------------------------------------------  IN: 240 mL / OUT: 2050 mL / NET: -1810 mL        PHYSICAL EXAM:  Vital Signs Last 24 Hrs  T(C): 36.5 (27 May 2021 05:37), Max: 37.1 (26 May 2021 20:56)  T(F): 97.7 (27 May 2021 05:37), Max: 98.7 (26 May 2021 20:56)  HR: 63 (27 May 2021 05:37) (63 - 76)  BP: 110/67 (27 May 2021 05:37) (108/70 - 114/73)  BP(mean): --  RR: 18 (27 May 2021 05:37) (18 - 18)  SpO2: 96% (27 May 2021 05:37) (96% - 97%)    GENERAL: No acute distress, well-developed  HEAD:  Atraumatic, Normocephalic  EYES: EOMI, PERRLA, conjunctiva and sclera clear  NECK: Supple, no lymphadenopathy, no JVD  CHEST/LUNG: CTAB; No wheezes, rales, or rhonchi  HEART: Regular rate and rhythm; No murmurs, rubs, or gallops  ABDOMEN: Soft, non-tender, non-distended; normal bowel sounds, no organomegaly  EXTREMITIES:  2+ peripheral pulses b/l, No clubbing, cyanosis, or edema  NEUROLOGY: A&O x 3, generalized weakness and slowing   SKIN: No rashes or lesions    LABS:                      RADIOLOGY & ADDITIONAL TESTS:  Results Reviewed:   Imaging Personally Reviewed:  Electrocardiogram Personally Reviewed:    COORDINATION OF CARE:  Care Discussed with Consultants/Other Providers [Y/N]:  Prior or Outpatient Records Reviewed [Y/N]:

## 2021-05-27 NOTE — DISCHARGE NOTE PROVIDER - NSDCCPCAREPLAN_GEN_ALL_CORE_FT
PRINCIPAL DISCHARGE DIAGNOSIS  Diagnosis: Ataxia  Assessment and Plan of Treatment: You were found to be unstable when your walk. You were given a rounds of high dose thiamine and slightly improved. Please try to stay hydrated. Stop drinking alcohol as this is having detrimental affects on your health and overall wellbeing. Please follow up with a PCP in 1 week for further management       PRINCIPAL DISCHARGE DIAGNOSIS  Diagnosis: Ataxia  Assessment and Plan of Treatment: You presented after a fall in the setting of alcohol use. Imaging showed a nasal fracture, vertebral fracture, and rib fractures. Orthopedic surgery evaluated you and recommended follow up as an outpatient for monitoring. Physical therapists evaluated  you and recommended rehab, however, you declined. Please follow up with Orthopedic Surgery within 2 weeks of discharge.      SECONDARY DISCHARGE DIAGNOSES  Diagnosis: H/O drainage of abscess  Assessment and Plan of Treatment: You were found to have an skin and soft tissue infection of your right forearm. You were treated with IV antibiotics and switched to oral antibiotics, which you will need to continue until 6/11. Plastic surgery drained an abscess. Please continue to clean and change the dressing for your wound. If signs of worsening infection, please come back to the ED or see your primary care doctor.    Diagnosis: Incidental lung nodule  Assessment and Plan of Treatment: You were found to have an incidental lung nodule on imaging. Please follow up with your PCP regarding follow up of the lung nodule. You will require repeat imaging in 1 year for monitoring.

## 2021-05-27 NOTE — DISCHARGE NOTE PROVIDER - NSDCFUADDINST_GEN_ALL_CORE_FT
Please keep right forearm wound clean and change dressing daily.  Please keep right forearm wound clean and change dressing daily.     PMD: Paulette Jesus

## 2021-05-27 NOTE — PROGRESS NOTE ADULT - PROBLEM SELECTOR PLAN 6
Schedule CBC, CMP and see me in 2 weeks DVT: lovenox  Diet: regular    Interdisciplinary meeting for dispo planning

## 2021-05-27 NOTE — PROGRESS NOTE ADULT - ATTENDING COMMENTS
discussed above plan with resident on rounds. patient seen and examined. states right arm pain improved. no focal weakness on exam. full rom right arm.   labs and imaging reviewed.  I agree with the above findings, assessment, and plan with the following additions and exceptions:  alcohol abuse with concern for wernickes s/p high dose thiamine with improvement in mental status - will retreat and see if mental status continues to improve.  psych follow up regarding capacity  incidental lung nodule found on ct- will need f/u outpatient   c/o back pain - CT with transverse process fracture of the L1 vertebra, healing transverse process fracture of L2 vertebra and healing fractures of right 9th and 12th ribs. no focal deficits on exam  await safe dc plan given homeless with no social support - may need guardianship discussed above plan with resident on rounds. patient seen and examined. states right arm pain improved. no focal weakness on exam. full rom right arm.   labs and imaging reviewed.  I agree with the above findings, assessment, and plan with the following additions and exceptions:  alcohol abuse with concern for wernickes s/p high dose thiamine with improvement in mental status - will retreat and see if mental status continues to improve.  psych follow up regarding capacity  incidental lung nodule found on ct- will need f/u outpatient   c/o back pain - CT with transverse process fracture of the L1 vertebra, healing transverse process fracture of L2 vertebra and healing fractures of right 9th and 12th ribs. no focal deficits on exam  await safe dc plan given homeless with no social support - seeking guardianship to help in decision making

## 2021-05-28 LAB
ALBUMIN SERPL ELPH-MCNC: 3.8 G/DL — SIGNIFICANT CHANGE UP (ref 3.3–5)
ALP SERPL-CCNC: 78 U/L — SIGNIFICANT CHANGE UP (ref 40–120)
ALT FLD-CCNC: 55 U/L — HIGH (ref 10–45)
ANION GAP SERPL CALC-SCNC: 13 MMOL/L — SIGNIFICANT CHANGE UP (ref 5–17)
AST SERPL-CCNC: 25 U/L — SIGNIFICANT CHANGE UP (ref 10–40)
BASOPHILS # BLD AUTO: 0.09 K/UL — SIGNIFICANT CHANGE UP (ref 0–0.2)
BASOPHILS NFR BLD AUTO: 1.3 % — SIGNIFICANT CHANGE UP (ref 0–2)
BILIRUB SERPL-MCNC: 0.5 MG/DL — SIGNIFICANT CHANGE UP (ref 0.2–1.2)
BUN SERPL-MCNC: 26 MG/DL — HIGH (ref 7–23)
CALCIUM SERPL-MCNC: 9.5 MG/DL — SIGNIFICANT CHANGE UP (ref 8.4–10.5)
CHLORIDE SERPL-SCNC: 102 MMOL/L — SIGNIFICANT CHANGE UP (ref 96–108)
CO2 SERPL-SCNC: 21 MMOL/L — LOW (ref 22–31)
CREAT SERPL-MCNC: 1.11 MG/DL — SIGNIFICANT CHANGE UP (ref 0.5–1.3)
EOSINOPHIL # BLD AUTO: 0.43 K/UL — SIGNIFICANT CHANGE UP (ref 0–0.5)
EOSINOPHIL NFR BLD AUTO: 6.2 % — HIGH (ref 0–6)
GLUCOSE SERPL-MCNC: 103 MG/DL — HIGH (ref 70–99)
HCT VFR BLD CALC: 37.6 % — LOW (ref 39–50)
HGB BLD-MCNC: 12.1 G/DL — LOW (ref 13–17)
IMM GRANULOCYTES NFR BLD AUTO: 1 % — SIGNIFICANT CHANGE UP (ref 0–1.5)
LYMPHOCYTES # BLD AUTO: 2.19 K/UL — SIGNIFICANT CHANGE UP (ref 1–3.3)
LYMPHOCYTES # BLD AUTO: 31.5 % — SIGNIFICANT CHANGE UP (ref 13–44)
MAGNESIUM SERPL-MCNC: 1.9 MG/DL — SIGNIFICANT CHANGE UP (ref 1.6–2.6)
MCHC RBC-ENTMCNC: 32.2 GM/DL — SIGNIFICANT CHANGE UP (ref 32–36)
MCHC RBC-ENTMCNC: 32.3 PG — SIGNIFICANT CHANGE UP (ref 27–34)
MCV RBC AUTO: 100.3 FL — HIGH (ref 80–100)
MONOCYTES # BLD AUTO: 0.81 K/UL — SIGNIFICANT CHANGE UP (ref 0–0.9)
MONOCYTES NFR BLD AUTO: 11.6 % — SIGNIFICANT CHANGE UP (ref 2–14)
NEUTROPHILS # BLD AUTO: 3.37 K/UL — SIGNIFICANT CHANGE UP (ref 1.8–7.4)
NEUTROPHILS NFR BLD AUTO: 48.4 % — SIGNIFICANT CHANGE UP (ref 43–77)
NRBC # BLD: 0 /100 WBCS — SIGNIFICANT CHANGE UP (ref 0–0)
PHOSPHATE SERPL-MCNC: 4.4 MG/DL — SIGNIFICANT CHANGE UP (ref 2.5–4.5)
PLATELET # BLD AUTO: 342 K/UL — SIGNIFICANT CHANGE UP (ref 150–400)
POTASSIUM SERPL-MCNC: 4.3 MMOL/L — SIGNIFICANT CHANGE UP (ref 3.5–5.3)
POTASSIUM SERPL-SCNC: 4.3 MMOL/L — SIGNIFICANT CHANGE UP (ref 3.5–5.3)
PROT SERPL-MCNC: 6.9 G/DL — SIGNIFICANT CHANGE UP (ref 6–8.3)
RBC # BLD: 3.75 M/UL — LOW (ref 4.2–5.8)
RBC # FLD: 12.6 % — SIGNIFICANT CHANGE UP (ref 10.3–14.5)
SODIUM SERPL-SCNC: 136 MMOL/L — SIGNIFICANT CHANGE UP (ref 135–145)
WBC # BLD: 6.96 K/UL — SIGNIFICANT CHANGE UP (ref 3.8–10.5)
WBC # FLD AUTO: 6.96 K/UL — SIGNIFICANT CHANGE UP (ref 3.8–10.5)

## 2021-05-28 PROCEDURE — 99232 SBSQ HOSP IP/OBS MODERATE 35: CPT | Mod: GC

## 2021-05-28 RX ADMIN — Medication 105 MILLIGRAM(S): at 21:25

## 2021-05-28 RX ADMIN — Medication 1 MILLIGRAM(S): at 13:16

## 2021-05-28 RX ADMIN — Medication 105 MILLIGRAM(S): at 13:17

## 2021-05-28 RX ADMIN — Medication 105 MILLIGRAM(S): at 05:08

## 2021-05-28 RX ADMIN — ENOXAPARIN SODIUM 40 MILLIGRAM(S): 100 INJECTION SUBCUTANEOUS at 13:17

## 2021-05-28 RX ADMIN — PREGABALIN 1000 MICROGRAM(S): 225 CAPSULE ORAL at 13:17

## 2021-05-28 RX ADMIN — Medication 1 PATCH: at 13:16

## 2021-05-28 NOTE — PROGRESS NOTE ADULT - SUBJECTIVE AND OBJECTIVE BOX
PROGRESS NOTE:   Authored by Dr. Carole Garner, [facility] pager [phone]    Patient is a 60y old  Male who presents with a chief complaint of fall (27 May 2021 16:54)      SUBJECTIVE / OVERNIGHT EVENTS: Patient examined at bedside. He appeared well and has no acute complaints     MEDICATIONS  (STANDING):  cyanocobalamin 1000 MICROGram(s) Oral daily  enoxaparin Injectable 40 milliGRAM(s) SubCutaneous daily  folic acid 1 milliGRAM(s) Oral daily  nicotine -   7 mG/24Hr(s) Patch 1 patch Transdermal daily  thiamine IVPB 500 milliGRAM(s) IV Intermittent every 8 hours    MEDICATIONS  (PRN):      CAPILLARY BLOOD GLUCOSE        I&O's Summary    27 May 2021 07:01  -  28 May 2021 07:00  --------------------------------------------------------  IN: 840 mL / OUT: 1975 mL / NET: -1135 mL    28 May 2021 07:01  -  28 May 2021 11:14  --------------------------------------------------------  IN: 560 mL / OUT: 0 mL / NET: 560 mL        PHYSICAL EXAM:  Vital Signs Last 24 Hrs  T(C): 36.3 (28 May 2021 05:01), Max: 36.9 (27 May 2021 13:19)  T(F): 97.4 (28 May 2021 05:01), Max: 98.4 (27 May 2021 13:19)  HR: 68 (28 May 2021 05:01) (68 - 84)  BP: 117/63 (28 May 2021 05:01) (113/67 - 117/63)  BP(mean): --  RR: 18 (28 May 2021 05:01) (18 - 18)  SpO2: 97% (28 May 2021 05:01) (95% - 97%)    GENERAL: No acute distress, well-developed  HEAD:  Atraumatic, Normocephalic  EYES: EOMI, PERRLA, conjunctiva and sclera clear  NECK: Supple, no lymphadenopathy, no JVD  CHEST/LUNG: CTAB; No wheezes, rales, or rhonchi  HEART: Regular rate and rhythm; No murmurs, rubs, or gallops  ABDOMEN: Soft, non-tender, non-distended; normal bowel sounds, no organomegaly  EXTREMITIES:  2+ peripheral pulses b/l, No clubbing, cyanosis, or edema  NEUROLOGY: A&O x 3, no focal deficits  SKIN: No rashes or lesions    LABS:                        12.1   6.96  )-----------( 342      ( 28 May 2021 06:50 )             37.6     05-28    136  |  102  |  26<H>  ----------------------------<  103<H>  4.3   |  21<L>  |  1.11    Ca    9.5      28 May 2021 06:49  Phos  4.4     05-28  Mg     1.9     05-28    TPro  6.9  /  Alb  3.8  /  TBili  0.5  /  DBili  x   /  AST  25  /  ALT  55<H>  /  AlkPhos  78  05-28                RADIOLOGY & ADDITIONAL TESTS:  Results Reviewed:   Imaging Personally Reviewed:  Electrocardiogram Personally Reviewed:    COORDINATION OF CARE:  Care Discussed with Consultants/Other Providers [Y/N]:  Prior or Outpatient Records Reviewed [Y/N]:

## 2021-05-28 NOTE — PROGRESS NOTE ADULT - ATTENDING COMMENTS
discussed above plan with resident on rounds. patient seen and examined. right arm pain improved. no focal weakness on exam. full rom right arm.   labs and imaging reviewed.  I agree with the above findings, assessment, and plan with the following additions and exceptions:  alcohol abuse with concern for wernickes s/p high dose thiamine with improvement in mental status - will retreat and see if mental status continues to improve.  psych follow up regarding capacity  incidental lung nodule found on ct- will need f/u outpatient   c/o back pain - CT with transverse process fracture of the L1 vertebra, healing transverse process fracture of L2 vertebra and healing fractures of right 9th and 12th ribs. no focal deficits on exam  await safe dc plan given homeless with no social support - seeking guardianship to help in decision making

## 2021-05-28 NOTE — CHART NOTE - NSCHARTNOTEFT_GEN_A_CORE
Nutrition Follow Up Note  Patient seen for: nutrition follow-up    Pt is a 59 yo male with PMH of EtOH abuse, chronic pancreatitis, s/p L nasal bone fracture, vertebral fracture of transverse process and recent admission for EtOH abuse now BIBEMS s/p fall in front of EMS. Admitted 5/15. "Concern for possible Wernicke's, s/p 3 days of thiamine, now pending dispo."     Chart reviewed, events noted.    Source: [] Patient       [x] EMR        [] RN        [] Family at bedside       [x] Other: PCA    -If unable to interview patient: [] Trach/Vent/BiPAP  [x] Disoriented/confused/inappropriate to interview    Diet Order:   Diet, Regular (05-15-21)    - Is current order appropriate/adequate? [x] Yes  []  No:     - PO intake :   [x] >75%  Adequate    [] 50-75%  Fair       [] <50%  Poor  - Per PCA and flowsheet documentation, pt consuming 100% of meals.     GI:  Last BM yesterday ().   Bowel Regimen? [] Yes   [x] No    Weights:   Daily Weight in k.9 ( standing) - possible wt loss noted. Question accuracy of previous wts, taken by bed scale. Pt eating well. Will continue to monitor and trend weights.    Nutritionally Pertinent MEDICATIONS  (STANDING):  cyanocobalamin  folic acid  thiamine IVPB    Pertinent Labs:  @ 06:49: Na 136, BUN 26<H>, Cr 1.11, <H>, K+ 4.3, Phos 4.4, Mg 1.9, Alk Phos 78, ALT/SGPT 55<H>, AST/SGOT 25, HbA1c --    Skin per nursing documentation: no pressure injuries per flowsheets   Edema per flowsheets: none    Estimated Needs: recalculated based on new low, standing wt 57.9 kg  Estimated Energy Needs (25-30 kcal/kg): 2687-7668 kcal/day  Estimated Protein Needs (1-1.2 g/kg): 58-69 g/day  Estimated Fluid Needs (25-30 ml/kg): 2318-2486 ml/day    Previous Nutrition Diagnosis: None    New Nutrition Diagnosis: not applicable    Recommendations:   1) Continue Regular diet. Monitor/adjust PRN.   2) Continue micronutrient supplementation if no medical contraindications    Monitoring and Evaluation: Monitor PO intake, weight, labs, skin, GI status, diet     RD remains available upon request and will follow up per protocol  Coty Berkowitz MS RD CDN Pager #[phone]

## 2021-05-29 PROCEDURE — 99232 SBSQ HOSP IP/OBS MODERATE 35: CPT | Mod: GC

## 2021-05-29 RX ORDER — DICLOFENAC SODIUM 30 MG/G
4 GEL TOPICAL
Refills: 0 | Status: DISCONTINUED | OUTPATIENT
Start: 2021-05-29 | End: 2021-06-09

## 2021-05-29 RX ADMIN — Medication 1 PATCH: at 11:31

## 2021-05-29 RX ADMIN — Medication 105 MILLIGRAM(S): at 05:00

## 2021-05-29 RX ADMIN — Medication 1 PATCH: at 11:29

## 2021-05-29 RX ADMIN — Medication 1 MILLIGRAM(S): at 11:31

## 2021-05-29 RX ADMIN — ENOXAPARIN SODIUM 40 MILLIGRAM(S): 100 INJECTION SUBCUTANEOUS at 11:31

## 2021-05-29 RX ADMIN — Medication 1 PATCH: at 19:30

## 2021-05-29 RX ADMIN — Medication 105 MILLIGRAM(S): at 14:47

## 2021-05-29 RX ADMIN — Medication 1 PATCH: at 11:33

## 2021-05-29 RX ADMIN — DICLOFENAC SODIUM 4 GRAM(S): 30 GEL TOPICAL at 22:44

## 2021-05-29 RX ADMIN — Medication 105 MILLIGRAM(S): at 22:43

## 2021-05-29 RX ADMIN — PREGABALIN 1000 MICROGRAM(S): 225 CAPSULE ORAL at 11:32

## 2021-05-29 NOTE — PROGRESS NOTE ADULT - SUBJECTIVE AND OBJECTIVE BOX
PROGRESS NOTE:   Authored by Dr. Carole Garner, CHENG pager [phone]    Patient is a 60y old  Male who presents with a chief complaint of fall (28 May 2021 11:14)      SUBJECTIVE / OVERNIGHT EVENTS: Patient examined at bedside. He appeared well but did not seem to have much improvement in cognition since yesterday. Patient still moving slowly.     MEDICATIONS  (STANDING):  cyanocobalamin 1000 MICROGram(s) Oral daily  enoxaparin Injectable 40 milliGRAM(s) SubCutaneous daily  folic acid 1 milliGRAM(s) Oral daily  nicotine -   7 mG/24Hr(s) Patch 1 patch Transdermal daily  thiamine IVPB 500 milliGRAM(s) IV Intermittent every 8 hours    MEDICATIONS  (PRN):      CAPILLARY BLOOD GLUCOSE        I&O's Summary    28 May 2021 07:01  -  29 May 2021 07:00  --------------------------------------------------------  IN: 1590 mL / OUT: 2600 mL / NET: -1010 mL        PHYSICAL EXAM:  Vital Signs Last 24 Hrs  T(C): 36.6 (29 May 2021 04:39), Max: 36.9 (28 May 2021 13:21)  T(F): 97.8 (29 May 2021 04:39), Max: 98.5 (28 May 2021 13:21)  HR: 60 (29 May 2021 04:39) (60 - 76)  BP: 113/67 (29 May 2021 04:39) (113/67 - 119/74)  BP(mean): --  RR: 18 (29 May 2021 04:39) (18 - 18)  SpO2: 96% (29 May 2021 04:39) (96% - 96%)    GENERAL: No acute distress, well-developed  HEAD:  Atraumatic, Normocephalic  EYES: EOMI, PERRLA, conjunctiva and sclera clear  NECK: Supple, no lymphadenopathy, no JVD  CHEST/LUNG: CTAB; No wheezes, rales, or rhonchi  HEART: Regular rate and rhythm; No murmurs, rubs, or gallops  ABDOMEN: Soft, non-tender, non-distended; normal bowel sounds, no organomegaly  EXTREMITIES:  2+ peripheral pulses b/l, No clubbing, cyanosis, or edema  NEUROLOGY: A&O x 3, no focal deficits, generalized weakness   SKIN: No rashes or lesions    LABS:                        12.1   6.96  )-----------( 342      ( 28 May 2021 06:50 )             37.6     05-28    136  |  102  |  26<H>  ----------------------------<  103<H>  4.3   |  21<L>  |  1.11    Ca    9.5      28 May 2021 06:49  Phos  4.4     05-28  Mg     1.9     05-28    TPro  6.9  /  Alb  3.8  /  TBili  0.5  /  DBili  x   /  AST  25  /  ALT  55<H>  /  AlkPhos  78  05-28                RADIOLOGY & ADDITIONAL TESTS:  Results Reviewed:   Imaging Personally Reviewed:  Electrocardiogram Personally Reviewed:    COORDINATION OF CARE:  Care Discussed with Consultants/Other Providers [Y/N]:  Prior or Outpatient Records Reviewed [Y/N]:

## 2021-05-29 NOTE — PROGRESS NOTE ADULT - ASSESSMENT
This patient is a 61yo gentleman with PMH of EtOH abuse, chronic pancreatitis who presents to the ED after an observed fall, in the setting of alcohol use, found to have L nasal bone fracture, vertebral fracture of transverse process and left facial wound now presenting back to the hospital for a fall, concern for possible Wernicke's, s/p 3 days of thiamine, now pending dispo     CM is working on legal guardianship

## 2021-05-30 PROCEDURE — 99232 SBSQ HOSP IP/OBS MODERATE 35: CPT | Mod: GC

## 2021-05-30 RX ORDER — THIAMINE MONONITRATE (VIT B1) 100 MG
100 TABLET ORAL DAILY
Refills: 0 | Status: DISCONTINUED | OUTPATIENT
Start: 2021-05-30 | End: 2021-06-09

## 2021-05-30 RX ADMIN — PREGABALIN 1000 MICROGRAM(S): 225 CAPSULE ORAL at 12:52

## 2021-05-30 RX ADMIN — DICLOFENAC SODIUM 4 GRAM(S): 30 GEL TOPICAL at 04:30

## 2021-05-30 RX ADMIN — Medication 1 MILLIGRAM(S): at 12:52

## 2021-05-30 RX ADMIN — Medication 1 PATCH: at 20:04

## 2021-05-30 RX ADMIN — ENOXAPARIN SODIUM 40 MILLIGRAM(S): 100 INJECTION SUBCUTANEOUS at 12:53

## 2021-05-30 RX ADMIN — DICLOFENAC SODIUM 4 GRAM(S): 30 GEL TOPICAL at 12:52

## 2021-05-30 RX ADMIN — Medication 1 PATCH: at 12:52

## 2021-05-30 RX ADMIN — Medication 105 MILLIGRAM(S): at 05:09

## 2021-05-30 RX ADMIN — DICLOFENAC SODIUM 4 GRAM(S): 30 GEL TOPICAL at 18:29

## 2021-05-30 RX ADMIN — Medication 100 MILLIGRAM(S): at 12:52

## 2021-05-30 NOTE — PROGRESS NOTE ADULT - PROBLEM SELECTOR PLAN 1
Fracture of vertebra, lumbar from previous fall. CT with transverse process fracture of the L1 vertebra, healing transverse process fracture of L2 vertebra and healing fractures of right 9th and 12th ribs. Pt with new fall now 5/15  -PT rec RICHARD but patient refusing and also has no insurance, undocumented  -per ortho: no need for surgery or need for TLSO brace, outpatient f/u  -c/w  tylenol PRN for pain control.   - unclear if new fall was due to syncope, patient appears altered   - CTH no sign of bleed or fracture  - CT cervical sign w/o cervical fracture   - UA negative Fracture of vertebra, lumbar from previous fall. CT with transverse process fracture of the L1 vertebra, healing transverse process fracture of L2 vertebra and healing fractures of right 9th and 12th ribs. Pt with new fall now 5/15  -PT rec RICHARD but patient refusing and also has no insurance, undocumented  -per ortho: no need for surgery or need for TLSO brace, outpatient f/u  -c/w  tylenol PRN for pain control.   - unclear if new fall was due to syncope  - CTH no sign of bleed or fracture  - CT cervical sign w/o cervical fracture   - UA negative
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
## 2021-05-30 NOTE — PROGRESS NOTE ADULT - SUBJECTIVE AND OBJECTIVE BOX
Ade Byrne MD  Internal Medicine PGY-2  Pager [phone]  Pager TFN [phone]      Patient is a 60y old  Male who presents with a chief complaint of fall (29 May 2021 08:10)        SUBJECTIVE / OVERNIGHT EVENTS: Patient had no acute events overnight. Patient seen and examined at bedside this morning.     ROS: [ - ] Fever [ - ] Chills [ - ] Nausea/Vomiting [ - ] Chest Pain [ - ] Shortness of breath     MEDICATIONS  (STANDING):  cyanocobalamin 1000 MICROGram(s) Oral daily  diclofenac sodium 1% Gel 4 Gram(s) Topical four times a day  enoxaparin Injectable 40 milliGRAM(s) SubCutaneous daily  folic acid 1 milliGRAM(s) Oral daily  nicotine -   7 mG/24Hr(s) Patch 1 patch Transdermal daily    MEDICATIONS  (PRN):      Vital Signs Last 24 Hrs  T(C): 36.6 (30 May 2021 04:49), Max: 37.2 (29 May 2021 21:44)  T(F): 97.9 (30 May 2021 04:49), Max: 98.9 (29 May 2021 21:44)  HR: 62 (30 May 2021 04:49) (62 - 74)  BP: 100/61 (30 May 2021 04:49) (100/61 - 125/76)  BP(mean): --  RR: 18 (30 May 2021 04:49) (18 - 18)  SpO2: 95% (30 May 2021 04:49) (95% - 97%)  CAPILLARY BLOOD GLUCOSE        I&O's Summary    29 May 2021 07:01  -  30 May 2021 07:00  --------------------------------------------------------  IN: 1060 mL / OUT: 2400 mL / NET: -1340 mL        PHYSICAL EXAM  GENERAL: No acute distress, well-developed  HEAD:  Atraumatic, Normocephalic  NECK: Supple, no lymphadenopathy, no JVD  CHEST/LUNG: CTAB; No wheezes, rales, or rhonchi  HEART: Regular rate and rhythm; No murmurs, rubs, or gallops  ABDOMEN: Soft, non-tender, non-distended; normal bowel sounds, no organomegaly  EXTREMITIES:  2+ peripheral pulses b/l, No clubbing, cyanosis, or edema  NEUROLOGY: A&O x 3, no focal deficits, generalized weakness, slow to respond to questions  SKIN: No rashes or lesions      LABS:                      RADIOLOGY & ADDITIONAL TESTS:    Imaging Personally Reviewed:  Consultant(s) Notes Reviewed:     Ade Byrne MD  Internal Medicine PGY-2  Pager [phone]  Pager NAM [phone]      Patient is a 60y old  Male who presents with a chief complaint of fall (29 May 2021 08:10)        SUBJECTIVE / OVERNIGHT EVENTS: Patient had no acute events overnight. Patient seen and examined at bedside this morning.  [id number]. Patient had no acute complaints. Aox3    ROS: [ - ] Fever [ - ] Chills [ - ] Nausea/Vomiting [ - ] Chest Pain [ - ] Shortness of breath     MEDICATIONS  (STANDING):  cyanocobalamin 1000 MICROGram(s) Oral daily  diclofenac sodium 1% Gel 4 Gram(s) Topical four times a day  enoxaparin Injectable 40 milliGRAM(s) SubCutaneous daily  folic acid 1 milliGRAM(s) Oral daily  nicotine -   7 mG/24Hr(s) Patch 1 patch Transdermal daily    MEDICATIONS  (PRN):      Vital Signs Last 24 Hrs  T(C): 36.6 (30 May 2021 04:49), Max: 37.2 (29 May 2021 21:44)  T(F): 97.9 (30 May 2021 04:49), Max: 98.9 (29 May 2021 21:44)  HR: 62 (30 May 2021 04:49) (62 - 74)  BP: 100/61 (30 May 2021 04:49) (100/61 - 125/76)  BP(mean): --  RR: 18 (30 May 2021 04:49) (18 - 18)  SpO2: 95% (30 May 2021 04:49) (95% - 97%)  CAPILLARY BLOOD GLUCOSE        I&O's Summary    29 May 2021 07:01  -  30 May 2021 07:00  --------------------------------------------------------  IN: 1060 mL / OUT: 2400 mL / NET: -1340 mL        PHYSICAL EXAM  GENERAL: No acute distress, well-developed  HEAD:  Atraumatic, Normocephalic  NECK: Supple, no lymphadenopathy, no JVD  CHEST/LUNG: CTAB; No wheezes, rales, or rhonchi  HEART: Regular rate and rhythm; No murmurs, rubs, or gallops  ABDOMEN: Soft, non-tender, non-distended; normal bowel sounds, no organomegaly  EXTREMITIES:  2+ peripheral pulses b/l, No clubbing, cyanosis, or edema  NEUROLOGY: A&O x 3, no focal deficits, generalized weakness, slow to respond to questions intermittently  SKIN: No rashes or lesions      LABS:                      RADIOLOGY & ADDITIONAL TESTS:    Imaging Personally Reviewed:  Consultant(s) Notes Reviewed:

## 2021-05-31 LAB
ANION GAP SERPL CALC-SCNC: 15 MMOL/L — SIGNIFICANT CHANGE UP (ref 5–17)
BUN SERPL-MCNC: 40 MG/DL — HIGH (ref 7–23)
CALCIUM SERPL-MCNC: 9.7 MG/DL — SIGNIFICANT CHANGE UP (ref 8.4–10.5)
CHLORIDE SERPL-SCNC: 101 MMOL/L — SIGNIFICANT CHANGE UP (ref 96–108)
CO2 SERPL-SCNC: 21 MMOL/L — LOW (ref 22–31)
CREAT SERPL-MCNC: 1.3 MG/DL — SIGNIFICANT CHANGE UP (ref 0.5–1.3)
GLUCOSE SERPL-MCNC: 96 MG/DL — SIGNIFICANT CHANGE UP (ref 70–99)
HCT VFR BLD CALC: 37.9 % — LOW (ref 39–50)
HGB BLD-MCNC: 12.2 G/DL — LOW (ref 13–17)
MAGNESIUM SERPL-MCNC: 2.1 MG/DL — SIGNIFICANT CHANGE UP (ref 1.6–2.6)
MCHC RBC-ENTMCNC: 32.2 GM/DL — SIGNIFICANT CHANGE UP (ref 32–36)
MCHC RBC-ENTMCNC: 32.4 PG — SIGNIFICANT CHANGE UP (ref 27–34)
MCV RBC AUTO: 100.5 FL — HIGH (ref 80–100)
NRBC # BLD: 0 /100 WBCS — SIGNIFICANT CHANGE UP (ref 0–0)
PHOSPHATE SERPL-MCNC: 4.6 MG/DL — HIGH (ref 2.5–4.5)
PLATELET # BLD AUTO: 339 K/UL — SIGNIFICANT CHANGE UP (ref 150–400)
POTASSIUM SERPL-MCNC: 4.4 MMOL/L — SIGNIFICANT CHANGE UP (ref 3.5–5.3)
POTASSIUM SERPL-SCNC: 4.4 MMOL/L — SIGNIFICANT CHANGE UP (ref 3.5–5.3)
RBC # BLD: 3.77 M/UL — LOW (ref 4.2–5.8)
RBC # FLD: 12.5 % — SIGNIFICANT CHANGE UP (ref 10.3–14.5)
SODIUM SERPL-SCNC: 137 MMOL/L — SIGNIFICANT CHANGE UP (ref 135–145)
WBC # BLD: 7.59 K/UL — SIGNIFICANT CHANGE UP (ref 3.8–10.5)
WBC # FLD AUTO: 7.59 K/UL — SIGNIFICANT CHANGE UP (ref 3.8–10.5)

## 2021-05-31 PROCEDURE — 99232 SBSQ HOSP IP/OBS MODERATE 35: CPT | Mod: GC

## 2021-05-31 RX ADMIN — ENOXAPARIN SODIUM 40 MILLIGRAM(S): 100 INJECTION SUBCUTANEOUS at 13:48

## 2021-05-31 RX ADMIN — DICLOFENAC SODIUM 4 GRAM(S): 30 GEL TOPICAL at 21:07

## 2021-05-31 RX ADMIN — Medication 1 PATCH: at 12:00

## 2021-05-31 RX ADMIN — PREGABALIN 1000 MICROGRAM(S): 225 CAPSULE ORAL at 13:48

## 2021-05-31 RX ADMIN — DICLOFENAC SODIUM 4 GRAM(S): 30 GEL TOPICAL at 05:13

## 2021-05-31 RX ADMIN — Medication 1 PATCH: at 13:48

## 2021-05-31 RX ADMIN — Medication 1 PATCH: at 18:45

## 2021-05-31 RX ADMIN — Medication 100 MILLIGRAM(S): at 13:48

## 2021-05-31 RX ADMIN — DICLOFENAC SODIUM 4 GRAM(S): 30 GEL TOPICAL at 13:47

## 2021-05-31 RX ADMIN — Medication 1 PATCH: at 19:59

## 2021-05-31 RX ADMIN — DICLOFENAC SODIUM 4 GRAM(S): 30 GEL TOPICAL at 00:16

## 2021-05-31 RX ADMIN — Medication 1 MILLIGRAM(S): at 13:48

## 2021-05-31 NOTE — PROGRESS NOTE ADULT - ASSESSMENT
61yo M with PMH of EtOH abuse, chronic pancreatitis presenting after observed fall, likely in the setting of alcohol use, found to have L nasal bone fracture, vertebral fracture of transverse process and left facial wound. Concern for possible Wernicke's, s/p 3 days of thiamine, now pending dispo. CM is working on legal guardianship.  61yo M with PMH of EtOH abuse, chronic pancreatitis presenting after observed fall, likely in the setting of alcohol use, found to have L nasal bone fracture, vertebral fracture of transverse process and left facial wound. C/f possible Wernicke's, s/p 3 days of thiamine. CM is working on legal guardianship, pending psych re-eval capacity.
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
## 2021-05-31 NOTE — PROGRESS NOTE ADULT - PROBLEM SELECTOR PLAN 1
Fracture of vertebra, lumbar from previous fall. CT with transverse process fracture of the L1 vertebra, healing transverse process fracture of L2 vertebra and healing fractures of right 9th and 12th ribs. Pt with new fall now 5/15  -PT rec RICHARD but patient refusing and also has no insurance, undocumented  -per ortho: no need for surgery or need for TLSO brace, outpatient f/u  -c/w  tylenol PRN for pain control.   - unclear if new fall was due to syncope  - CTH no sign of bleed or fracture  - CT cervical sign w/o cervical fracture   - UA negative

## 2021-05-31 NOTE — PROGRESS NOTE ADULT - SUBJECTIVE AND OBJECTIVE BOX
**********************************************  Paulette Mccrackened, PGY-1  Internal Medicine   [facility] Pager #: [phone]  **********************************************     Patient is a 60y old  Male who presents with a chief complaint of fall (30 May 2021 07:24)    SUBJECTIVE / OVERNIGHT EVENTS:     OBJECTIVE:  Vital Signs Last 24 Hrs  T(C): 36.7 (31 May 2021 04:28), Max: 36.7 (30 May 2021 21:02)  T(F): 98 (31 May 2021 04:28), Max: 98 (30 May 2021 21:02)  HR: 61 (31 May 2021 04:28) (61 - 77)  BP: 103/60 (31 May 2021 04:28) (103/60 - 115/67)  RR: 18 (31 May 2021 04:28) (18 - 18)  SpO2: 97% (31 May 2021 04:28) (95% - 97%)    I&O's Summary    29 May 2021 07:01  -  30 May 2021 07:00  --------------------------------------------------------  IN: 1060 mL / OUT: 2400 mL / NET: -1340 mL    30 May 2021 07:01  -  31 May 2021 06:39  --------------------------------------------------------  IN: 1150 mL / OUT: 2000 mL / NET: -850 mL      Physical Examination:      Labs:        Imaging Personally Reviewed:      MEDICATIONS  (STANDING):  cyanocobalamin 1000 MICROGram(s) Oral daily  diclofenac sodium 1% Gel 4 Gram(s) Topical four times a day  enoxaparin Injectable 40 milliGRAM(s) SubCutaneous daily  folic acid 1 milliGRAM(s) Oral daily  nicotine -   7 mG/24Hr(s) Patch 1 patch Transdermal daily  thiamine 100 milliGRAM(s) Oral daily    MEDICATIONS  (PRN):   **********************************************  Paulette Mccrackened, PGY-1  Internal Medicine   [facility] Pager #: [phone]  **********************************************     Patient is a 60y old  Male who presents with a chief complaint of fall (30 May 2021 07:24)    SUBJECTIVE / OVERNIGHT EVENTS:   -No acute events overnight  -Pt examined at bedside this AM, no subjective complaints  -Limited interview participation, however, denies CP, palpitations, SOB, n/v/d     OBJECTIVE:  Vital Signs Last 24 Hrs  T(C): 36.7 (31 May 2021 04:28), Max: 36.7 (30 May 2021 21:02)  T(F): 98 (31 May 2021 04:28), Max: 98 (30 May 2021 21:02)  HR: 61 (31 May 2021 04:28) (61 - 77)  BP: 103/60 (31 May 2021 04:28) (103/60 - 115/67)  RR: 18 (31 May 2021 04:28) (18 - 18)  SpO2: 97% (31 May 2021 04:28) (95% - 97%)    I&O's Summary    29 May 2021 07:01  -  30 May 2021 07:00  --------------------------------------------------------  IN: 1060 mL / OUT: 2400 mL / NET: -1340 mL    30 May 2021 07:01  -  31 May 2021 06:39  --------------------------------------------------------  IN: 1150 mL / OUT: 2000 mL / NET: -850 mL      Physical Examination:    General: No acute distress, well-appearing    Eyes: PERRL, EOMI     ENT: MMM, no oropharyngeal lesions or erythema appreciated     Pulm: No increased WOB. CTAB. No wheezing.     CV: RRR. S1&S2+. No M/R/G appreciated.     Abdomen: +BS. Soft, NTND. No organomegaly.     MSK: Nml ROM    Extremities: No peripheral edema or cyanosis.     Neuro: A&Ox2, no focal deficits     Skin: Warm and dry. No visible rash.     MEDICATIONS  (STANDING):  cyanocobalamin 1000 MICROGram(s) Oral daily  diclofenac sodium 1% Gel 4 Gram(s) Topical four times a day  enoxaparin Injectable 40 milliGRAM(s) SubCutaneous daily  folic acid 1 milliGRAM(s) Oral daily  nicotine -   7 mG/24Hr(s) Patch 1 patch Transdermal daily  thiamine 100 milliGRAM(s) Oral daily    MEDICATIONS  (PRN):

## 2021-06-01 LAB — SARS-COV-2 RNA SPEC QL NAA+PROBE: SIGNIFICANT CHANGE UP

## 2021-06-01 PROCEDURE — 99232 SBSQ HOSP IP/OBS MODERATE 35: CPT | Mod: GC

## 2021-06-01 RX ADMIN — DICLOFENAC SODIUM 4 GRAM(S): 30 GEL TOPICAL at 22:01

## 2021-06-01 RX ADMIN — Medication 1 PATCH: at 06:14

## 2021-06-01 RX ADMIN — ENOXAPARIN SODIUM 40 MILLIGRAM(S): 100 INJECTION SUBCUTANEOUS at 12:26

## 2021-06-01 RX ADMIN — Medication 1 PATCH: at 12:26

## 2021-06-01 RX ADMIN — Medication 1 MILLIGRAM(S): at 12:27

## 2021-06-01 RX ADMIN — PREGABALIN 1000 MICROGRAM(S): 225 CAPSULE ORAL at 12:27

## 2021-06-01 RX ADMIN — DICLOFENAC SODIUM 4 GRAM(S): 30 GEL TOPICAL at 04:14

## 2021-06-01 RX ADMIN — DICLOFENAC SODIUM 4 GRAM(S): 30 GEL TOPICAL at 17:41

## 2021-06-01 RX ADMIN — Medication 100 MILLIGRAM(S): at 12:27

## 2021-06-01 RX ADMIN — DICLOFENAC SODIUM 4 GRAM(S): 30 GEL TOPICAL at 09:51

## 2021-06-01 NOTE — PROGRESS NOTE ADULT - PROBLEM SELECTOR PLAN 4
Pt found to have incidental finding of nodule In lung on imaging  - cont to monitor in setting of active smoker   - unknown weight loss or other associated symptoms of cancer  - follow up imaging needed Pt found to have incidental finding of nodule In lung on imaging  - cont to monitor in setting of active smoker   - unknown weight loss or other associated symptoms of cancer  - follow up imaging needed as outpatient

## 2021-06-01 NOTE — PROGRESS NOTE ADULT - SUBJECTIVE AND OBJECTIVE BOX
**********************************************  Paulette Santos, PGY-1  Internal Medicine   [facility] Pager #: [phone]  **********************************************     Patient is a 60y old  Male who presents with a chief complaint of fall (31 May 2021 06:38)    SUBJECTIVE / OVERNIGHT EVENTS:     OBJECTIVE:  Vital Signs Last 24 Hrs  T(C): 36.7 (01 Jun 2021 04:34), Max: 37 (31 May 2021 13:54)  T(F): 98 (01 Jun 2021 04:34), Max: 98.6 (31 May 2021 13:54)  HR: 65 (01 Jun 2021 04:34) (65 - 76)  BP: 117/72 (01 Jun 2021 04:34) (116/71 - 130/73)  RR: 18 (01 Jun 2021 04:34) (18 - 18)  SpO2: 98% (01 Jun 2021 04:34) (96% - 98%)    I&O's Summary    30 May 2021 07:01  -  31 May 2021 07:00  --------------------------------------------------------  IN: 1150 mL / OUT: 2750 mL / NET: -1600 mL    31 May 2021 07:01  -  01 Jun 2021 06:27  --------------------------------------------------------  IN: 1280 mL / OUT: 1350 mL / NET: -70 mL      Physical Examination:      Labs:               12.2   7.59  )-----------( 339      ( 31 May 2021 07:19 )             37.9     05-31    137  |  101  |  40<H>  ----------------------------<  96  4.4   |  21<L>  |  1.30    Ca    9.7      31 May 2021 07:19  Phos  4.6     05-31  Mg     2.1     05-31          Imaging Personally Reviewed:      MEDICATIONS  (STANDING):  cyanocobalamin 1000 MICROGram(s) Oral daily  diclofenac sodium 1% Gel 4 Gram(s) Topical four times a day  enoxaparin Injectable 40 milliGRAM(s) SubCutaneous daily  folic acid 1 milliGRAM(s) Oral daily  nicotine -   7 mG/24Hr(s) Patch 1 patch Transdermal daily  thiamine 100 milliGRAM(s) Oral daily    MEDICATIONS  (PRN):   **********************************************  Paulette Santos, PGY-1  Internal Medicine   [facility] Pager #: [phone]  **********************************************     Patient is a 60y old  Male who presents with a chief complaint of fall (31 May 2021 06:38)    SUBJECTIVE / OVERNIGHT EVENTS:   -[place]  ID: [id number]  -No acute events overnight  -Pt examined at bedside this AM, with no subjective complaints, just inquiring about his belongings  -Denies CP, palpitations, SOB, n/v/d  -A&Ox3    OBJECTIVE:  Vital Signs Last 24 Hrs  T(C): 36.7 (01 Jun 2021 04:34), Max: 37 (31 May 2021 13:54)  T(F): 98 (01 Jun 2021 04:34), Max: 98.6 (31 May 2021 13:54)  HR: 65 (01 Jun 2021 04:34) (65 - 76)  BP: 117/72 (01 Jun 2021 04:34) (116/71 - 130/73)  RR: 18 (01 Jun 2021 04:34) (18 - 18)  SpO2: 98% (01 Jun 2021 04:34) (96% - 98%)    I&O's Summary    30 May 2021 07:01  -  31 May 2021 07:00  --------------------------------------------------------  IN: 1150 mL / OUT: 2750 mL / NET: -1600 mL    31 May 2021 07:01  -  01 Jun 2021 06:27  --------------------------------------------------------  IN: 1280 mL / OUT: 1350 mL / NET: -70 mL      Physical Examination:    General: No acute distress, well-appearing    Eyes: PERRL, EOMI     ENT: MMM, no oropharyngeal lesions or erythema appreciated     Pulm: No increased WOB. CTAB. No wheezing.     CV: RRR. S1&S2+. No M/R/G appreciated.     Abdomen: +BS. Soft, NTND. No organomegaly.     MSK: Nml ROM    Extremities: No peripheral edema or cyanosis.     Neuro: A&Ox3, no focal deficits     Skin: Warm and dry. No visible rash.     Labs:               12.2   7.59  )-----------( 339      ( 31 May 2021 07:19 )             37.9     05-31    137  |  101  |  40<H>  ----------------------------<  96  4.4   |  21<L>  |  1.30    Ca    9.7      31 May 2021 07:19  Phos  4.6     05-31  Mg     2.1     05-31          Imaging Personally Reviewed:      MEDICATIONS  (STANDING):  cyanocobalamin 1000 MICROGram(s) Oral daily  diclofenac sodium 1% Gel 4 Gram(s) Topical four times a day  enoxaparin Injectable 40 milliGRAM(s) SubCutaneous daily  folic acid 1 milliGRAM(s) Oral daily  nicotine -   7 mG/24Hr(s) Patch 1 patch Transdermal daily  thiamine 100 milliGRAM(s) Oral daily    MEDICATIONS  (PRN):

## 2021-06-01 NOTE — PROGRESS NOTE ADULT - PROBLEM SELECTOR PLAN 2
Altered, found down   - BAL 0  - concern for Wernicke  - s/p high dose thiamine  - CTH normal  - may have some underlying dementia/cognitive change from chronic alcohol use.   - Infectious workup limited to UA (negative) as patients vitals WNL and normal WBC, low -no concern for infxn Altered, found down. NOW RESOLVED.   - BAL 0  - concern for Wernicke  - s/p high dose thiamine  - CTH normal  - may have some underlying dementia/cognitive change from chronic alcohol use.   - Infectious workup limited to UA (negative) as patients vitals WNL and normal WBC, low -no concern for infxn

## 2021-06-01 NOTE — PROGRESS NOTE ADULT - ATTENDING COMMENTS
Interviewed with [place] . Endorses some R leg stiffness but otherwise no complaints. Is primarily concerned with retrieving his clothing from [place].   psych follow up regarding capacity  incidental lung nodule found on ct- will need f/u outpatient   await safe dc plan given homeless with no social support.  Rest as above.
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
## 2021-06-01 NOTE — PROGRESS NOTE ADULT - PROBLEM SELECTOR PLAN 1
Fracture of vertebra, lumbar from previous fall. CT with transverse process fracture of the L1 vertebra, healing transverse process fracture of L2 vertebra and healing fractures of right 9th and 12th ribs. Pt with new fall now 5/15  -PT rec RICHARD but patient refusing and also has no insurance, undocumented  -per ortho: no need for surgery or need for TLSO brace, outpatient f/u  -c/w  tylenol PRN for pain control.   - unclear if new fall was due to syncope  - CTH no sign of bleed or fracture  - CT cervical sign w/o cervical fracture   - UA negative Fracture of vertebra, lumbar from previous fall. CT with transverse process fracture of the L1 vertebra, healing transverse process fracture of L2 vertebra and healing fractures of right 9th and 12th ribs. Pt with new fall now 5/15  -CTH/C spine no sign of bleed or fracture   -PT rec RICHARD but patient refusing and also has no insurance, undocumented  -per ortho: no need for surgery or need for TLSO brace, outpatient f/u

## 2021-06-01 NOTE — PROGRESS NOTE ADULT - ASSESSMENT
59yo M with PMH of EtOH abuse, chronic pancreatitis presenting after observed fall, likely in the setting of alcohol use, found to have L nasal bone fracture, vertebral fracture of transverse process and left facial wound. C/f possible Wernicke's, s/p 3 days of thiamine. CM is working on legal guardianship, pending psych re-eval capacity.

## 2021-06-01 NOTE — PROGRESS NOTE ADULT - PROBLEM SELECTOR PLAN 3
- s/p librium taper with highest CIWA 3-4  - fall precaution   - SBIRT consult  -  consult--> working on placement   - psych eval for capacity--> no capacity - s/p librium taper with highest CIWA 3-4  - fall precaution   - SBIRT consult  -  consult--> working on placement   - psych eval for capacity--> no capacity. Will discuss capacity re-evaluation with Psych

## 2021-06-02 LAB
ANION GAP SERPL CALC-SCNC: 15 MMOL/L — SIGNIFICANT CHANGE UP (ref 5–17)
ANION GAP SERPL CALC-SCNC: 15 MMOL/L — SIGNIFICANT CHANGE UP (ref 5–17)
BUN SERPL-MCNC: 34 MG/DL — HIGH (ref 7–23)
BUN SERPL-MCNC: 37 MG/DL — HIGH (ref 7–23)
CALCIUM SERPL-MCNC: 9.5 MG/DL — SIGNIFICANT CHANGE UP (ref 8.4–10.5)
CALCIUM SERPL-MCNC: 9.6 MG/DL — SIGNIFICANT CHANGE UP (ref 8.4–10.5)
CHLORIDE SERPL-SCNC: 101 MMOL/L — SIGNIFICANT CHANGE UP (ref 96–108)
CHLORIDE SERPL-SCNC: 104 MMOL/L — SIGNIFICANT CHANGE UP (ref 96–108)
CO2 SERPL-SCNC: 19 MMOL/L — LOW (ref 22–31)
CO2 SERPL-SCNC: 20 MMOL/L — LOW (ref 22–31)
CREAT SERPL-MCNC: 1.06 MG/DL — SIGNIFICANT CHANGE UP (ref 0.5–1.3)
CREAT SERPL-MCNC: 1.08 MG/DL — SIGNIFICANT CHANGE UP (ref 0.5–1.3)
GLUCOSE SERPL-MCNC: 92 MG/DL — SIGNIFICANT CHANGE UP (ref 70–99)
GLUCOSE SERPL-MCNC: 96 MG/DL — SIGNIFICANT CHANGE UP (ref 70–99)
PHOSPHATE SERPL-MCNC: 5.3 MG/DL — HIGH (ref 2.5–4.5)
POTASSIUM SERPL-MCNC: 4.7 MMOL/L — SIGNIFICANT CHANGE UP (ref 3.5–5.3)
POTASSIUM SERPL-MCNC: 6.4 MMOL/L — CRITICAL HIGH (ref 3.5–5.3)
POTASSIUM SERPL-SCNC: 4.7 MMOL/L — SIGNIFICANT CHANGE UP (ref 3.5–5.3)
POTASSIUM SERPL-SCNC: 6.4 MMOL/L — CRITICAL HIGH (ref 3.5–5.3)
SODIUM SERPL-SCNC: 135 MMOL/L — SIGNIFICANT CHANGE UP (ref 135–145)
SODIUM SERPL-SCNC: 139 MMOL/L — SIGNIFICANT CHANGE UP (ref 135–145)

## 2021-06-02 PROCEDURE — 99232 SBSQ HOSP IP/OBS MODERATE 35: CPT | Mod: GC

## 2021-06-02 PROCEDURE — 99232 SBSQ HOSP IP/OBS MODERATE 35: CPT

## 2021-06-02 PROCEDURE — 93971 EXTREMITY STUDY: CPT | Mod: 26,RT

## 2021-06-02 RX ORDER — ACETAMINOPHEN 500 MG
650 TABLET ORAL EVERY 6 HOURS
Refills: 0 | Status: DISCONTINUED | OUTPATIENT
Start: 2021-06-02 | End: 2021-06-09

## 2021-06-02 RX ADMIN — DICLOFENAC SODIUM 4 GRAM(S): 30 GEL TOPICAL at 05:04

## 2021-06-02 RX ADMIN — Medication 1 PATCH: at 20:06

## 2021-06-02 RX ADMIN — DICLOFENAC SODIUM 4 GRAM(S): 30 GEL TOPICAL at 21:25

## 2021-06-02 RX ADMIN — Medication 1 MILLIGRAM(S): at 12:38

## 2021-06-02 RX ADMIN — DICLOFENAC SODIUM 4 GRAM(S): 30 GEL TOPICAL at 16:23

## 2021-06-02 RX ADMIN — ENOXAPARIN SODIUM 40 MILLIGRAM(S): 100 INJECTION SUBCUTANEOUS at 12:38

## 2021-06-02 RX ADMIN — PREGABALIN 1000 MICROGRAM(S): 225 CAPSULE ORAL at 12:38

## 2021-06-02 RX ADMIN — Medication 100 MILLIGRAM(S): at 12:39

## 2021-06-02 RX ADMIN — Medication 650 MILLIGRAM(S): at 16:22

## 2021-06-02 RX ADMIN — Medication 1 PATCH: at 12:38

## 2021-06-02 RX ADMIN — DICLOFENAC SODIUM 4 GRAM(S): 30 GEL TOPICAL at 12:37

## 2021-06-02 NOTE — PROGRESS NOTE ADULT - PROBLEM SELECTOR PLAN 3
Pt found to have incidental finding of nodule In lung on imaging  - cont to monitor in setting of active smoker   - unknown weight loss or other associated symptoms of cancer  - follow up imaging needed as outpatient

## 2021-06-02 NOTE — PROGRESS NOTE ADULT - PROBLEM SELECTOR PLAN 6
-DVT ppx: lovenox  -Diet: regular  -Discussed +COVID PCR on 5/23 with Infxn control, confirmed that no isolation precautions needed as pt's COVID antibodies positive and pt asymptomatic. Repeat COVID PCR negative on 6/1.

## 2021-06-02 NOTE — PROVIDER CONTACT NOTE (OTHER) - ACTION/TREATMENT ORDERED:
applied warm compresses and elevated arm on pillow applied warm compresses and elevated arm on pillow  VA doppler right arm ordered and Tylenol 2 tabs given

## 2021-06-02 NOTE — PROGRESS NOTE ADULT - SUBJECTIVE AND OBJECTIVE BOX
**********************************************  Paulette Santos, PGY-1  Internal Medicine   [facility] Pager #: [phone]  **********************************************     Patient is a 60y old  Male who presents with a chief complaint of fall (01 Jun 2021 06:27)    SUBJECTIVE / OVERNIGHT EVENTS:     OBJECTIVE:  Vital Signs Last 24 Hrs  T(C): 36.8 (02 Jun 2021 04:44), Max: 37.3 (01 Jun 2021 21:06)  T(F): 98.2 (02 Jun 2021 04:44), Max: 99.1 (01 Jun 2021 21:06)  HR: 60 (02 Jun 2021 04:44) (60 - 83)  BP: 116/68 (02 Jun 2021 04:44) (114/71 - 120/66)  RR: 18 (02 Jun 2021 04:44) (18 - 18)  SpO2: 97% (02 Jun 2021 04:44) (96% - 97%)    I&O's Summary    31 May 2021 07:01  -  01 Jun 2021 07:00  --------------------------------------------------------  IN: 1280 mL / OUT: 1350 mL / NET: -70 mL    01 Jun 2021 07:01  -  02 Jun 2021 06:33  --------------------------------------------------------  IN: 240 mL / OUT: 1200 mL / NET: -960 mL      Physical Examination:      Labs:               12.2   7.59  )-----------( 339      ( 31 May 2021 07:19 )             37.9     05-31    137  |  101  |  40<H>  ----------------------------<  96  4.4   |  21<L>  |  1.30    Ca    9.7      31 May 2021 07:19  Phos  4.6     05-31  Mg     2.1     05-31        Imaging Personally Reviewed:      MEDICATIONS  (STANDING):  cyanocobalamin 1000 MICROGram(s) Oral daily  diclofenac sodium 1% Gel 4 Gram(s) Topical four times a day  enoxaparin Injectable 40 milliGRAM(s) SubCutaneous daily  folic acid 1 milliGRAM(s) Oral daily  nicotine -   7 mG/24Hr(s) Patch 1 patch Transdermal daily  thiamine 100 milliGRAM(s) Oral daily    MEDICATIONS  (PRN):   **********************************************  Paulette Mccrackened, PGY-1  Internal Medicine   [facility] Pager #: [phone]  **********************************************     Patient is a 60y old  Male who presents with a chief complaint of fall (01 Jun 2021 06:27)    SUBJECTIVE / OVERNIGHT EVENTS:   -No acute events overnight  -Pt examined at bedside this AM, with no subjective complaints. States he wishes to go back to [place], where his stuff is. Does not have a place to live there.   -Denies CP, palpitations, SOB, n/v/d   [facility]  ID: [id number]    OBJECTIVE:  Vital Signs Last 24 Hrs  T(C): 36.8 (02 Jun 2021 04:44), Max: 37.3 (01 Jun 2021 21:06)  T(F): 98.2 (02 Jun 2021 04:44), Max: 99.1 (01 Jun 2021 21:06)  HR: 60 (02 Jun 2021 04:44) (60 - 83)  BP: 116/68 (02 Jun 2021 04:44) (114/71 - 120/66)  RR: 18 (02 Jun 2021 04:44) (18 - 18)  SpO2: 97% (02 Jun 2021 04:44) (96% - 97%)    I&O's Summary    31 May 2021 07:01  -  01 Jun 2021 07:00  --------------------------------------------------------  IN: 1280 mL / OUT: 1350 mL / NET: -70 mL    01 Jun 2021 07:01  -  02 Jun 2021 06:33  --------------------------------------------------------  IN: 240 mL / OUT: 1200 mL / NET: -960 mL      Physical Examination:    General: No acute distress, well-appearing    Eyes: PERRL, EOMI     ENT: MMM, no oropharyngeal lesions or erythema appreciated     Pulm: No increased WOB. CTAB. No wheezing.     CV: RRR. S1&S2+. No M/R/G appreciated.     Abdomen: +BS. Soft, NTND. No organomegaly.     MSK: Nml ROM    Extremities: No peripheral edema or cyanosis.     Neuro: A&Ox2-3, no focal deficits     Skin: Warm and dry. No visible rash.     Labs:               12.2   7.59  )-----------( 339      ( 31 May 2021 07:19 )             37.9     05-31    137  |  101  |  40<H>  ----------------------------<  96  4.4   |  21<L>  |  1.30    Ca    9.7      31 May 2021 07:19  Phos  4.6     05-31  Mg     2.1     05-31        Imaging Personally Reviewed:      MEDICATIONS  (STANDING):  cyanocobalamin 1000 MICROGram(s) Oral daily  diclofenac sodium 1% Gel 4 Gram(s) Topical four times a day  enoxaparin Injectable 40 milliGRAM(s) SubCutaneous daily  folic acid 1 milliGRAM(s) Oral daily  nicotine -   7 mG/24Hr(s) Patch 1 patch Transdermal daily  thiamine 100 milliGRAM(s) Oral daily    MEDICATIONS  (PRN):

## 2021-06-02 NOTE — BH CONSULTATION LIAISON PROGRESS NOTE - NSBHCHARTREVIEWINVESTIGATE_PSY_A_CORE FT
< from: 12 Lead ECG (06.15.19 @ 07:21) >      Ventricular Rate 72 BPM    Atrial Rate 72 BPM    P-R Interval 140 ms    QRS Duration 92 ms    Q-T Interval 416 ms    QTC Calculation(Bezet) 455 ms    P Axis 30 degrees    R Axis 3 degrees    T Axis 18 degrees    Diagnosis Line NORMAL SINUS RHYTHM  NORMAL ECG    Confirmed by JUAN IBARRA MD ([id number]) on 6/15/2019 10:57:52 AM    < end of copied text >
< from: 12 Lead ECG (06.15.19 @ 07:21) >      Ventricular Rate 72 BPM    Atrial Rate 72 BPM    P-R Interval 140 ms    QRS Duration 92 ms    Q-T Interval 416 ms    QTC Calculation(Bezet) 455 ms    P Axis 30 degrees    R Axis 3 degrees    T Axis 18 degrees    Diagnosis Line NORMAL SINUS RHYTHM  NORMAL ECG    Confirmed by JUAN IBARRA MD ([id number]) on 6/15/2019 10:57:52 AM    < end of copied text >

## 2021-06-02 NOTE — PROGRESS NOTE ADULT - ATTENDING COMMENTS
discussed about plan with resident on rounds. no acute complaints  labs and imaging reviewed.  I agree with the above findings, assessment, and plan with the following additions and exceptions:  psych follow up regarding capacity  incidental lung nodule found on ct- will need f/u outpatient   await safe dc plan given homeless with no social support.  may need to seek guardianship if continues to lack capacity for discharge planning

## 2021-06-02 NOTE — PROGRESS NOTE ADULT - PROBLEM SELECTOR PLAN 5
Altered, found down. NOW RESOLVED.   - BAL 0  - concern for Wernicke  - s/p high dose thiamine  - CTH normal  - may have some underlying dementia/cognitive change from chronic alcohol use.   - Infectious workup limited to UA (negative) as patients vitals WNL and normal WBC, low -no concern for infxn

## 2021-06-02 NOTE — PROGRESS NOTE ADULT - PROBLEM SELECTOR PLAN 1
Fracture of vertebra, lumbar from previous fall. CT with transverse process fracture of the L1 vertebra, healing transverse process fracture of L2 vertebra and healing fractures of right 9th and 12th ribs. Pt with new fall now 5/15  -CTH/C spine no sign of bleed or fracture   -PT rec [facility]ARD but patient refusing and also has no insurance, undocumented  -per ortho: no need for surgery or need for TLSO brace, outpatient f/u

## 2021-06-02 NOTE — PROGRESS NOTE ADULT - PROBLEM SELECTOR PLAN 2
- s/p librium taper with highest CIWA 3-4  - fall precautions   -  consulted, working on placement vs guardianship   - psych eval for capacity--> no capacity. Will discuss capacity re-evaluation with Psych (plan to see 6/20).

## 2021-06-03 LAB
HCT VFR BLD CALC: 37.2 % — LOW (ref 39–50)
HGB BLD-MCNC: 12.1 G/DL — LOW (ref 13–17)
MCHC RBC-ENTMCNC: 32.1 PG — SIGNIFICANT CHANGE UP (ref 27–34)
MCHC RBC-ENTMCNC: 32.5 GM/DL — SIGNIFICANT CHANGE UP (ref 32–36)
MCV RBC AUTO: 98.7 FL — SIGNIFICANT CHANGE UP (ref 80–100)
NRBC # BLD: 0 /100 WBCS — SIGNIFICANT CHANGE UP (ref 0–0)
PLATELET # BLD AUTO: 280 K/UL — SIGNIFICANT CHANGE UP (ref 150–400)
RBC # BLD: 3.77 M/UL — LOW (ref 4.2–5.8)
RBC # FLD: 12.6 % — SIGNIFICANT CHANGE UP (ref 10.3–14.5)
WBC # BLD: 9.13 K/UL — SIGNIFICANT CHANGE UP (ref 3.8–10.5)
WBC # FLD AUTO: 9.13 K/UL — SIGNIFICANT CHANGE UP (ref 3.8–10.5)

## 2021-06-03 PROCEDURE — 99233 SBSQ HOSP IP/OBS HIGH 50: CPT | Mod: GC

## 2021-06-03 PROCEDURE — 99233 SBSQ HOSP IP/OBS HIGH 50: CPT

## 2021-06-03 RX ORDER — CEFAZOLIN SODIUM 1 G
VIAL (EA) INJECTION
Refills: 0 | Status: DISCONTINUED | OUTPATIENT
Start: 2021-06-03 | End: 2021-06-07

## 2021-06-03 RX ORDER — CEFAZOLIN SODIUM 1 G
1000 VIAL (EA) INJECTION ONCE
Refills: 0 | Status: COMPLETED | OUTPATIENT
Start: 2021-06-03 | End: 2021-06-03

## 2021-06-03 RX ORDER — CEFAZOLIN SODIUM 1 G
1000 VIAL (EA) INJECTION EVERY 8 HOURS
Refills: 0 | Status: DISCONTINUED | OUTPATIENT
Start: 2021-06-03 | End: 2021-06-07

## 2021-06-03 RX ORDER — LIDOCAINE 4 G/100G
1 CREAM TOPICAL EVERY 24 HOURS
Refills: 0 | Status: DISCONTINUED | OUTPATIENT
Start: 2021-06-03 | End: 2021-06-09

## 2021-06-03 RX ADMIN — PREGABALIN 1000 MICROGRAM(S): 225 CAPSULE ORAL at 12:46

## 2021-06-03 RX ADMIN — LIDOCAINE 1 PATCH: 4 CREAM TOPICAL at 12:47

## 2021-06-03 RX ADMIN — Medication 1 PATCH: at 12:47

## 2021-06-03 RX ADMIN — Medication 100 MILLIGRAM(S): at 12:47

## 2021-06-03 RX ADMIN — DICLOFENAC SODIUM 4 GRAM(S): 30 GEL TOPICAL at 09:49

## 2021-06-03 RX ADMIN — ENOXAPARIN SODIUM 40 MILLIGRAM(S): 100 INJECTION SUBCUTANEOUS at 12:46

## 2021-06-03 RX ADMIN — Medication 100 MILLIGRAM(S): at 22:47

## 2021-06-03 RX ADMIN — DICLOFENAC SODIUM 4 GRAM(S): 30 GEL TOPICAL at 22:48

## 2021-06-03 RX ADMIN — Medication 1 MILLIGRAM(S): at 12:47

## 2021-06-03 RX ADMIN — DICLOFENAC SODIUM 4 GRAM(S): 30 GEL TOPICAL at 05:11

## 2021-06-03 RX ADMIN — Medication 100 MILLIGRAM(S): at 16:29

## 2021-06-03 NOTE — BH CONSULTATION LIAISON PROGRESS NOTE - NSBHCHARTREVIEWVS_PSY_A_CORE FT
Vital Signs Last 24 Hrs  T(C): 36.8 (02 Jun 2021 14:03), Max: 37.3 (01 Jun 2021 21:06)  T(F): 98.2 (02 Jun 2021 14:03), Max: 99.1 (01 Jun 2021 21:06)  HR: 84 (02 Jun 2021 14:03) (60 - 84)  BP: 118/74 (02 Jun 2021 14:03) (116/68 - 120/66)  BP(mean): --  RR: 18 (02 Jun 2021 14:03) (18 - 18)  SpO2: 95% (02 Jun 2021 14:03) (95% - 97%)
Vital Signs Last 24 Hrs  T(C): 36.8 (24 May 2021 12:12), Max: 36.8 (23 May 2021 21:49)  T(F): 98.3 (24 May 2021 12:12), Max: 98.3 (23 May 2021 21:49)  HR: 70 (24 May 2021 12:12) (70 - 75)  BP: 119/77 (24 May 2021 12:12) (115/69 - 120/68)  BP(mean): --  RR: 18 (24 May 2021 12:12) (18 - 18)  SpO2: 97% (24 May 2021 12:12) (96% - 97%)
Vital Signs Last 24 Hrs  T(C): 36.8 (03 Jun 2021 04:52), Max: 37 (02 Jun 2021 19:59)  T(F): 98.2 (03 Jun 2021 04:52), Max: 98.6 (02 Jun 2021 19:59)  HR: 60 (03 Jun 2021 04:52) (60 - 84)  BP: 126/77 (03 Jun 2021 04:52) (118/74 - 126/77)  BP(mean): --  RR: 18 (03 Jun 2021 04:52) (18 - 18)  SpO2: 98% (03 Jun 2021 04:52) (95% - 98%)

## 2021-06-03 NOTE — BH CONSULTATION LIAISON PROGRESS NOTE - NSBHMSESPEECH_PSY_A_CORE
Abnormal as indicated, otherwise normal...
Abnormal as indicated, otherwise normal...
Normal volume, rate, productivity, spontaneity and articulation

## 2021-06-03 NOTE — PROGRESS NOTE ADULT - PROBLEM SELECTOR PLAN 1
Fracture of vertebra, lumbar from previous fall. CT with transverse process fracture of the L1 vertebra, healing transverse process fracture of L2 vertebra and healing fractures of right 9th and 12th ribs. Pt with new fall now 5/15  -CTH/C spine no sign of bleed or fracture   -PT recs RICHARD but patient refusing and also has no insurance, undocumented  -per ortho: no need for surgery or need for TLSO brace, outpatient f/u Fracture of vertebra, lumbar from previous fall. CT with transverse process fracture of the L1 vertebra, healing transverse process fracture of L2 vertebra and healing fractures of right 9th and 12th ribs. Pt with new fall now 5/15  -CTH/C spine no sign of bleed or fracture   -PT recs RICHARD but patient refusing and also has no insurance, undocumented  -per ortho: no need for surgery or need for TLSO brace, outpatient f/u    #RUE swelling  -Pt found to have erythema/swelling/tenderness around peripheral IV removal site (6/2)  -RUE Duplex negative for DVT   -F/u Bcx 6/2   -C/w warm compresses and Tylenol PRN   -Hold off on abx for now

## 2021-06-03 NOTE — BH CONSULTATION LIAISON PROGRESS NOTE - NSBHFUPINTERVALHXFT_PSY_A_CORE
Patient seen and evaluated, staff consulted, chart, labs, meds reviewed. Patient and staff report no issues overnight. Patient states there are mild improvements in clarity of sensorium and orientation. No SI/HI, no delusions or perceptual disturbances, no prominent psych. sx. noted or reported. Patient is oriented to self, but not location or time. Patient able to voice his desire to be discharged, unable to verbalize and elaborate on reasons for refusing aftercare.    Direction of care discussed with the patient. Counseling and support provided.  Patient did not represent a management problem overnight.
Patient seen and evaluated, staff consulted, chart, labs, meds reviewed. Patient and staff report no issues overnight. Patient states there are mild improvements in clarity of sensorium and orientation and continues to demonstrate so. No SI/HI, no delusions or perceptual disturbances, no prominent psych. sx. noted or reported. Patient is oriented to self, location (not the floor), and time. Patient able to voice his desire to be discharged, and able to verbalize his plans after the discharge.    Direction of care discussed with the patient. Counseling and support provided.  Patient did not represent a management problem overnight.
Patient seen and evaluated in the presence of  (Sarah), staff consulted, chart, labs, meds reviewed. Patient and staff report no issues overnight. Patient states there are continued improvements in clarity of sensorium and orientation and continues to demonstrate so. No SI/HI, no delusions or perceptual disturbances, no prominent psych. sx. noted or reported. Patient is oriented to self, location (not the floor), and time. Patient able to voice his desire to be discharged, and able to verbalize his plans after the discharge. He is able to voice his understanding of the proposed assistance and to elaborate his plan on how to care for himself after discharge.    Direction of care discussed with the patient. Counseling and support provided.  Patient did not represent a management problem overnight.

## 2021-06-03 NOTE — BH CONSULTATION LIAISON PROGRESS NOTE - CURRENT MEDICATION
MEDICATIONS  (STANDING):  cyanocobalamin 1000 MICROGram(s) Oral daily  enoxaparin Injectable 40 milliGRAM(s) SubCutaneous daily  folic acid 1 milliGRAM(s) Oral daily  lactated ringers. 1000 milliLiter(s) (75 mL/Hr) IV Continuous <Continuous>  mupirocin 2% Ointment 1 Application(s) Topical two times a day  nicotine -   7 mG/24Hr(s) Patch 1 patch Transdermal daily  thiamine 100 milliGRAM(s) Oral daily    MEDICATIONS  (PRN):  OLANZapine Injectable 2.5 milliGRAM(s) IntraMuscular every 6 hours PRN agitation  
MEDICATIONS  (STANDING):  cyanocobalamin 1000 MICROGram(s) Oral daily  diclofenac sodium 1% Gel 4 Gram(s) Topical four times a day  enoxaparin Injectable 40 milliGRAM(s) SubCutaneous daily  folic acid 1 milliGRAM(s) Oral daily  lidocaine   Patch 1 Patch Transdermal every 24 hours  nicotine -   7 mG/24Hr(s) Patch 1 patch Transdermal daily  thiamine 100 milliGRAM(s) Oral daily    MEDICATIONS  (PRN):  acetaminophen   Tablet .. 650 milliGRAM(s) Oral every 6 hours PRN Temp greater or equal to 38C (100.4F), Mild Pain (1 - 3)  
MEDICATIONS  (STANDING):  cyanocobalamin 1000 MICROGram(s) Oral daily  diclofenac sodium 1% Gel 4 Gram(s) Topical four times a day  enoxaparin Injectable 40 milliGRAM(s) SubCutaneous daily  folic acid 1 milliGRAM(s) Oral daily  nicotine -   7 mG/24Hr(s) Patch 1 patch Transdermal daily  thiamine 100 milliGRAM(s) Oral daily    MEDICATIONS  (PRN):  acetaminophen   Tablet .. 650 milliGRAM(s) Oral every 6 hours PRN Temp greater or equal to 38C (100.4F), Mild Pain (1 - 3)

## 2021-06-03 NOTE — BH CONSULTATION LIAISON PROGRESS NOTE - NSBHCHARTREVIEWLAB_PSY_A_CORE FT
06-20    137  |  101  |  14  ----------------------------<  122<H>  4.2   |  25  |  0.99    Ca    9.2      20 Jun 2019 06:07  Phos  3.6     06-20  Mg     1.8     06-20
                      12.1   9.13  )-----------( 280      ( 03 Jun 2021 06:44 )             37.2     06-02    139  |  104  |  34<H>  ----------------------------<  96  4.7   |  20<L>  |  1.06    Ca    9.6      02 Jun 2021 13:47  Phos  5.3     06-02          
  06-02    139  |  104  |  34<H>  ----------------------------<  96  4.7   |  20<L>  |  1.06    Ca    9.6      02 Jun 2021 13:47  Phos  5.3     06-02

## 2021-06-03 NOTE — BH CONSULTATION LIAISON PROGRESS NOTE - NSBHASSESSMENTFT_PSY_ALL_CORE
This is a 57 y/o homeless [place]inean-speaking  man with a history of EtOH abuse, PMH includes alcohol intoxication (s/p completed taper in hospital), L1 vertebral compression fracture, and chronic pancreatitis who was brought to the ED on 14JUN2019 by EMS after being found on the side of the road. Psychiatry was consulted for evaluation of capacity for d/c planning as the patient was reportedly previously refusing all services but is in need for rehabilitation due to inability to ambulate.    Patient presenting with mild improvement in mental status, with lingering cognitive deficits that may affect his capacity to participate in disposition planning. At this time, it would be reasonable to have a discussion with pt. and CSW about aftercare plans. 
This is a 59 y/o homeless [place]ican-speaking  man with a history of EtOH abuse, PMH includes alcohol intoxication (s/p completed taper in hospital), L1 vertebral compression fracture, and chronic pancreatitis who was brought to the ED on 14JUN2019 by EMS after being found on the side of the road. Psychiatry was consulted for evaluation of capacity for d/c planning as the patient was reportedly previously refusing all services but is in need for rehabilitation due to inability to ambulate.    Patient presenting with mild improvement in mental status, but still with cognitive deficits impairing his capacity to participate in disposition planning. At this time, patient can state a preference and give assent. 
This is a 57 y/o homeless Hong Konger-speaking  man with a history of EtOH abuse, PMH includes alcohol intoxication (s/p completed taper in hospital), L1 vertebral compression fracture, and chronic pancreatitis who was brought to the ED on 14JUN2019 by EMS after being found on the side of the road. Psychiatry was consulted for evaluation of capacity for d/c planning as the patient was reportedly previously refusing all services but is in need for rehabilitation due to inability to ambulate.    Patient presenting with improvement in mental status, with possible lingering cognitive deficits, but with enough preserved cognition to be able to participate in disposition planning. At this time, patient has capacity to participate in disposition planning.

## 2021-06-03 NOTE — PROGRESS NOTE ADULT - ASSESSMENT
61yo M with PMH of EtOH abuse, chronic pancreatitis presenting after observed fall, likely in the setting of alcohol use, found to have L nasal bone fracture, vertebral fracture of transverse process and left facial wound. C/f possible Wernicke's, s/p 3 days of thiamine. CM is working on legal guardianship.

## 2021-06-03 NOTE — PROGRESS NOTE ADULT - SUBJECTIVE AND OBJECTIVE BOX
**********************************************  Paulette Santos, PGY-1  Internal Medicine   [facility] Pager #: [phone]  **********************************************     Patient is a 60y old  Male who presents with a chief complaint of fall (02 Jun 2021 06:33)    SUBJECTIVE / OVERNIGHT EVENTS:     OBJECTIVE:  Vital Signs Last 24 Hrs  T(C): 36.8 (03 Jun 2021 04:52), Max: 37 (02 Jun 2021 19:59)  T(F): 98.2 (03 Jun 2021 04:52), Max: 98.6 (02 Jun 2021 19:59)  HR: 60 (03 Jun 2021 04:52) (60 - 84)  BP: 126/77 (03 Jun 2021 04:52) (118/74 - 126/77)  RR: 18 (03 Jun 2021 04:52) (18 - 18)  SpO2: 98% (03 Jun 2021 04:52) (95% - 98%)    I&O's Summary    01 Jun 2021 07:01  -  02 Jun 2021 07:00  --------------------------------------------------------  IN: 360 mL / OUT: 1200 mL / NET: -840 mL    02 Jun 2021 07:01  -  03 Jun 2021 06:33  --------------------------------------------------------  IN: 1590 mL / OUT: 1150 mL / NET: 440 mL      Physical Examination:    General: No acute distress, well-appearing    Eyes: PERRL, EOMI     ENT: MMM, no oropharyngeal lesions or erythema appreciated     Pulm: No increased WOB. CTAB. No wheezing.     CV: RRR. S1&S2+. No M/R/G appreciated.     Abdomen: +BS. Soft, NTND. No organomegaly.     MSK: Nml ROM    Extremities: No peripheral edema or cyanosis.     Neuro: A&Ox3, no focal deficits     Skin: Warm and dry. No visible rash.     Labs:    06-02    139  |  104  |  34<H>  ----------------------------<  96  4.7   |  20<L>  |  1.06    Ca    9.6      02 Jun 2021 13:47  Phos  5.3     06-02        Imaging Personally Reviewed:      MEDICATIONS  (STANDING):  cyanocobalamin 1000 MICROGram(s) Oral daily  diclofenac sodium 1% Gel 4 Gram(s) Topical four times a day  enoxaparin Injectable 40 milliGRAM(s) SubCutaneous daily  folic acid 1 milliGRAM(s) Oral daily  nicotine -   7 mG/24Hr(s) Patch 1 patch Transdermal daily  thiamine 100 milliGRAM(s) Oral daily    MEDICATIONS  (PRN):  acetaminophen   Tablet .. 650 milliGRAM(s) Oral every 6 hours PRN Temp greater or equal to 38C (100.4F), Mild Pain (1 - 3)   **********************************************  Paulette Mccrackened, PGY-1  Internal Medicine   [facility] Pager #: [phone]  **********************************************     Patient is a 60y old  Male who presents with a chief complaint of fall (02 Jun 2021 06:33)    SUBJECTIVE / OVERNIGHT EVENTS:  -[facility] : [id number]   -No acute events overnight   -Pt examined at bedside this AM, reports mild improvement in pain and swelling in RUE with warm compresses   -Denies CP, palpitations, SOB, n/v/d, abdominal pain     OBJECTIVE:  Vital Signs Last 24 Hrs  T(C): 36.8 (03 Jun 2021 04:52), Max: 37 (02 Jun 2021 19:59)  T(F): 98.2 (03 Jun 2021 04:52), Max: 98.6 (02 Jun 2021 19:59)  HR: 60 (03 Jun 2021 04:52) (60 - 84)  BP: 126/77 (03 Jun 2021 04:52) (118/74 - 126/77)  RR: 18 (03 Jun 2021 04:52) (18 - 18)  SpO2: 98% (03 Jun 2021 04:52) (95% - 98%)    I&O's Summary    01 Jun 2021 07:01  -  02 Jun 2021 07:00  --------------------------------------------------------  IN: 360 mL / OUT: 1200 mL / NET: -840 mL    02 Jun 2021 07:01  -  03 Jun 2021 06:33  --------------------------------------------------------  IN: 1590 mL / OUT: 1150 mL / NET: 440 mL      Physical Examination:    General: No acute distress, well-appearing    Eyes: PERRL, EOMI     ENT: MMM, no oropharyngeal lesions or erythema appreciated     Pulm: No increased WOB. CTAB. No wheezing.     CV: RRR. S1&S2+. No M/R/G appreciated.     Abdomen: +BS. Soft, NTND. No organomegaly.     MSK: Nml ROM    Extremities: No peripheral edema or cyanosis. RUE forearm with erythema/swelling/ttp    Neuro: A&Ox3, no focal deficits     Skin: Warm and dry. No visible rash.     Labs:    06-02    139  |  104  |  34<H>  ----------------------------<  96  4.7   |  20<L>  |  1.06    Ca    9.6      02 Jun 2021 13:47  Phos  5.3     06-02        Imaging Personally Reviewed:      MEDICATIONS  (STANDING):  cyanocobalamin 1000 MICROGram(s) Oral daily  diclofenac sodium 1% Gel 4 Gram(s) Topical four times a day  enoxaparin Injectable 40 milliGRAM(s) SubCutaneous daily  folic acid 1 milliGRAM(s) Oral daily  nicotine -   7 mG/24Hr(s) Patch 1 patch Transdermal daily  thiamine 100 milliGRAM(s) Oral daily    MEDICATIONS  (PRN):  acetaminophen   Tablet .. 650 milliGRAM(s) Oral every 6 hours PRN Temp greater or equal to 38C (100.4F), Mild Pain (1 - 3)

## 2021-06-03 NOTE — PROGRESS NOTE ADULT - PROBLEM SELECTOR PLAN 2
- s/p librium taper with highest CIWA 3-4  - fall precautions   -  consulted, working on placement vs guardianship   - psych eval for capacity--> no capacity. Re-evaluated pt on 6/2 and state it's reasonable to have a discussion with pt and CSW about aftercare plans. Will clarify capacity with psychiatrist today.

## 2021-06-03 NOTE — PROGRESS NOTE ADULT - ATTENDING COMMENTS
Patient admitted with intoxication concern for dementia 2/2 long standing Wernikes encephalopathy s/p CIWA. Main issue is disposition as patient is homeless no insurance. Previously with no capacity but no per psych has and patient requesting to go back to [facility] in [place].     #RUE IV infiltratrion vs cellulitis- with overlying erythema and induration, will start Ancef and monitor demarcation.

## 2021-06-03 NOTE — CHART NOTE - NSCHARTNOTEFT_GEN_A_CORE
Nutrition Follow Up Note  Patient seen for: nutrition follow-up    Pt is a 61 yo male with PMH of EtOH abuse, chronic pancreatitis, s/p L nasal bone fracture, vertebral fracture of transverse process and recent admission for EtOH abuse now BIBEMS s/p fall in front of EMS. Admitted 5/15. "Concern for possible Wernicke's, s/p 3 days of thiamine." "CM is working on legal guardianship." Chart reviewed, events noted.     Source: [] Patient       [x] EMR        [] RN        [] Family at bedside       [] Other:     -If unable to interview patient: [] Trach/Vent/BiPAP  [x] Disoriented/confused/inappropriate to interview    Diet, Regular (05-15-21 @ 12:09) [Active]    - Is current order appropriate/adequate? [x] Yes  []  No:     - PO intake :   [x] >75%  Adequate    [] 50-75%  Fair       [] <50%  Poor  - Observed breakfast tray at bedside, pt consumed 100%. Per flowsheet documentation, pt consuming 100% of meals.     GI:  Last BM yesterday ().   Bowel Regimen? [] Yes   [x] No    Weights:   Daily Weight in k.9 (05- standing) - possible wt loss noted. Question accuracy of previous wts, taken by bed scale. Pt eating well. Will continue to monitor and trend weights.    MEDICATIONS  (STANDING):  cyanocobalamin 1000 MICROGram(s) Oral daily  diclofenac sodium 1% Gel 4 Gram(s) Topical four times a day  enoxaparin Injectable 40 milliGRAM(s) SubCutaneous daily  folic acid 1 milliGRAM(s) Oral daily  nicotine -   7 mG/24Hr(s) Patch 1 patch Transdermal daily  thiamine 100 milliGRAM(s) Oral daily    MEDICATIONS  (PRN):  acetaminophen   Tablet .. 650 milliGRAM(s) Oral every 6 hours PRN Temp greater or equal to 38C (100.4F), Mild Pain (1 - 3)    Pertinent Labs (): BUN 34    Skin per nursing documentation: no pressure injuries per flowsheets   Edema per flowsheets: 1+ to R arm    Estimated Needs: based on standing wt 57.9 kg  Estimated Energy Needs (25-30 kcal/kg): 3433-7612 kcal/day  Estimated Protein Needs (1-1.2 g/kg): 58-69 g/day  Estimated Fluid Needs (25-30 ml/kg): 5000-7325 ml/day    Previous Nutrition Diagnosis: None    New Nutrition Diagnosis: not applicable    Recommendations:   1) Continue Regular diet. Monitor/adjust PRN.   2) Continue micronutrient supplementation if no medical contraindications    Monitoring and Evaluation: Monitor PO intake, weight, labs, skin, GI status, diet     RD remains available upon request and will follow up per protocol  Coty Berkowitz MS RD CDN Pager #[phone] Nutrition Follow Up Note  Patient seen for: nutrition follow-up    Pt is a 61 yo male with PMH of EtOH abuse, chronic pancreatitis, s/p L nasal bone fracture, vertebral fracture of transverse process and recent admission for EtOH abuse now BIBEMS s/p fall in front of EMS. Admitted 5/15. "Concern for possible Wernicke's, s/p 3 days of thiamine." "CM is working on legal guardianship." Chart reviewed, events noted.     Source: [] Patient       [x] EMR        [] RN        [] Family at bedside       [] Other:     -If unable to interview patient: [] Trach/Vent/BiPAP  [x] Disoriented/confused/inappropriate to interview    Diet, Regular (05-15-21 @ 12:09) [Active]    - Is current order appropriate/adequate? [x] Yes  []  No:     - PO intake :   [x] >75%  Adequate    [] 50-75%  Fair       [] <50%  Poor  - Observed breakfast tray at bedside, pt consumed 100%. Per flowsheet documentation, pt consuming 100% of meals.     GI:  Last BM yesterday ().   Bowel Regimen? [] Yes   [x] No    Weights:   Daily Weight in k.9 (05- standing) - possible wt loss noted. Question accuracy of previous wts, taken by bed scale. Pt eating well. Will continue to monitor and trend weights.    MEDICATIONS  (STANDING):  cyanocobalamin 1000 MICROGram(s) Oral daily  diclofenac sodium 1% Gel 4 Gram(s) Topical four times a day  enoxaparin Injectable 40 milliGRAM(s) SubCutaneous daily  folic acid 1 milliGRAM(s) Oral daily  nicotine -   7 mG/24Hr(s) Patch 1 patch Transdermal daily  thiamine 100 milliGRAM(s) Oral daily    MEDICATIONS  (PRN):  acetaminophen   Tablet .. 650 milliGRAM(s) Oral every 6 hours PRN Temp greater or equal to 38C (100.4F), Mild Pain (1 - 3)    Pertinent Labs (): BUN 34    Skin per nursing documentation: no pressure injuries per flowsheets   Edema per flowsheets: 1+ to R arm    Estimated Needs: based on standing wt 57.9 kg  Estimated Energy Needs (25-30 kcal/kg): 9495-5056 kcal/day  Estimated Protein Needs (1-1.2 g/kg): 58-69 g/day  Estimated Fluid Needs (25-30 ml/kg): 0815-5827 ml/day    Previous Nutrition Diagnosis: None    New Nutrition Diagnosis: not applicable    Recommendations:   1) Continue Regular diet. Monitor/adjust PRN.   2) Continue micronutrient supplementation if no medical contraindications    Monitoring and Evaluation: Monitor PO intake, weight, labs, skin, GI status, diet     RD remains available upon request and will follow up per protocol  Coty Berkowitz MS RD CDN [facility] Pager #[phone]

## 2021-06-03 NOTE — BH CONSULTATION LIAISON PROGRESS NOTE - NSICDXBHPRIMARYDX_PSY_ALL_CORE
Dementia associated with alcoholism   F10.27  

## 2021-06-04 PROCEDURE — 99233 SBSQ HOSP IP/OBS HIGH 50: CPT | Mod: GC

## 2021-06-04 PROCEDURE — 73090 X-RAY EXAM OF FOREARM: CPT | Mod: 26,LT

## 2021-06-04 RX ADMIN — Medication 100 MILLIGRAM(S): at 22:01

## 2021-06-04 RX ADMIN — Medication 100 MILLIGRAM(S): at 07:01

## 2021-06-04 RX ADMIN — Medication 1 PATCH: at 14:43

## 2021-06-04 RX ADMIN — DICLOFENAC SODIUM 4 GRAM(S): 30 GEL TOPICAL at 14:42

## 2021-06-04 RX ADMIN — Medication 100 MILLIGRAM(S): at 18:47

## 2021-06-04 RX ADMIN — PREGABALIN 1000 MICROGRAM(S): 225 CAPSULE ORAL at 14:43

## 2021-06-04 RX ADMIN — Medication 1 MILLIGRAM(S): at 14:42

## 2021-06-04 RX ADMIN — ENOXAPARIN SODIUM 40 MILLIGRAM(S): 100 INJECTION SUBCUTANEOUS at 14:42

## 2021-06-04 RX ADMIN — DICLOFENAC SODIUM 4 GRAM(S): 30 GEL TOPICAL at 22:01

## 2021-06-04 RX ADMIN — Medication 100 MILLIGRAM(S): at 14:43

## 2021-06-04 RX ADMIN — LIDOCAINE 1 PATCH: 4 CREAM TOPICAL at 14:43

## 2021-06-04 NOTE — PROGRESS NOTE ADULT - ATTENDING COMMENTS
Patient admitted with intoxication concern for dementia 2/2 long standing Wernikes encephalopathy s/p CIWA. Main issue is disposition as patient is homeless no insurance. Previously with no capacity but no per psych has and patient requesting to go back to [facility] in [place].     #RUE IV infiltration vs cellulitis- with overlying erythema and induration, marked improvement but has an area of induration with small will start Ancef and monitor demarcation. Obtain RUE XR to r/o retained foreign body    #Onychomycosis/elonged toenails: podiatry for nail eval    #Disposition: Per psych and ethics, patient has capacity; will plan for dc after cellulitis tx; social work involved appreciated coordination of care

## 2021-06-04 NOTE — PROGRESS NOTE ADULT - SUBJECTIVE AND OBJECTIVE BOX
**********************************************  Paulette Santos, PGY-1  Internal Medicine   [facility] Pager #: [phone]  **********************************************     Patient is a 60y old  Male who presents with a chief complaint of fall (03 Jun 2021 06:33)    SUBJECTIVE / OVERNIGHT EVENTS:     OBJECTIVE:  Vital Signs Last 24 Hrs  T(C): 36.6 (04 Jun 2021 04:31), Max: 37.1 (03 Jun 2021 20:48)  T(F): 97.9 (04 Jun 2021 04:31), Max: 98.8 (03 Jun 2021 20:48)  HR: 58 (04 Jun 2021 04:31) (58 - 76)  BP: 113/70 (04 Jun 2021 04:31) (113/70 - 119/75)  RR: 18 (04 Jun 2021 04:31) (18 - 18)  SpO2: 96% (04 Jun 2021 04:31) (96% - 96%)    I&O's Summary    02 Jun 2021 07:01  -  03 Jun 2021 07:00  --------------------------------------------------------  IN: 1590 mL / OUT: 1300 mL / NET: 290 mL    03 Jun 2021 07:01  -  04 Jun 2021 06:17  --------------------------------------------------------  IN: 700 mL / OUT: 1800 mL / NET: -1100 mL      Physical Examination:      Labs:               12.1   9.13  )-----------( 280      ( 03 Jun 2021 06:44 )             37.2     06-02    139  |  104  |  34<H>  ----------------------------<  96  4.7   |  20<L>  |  1.06    Ca    9.6      02 Jun 2021 13:47  Phos  5.3     06-02        Culture - Blood (collected 02 Jun 2021 23:11)  Source: .Blood Blood-Venous  Preliminary Report (04 Jun 2021 01:01):    No growth to date.    Culture - Blood (collected 02 Jun 2021 23:11)  Source: .Blood Blood-Peripheral  Preliminary Report (04 Jun 2021 01:01):    No growth to date.        Imaging Personally Reviewed:      MEDICATIONS  (STANDING):  ceFAZolin   IVPB 1000 milliGRAM(s) IV Intermittent every 8 hours  ceFAZolin   IVPB      cyanocobalamin 1000 MICROGram(s) Oral daily  diclofenac sodium 1% Gel 4 Gram(s) Topical four times a day  enoxaparin Injectable 40 milliGRAM(s) SubCutaneous daily  folic acid 1 milliGRAM(s) Oral daily  lidocaine   Patch 1 Patch Transdermal every 24 hours  nicotine -   7 mG/24Hr(s) Patch 1 patch Transdermal daily  thiamine 100 milliGRAM(s) Oral daily    MEDICATIONS  (PRN):  acetaminophen   Tablet .. 650 milliGRAM(s) Oral every 6 hours PRN Temp greater or equal to 38C (100.4F), Mild Pain (1 - 3)   **********************************************  Paulette Mccrackened, PGY-1  Internal Medicine   [facility] Pager #: [phone]  **********************************************     Patient is a 60y old  Male who presents with a chief complaint of fall (03 Jun 2021 06:33)    SUBJECTIVE / OVERNIGHT EVENTS:   -No acute events overnight  -Pt examined at bedside this AM, with no subjective complaints  -Denies CP, palpitations, SOB, n/v/d   -Reports improvement in pain/swelling in R forearm  -[place] : [id number]    OBJECTIVE:  Vital Signs Last 24 Hrs  T(C): 36.6 (04 Jun 2021 04:31), Max: 37.1 (03 Jun 2021 20:48)  T(F): 97.9 (04 Jun 2021 04:31), Max: 98.8 (03 Jun 2021 20:48)  HR: 58 (04 Jun 2021 04:31) (58 - 76)  BP: 113/70 (04 Jun 2021 04:31) (113/70 - 119/75)  RR: 18 (04 Jun 2021 04:31) (18 - 18)  SpO2: 96% (04 Jun 2021 04:31) (96% - 96%)    I&O's Summary    02 Jun 2021 07:01  -  03 Jun 2021 07:00  --------------------------------------------------------  IN: 1590 mL / OUT: 1300 mL / NET: 290 mL    03 Jun 2021 07:01  -  04 Jun 2021 06:17  --------------------------------------------------------  IN: 700 mL / OUT: 1800 mL / NET: -1100 mL      Physical Examination:    General: No acute distress, well-appearing    Eyes: PERRL, EOMI     ENT: MMM, no oropharyngeal lesions or erythema appreciated     Pulm: No increased WOB. CTAB. No wheezing.     CV: RRR. S1&S2+. No M/R/G appreciated.     Abdomen: +BS. Soft, NTND. No organomegaly.     MSK: Nml ROM    Extremities: No peripheral edema or cyanosis. R forearm with improved swelling/erythema/calor, primarily one small area with induration    Neuro: A&Ox3, no focal deficits     Skin: Warm and dry. No visible rash.     Labs:               12.1   9.13  )-----------( 280      ( 03 Jun 2021 06:44 )             37.2     06-02    139  |  104  |  34<H>  ----------------------------<  96  4.7   |  20<L>  |  1.06    Ca    9.6      02 Jun 2021 13:47  Phos  5.3     06-02        Culture - Blood (collected 02 Jun 2021 23:11)  Source: .Blood Blood-Venous  Preliminary Report (04 Jun 2021 01:01):    No growth to date.    Culture - Blood (collected 02 Jun 2021 23:11)  Source: .Blood Blood-Peripheral  Preliminary Report (04 Jun 2021 01:01):    No growth to date.        Imaging Personally Reviewed:      MEDICATIONS  (STANDING):  ceFAZolin   IVPB 1000 milliGRAM(s) IV Intermittent every 8 hours  ceFAZolin   IVPB      cyanocobalamin 1000 MICROGram(s) Oral daily  diclofenac sodium 1% Gel 4 Gram(s) Topical four times a day  enoxaparin Injectable 40 milliGRAM(s) SubCutaneous daily  folic acid 1 milliGRAM(s) Oral daily  lidocaine   Patch 1 Patch Transdermal every 24 hours  nicotine -   7 mG/24Hr(s) Patch 1 patch Transdermal daily  thiamine 100 milliGRAM(s) Oral daily    MEDICATIONS  (PRN):  acetaminophen   Tablet .. 650 milliGRAM(s) Oral every 6 hours PRN Temp greater or equal to 38C (100.4F), Mild Pain (1 - 3)

## 2021-06-04 NOTE — PROGRESS NOTE ADULT - PROBLEM SELECTOR PLAN 2
- s/p librium taper with highest CIWA 3-4  - fall precautions   -  consulted, appreciate help in clarifying capacity  - psych eval for capacity--> no capacity. Re-evaluated pt on 6/2 and pt now has capacity.

## 2021-06-04 NOTE — PROGRESS NOTE ADULT - PROBLEM SELECTOR PLAN 1
Fracture of vertebra, lumbar from previous fall. CT with transverse process fracture of the L1 vertebra, healing transverse process fracture of L2 vertebra and healing fractures of right 9th and 12th ribs. Pt with new fall now 5/15  -CTH/C spine no sign of bleed or fracture   -PT recs RICHARD but patient refusing and also has no insurance, undocumented  -per ortho: no need for surgery or need for TLSO brace, outpatient f/u    #RUE swelling  -Pt found to have erythema/swelling/tenderness around peripheral IV removal site (6/2)  -RUE Duplex negative for DVT   -F/u Bcx 6/2   -C/w warm compresses and Tylenol PRN   -Started ancef x 5d (6/3-) Fracture of vertebra, lumbar from previous fall. CT with transverse process fracture of the L1 vertebra, healing transverse process fracture of L2 vertebra and healing fractures of right 9th and 12th ribs. Pt with new fall now 5/15  -CTH/C spine no sign of bleed or fracture   -PT recs RICHARD but patient refusing and also has no insurance, undocumented  -per ortho: no need for surgery or need for TLSO brace, outpatient f/u    #RUE swelling  -Pt found to have erythema/swelling/tenderness around peripheral IV removal site (6/2)  -RUE Duplex negative for DVT   -F/u Bcx 6/2   -C/w warm compresses and Tylenol PRN   -Started ancef x 5d (6/3-)  -C/f possible foreign body, ordered XR forearm

## 2021-06-05 PROCEDURE — 99233 SBSQ HOSP IP/OBS HIGH 50: CPT | Mod: GC

## 2021-06-05 PROCEDURE — 99253 IP/OBS CNSLTJ NEW/EST LOW 45: CPT | Mod: GC

## 2021-06-05 RX ORDER — LIDOCAINE HCL 20 MG/ML
20 VIAL (ML) INJECTION ONCE
Refills: 0 | Status: DISCONTINUED | OUTPATIENT
Start: 2021-06-05 | End: 2021-06-09

## 2021-06-05 RX ADMIN — ENOXAPARIN SODIUM 40 MILLIGRAM(S): 100 INJECTION SUBCUTANEOUS at 12:25

## 2021-06-05 RX ADMIN — LIDOCAINE 1 PATCH: 4 CREAM TOPICAL at 02:43

## 2021-06-05 RX ADMIN — Medication 100 MILLIGRAM(S): at 12:24

## 2021-06-05 RX ADMIN — Medication 100 MILLIGRAM(S): at 05:57

## 2021-06-05 RX ADMIN — Medication 1 PATCH: at 12:24

## 2021-06-05 RX ADMIN — PREGABALIN 1000 MICROGRAM(S): 225 CAPSULE ORAL at 12:24

## 2021-06-05 RX ADMIN — Medication 100 MILLIGRAM(S): at 21:56

## 2021-06-05 RX ADMIN — Medication 100 MILLIGRAM(S): at 13:00

## 2021-06-05 RX ADMIN — LIDOCAINE 1 PATCH: 4 CREAM TOPICAL at 12:25

## 2021-06-05 RX ADMIN — DICLOFENAC SODIUM 4 GRAM(S): 30 GEL TOPICAL at 05:57

## 2021-06-05 RX ADMIN — Medication 650 MILLIGRAM(S): at 18:03

## 2021-06-05 RX ADMIN — DICLOFENAC SODIUM 4 GRAM(S): 30 GEL TOPICAL at 21:56

## 2021-06-05 RX ADMIN — Medication 1 MILLIGRAM(S): at 12:24

## 2021-06-05 NOTE — CONSULT NOTE ADULT - SUBJECTIVE AND OBJECTIVE BOX
PLASTIC SURGERY CONSULTATION    HPI:  61yo M Zambian speaking with PMH of EtOH abuse, chronic pancreatitis s/p L nasal bone fracture, vertebral fracture of transverse process, [facility] Patient s/p treatment for possible Wernicke's, currently stable.     Plastic Surgery consulted for right forearm cellulitis with abscess.      PAST MEDICAL & SURGICAL HISTORY:  Alcohol Abuse    ETOH abuse    Macrocytic anemia    Alcohol-induced chronic pancreatitis    History of left knee surgery        MEDICATIONS  (STANDING):  ceFAZolin   IVPB      ceFAZolin   IVPB 1000 milliGRAM(s) IV Intermittent every 8 hours  cyanocobalamin 1000 MICROGram(s) Oral daily  diclofenac sodium 1% Gel 4 Gram(s) Topical four times a day  enoxaparin Injectable 40 milliGRAM(s) SubCutaneous daily  folic acid 1 milliGRAM(s) Oral daily  lidocaine   Patch 1 Patch Transdermal every 24 hours  nicotine -   7 mG/24Hr(s) Patch 1 patch Transdermal daily  thiamine 100 milliGRAM(s) Oral daily      ___________________________________________  REVIEW OF SYSTEMS:  .ros  ___________________________________________  PHYSICAL EXAM:  Vital Signs Last 24 Hrs  T(C): 36.2 (05 Jun 2021 12:28), Max: 36.9 (05 Jun 2021 05:45)  T(F): 97.2 (05 Jun 2021 12:28), Max: 98.4 (05 Jun 2021 05:45)  HR: 80 (05 Jun 2021 12:28) (64 - 83)  BP: 116/70 (05 Jun 2021 12:28) (114/73 - 124/73)  BP(mean): --  RR: 18 (05 Jun 2021 12:28) (18 - 18)  SpO2: 97% (05 Jun 2021 12:28) (97% - 97%)CAPILLARY BLOOD GLUCOSE        I&O's Detail    04 Jun 2021 07:01  -  05 Jun 2021 07:00  --------------------------------------------------------  IN:    Oral Fluid: 400 mL  Total IN: 400 mL    OUT:    Voided (mL): 400 mL  Total OUT: 400 mL    Total NET: 0 mL          General: Alert and Oriented x3, No acute distress.  Respiratory: Breathing non-labored.  Abdomen: Soft, nontender, nondistended. No rebound, no guarding, No palpable organomegaly or masses.  Extremities: Moves all four.   ____________________________________________  LABS:                    ____________________________________________  RADIOLOGY:   PLASTIC SURGERY CONSULTATION    HPI:  61yo M Bangladeshi speaking with PMH of EtOH abuse, chronic pancreatitis s/p L nasal bone fracture, vertebral fracture of transverse process, [facility] Patient s/p treatment for possible Wernicke's, currently stable.     Plastic Surgery consulted for right forearm cellulitis with abscess. Per team, patient had an IV in the medial aspect of the right forearm that was noted to be infected and causing right forearm cellulitis. IV was removed on 6/2 and patient was started on ancef with improvement of erythema. An xray was negative for the presence of a foreign body. Per team, it was noted that today patient's arm had a discrete collection with fluctuance at the IV insertion site concerning for an abscess. Patient states he feels pain and burning at the area but denies difficulty moving his arm or pain at the elbow. Denies numbness or tingling. Has been afebrile.    ___________________________________________  REVIEW OF SYSTEMS: Non-contributory except as per HPI      PAST MEDICAL & SURGICAL HISTORY:  Alcohol Abuse  ETOH abuse  Macrocytic anemia  Alcohol-induced chronic pancreatitis  History of left knee surgery      MEDICATIONS  (STANDING):  ceFAZolin   IVPB      ceFAZolin   IVPB 1000 milliGRAM(s) IV Intermittent every 8 hours  cyanocobalamin 1000 MICROGram(s) Oral daily  diclofenac sodium 1% Gel 4 Gram(s) Topical four times a day  enoxaparin Injectable 40 milliGRAM(s) SubCutaneous daily  folic acid 1 milliGRAM(s) Oral daily  lidocaine   Patch 1 Patch Transdermal every 24 hours  nicotine -   7 mG/24Hr(s) Patch 1 patch Transdermal daily  thiamine 100 milliGRAM(s) Oral daily    ALLERGIES:   NKDA    ___________________________________________  Vital Signs Last 24 Hrs  T(C): 36.2 (05 Jun 2021 12:28), Max: 36.9 (05 Jun 2021 05:45)  T(F): 97.2 (05 Jun 2021 12:28), Max: 98.4 (05 Jun 2021 05:45)  HR: 80 (05 Jun 2021 12:28) (64 - 83)  BP: 116/70 (05 Jun 2021 12:28) (114/73 - 124/73)  BP(mean): --  RR: 18 (05 Jun 2021 12:28) (18 - 18)  SpO2: 97% (05 Jun 2021 12:28) (97% - 97%)CAPILLARY BLOOD GLUCOSE      I&O's Detail    04 Jun 2021 07:01  -  05 Jun 2021 07:00  --------------------------------------------------------  IN:    Oral Fluid: 400 mL  Total IN: 400 mL    OUT:    Voided (mL): 400 mL  Total OUT: 400 mL    Total NET: 0 mL      PHYSICAL EXAM  General: Alert and Oriented x3, No acute distress.  Respiratory: Breathing non-labored.  Extremities: right medial forearm with approx 3x3 circular area of erythema and induration, small amount of pus expressed upon applying gentle pressure, sample collected and sent for culture, hand and elbow with FROM, no edema or swelling over joint spaces, no crepitus. I&D performed at bedside (refer to procedure note for full details), wound packed and wrapped with kerlix      LABS:  None       RADIOLOGY:    < from: Xray Forearm, Right (06.04.21 @ 15:08) >  INTERPRETATION:  Clinical indications: Induration in the right arm. Question foreign body.    2 views of the right forearm are compared to previous rightforearm radiographs from 5/13/2021.    Impression: There is no acute fracture or dislocation. There is no radiopaque foreign body identified    < end of copied text >

## 2021-06-05 NOTE — CONSULT NOTE ADULT - ASSESSMENT
61yo M Albanian speaking with PMH of EtOH abuse, chronic pancreatitis s/p L nasal bone fracture, vertebral fracture of transverse process, recent admission for Etoh abuse BIBEMS after a witnessed fall. Plastic Surgery consulted for abscess from infected IV site on medial proximal forearm, now s/p bedside I&D.    Plan/Recommendations:  - Abscess drained upon applying gentle pressure to area. Minimal pus noted from I&D.  - F/u wound cultures 6/4  - Continue abx per primary team  - Will f/u US of arm to assess for further collections  - Will continue daily packing changes    Plastic Surgery  [facility]: [phone]

## 2021-06-05 NOTE — PROGRESS NOTE ADULT - ASSESSMENT
59yo M with PMH of EtOH abuse, chronic pancreatitis presenting after observed fall, likely in the setting of alcohol use, found to have L nasal bone fracture, vertebral fracture of transverse process and left facial wound. C/f possible Wernicke's, s/p 3 days of thiamine. Pt is now being treated for cellulitis of the R forearm.  61yo M with PMH of EtOH abuse, chronic pancreatitis presenting after observed fall, likely in the setting of alcohol use, found to have L nasal bone fracture, vertebral fracture of transverse process and left facial wound. C/f possible Wernicke's, s/p 3 days of thiamine. Pt is now being treated for cellulitis of the R forearm, c/f abscess.

## 2021-06-05 NOTE — PROGRESS NOTE ADULT - ATTENDING COMMENTS
Patient admitted with intoxication concern for dementia 2/2 long standing Wernikes encephalopathy s/p CIWA. Main issue is disposition as patient is homeless no insurance. Previously with no capacity but no per psych has and patient requesting to go back to [facility] in [place].     #RUE IV infiltration vs cellulitis- with overlying erythema and induration, marked improvement but has an area of induration with small will start Ancef and monitor demarcation. RUE without foreign body. Surgery called, s/p I&D, pending arm US for evaluation and possible further I&D    #Onychomycosis/elonged toenails: podiatry for nail eval    #Disposition: Per psych and ethics, patient has capacity; will plan for dc after cellulitis tx; social work involved appreciated coordination of care .

## 2021-06-05 NOTE — PROCEDURE NOTE - ADDITIONAL PROCEDURE DETAILS
I&D of medial right forearm. Minimal pus encountered. Wound packed with 1/2 inch plain packing and dressed with 4x4 gauze, abd and jesus wrap.

## 2021-06-05 NOTE — PROGRESS NOTE ADULT - SUBJECTIVE AND OBJECTIVE BOX
**********************************************  Paulette Mccrackened, PGY-1  Internal Medicine   [facility] Pager #: [phone]  **********************************************     Patient is a 60y old  Male who presents with a chief complaint of fall (04 Jun 2021 06:17)    SUBJECTIVE / OVERNIGHT EVENTS:     OBJECTIVE:  Vital Signs Last 24 Hrs  T(C): 36.9 (05 Jun 2021 05:45), Max: 36.9 (05 Jun 2021 05:45)  T(F): 98.4 (05 Jun 2021 05:45), Max: 98.4 (05 Jun 2021 05:45)  HR: 64 (05 Jun 2021 05:45) (64 - 83)  BP: 114/73 (05 Jun 2021 05:45) (114/73 - 124/73)  RR: 18 (05 Jun 2021 05:45) (18 - 18)  SpO2: 97% (05 Jun 2021 05:45) (97% - 97%)    I&O's Summary    03 Jun 2021 07:01  -  04 Jun 2021 07:00  --------------------------------------------------------  IN: 800 mL / OUT: 1800 mL / NET: -1000 mL    04 Jun 2021 07:01  -  05 Jun 2021 06:32  --------------------------------------------------------  IN: 400 mL / OUT: 400 mL / NET: 0 mL      Physical Examination:      Labs:               12.1   9.13  )-----------( 280      ( 03 Jun 2021 06:44 )             37.2         Culture - Blood (collected 02 Jun 2021 23:11)  Source: .Blood Blood-Venous  Preliminary Report (04 Jun 2021 01:01):    No growth to date.    Culture - Blood (collected 02 Jun 2021 23:11)  Source: .Blood Blood-Peripheral  Preliminary Report (04 Jun 2021 01:01):    No growth to date.        Imaging Personally Reviewed:      MEDICATIONS  (STANDING):  ceFAZolin   IVPB 1000 milliGRAM(s) IV Intermittent every 8 hours  ceFAZolin   IVPB      cyanocobalamin 1000 MICROGram(s) Oral daily  diclofenac sodium 1% Gel 4 Gram(s) Topical four times a day  enoxaparin Injectable 40 milliGRAM(s) SubCutaneous daily  folic acid 1 milliGRAM(s) Oral daily  lidocaine   Patch 1 Patch Transdermal every 24 hours  nicotine -   7 mG/24Hr(s) Patch 1 patch Transdermal daily  thiamine 100 milliGRAM(s) Oral daily    MEDICATIONS  (PRN):  acetaminophen   Tablet .. 650 milliGRAM(s) Oral every 6 hours PRN Temp greater or equal to 38C (100.4F), Mild Pain (1 - 3)   **********************************************  Paulette Tom, PGY-1  Internal Medicine   [facility] Pager #: [phone]  **********************************************     Patient is a 60y old  Male who presents with a chief complaint of fall (04 Jun 2021 06:17)    SUBJECTIVE / OVERNIGHT EVENTS:   -No acute events overnight  -Pt examined at bedside this AM, continuing to c/o R forearm pain   -Denies fever/chills, CP, palpitations, SOB, n/v/d     OBJECTIVE:  Vital Signs Last 24 Hrs  T(C): 36.9 (05 Jun 2021 05:45), Max: 36.9 (05 Jun 2021 05:45)  T(F): 98.4 (05 Jun 2021 05:45), Max: 98.4 (05 Jun 2021 05:45)  HR: 64 (05 Jun 2021 05:45) (64 - 83)  BP: 114/73 (05 Jun 2021 05:45) (114/73 - 124/73)  RR: 18 (05 Jun 2021 05:45) (18 - 18)  SpO2: 97% (05 Jun 2021 05:45) (97% - 97%)    I&O's Summary    03 Jun 2021 07:01  -  04 Jun 2021 07:00  --------------------------------------------------------  IN: 800 mL / OUT: 1800 mL / NET: -1000 mL    04 Jun 2021 07:01  -  05 Jun 2021 06:32  --------------------------------------------------------  IN: 400 mL / OUT: 400 mL / NET: 0 mL      Physical Examination:    General: No acute distress, well-appearing    Eyes: PERRL, EOMI     ENT: MMM, no oropharyngeal lesions or erythema appreciated     Pulm: No increased WOB. CTAB. No wheezing.     CV: RRR. S1&S2+. No M/R/G appreciated.     Abdomen: +BS. Soft, NTND. No organomegaly.     MSK: Nml ROM    Extremities: R forearm with old peripheral IV site tenderness, erythema, induration, hot to touch     Neuro: A&Ox3, no focal deficits     Skin: Warm and dry. No visible rash.     Labs:               12.1   9.13  )-----------( 280      ( 03 Jun 2021 06:44 )             37.2         Culture - Blood (collected 02 Jun 2021 23:11)  Source: .Blood Blood-Venous  Preliminary Report (04 Jun 2021 01:01):    No growth to date.    Culture - Blood (collected 02 Jun 2021 23:11)  Source: .Blood Blood-Peripheral  Preliminary Report (04 Jun 2021 01:01):    No growth to date.        Imaging Personally Reviewed:      MEDICATIONS  (STANDING):  ceFAZolin   IVPB 1000 milliGRAM(s) IV Intermittent every 8 hours  ceFAZolin   IVPB      cyanocobalamin 1000 MICROGram(s) Oral daily  diclofenac sodium 1% Gel 4 Gram(s) Topical four times a day  enoxaparin Injectable 40 milliGRAM(s) SubCutaneous daily  folic acid 1 milliGRAM(s) Oral daily  lidocaine   Patch 1 Patch Transdermal every 24 hours  nicotine -   7 mG/24Hr(s) Patch 1 patch Transdermal daily  thiamine 100 milliGRAM(s) Oral daily    MEDICATIONS  (PRN):  acetaminophen   Tablet .. 650 milliGRAM(s) Oral every 6 hours PRN Temp greater or equal to 38C (100.4F), Mild Pain (1 - 3)

## 2021-06-05 NOTE — PROGRESS NOTE ADULT - PROBLEM SELECTOR PLAN 1
Fracture of vertebra, lumbar from previous fall. CT with transverse process fracture of the L1 vertebra, healing transverse process fracture of L2 vertebra and healing fractures of right 9th and 12th ribs. Pt with new fall now 5/15  -CTH/C spine no sign of bleed or fracture   -PT recs RICHARD but patient refusing and also has no insurance, undocumented  -per ortho: no need for surgery or need for TLSO brace, outpatient f/u    #RUE swelling  -Pt found to have erythema/swelling/tenderness around peripheral IV removal site (6/2)  -RUE Duplex negative for DVT   -Bcx 6/2 NGTD  -C/w warm compresses and Tylenol PRN   -C/w ancef x 5d (6/3-)  -XR forearm w/o foreign body Fracture of vertebra, lumbar from previous fall. CT with transverse process fracture of the L1 vertebra, healing transverse process fracture of L2 vertebra and healing fractures of right 9th and 12th ribs. Pt with new fall now 5/15  -CTH/C spine no sign of bleed or fracture   -PT recs RICHARD but patient refusing and also has no insurance, undocumented  -per ortho: no need for surgery or need for TLSO brace, outpatient f/u    #RUE swelling  -Pt found to have erythema/swelling/tenderness around peripheral IV removal site (6/2)  -RUE Duplex negative for DVT   -Bcx 6/2 NGTD  -C/w warm compresses and Tylenol PRN   -C/w ancef x 5d (6/3-)  -XR forearm w/o foreign body  -Will consider surgery consult for I&D of likely abscess

## 2021-06-06 DIAGNOSIS — L03.90 CELLULITIS, UNSPECIFIED: ICD-10-CM

## 2021-06-06 PROCEDURE — 76882 US LMTD JT/FCL EVL NVASC XTR: CPT | Mod: 26,RT

## 2021-06-06 PROCEDURE — 99232 SBSQ HOSP IP/OBS MODERATE 35: CPT | Mod: GC

## 2021-06-06 RX ORDER — HYDROMORPHONE HYDROCHLORIDE 2 MG/ML
0.5 INJECTION INTRAMUSCULAR; INTRAVENOUS; SUBCUTANEOUS ONCE
Refills: 0 | Status: DISCONTINUED | OUTPATIENT
Start: 2021-06-06 | End: 2021-06-06

## 2021-06-06 RX ADMIN — Medication 1 PATCH: at 19:26

## 2021-06-06 RX ADMIN — DICLOFENAC SODIUM 4 GRAM(S): 30 GEL TOPICAL at 15:53

## 2021-06-06 RX ADMIN — Medication 1 PATCH: at 11:43

## 2021-06-06 RX ADMIN — Medication 1 MILLIGRAM(S): at 11:43

## 2021-06-06 RX ADMIN — DICLOFENAC SODIUM 4 GRAM(S): 30 GEL TOPICAL at 21:30

## 2021-06-06 RX ADMIN — LIDOCAINE 1 PATCH: 4 CREAM TOPICAL at 00:00

## 2021-06-06 RX ADMIN — Medication 100 MILLIGRAM(S): at 21:38

## 2021-06-06 RX ADMIN — HYDROMORPHONE HYDROCHLORIDE 0.5 MILLIGRAM(S): 2 INJECTION INTRAMUSCULAR; INTRAVENOUS; SUBCUTANEOUS at 10:27

## 2021-06-06 RX ADMIN — HYDROMORPHONE HYDROCHLORIDE 0.5 MILLIGRAM(S): 2 INJECTION INTRAMUSCULAR; INTRAVENOUS; SUBCUTANEOUS at 11:46

## 2021-06-06 RX ADMIN — Medication 100 MILLIGRAM(S): at 13:06

## 2021-06-06 RX ADMIN — LIDOCAINE 1 PATCH: 4 CREAM TOPICAL at 17:57

## 2021-06-06 RX ADMIN — DICLOFENAC SODIUM 4 GRAM(S): 30 GEL TOPICAL at 05:40

## 2021-06-06 RX ADMIN — ENOXAPARIN SODIUM 40 MILLIGRAM(S): 100 INJECTION SUBCUTANEOUS at 11:43

## 2021-06-06 RX ADMIN — LIDOCAINE 1 PATCH: 4 CREAM TOPICAL at 11:47

## 2021-06-06 RX ADMIN — Medication 100 MILLIGRAM(S): at 11:43

## 2021-06-06 RX ADMIN — Medication 100 MILLIGRAM(S): at 05:40

## 2021-06-06 RX ADMIN — DICLOFENAC SODIUM 4 GRAM(S): 30 GEL TOPICAL at 10:27

## 2021-06-06 RX ADMIN — LIDOCAINE 1 PATCH: 4 CREAM TOPICAL at 20:00

## 2021-06-06 RX ADMIN — Medication 1 PATCH: at 11:47

## 2021-06-06 RX ADMIN — PREGABALIN 1000 MICROGRAM(S): 225 CAPSULE ORAL at 11:44

## 2021-06-06 RX ADMIN — Medication 1 PATCH: at 17:57

## 2021-06-06 RX ADMIN — LIDOCAINE 1 PATCH: 4 CREAM TOPICAL at 05:42

## 2021-06-06 NOTE — PROGRESS NOTE ADULT - ASSESSMENT
61yo M with PMH of EtOH abuse, chronic pancreatitis presenting after observed fall, likely in the setting of alcohol use, found to have L nasal bone fracture, vertebral fracture of transverse process and left facial wound. C/f possible Wernicke's, s/p 3 days of thiamine. Pt is now being treated for cellulitis of the R forearm, c/f abscess.

## 2021-06-06 NOTE — PROGRESS NOTE ADULT - PROBLEM SELECTOR PLAN 6
-DVT ppx: lovenox  -Diet: regular  -Discussed +COVID PCR on 5/23 with Infxn control, confirmed that no isolation precautions needed as pt's COVID antibodies positive and pt asymptomatic. Repeat COVID PCR negative on 6/1. Altered, found down. NOW RESOLVED.   - BAL 0  - concern for Wernicke  - s/p high dose thiamine  - CTH normal  - may have some underlying dementia/cognitive change from chronic alcohol use.   - Infectious workup limited to UA (negative) as patients vitals WNL and normal WBC, low -no concern for infxn

## 2021-06-06 NOTE — PROGRESS NOTE ADULT - SUBJECTIVE AND OBJECTIVE BOX
60M s/p forearm abscess drainage from infected IV site 6/5/21.     Seen this AM. Stable.     AFVSS  NAD  R Forearm: Stable erythema and induration, wound bed clean, packing in place that was removed, irrigated, and repacked. No further purulence expressed.     AP: 60M s/p forearm I&D with stable and clean wound.     - Dressings: RN order for daily packing changes and irrigation   - Continue abx, follow cultures, consider ID consult pending cultures  - US RUE pending   - PRS will continue to follow, please call with questions or changes in exam     Pager [phone]

## 2021-06-06 NOTE — PROGRESS NOTE ADULT - ATTENDING COMMENTS
Patient admitted with intoxication concern for dementia 2/2 long standing Wernikes encephalopathy s/p CIWA. Main issue is disposition as patient is homeless no insurance. Previously with no capacity but no per psych has and patient requesting to go back to [facility] in [place].     #RUE IV infiltration vs cellulitis- with overlying erythema and induration, marked improvement but has an area of induration with small will start Ancef and monitor demarcation. RUE without foreign body. Surgery called, s/p I&D, soft tissue US with small residual collection. Prelim ctx growing Staph. Packing and dressing as per RN; appreciate surgery recs    #Onychomycosis/ elongated toenails: podiatry for nail eval    #Disposition: Per psych and ethics, patient has capacity; will plan for dc after cellulitis tx; social work involved appreciated coordination of care .

## 2021-06-06 NOTE — PROGRESS NOTE ADULT - PROBLEM SELECTOR PLAN 7
- Back pain: Tylenol PRN (given Dilaudid 0.5 mg IV once on 6/6); consider cyclobenzaprine if recurs  -DVT ppx: lovenox  -Diet: regular  -Discussed +COVID PCR on 5/23 with Infxn control, confirmed that no isolation precautions needed as pt's COVID antibodies positive and pt asymptomatic. Repeat COVID PCR negative on 6/1.

## 2021-06-06 NOTE — PROGRESS NOTE ADULT - PROBLEM SELECTOR PLAN 3
Pt found to have incidental finding of nodule In lung on imaging  - cont to monitor in setting of active smoker   - unknown weight loss or other associated symptoms of cancer  - follow up imaging needed as outpatient - s/p librium taper with highest CIWA 3-4  - fall precautions   -  consulted, appreciate help in clarifying capacity  - psych eval for capacity--> no capacity. Re-evaluated pt on 6/2 and now has capacity.

## 2021-06-06 NOTE — PROGRESS NOTE ADULT - SUBJECTIVE AND OBJECTIVE BOX
Che Peck M.D.  Beeper: [phone] ([facility])/ [phone] (J)     INTERNAL MEDICINE PROGRESS NOTE    Patient is a 60y old  Male who presents with a chief complaint of fall (06 Jun 2021 09:20)      Overnight events:  Subjective:    Medications:  MEDICATIONS  (STANDING):  ceFAZolin   IVPB      ceFAZolin   IVPB 1000 milliGRAM(s) IV Intermittent every 8 hours  cyanocobalamin 1000 MICROGram(s) Oral daily  diclofenac sodium 1% Gel 4 Gram(s) Topical four times a day  enoxaparin Injectable 40 milliGRAM(s) SubCutaneous daily  folic acid 1 milliGRAM(s) Oral daily  lidocaine   Patch 1 Patch Transdermal every 24 hours  lidocaine 1% Injectable 20 milliLiter(s) Local Injection once  nicotine -   7 mG/24Hr(s) Patch 1 patch Transdermal daily  thiamine 100 milliGRAM(s) Oral daily    MEDICATIONS  (PRN):  acetaminophen   Tablet .. 650 milliGRAM(s) Oral every 6 hours PRN Temp greater or equal to 38C (100.4F), Mild Pain (1 - 3)      Objective:    Vitals: Vital Signs Last 24 Hrs  T(C): 36.7 (06-06-21 @ 04:35), Max: 36.8 (06-05-21 @ 20:58)  T(F): 98.1 (06-06-21 @ 04:35), Max: 98.3 (06-05-21 @ 20:58)  HR: 60 (06-06-21 @ 04:35) (60 - 70)  BP: 118/61 (06-06-21 @ 04:35) (118/61 - 122/62)  BP(mean): --  RR: 18 (06-06-21 @ 04:35) (18 - 18)  SpO2: 97% (06-06-21 @ 04:35) (97% - 98%)              I&O's Summary    05 Jun 2021 07:01  -  06 Jun 2021 07:00  --------------------------------------------------------  IN: 0 mL / OUT: 1340 mL / NET: -1340 mL        PHYSICAL EXAM:  GENERAL: NAD, conversant  CHEST/LUNG: Clear to auscultation bilaterally; No crackles, rhonchi, wheezing, or rubs  HEART: Regular rate and rhythm; No murmurs, rubs, or gallops  ABDOMEN: Soft, Nontender, Nondistended; Bowel sounds present  EXTREMITIES:  2+ Peripheral Pulses, No clubbing, cyanosis, or edema  SKIN: No rashes or lesions  NERVOUS SYSTEM:  Alert & Oriented, Good concentration                                                                                    LABS:                                    CAPILLARY BLOOD GLUCOSE            RECENT CULTURES:  06-02 @ 23:11  .Blood Blood-Peripheral  --  --  --    No growth to date.  --     RADIOLOGY & ADDITIONAL TESTS: N  Consultants involved in case: N                     Che Peck M.D.  Beeper: [phone] ([facility])/ [phone] (J)     INTERNAL MEDICINE PROGRESS NOTE    Patient is a 60y old  Male who presents with a chief complaint of fall (06 Jun 2021 09:20)    Overnight events: none  Subjective: C/o back pain and L sided rib pain, likely MSK as worsened with movement. No abd pain.     Medications:  MEDICATIONS  (STANDING):  ceFAZolin   IVPB      ceFAZolin   IVPB 1000 milliGRAM(s) IV Intermittent every 8 hours  cyanocobalamin 1000 MICROGram(s) Oral daily  diclofenac sodium 1% Gel 4 Gram(s) Topical four times a day  enoxaparin Injectable 40 milliGRAM(s) SubCutaneous daily  folic acid 1 milliGRAM(s) Oral daily  lidocaine   Patch 1 Patch Transdermal every 24 hours  lidocaine 1% Injectable 20 milliLiter(s) Local Injection once  nicotine -   7 mG/24Hr(s) Patch 1 patch Transdermal daily  thiamine 100 milliGRAM(s) Oral daily    MEDICATIONS  (PRN):  acetaminophen   Tablet .. 650 milliGRAM(s) Oral every 6 hours PRN Temp greater or equal to 38C (100.4F), Mild Pain (1 - 3)      Objective:    Vitals: Vital Signs Last 24 Hrs  T(C): 36.7 (06-06-21 @ 04:35), Max: 36.8 (06-05-21 @ 20:58)  T(F): 98.1 (06-06-21 @ 04:35), Max: 98.3 (06-05-21 @ 20:58)  HR: 60 (06-06-21 @ 04:35) (60 - 70)  BP: 118/61 (06-06-21 @ 04:35) (118/61 - 122/62)  BP(mean): --  RR: 18 (06-06-21 @ 04:35) (18 - 18)  SpO2: 97% (06-06-21 @ 04:35) (97% - 98%)              I&O's Summary    05 Jun 2021 07:01  -  06 Jun 2021 07:00  --------------------------------------------------------  IN: 0 mL / OUT: 1340 mL / NET: -1340 mL        PHYSICAL EXAM:  GENERAL: NAD, conversant  CHEST/LUNG: Clear to auscultation bilaterally; No crackles, rhonchi, wheezing, or rubs  HEART: Regular rate and rhythm; No murmurs, rubs, or gallops  ABDOMEN: Soft, Nontender, Nondistended; Bowel sounds present  EXTREMITIES:  2+ Peripheral Pulses, No clubbing, cyanosis, or edema  L sided axillary lower ribs TTP  R forearm packed wound, nontender  SKIN: No rashes or lesions  NERVOUS SYSTEM:  Alert & Oriented, Good concentration                                                                                    LABS:                                    CAPILLARY BLOOD GLUCOSE            RECENT CULTURES:  06-02 @ 23:11  .Blood Blood-Peripheral  --  --  --    No growth to date.  --     RADIOLOGY & ADDITIONAL TESTS: N  Consultants involved in case: N

## 2021-06-06 NOTE — PROGRESS NOTE ADULT - PROBLEM SELECTOR PLAN 4
active smoker, a few times a week   - nicotine patch   - tobacco counseling Pt found to have incidental finding of nodule In lung on imaging  - cont to monitor in setting of active smoker   - unknown weight loss or other associated symptoms of cancer  - follow up imaging needed as outpatient

## 2021-06-06 NOTE — PROGRESS NOTE ADULT - PROBLEM SELECTOR PLAN 5
Altered, found down. NOW RESOLVED.   - BAL 0  - concern for Wernicke  - s/p high dose thiamine  - CTH normal  - may have some underlying dementia/cognitive change from chronic alcohol use.   - Infectious workup limited to UA (negative) as patients vitals WNL and normal WBC, low -no concern for infxn active smoker, a few times a week   - nicotine patch   - tobacco counseling

## 2021-06-06 NOTE — PROGRESS NOTE ADULT - PROBLEM SELECTOR PLAN 2
- s/p librium taper with highest CIWA 3-4  - fall precautions   -  consulted, appreciate help in clarifying capacity  - psych eval for capacity--> no capacity. Re-evaluated pt on 6/2 and now has capacity. Fracture of vertebra, lumbar from previous fall. CT with transverse process fracture of the L1 vertebra, healing transverse process fracture of L2 vertebra and healing fractures of right 9th and 12th ribs. Pt with new fall now 5/15  -CTH/C spine no sign of bleed or fracture   -PT recs RICHARD but patient refusing and also has no insurance, undocumented  -per ortho: no need for surgery or need for TLSO brace, outpatient f/u    #RUE swelling  -Pt found to have erythema/swelling/tenderness around peripheral IV removal site (6/2)  -RUE Duplex negative for DVT   -Bcx 6/2 NGTD  -C/w warm compresses and Tylenol PRN   -C/w ancef x 5d (6/3-)  -XR forearm w/o foreign body  - s/p I&D 6/5/21; US performed of R forearm cellulitis wound (s/p I&D on 6/5), small residual collection.

## 2021-06-06 NOTE — PROGRESS NOTE ADULT - PROBLEM SELECTOR PLAN 1
Fracture of vertebra, lumbar from previous fall. CT with transverse process fracture of the L1 vertebra, healing transverse process fracture of L2 vertebra and healing fractures of right 9th and 12th ribs. Pt with new fall now 5/15  -CTH/C spine no sign of bleed or fracture   -PT recs RICHARD but patient refusing and also has no insurance, undocumented  -per ortho: no need for surgery or need for TLSO brace, outpatient f/u    #RUE swelling  -Pt found to have erythema/swelling/tenderness around peripheral IV removal site (6/2)  -RUE Duplex negative for DVT   -Bcx 6/2 NGTD  -C/w warm compresses and Tylenol PRN   -C/w ancef x 5d (6/3-)  -XR forearm w/o foreign body  -Will consider surgery consult for I&D of likely abscess #RUE swelling  -Pt found to have erythema/swelling/tenderness around peripheral IV removal site (6/2)  -RUE Duplex negative for DVT   -Bcx 6/2 NGTD  -C/w warm compresses and Tylenol PRN   -C/w ancef x 5d (6/3-)  -XR forearm w/o foreign body  - s/p I&D 6/5/21; US performed of R forearm cellulitis wound (s/p I&D on 6/5), small residual collection.

## 2021-06-07 LAB
-  AMPICILLIN/SULBACTAM: SIGNIFICANT CHANGE UP
-  CEFAZOLIN: SIGNIFICANT CHANGE UP
-  CLINDAMYCIN: SIGNIFICANT CHANGE UP
-  DAPTOMYCIN: SIGNIFICANT CHANGE UP
-  ERYTHROMYCIN: SIGNIFICANT CHANGE UP
-  GENTAMICIN: SIGNIFICANT CHANGE UP
-  LINEZOLID: SIGNIFICANT CHANGE UP
-  OXACILLIN: SIGNIFICANT CHANGE UP
-  PENICILLIN: SIGNIFICANT CHANGE UP
-  RIFAMPIN: SIGNIFICANT CHANGE UP
-  TETRACYCLINE: SIGNIFICANT CHANGE UP
-  TRIMETHOPRIM/SULFAMETHOXAZOLE: SIGNIFICANT CHANGE UP
-  VANCOMYCIN: SIGNIFICANT CHANGE UP
ALBUMIN SERPL ELPH-MCNC: 4.1 G/DL — SIGNIFICANT CHANGE UP (ref 3.3–5)
ALP SERPL-CCNC: 73 U/L — SIGNIFICANT CHANGE UP (ref 40–120)
ALT FLD-CCNC: 22 U/L — SIGNIFICANT CHANGE UP (ref 10–45)
ANION GAP SERPL CALC-SCNC: 14 MMOL/L — SIGNIFICANT CHANGE UP (ref 5–17)
AST SERPL-CCNC: 19 U/L — SIGNIFICANT CHANGE UP (ref 10–40)
BILIRUB SERPL-MCNC: 0.4 MG/DL — SIGNIFICANT CHANGE UP (ref 0.2–1.2)
BUN SERPL-MCNC: 29 MG/DL — HIGH (ref 7–23)
CALCIUM SERPL-MCNC: 10.1 MG/DL — SIGNIFICANT CHANGE UP (ref 8.4–10.5)
CHLORIDE SERPL-SCNC: 98 MMOL/L — SIGNIFICANT CHANGE UP (ref 96–108)
CO2 SERPL-SCNC: 20 MMOL/L — LOW (ref 22–31)
CREAT SERPL-MCNC: 1.16 MG/DL — SIGNIFICANT CHANGE UP (ref 0.5–1.3)
CULTURE RESULTS: SIGNIFICANT CHANGE UP
GLUCOSE SERPL-MCNC: 103 MG/DL — HIGH (ref 70–99)
HCT VFR BLD CALC: 36.8 % — LOW (ref 39–50)
HGB BLD-MCNC: 12.2 G/DL — LOW (ref 13–17)
MCHC RBC-ENTMCNC: 32.3 PG — SIGNIFICANT CHANGE UP (ref 27–34)
MCHC RBC-ENTMCNC: 33.2 GM/DL — SIGNIFICANT CHANGE UP (ref 32–36)
MCV RBC AUTO: 97.4 FL — SIGNIFICANT CHANGE UP (ref 80–100)
METHOD TYPE: SIGNIFICANT CHANGE UP
NRBC # BLD: 0 /100 WBCS — SIGNIFICANT CHANGE UP (ref 0–0)
ORGANISM # SPEC MICROSCOPIC CNT: SIGNIFICANT CHANGE UP
ORGANISM # SPEC MICROSCOPIC CNT: SIGNIFICANT CHANGE UP
PLATELET # BLD AUTO: 245 K/UL — SIGNIFICANT CHANGE UP (ref 150–400)
POTASSIUM SERPL-MCNC: 4.3 MMOL/L — SIGNIFICANT CHANGE UP (ref 3.5–5.3)
POTASSIUM SERPL-SCNC: 4.3 MMOL/L — SIGNIFICANT CHANGE UP (ref 3.5–5.3)
PROT SERPL-MCNC: 7.6 G/DL — SIGNIFICANT CHANGE UP (ref 6–8.3)
RBC # BLD: 3.78 M/UL — LOW (ref 4.2–5.8)
RBC # FLD: 12.4 % — SIGNIFICANT CHANGE UP (ref 10.3–14.5)
SODIUM SERPL-SCNC: 132 MMOL/L — LOW (ref 135–145)
SPECIMEN SOURCE: SIGNIFICANT CHANGE UP
WBC # BLD: 8.52 K/UL — SIGNIFICANT CHANGE UP (ref 3.8–10.5)
WBC # FLD AUTO: 8.52 K/UL — SIGNIFICANT CHANGE UP (ref 3.8–10.5)

## 2021-06-07 PROCEDURE — 99232 SBSQ HOSP IP/OBS MODERATE 35: CPT | Mod: GC

## 2021-06-07 PROCEDURE — 99253 IP/OBS CNSLTJ NEW/EST LOW 45: CPT | Mod: GC

## 2021-06-07 PROCEDURE — 71100 X-RAY EXAM RIBS UNI 2 VIEWS: CPT | Mod: 26

## 2021-06-07 RX ORDER — PREGABALIN 225 MG/1
1 CAPSULE ORAL
Qty: 10 | Refills: 0
Start: 2021-06-07 | End: 2021-06-16

## 2021-06-07 RX ORDER — FOLIC ACID 0.8 MG
1 TABLET ORAL
Qty: 10 | Refills: 0
Start: 2021-06-07 | End: 2021-06-16

## 2021-06-07 RX ORDER — THIAMINE MONONITRATE (VIT B1) 100 MG
1 TABLET ORAL
Qty: 10 | Refills: 0
Start: 2021-06-07 | End: 2021-06-16

## 2021-06-07 RX ORDER — CHLORHEXIDINE GLUCONATE 213 G/1000ML
1 SOLUTION TOPICAL DAILY
Refills: 0 | Status: DISCONTINUED | OUTPATIENT
Start: 2021-06-07 | End: 2021-06-09

## 2021-06-07 RX ADMIN — LIDOCAINE 1 PATCH: 4 CREAM TOPICAL at 19:34

## 2021-06-07 RX ADMIN — Medication 100 MILLIGRAM(S): at 05:16

## 2021-06-07 RX ADMIN — DICLOFENAC SODIUM 4 GRAM(S): 30 GEL TOPICAL at 05:15

## 2021-06-07 RX ADMIN — Medication 650 MILLIGRAM(S): at 20:00

## 2021-06-07 RX ADMIN — PREGABALIN 1000 MICROGRAM(S): 225 CAPSULE ORAL at 13:46

## 2021-06-07 RX ADMIN — Medication 1 TABLET(S): at 18:37

## 2021-06-07 RX ADMIN — DICLOFENAC SODIUM 4 GRAM(S): 30 GEL TOPICAL at 21:41

## 2021-06-07 RX ADMIN — Medication 1 MILLIGRAM(S): at 13:45

## 2021-06-07 RX ADMIN — ENOXAPARIN SODIUM 40 MILLIGRAM(S): 100 INJECTION SUBCUTANEOUS at 13:45

## 2021-06-07 RX ADMIN — LIDOCAINE 1 PATCH: 4 CREAM TOPICAL at 13:46

## 2021-06-07 RX ADMIN — Medication 650 MILLIGRAM(S): at 20:30

## 2021-06-07 RX ADMIN — Medication 650 MILLIGRAM(S): at 12:30

## 2021-06-07 RX ADMIN — Medication 650 MILLIGRAM(S): at 11:54

## 2021-06-07 RX ADMIN — Medication 100 MILLIGRAM(S): at 13:46

## 2021-06-07 RX ADMIN — Medication 1 PATCH: at 07:00

## 2021-06-07 RX ADMIN — Medication 1 PATCH: at 19:35

## 2021-06-07 RX ADMIN — Medication 1 PATCH: at 13:45

## 2021-06-07 RX ADMIN — DICLOFENAC SODIUM 4 GRAM(S): 30 GEL TOPICAL at 11:55

## 2021-06-07 RX ADMIN — Medication 1 PATCH: at 12:00

## 2021-06-07 RX ADMIN — Medication 100 MILLIGRAM(S): at 13:55

## 2021-06-07 NOTE — CONSULT NOTE ADULT - ASSESSMENT
61yo M with PMH of EtOH abuse, chronic pancreatitis, falls in the setting of alcohol use c/b recent L nasal bone fracture, vertebral fracture of transverse process and left facial wound admitted for recurrent falls c/f possible Wernicke's, s/p 3 days of thiamine. Hospital course c/b right forearm cellulitis and abscess s/p incision & drainage, ID consulted for MRSA in abscess culture.     # MRSA in right forearm abscess   - Wound cx obtained 6/4 now growing MRSA sensitive to dapto, linezolid, tetra/doxy, and vanco   - s/p I&D by plastic surgery with minimal pus noted during op   - afebrile and without leukocytosis throughout hospital course   61yo M with PMH of EtOH abuse, chronic pancreatitis, falls in the setting of alcohol use c/b recent L nasal bone fracture, vertebral fracture of transverse process and left facial wound admitted for recurrent falls c/f possible Wernicke's, s/p 3 days of thiamine. Hospital course c/b right forearm cellulitis and abscess s/p incision & drainage, ID consulted for MRSA in abscess culture.     # MRSA in right forearm abscess   - In s/o IV catheter, which has been removed since 6/2  - Wound cx obtained 6/4 now growing MRSA sensitive to dapto, linezolid, tetra/doxy, and vanco   - S/p I&D by plastic surgery with minimal pus noted during op   - Afebrile and without leukocytosis throughout hospital course  - Exam with residual induration, no surrounding erythema, non-draining   - Given absence of systemic toxicity, bacteremia, and lack of extensive soft tissue involvement, would recommend oral abx (tetracycline>)   - Anticipate 5 day course     ***** NOTE IS INCOMPLETE ******    Plan to be discussed with attending  59yo M with PMH of EtOH abuse, chronic pancreatitis, falls in the setting of alcohol use c/b recent L nasal bone fracture, vertebral fracture of transverse process and left facial wound admitted for recurrent falls c/f possible Wernicke's, s/p 3 days of thiamine. Hospital course c/b right forearm cellulitis and abscess s/p incision & drainage, with wound cx + for MRSA, prompting ID consult.     # MRSA in right forearm abscess   - In s/o IV catheter, which has been removed since 6/2  - Wound cx obtained 6/4 now growing MRSA sensitive to dapto, linezolid, tetra/doxy, and vanco   - S/p I&D by plastic surgery with minimal pus noted during op   - Afebrile and without leukocytosis throughout hospital course  - Exam with residual induration, no surrounding erythema, non-draining   - Given absence of systemic toxicity, bacteremia, and lack of extensive soft tissue involvement, would recommend bactrim /80 BID for 5 day course   - Remove packing and cover wound prior to discharge  - No oppositions to discharge from ID perspective     Plan discussed with attending Dr. Gomez 59yo M with PMH of EtOH abuse, chronic pancreatitis, falls in the setting of alcohol use c/b recent L nasal bone fracture, vertebral fracture of transverse process and left facial wound admitted for recurrent falls c/f possible Wernicke's, s/p 3 days of thiamine. Hospital course c/b right forearm cellulitis and abscess s/p incision & drainage, with wound cx + for MRSA, prompting ID consult.     # MRSA in right forearm abscess   - In s/o IV catheter, which has been removed since 6/2  - Wound cx obtained 6/4 now growing MRSA sensitive to bactrim, dapto, linezolid, tetra/doxy, and vanco   - S/p I&D by plastic surgery with minimal pus noted during op   - Afebrile and without leukocytosis throughout hospital course  - Discontinue ancef  - Given absence of systemic toxicity, bacteremia, and lack of extensive soft tissue involvement, would recommend bactrim /80 BID for 5 day course   - Remove packing and cover wound prior to discharge  - No oppositions to discharge from ID perspective     Plan discussed with attending Dr. Gomez

## 2021-06-07 NOTE — CONSULT NOTE ADULT - SUBJECTIVE AND OBJECTIVE BOX
Patient is a 60y old  Male who presents with a chief complaint of fall (2021 09:03)    HPI:  61yo M Thai speaking with PMH of EtOH abuse, chronic pancreatitis s/p  L nasal bone fracture, vertebral fracture of transverse process recent admission for Etoh abuse BIBEMS witness fall infront of EMS this morning. Pt report right lower back pain and right knees pain. Report that last EtOH use was 3 days ago. Unclear if pt had LOC. Per the ED resident, he denies CP, SOB, abd pain.    The patient has been hospitalized due to alcohol withdrawal in the past, but denies history of ICU stay or withdrawal seizures.   The patient has not received COVID19 vaccine. (15 May 2021 10:52)     prior hospital charts reviewed [ x ]  primary team notes reviewed [ x ]  other consultant notes reviewed [ x ]    PAST MEDICAL & SURGICAL HISTORY:  Alcohol Abuse  ETOH abuse  Macrocytic anemia  Alcohol-induced chronic pancreatitis  History of left knee surgery    ANTIMICROBIALS:    Ancef 6/3-current   Allergies  No Known Allergies    ANTIMICROBIALS (past 90 days)  MEDICATIONS  (STANDING):  ceFAZolin   IVPB   100 mL/Hr IV Intermittent (21 @ 16:29)  ceFAZolin   IVPB   100 mL/Hr IV Intermittent (21 @ 05:16)   100 mL/Hr IV Intermittent (21 @ 21:38)   100 mL/Hr IV Intermittent (21 @ 13:06)   100 mL/Hr IV Intermittent (21 @ 05:40)   100 mL/Hr IV Intermittent (21 @ 21:56)   100 mL/Hr IV Intermittent (21 @ 13:00)   100 mL/Hr IV Intermittent (21 @ 05:57)   100 mL/Hr IV Intermittent (21 @ 22:01)   100 mL/Hr IV Intermittent (21 @ 18:47)   100 mL/Hr IV Intermittent (21 @ 07:01)   100 mL/Hr IV Intermittent (21 @ 22:47)  ceFAZolin   IVPB    ceFAZolin   IVPB 1000 every 8 hours    OTHER MEDS: MEDICATIONS  (STANDING):  acetaminophen   Tablet .. 650 every 6 hours PRN  enoxaparin Injectable 40 daily    SOCIAL HISTORY:   hx smoking  non-smoker    FAMILY HISTORY:  Patient&#x27;s father is       REVIEW OF SYSTEMS  [  ] ROS unobtainable because:    [  ] All other systems negative except as noted below:	    Constitutional:  [ ] fever [ ] chills  [ ] weight loss  [ ] weakness  Skin:  [ ] rash [ ] phlebitis	  Eyes: [ ] icterus [ ] pain  [ ] discharge	  ENMT: [ ] sore throat  [ ] thrush [ ] ulcers [ ] exudates  Respiratory: [ ] dyspnea [ ] hemoptysis [ ] cough [ ] sputum	  Cardiovascular:  [ ] chest pain [ ] palpitations [ ] edema	  Gastrointestinal:  [ ] nausea [ ] vomiting [ ] diarrhea [ ] constipation [ ] pain	  Genitourinary:  [ ] dysuria [ ] frequency [ ] hematuria [ ] discharge [ ] flank pain  [ ] incontinence  Musculoskeletal:  [ ] myalgias [ ] arthralgias [ ] arthritis  [ ] back pain  Neurological:  [ ] headache [ ] seizures  [ ] confusion/altered mental status  Psychiatric:  [ ] anxiety [ ] depression	  Hematology/Lymphatics:  [ ] lymphadenopathy  Endocrine:  [ ] adrenal [ ] thyroid  Allergic/Immunologic:	 [ ] transplant [ ] seasonal    Vital Signs Last 24 Hrs  T(F): 98.5 (21 @ 04:33), Max: 99.1 (21 @ 21:06)  Vital Signs Last 24 Hrs  HR: 60 (21 @ 04:33) (60 - 80)  BP: 101/64 (21 @ 04:33) (101/64 - 127/78)  RR: 18 (21 @ 04:33)  SpO2: 97% (21 @ 04:33) (97% - 99%)  Wt(kg): --    PHYSICAL EXAM:  Constitutional: non-toxic, no distress  HEAD/EYES: anicteric, no conjunctival injection  ENT:  supple, no thrush  Cardiovascular:   normal S1, S2, no murmur, no edema  Respiratory:  clear BS bilaterally, no wheezes, no rales  GI:  soft, non-tender, normal bowel sounds  :  no king, no CVA tenderness  Musculoskeletal:  no synovitis, normal ROM  Neurologic: awake and alert, normal strength, no focal findings  Skin:  no rash, no erythema, no phlebitis  Heme/Onc: no lymphadenopathy   Psychiatric:  awake, alert, appropriate mood    LABS:                        12.2   8.52  )-----------( 245      ( 2021 06:57 )             36.8   06-07    132<L>  |  98  |  29<H>  ----------------------------<  103<H>  4.3   |  20<L>  |  1.16    Ca    10.1      2021 06:57    TPro  7.6  /  Alb  4.1  /  TBili  0.4  /  DBili  x   /  AST  19  /  ALT  22  /  AlkPhos  73  06-07    MICROBIOLOGY:  Culture - Other (collected 2021 15:16)  Source: Skin Right forearm abscess  Final Report (2021 09:46):    Moderate Methicillin resistant Staphylococcus aureus  Organism: Methicillin resistant Staphylococcus aureus (2021 09:46)  Organism: Methicillin resistant Staphylococcus aureus (2021 09:46)      -  Ampicillin/Sulbactam: R <=8/4      -  Cefazolin: R >16      -  Clindamycin: R >4      -  Daptomycin: S 0.5      -  Erythromycin: R >4      -  Gentamicin: S <=1 Should not be used as monotherapy      -  Linezolid: S 2      -  Oxacillin: R >2      -  Penicillin: R >8      -  RIF- Rifampin: S <=1 Should not be used as monotherapy      -  Tetra/Doxy: S 2      -  Trimethoprim/Sulfamethoxazole: S <=0.5/9.5      -  Vancomycin: S 2      Method Type: SKYE    Culture - Blood (collected 2021 23:11)  Source: .Blood Blood-Venous  Preliminary Report (2021 01:01):    No growth to date.    Culture - Blood (collected 2021 23:11)  Source: .Blood Blood-Peripheral  Preliminary Report (2021 01:01):    No growth to date.    RADIOLOGY:  imaging below personally reviewed    US Extremity Nonvasc Limited, Right 21  Echogenic packing material in the subcutaneous right forearm.  There is a small cystic fluid collection measuring 1.1 x 0.3 x 0.3 cm with internal debris and increased peripheral vascularity beneath the packing material.  IMPRESSION:  Small residual collection status post incision and drainage.    Xray Forearm, Right 21   INTERPRETATION:  Clinical indications: Induration in the right arm. Question foreign body.  2 views of the right forearm are compared to previous rightforearm radiographs from 2021.  Impression: There is no acute fracture or dislocation. There is no radiopaque foreign body identified    VA Duplex Upper Ext Vein Scan, Right 21  FINDINGS:  The right internal jugular, subclavian, axillary and brachial veins are patent and compressible where applicable.  The basilic and cephalic veins (superficial veins) are patent and without thrombus.  Doppler examination shows normal spontaneous and phasic flow.  The visualized left subclavian vein is patent.  IMPRESSION:  No evidence of right upper extremity deep venous thrombosis.       Patient is a 60y old  Male who presents with a chief complaint of fall (2021 09:03)    HPI:  61yo M Japanese speaking with PMH of EtOH abuse, chronic pancreatitis s/p  L nasal bone fracture, vertebral fracture of transverse process recent admission for Etoh abuse BIBEMS witness fall infront of EMS this morning. Pt report right lower back pain and right knees pain. Report that last EtOH use was 3 days ago. Unclear if pt had LOC. Per the ED resident, he denies CP, SOB, abd pain.    Interval hx:  Noted right medial forearm signs of infection on , IV catheter removed. Ancef started 6/3. Induration noted on exam , c/f abscess. Plastic surgery consulted and pt underwent I&D on . Wound cx + for MRSA.     The patient has been hospitalized due to alcohol withdrawal in the past, but denies history of ICU stay or withdrawal seizures.   The patient has not received COVID19 vaccine. (15 May 2021 10:52)     prior hospital charts reviewed [ x ]  primary team notes reviewed [ x ]  other consultant notes reviewed [ x ]    PAST MEDICAL & SURGICAL HISTORY:  Alcohol Abuse  ETOH abuse  Macrocytic anemia  Alcohol-induced chronic pancreatitis  History of left knee surgery    ANTIMICROBIALS:    Ancef 6/3-current   Allergies  No Known Allergies    ANTIMICROBIALS (past 90 days)  MEDICATIONS  (STANDING):  ceFAZolin   IVPB   100 mL/Hr IV Intermittent (21 @ 16:29)  ceFAZolin   IVPB   100 mL/Hr IV Intermittent (21 @ 05:16)   100 mL/Hr IV Intermittent (21 @ 21:38)   100 mL/Hr IV Intermittent (21 @ 13:06)   100 mL/Hr IV Intermittent (21 @ 05:40)   100 mL/Hr IV Intermittent (21 @ 21:56)   100 mL/Hr IV Intermittent (21 @ 13:00)   100 mL/Hr IV Intermittent (21 @ 05:57)   100 mL/Hr IV Intermittent (21 @ 22:01)   100 mL/Hr IV Intermittent (21 @ 18:47)   100 mL/Hr IV Intermittent (21 @ 07:01)   100 mL/Hr IV Intermittent (21 @ 22:47)  ceFAZolin   IVPB    ceFAZolin   IVPB 1000 every 8 hours    OTHER MEDS: MEDICATIONS  (STANDING):  acetaminophen   Tablet .. 650 every 6 hours PRN  enoxaparin Injectable 40 daily    SOCIAL HISTORY:   hx smoking  non-smoker    FAMILY HISTORY:  Patient&#x27;s father is       REVIEW OF SYSTEMS  [  ] ROS unobtainable because:    [ x ] All other systems negative except as noted below:	    Constitutional:  [ ] fever [ ] chills  [ ] weight loss  [ ] weakness  Skin:  [ ] rash [ ] phlebitis	  Eyes: [ ] icterus [ ] pain  [ ] discharge	  ENMT: [ ] sore throat  [ ] thrush [ ] ulcers [ ] exudates  Respiratory: [ ] dyspnea [ ] hemoptysis [ ] cough [ ] sputum	  Cardiovascular:  [ ] chest pain [ ] palpitations [ ] edema	  Gastrointestinal:  [ ] nausea [ ] vomiting [ ] diarrhea [ ] constipation [ ] pain	  Genitourinary:  [ ] dysuria [ ] frequency [ ] hematuria [ ] discharge [ ] flank pain  [ ] incontinence  Musculoskeletal:  [ ] myalgias [ ] arthralgias [ ] arthritis  [ ] back pain  Neurological:  [ ] headache [ ] seizures  [ ] confusion/altered mental status  Psychiatric:  [ ] anxiety [ ] depression	  Hematology/Lymphatics:  [ ] lymphadenopathy  Endocrine:  [ ] adrenal [ ] thyroid  Allergic/Immunologic:	 [ ] transplant [ ] seasonal    Vital Signs Last 24 Hrs  T(F): 98.5 (21 @ 04:33), Max: 99.1 (21 @ 21:06)  Vital Signs Last 24 Hrs  HR: 60 (21 @ 04:33) (60 - 80)  BP: 101/64 (21 @ 04:33) (101/64 - 127/78)  RR: 18 (21 @ 04:33)  SpO2: 97% (21 @ 04:33) (97% - 99%)  Wt(kg): --    PHYSICAL EXAM:  Constitutional: non-toxic, no distress  HEAD/EYES: anicteric, no conjunctival injection  ENT:  supple, no thrush  Cardiovascular:   normal S1, S2, no murmur, no edema  Respiratory:  clear BS bilaterally, no wheezes, no rales  GI:  soft, non-tender, normal bowel sounds  :  no king, no CVA tenderness  Musculoskeletal:  no synovitis, normal ROM  Neurologic: awake and alert, normal strength, no focal findings  Skin:  right medial forearm wound with packing in place, mild induration palpable, non tender on exam, no erythema in skin surrounding wound, no drainage   Heme/Onc: no lymphadenopathy   Psychiatric:  awake, alert, appropriate mood    LABS:                        12.2   8.52  )-----------( 245      ( 2021 06:57 )             36.8   06-07    132<L>  |  98  |  29<H>  ----------------------------<  103<H>  4.3   |  20<L>  |  1.16    Ca    10.1      2021 06:57    TPro  7.6  /  Alb  4.1  /  TBili  0.4  /  DBili  x   /  AST  19  /  ALT  22  /  AlkPhos  73  06-07    MICROBIOLOGY:  Culture - Other (collected 2021 15:16)  Source: Skin Right forearm abscess  Final Report (2021 09:46):    Moderate Methicillin resistant Staphylococcus aureus  Organism: Methicillin resistant Staphylococcus aureus (2021 09:46)  Organism: Methicillin resistant Staphylococcus aureus (2021 09:46)      -  Ampicillin/Sulbactam: R <=8/4      -  Cefazolin: R >16      -  Clindamycin: R >4      -  Daptomycin: S 0.5      -  Erythromycin: R >4      -  Gentamicin: S <=1 Should not be used as monotherapy      -  Linezolid: S 2      -  Oxacillin: R >2      -  Penicillin: R >8      -  RIF- Rifampin: S <=1 Should not be used as monotherapy      -  Tetra/Doxy: S 2      -  Trimethoprim/Sulfamethoxazole: S <=0.5/9.5      -  Vancomycin: S 2      Method Type: SKYE    Culture - Blood (collected 2021 23:11)  Source: .Blood Blood-Venous  Preliminary Report (2021 01:01):    No growth to date.    Culture - Blood (collected 2021 23:11)  Source: .Blood Blood-Peripheral  Preliminary Report (2021 01:01):    No growth to date.    RADIOLOGY:  imaging below personally reviewed    US Extremity Nonvasc Limited, Right 21  Echogenic packing material in the subcutaneous right forearm.  There is a small cystic fluid collection measuring 1.1 x 0.3 x 0.3 cm with internal debris and increased peripheral vascularity beneath the packing material.  IMPRESSION:  Small residual collection status post incision and drainage.    Xray Forearm, Right 21   INTERPRETATION:  Clinical indications: Induration in the right arm. Question foreign body.  2 views of the right forearm are compared to previous rightforearm radiographs from 2021.  Impression: There is no acute fracture or dislocation. There is no radiopaque foreign body identified    VA Duplex Upper Ext Vein Scan, Right 21  FINDINGS:  The right internal jugular, subclavian, axillary and brachial veins are patent and compressible where applicable.  The basilic and cephalic veins (superficial veins) are patent and without thrombus.  Doppler examination shows normal spontaneous and phasic flow.  The visualized left subclavian vein is patent.  IMPRESSION:  No evidence of right upper extremity deep venous thrombosis.

## 2021-06-07 NOTE — PROGRESS NOTE ADULT - ATTENDING COMMENTS
discussed about plan with resident on rounds. c/o left sided flank pain - tender to palpation. no recent fall  labs and imaging reviewed.  I agree with the above findings, assessment, and plan with the following additions and exceptions:  Patient admitted with intoxication concern for dementia 2/2 long standing [place] encephalopathy s/p CIWA. Main issue is disposition as patient is homeless no insurance. Previously with no capacity but no per psych has and patient requesting to go back to [facility] in [place].     #RUE IV infiltration vs cellulitis- with overlying erythema and induration, marked improvement but has an area of induration with small collection. Surgery called, soft tissue US with small residual collection s/p I+D. Cx MRSA sensitive to bactrim - ID consult called. Packing and dressing as per RN; appreciate surgery recs    #Onychomycosis/ elongated toenails: podiatry for nail eval    #Disposition: Per psych and ethics, patient has capacity; will plan for dc after cellulitis tx; social work involved appreciated coordination of care . discussed about plan with resident on rounds. c/o left sided flank pain - tender to palpation. no recent fall  labs and imaging reviewed.  I agree with the above findings, assessment, and plan with the following additions and exceptions:  Patient admitted with intoxication concern for dementia 2/2 long standing [place] encephalopathy s/p CIWA. Main issue is disposition as patient is homeless no insurance. Previously with no capacity but no per psych has and patient requesting to go back to [facility] in [place].     #RUE IV infiltration vs cellulitis- with overlying erythema and induration, marked improvement but has an area of induration with small collection. Surgery called, soft tissue US with small residual collection s/p I+D. Cx MRSA sensitive to bactrim - ID consult called.     #Onychomycosis/ elongated toenails: podiatry for nail eval    #Disposition: Per psych and ethics, patient has capacity; discharge pending ID clearance; social work involved appreciated coordination of care . discussed about plan with resident on rounds. c/o left sided flank pain - tender to palpation. no recent fall  labs and imaging reviewed.  I agree with the above findings, assessment, and plan with the following additions and exceptions:  Patient admitted with intoxication concern for dementia 2/2 long standing [place] encephalopathy s/p CIWA.     #new left flank pain with tenderness on palpation -  xray left ribs     #RUE IV infiltration vs cellulitis- with overlying erythema and induration, marked improvement but has an area of induration with small collection. Surgery called, soft tissue US with small residual collection s/p I+D. Cx MRSA sensitive to bactrim - ID consult called.     #Onychomycosis/ elongated toenails: podiatry for nail eval    #Main issue is disposition as patient is homeless no insurance. Previously with no capacity but now improved and per psych has capacity and patient requesting to go back to [facility] in [place]. discharge pending ID clearance; social work involved appreciated coordination of care .

## 2021-06-07 NOTE — PROVIDER CONTACT NOTE (OTHER) - REASON
pt. covid detected
Left lateral abdominal pain
pt. agitated , wants to leave, climbing oob and hitting
redden right forearm

## 2021-06-07 NOTE — PROGRESS NOTE ADULT - SUBJECTIVE AND OBJECTIVE BOX
Marino Carson  PGY-2  Pager: [phone]    Patient is a 60y old  Male who presents with a chief complaint of fall (07 Jun 2021 08:46)      SUBJECTIVE / OVERNIGHT EVENTS:    MEDICATIONS  (STANDING):  ceFAZolin   IVPB      ceFAZolin   IVPB 1000 milliGRAM(s) IV Intermittent every 8 hours  cyanocobalamin 1000 MICROGram(s) Oral daily  diclofenac sodium 1% Gel 4 Gram(s) Topical four times a day  enoxaparin Injectable 40 milliGRAM(s) SubCutaneous daily  folic acid 1 milliGRAM(s) Oral daily  lidocaine   Patch 1 Patch Transdermal every 24 hours  lidocaine 1% Injectable 20 milliLiter(s) Local Injection once  nicotine -   7 mG/24Hr(s) Patch 1 patch Transdermal daily  thiamine 100 milliGRAM(s) Oral daily    MEDICATIONS  (PRN):  acetaminophen   Tablet .. 650 milliGRAM(s) Oral every 6 hours PRN Temp greater or equal to 38C (100.4F), Mild Pain (1 - 3)      T(C): 36.9 (06-07-21 @ 04:33), Max: 36.9 (06-07-21 @ 04:33)  HR: 60 (06-07-21 @ 04:33) (60 - 80)  BP: 101/64 (06-07-21 @ 04:33) (101/64 - 127/78)  RR: 18 (06-07-21 @ 04:33) (18 - 18)  SpO2: 97% (06-07-21 @ 04:33) (97% - 99%)  CAPILLARY BLOOD GLUCOSE        I&O's Summary    06 Jun 2021 07:01  -  07 Jun 2021 07:00  --------------------------------------------------------  IN: 0 mL / OUT: 600 mL / NET: -600 mL        PHYSICAL EXAM:  PHYSICAL EXAM:    Constitutional: NAD. well-developed; well-groomed; well-nourished;  HEENT: AT/NC, PERRLA; EOMI, MMM, no oropharyngeal lesions, no erythema, no exudates, Supple neck; normal thyroid gland, no cervical lymphadenopathy  Respiratory: CTAB. equal aeration bilaterally. no wheezing, no crackes, no rhonchi. no increase in WOB  Cardiovascular: RRR, no M/R/G. 2+ distal pulses. Cap refill < 2 seconds. no JVD  Gastrointestinal: soft; NT/ND, +BS, no rebounding tenderness / guarding / HSM / mass / ascites.  Genitourinary: not examined.  Extremities: no clubbing; no cyanosis; no pedal edema, non-tender to palpation, DP and Radial pulses intact.  Skin: warm and dry; color normal: no rash: no ulcers  Neurological: A&Ox 3; responds to pain; responds to verbal commands; epicritic and protopathic sensation intact: CN nerves grossly intact.   MSK/Back: no deformities. Active and passive ROM intact; strength intact, no CVA tenderness, No joint tenderness, swelling, erythema  Psychiatric: normal mood/affect. Denies SI/HI    LABS:                        12.2   8.52  )-----------( 245      ( 07 Jun 2021 06:57 )             36.8     06-07    132<L>  |  98  |  29<H>  ----------------------------<  103<H>  4.3   |  20<L>  |  1.16    Ca    10.1      07 Jun 2021 06:57    TPro  7.6  /  Alb  4.1  /  TBili  0.4  /  DBili  x   /  AST  19  /  ALT  22  /  AlkPhos  73  06-07              RADIOLOGY & ADDITIONAL TESTS:    Imaging Personally Reviewed: RICK    Consultant(s) Notes Reviewed:  RICK    Care Discussed with Consultants/Other Providers: RICK   Marino Carson  PGY-3  Pager: [phone]    Patient is a 60y old  Male who presents with a chief complaint of fall (07 Jun 2021 08:46)      SUBJECTIVE / OVERNIGHT EVENTS:  no acute events overnight.  Patient denies CP, palpitation, SOB, visaul disturbance, dizziness, lightheadedness, abdominal pain, n/v/d/c, dysusria/hematuria.  Patient still has mild tenderness to palpation on I&D site. no purulent discharge.    MEDICATIONS  (STANDING):  ceFAZolin   IVPB      ceFAZolin   IVPB 1000 milliGRAM(s) IV Intermittent every 8 hours  cyanocobalamin 1000 MICROGram(s) Oral daily  diclofenac sodium 1% Gel 4 Gram(s) Topical four times a day  enoxaparin Injectable 40 milliGRAM(s) SubCutaneous daily  folic acid 1 milliGRAM(s) Oral daily  lidocaine   Patch 1 Patch Transdermal every 24 hours  lidocaine 1% Injectable 20 milliLiter(s) Local Injection once  nicotine -   7 mG/24Hr(s) Patch 1 patch Transdermal daily  thiamine 100 milliGRAM(s) Oral daily    MEDICATIONS  (PRN):  acetaminophen   Tablet .. 650 milliGRAM(s) Oral every 6 hours PRN Temp greater or equal to 38C (100.4F), Mild Pain (1 - 3)      T(C): 36.9 (06-07-21 @ 04:33), Max: 36.9 (06-07-21 @ 04:33)  HR: 60 (06-07-21 @ 04:33) (60 - 80)  BP: 101/64 (06-07-21 @ 04:33) (101/64 - 127/78)  RR: 18 (06-07-21 @ 04:33) (18 - 18)  SpO2: 97% (06-07-21 @ 04:33) (97% - 99%)  CAPILLARY BLOOD GLUCOSE        I&O's Summary    06 Jun 2021 07:01  -  07 Jun 2021 07:00  --------------------------------------------------------  IN: 0 mL / OUT: 600 mL / NET: -600 mL        PHYSICAL EXAM:  GENERAL: NAD, conversant  CHEST/LUNG: Clear to auscultation bilaterally; No crackles, rhonchi, wheezing, or rubs  HEART: Regular rate and rhythm; No murmurs, rubs, or gallops  ABDOMEN: Soft, Nontender, Nondistended; Bowel sounds present  EXTREMITIES:  2+ Peripheral Pulses, No clubbing, cyanosis, or edema  L sided axillary lower ribs TTP  R forearm wound covered, procedure site c/d/i  SKIN: No rashes or lesions  NERVOUS SYSTEM:  Alert & Oriented, Good concentration            LABS:                        12.2   8.52  )-----------( 245      ( 07 Jun 2021 06:57 )             36.8     06-07    132<L>  |  98  |  29<H>  ----------------------------<  103<H>  4.3   |  20<L>  |  1.16    Ca    10.1      07 Jun 2021 06:57    TPro  7.6  /  Alb  4.1  /  TBili  0.4  /  DBili  x   /  AST  19  /  ALT  22  /  AlkPhos  73  06-07              RADIOLOGY & ADDITIONAL TESTS:    Imaging Personally Reviewed: RICK    Consultant(s) Notes Reviewed:  RICK    Care Discussed with Consultants/Other Providers: RICK

## 2021-06-07 NOTE — PROGRESS NOTE ADULT - PROBLEM SELECTOR PLAN 7
- Back pain: Tylenol PRN (given Dilaudid 0.5 mg IV once on 6/6); consider cyclobenzaprine if recurs  -DVT ppx: lovenox  -Diet: regular  -Discussed +COVID PCR on 5/23 with Infxn control, confirmed that no isolation precautions needed as pt's COVID antibodies positive and pt asymptomatic. Repeat COVID PCR negative on 6/1. -Back pain: Tylenol PRN (given Dilaudid 0.5 mg IV once on 6/6); consider cyclobenzaprine if recurs  -DVT ppx: lovenox  -Diet: regular  -Discussed +COVID PCR on 5/23 with Infxn control, confirmed that no isolation precautions needed as pt's COVID antibodies positive and pt asymptomatic. Repeat COVID PCR negative on 6/1.  - Dispo: Discussed with SW ([id number]). Patient has been evaluated multiple times by psych. Patient deemed to not have capacity initially and was pursued for guardianship, but as pt's capacity recovered. no longer pursuing guardianship. Patient is refusing all help from SW including shelter placement, etc, and this has been recurring discussion since 2014 with his hospitalization. Patient adamant about returning to street.

## 2021-06-07 NOTE — PROGRESS NOTE ADULT - SUBJECTIVE AND OBJECTIVE BOX
PLASTIC SURGERY PROGRESS NOTE    SUBJECTIVE:  Reports improvement in symptoms since I&D. States redness and pain improving.    OBJECTIVE:  Vital Signs Last 24 Hrs  T(C): 36.9 (07 Jun 2021 04:33), Max: 36.9 (07 Jun 2021 04:33)  T(F): 98.5 (07 Jun 2021 04:33), Max: 98.5 (07 Jun 2021 04:33)  HR: 60 (07 Jun 2021 04:33) (60 - 80)  BP: 101/64 (07 Jun 2021 04:33) (101/64 - 127/78)  BP(mean): --  RR: 18 (07 Jun 2021 04:33) (18 - 18)  SpO2: 97% (07 Jun 2021 04:33) (97% - 99%)      06-06-21 @ 07:01  -  06-07-21 @ 07:00  --------------------------------------------------------  IN: 0 mL / OUT: 600 mL / NET: -600 mL        Physical Examination:  GEN: NAD, resting quietly  PULM: symmetric chest rise bilaterally, no increased WOB  EXTR: right medial proximal forearm wound clean, no drainage, packing removed and replaced, minimal erythema, no fluctuance      LABS:                        12.2   8.52  )-----------( 245      ( 07 Jun 2021 06:57 )             36.8       06-07    132<L>  |  98  |  29<H>  ----------------------------<  103<H>  4.3   |  20<L>  |  1.16    Ca    10.1      07 Jun 2021 06:57    TPro  7.6  /  Alb  4.1  /  TBili  0.4  /  DBili  x   /  AST  19  /  ALT  22  /  AlkPhos  73  06-07

## 2021-06-07 NOTE — PROGRESS NOTE ADULT - ASSESSMENT
59yo M [place] speaking with PMH of EtOH abuse, chronic pancreatitis s/p L nasal bone fracture, vertebral fracture of transverse process, recent admission for Etoh abuse BIBEMS after a witnessed fall. Plastic Surgery consulted for abscess from infected IV site on medial proximal forearm, now s/p bedside I&D (6/5)    Plan/Recommendations:  - Wound cultures with moderate staph aureus, f/u sensitivities  - Continue daily dressing changes by nursing. Can discontinue packing wound after today.  - Continue abx per primary team    Plastic Surgery  [facility]: [phone]

## 2021-06-07 NOTE — PROGRESS NOTE ADULT - PROBLEM SELECTOR PLAN 2
Fracture of vertebra, lumbar from previous fall. CT with transverse process fracture of the L1 vertebra, healing transverse process fracture of L2 vertebra and healing fractures of right 9th and 12th ribs. Pt with new fall now 5/15  -CTH/C spine no sign of bleed or fracture   -PT recs RICHARD but patient refusing and also has no insurance, undocumented  -per ortho: no need for surgery or need for TLSO brace, outpatient f/u    #RUE swelling  -Pt found to have erythema/swelling/tenderness around peripheral IV removal site (6/2)  -RUE Duplex negative for DVT   -Bcx 6/2 NGTD  -C/w warm compresses and Tylenol PRN   -C/w ancef x 5d (6/3-)  -XR forearm w/o foreign body  - s/p I&D 6/5/21; US performed of R forearm cellulitis wound (s/p I&D on 6/5), small residual collection. Fracture of vertebra, lumbar from previous fall. CT with transverse process fracture of the L1 vertebra, healing transverse process fracture of L2 vertebra and healing fractures of right 9th and 12th ribs. Pt with new fall now 5/15  -CT H/C spine no sign of bleed or fracture   -PT recs RICHARD but patient refusing and also has no insurance, undocumented  -per ortho: no need for surgery or need for TLSO brace, outpatient f/u    #RUE swelling  -Pt found to have erythema/swelling/tenderness around peripheral IV removal site (6/2)  -RUE Duplex negative for DVT   -Bcx 6/2 NGTD  -C/w warm compresses and Tylenol PRN   -abx as above.  -XR forearm w/o foreign body  - s/p I&D 6/5/21; US performed of R forearm cellulitis wound (s/p I&D on 6/5), small residual collection.

## 2021-06-07 NOTE — PROVIDER CONTACT NOTE (OTHER) - SITUATION
pt c/o of right forearm discomfort at IV site
pt complaining of Left lower lateral abdominal pain. Pt given tylenol. Pt complaining of trouble turning and coughing.

## 2021-06-07 NOTE — PROGRESS NOTE ADULT - PROBLEM SELECTOR PLAN 3
- s/p librium taper with highest CIWA 3-4  - fall precautions   -  consulted, appreciate help in clarifying capacity  - psych eval for capacity--> no capacity. Re-evaluated pt on 6/2 and now has capacity.

## 2021-06-07 NOTE — CONSULT NOTE ADULT - ATTENDING COMMENTS
Right forearm abscess s/p I&D, f/u cx, continue daily packing, and continue abx.
60M with ETOH abuse, right forearm MRSA abscess/cellulitis   -likely related to an IV line  -s/p I+D by plastics  -better   -bactrim DS 1 tab po bid x 5 days  -dc vancomycin  -packing per plastics  -DC planning    Dwight Gomez  Attending Physician   Division of Infectious Disease  Pager #[phone]  Available on Microsoft Teams also  After 5pm/weekend or no response, call #[phone]

## 2021-06-07 NOTE — PROGRESS NOTE ADULT - PROBLEM SELECTOR PLAN 1
#RUE swelling  -Pt found to have erythema/swelling/tenderness around peripheral IV removal site (6/2)  -RUE Duplex negative for DVT   -Bcx 6/2 NGTD  -C/w warm compresses and Tylenol PRN   -C/w ancef x 5d (6/3-)  -XR forearm w/o foreign body  - s/p I&D 6/5/21; US performed of R forearm cellulitis wound (s/p I&D on 6/5), small residual collection. #RUE swelling  -Pt found to have erythema/swelling/tenderness around peripheral IV removal site (6/2)  -RUE Duplex negative for DVT   -Bcx 6/2 NGTD  -c/w warm compresses and Tylenol PRN   -s/p ancef x 5d (6/3-6/7)  -XR forearm w/o foreign body  -s/p I&D 6/5/21; US performed of R forearm cellulitis wound (s/p I&D on 6/5), small residual collection. #RUE swelling  -Pt found to have erythema/swelling/tenderness around peripheral IV removal site (6/2)  -RUE Duplex negative for DVT   -Bcx 6/2 NGTD. Wound culture came back positive for MRSA (6/4)  -c/w warm compresses and Tylenol PRN   -s/p ancef x 5d (6/3-6/7)  -XR forearm w/o foreign body  -s/p I&D 6/5/21; US performed of R forearm cellulitis wound (s/p I&D on 6/5), small residual collection.  - contact precaution for MRSA.  - ID consulted. appreciate rec.

## 2021-06-07 NOTE — PROGRESS NOTE ADULT - PROBLEM SELECTOR PLAN 6
Patient refuses COVID 19 swab.       Ondina Santo RN  05/16/21 9689 Altered, found down. NOW RESOLVED.   - BAL 0  - concern for Wernicke  - s/p high dose thiamine  - CTH normal  - may have some underlying dementia/cognitive change from chronic alcohol use.   - Infectious workup limited to UA (negative) as patients vitals WNL and normal WBC, low -no concern for infxn

## 2021-06-08 ENCOUNTER — TRANSCRIPTION ENCOUNTER (OUTPATIENT)
Age: 61
End: 2021-06-08

## 2021-06-08 LAB
CULTURE RESULTS: SIGNIFICANT CHANGE UP
CULTURE RESULTS: SIGNIFICANT CHANGE UP
SPECIMEN SOURCE: SIGNIFICANT CHANGE UP
SPECIMEN SOURCE: SIGNIFICANT CHANGE UP

## 2021-06-08 PROCEDURE — 99232 SBSQ HOSP IP/OBS MODERATE 35: CPT | Mod: GC

## 2021-06-08 PROCEDURE — 99232 SBSQ HOSP IP/OBS MODERATE 35: CPT

## 2021-06-08 RX ADMIN — DICLOFENAC SODIUM 4 GRAM(S): 30 GEL TOPICAL at 21:12

## 2021-06-08 RX ADMIN — Medication 1 PATCH: at 12:09

## 2021-06-08 RX ADMIN — DICLOFENAC SODIUM 4 GRAM(S): 30 GEL TOPICAL at 05:36

## 2021-06-08 RX ADMIN — Medication 1 MILLIGRAM(S): at 12:01

## 2021-06-08 RX ADMIN — LIDOCAINE 1 PATCH: 4 CREAM TOPICAL at 01:25

## 2021-06-08 RX ADMIN — Medication 1 PATCH: at 12:01

## 2021-06-08 RX ADMIN — Medication 1 PATCH: at 19:38

## 2021-06-08 RX ADMIN — DICLOFENAC SODIUM 4 GRAM(S): 30 GEL TOPICAL at 10:56

## 2021-06-08 RX ADMIN — Medication 100 MILLIGRAM(S): at 12:01

## 2021-06-08 RX ADMIN — ENOXAPARIN SODIUM 40 MILLIGRAM(S): 100 INJECTION SUBCUTANEOUS at 12:02

## 2021-06-08 RX ADMIN — Medication 650 MILLIGRAM(S): at 05:37

## 2021-06-08 RX ADMIN — PREGABALIN 1000 MICROGRAM(S): 225 CAPSULE ORAL at 12:01

## 2021-06-08 RX ADMIN — Medication 650 MILLIGRAM(S): at 06:07

## 2021-06-08 RX ADMIN — LIDOCAINE 1 PATCH: 4 CREAM TOPICAL at 12:02

## 2021-06-08 RX ADMIN — Medication 1 TABLET(S): at 05:36

## 2021-06-08 RX ADMIN — LIDOCAINE 1 PATCH: 4 CREAM TOPICAL at 19:37

## 2021-06-08 RX ADMIN — DICLOFENAC SODIUM 4 GRAM(S): 30 GEL TOPICAL at 15:25

## 2021-06-08 RX ADMIN — LIDOCAINE 1 PATCH: 4 CREAM TOPICAL at 23:39

## 2021-06-08 RX ADMIN — Medication 1 TABLET(S): at 17:26

## 2021-06-08 NOTE — DISCHARGE NOTE NURSING/CASE MANAGEMENT/SOCIAL WORK - PATIENT PORTAL LINK FT
You can access the FollowMyHealth Patient Portal offered by [facility] by registering at the following website: [email or web address]. By joining [employer]’s FollowMyHealth portal, you will also be able to view your health information using other applications (apps) compatible with our system.

## 2021-06-08 NOTE — PROGRESS NOTE ADULT - PROBLEM SELECTOR PLAN 7
-Back pain: Tylenol PRN (given Dilaudid 0.5 mg IV once on 6/6); consider cyclobenzaprine if recurs  -DVT ppx: lovenox  -Diet: regular  -Discussed +COVID PCR on 5/23 with Infxn control, confirmed that no isolation precautions needed as pt's COVID antibodies positive and pt asymptomatic. Repeat COVID PCR negative on 6/1.  - Dispo: Discussed with SW ([id number]). Patient has been evaluated multiple times by psych. Patient deemed to not have capacity initially and was pursued for guardianship, but as pt's capacity recovered. no longer pursuing guardianship. Patient is refusing all help from SW including shelter placement, etc, and this has been recurring discussion since 2014 with his hospitalization. Patient adamant about returning to street.

## 2021-06-08 NOTE — PROGRESS NOTE ADULT - PROBLEM SELECTOR PLAN 2
Fracture of vertebra, lumbar from previous fall. CT with transverse process fracture of the L1 vertebra, healing transverse process fracture of L2 vertebra and healing fractures of right 9th and 12th ribs. Pt with new fall now 5/15  -CT H/C spine no sign of bleed or fracture   -PT recs RICHARD but patient refusing and also has no insurance, undocumented  -per ortho: no need for surgery or need for TLSO brace, outpatient f/u    #RUE swelling  -Pt found to have erythema/swelling/tenderness around peripheral IV removal site (6/2)  -RUE Duplex negative for DVT   -Bcx 6/2 NGTD  -C/w warm compresses and Tylenol PRN   -abx as above.  -XR forearm w/o foreign body  - s/p I&D 6/5/21; US performed of R forearm cellulitis wound (s/p I&D on 6/5), small residual collection. Fracture of vertebra, lumbar from previous fall. CT with transverse process fracture of the L1 vertebra, healing transverse process fracture of L2 vertebra and healing fractures of right 9th and 12th ribs. Pt with new fall now 5/15  -CT H/C spine no sign of bleed or fracture   -PT recs RICHARD but patient refusing and also has no insurance, undocumented  -per ortho: no need for surgery or need for TLSO brace, outpatient f/u

## 2021-06-08 NOTE — PROGRESS NOTE ADULT - ASSESSMENT
61yo M with PMH of EtOH abuse, chronic pancreatitis presenting after observed fall, likely in the setting of alcohol use, found to have L nasal bone fracture, vertebral fracture of transverse process and left facial wound. C/f possible Wernicke's, s/p 3 days of thiamine. Pt is now being treated for cellulitis of the R forearm, c/f abscess.  61yo M with PMH of EtOH abuse, chronic pancreatitis presenting after observed fall, likely in the setting of alcohol use, found to have L nasal bone fracture, vertebral fracture of transverse process and left facial wound. C/f possible Wernicke's, s/p 3 days of thiamine. Pt is now being treated for cellulitis of the R forearm, s/p I&D of abscess.

## 2021-06-08 NOTE — PROGRESS NOTE ADULT - ATTENDING COMMENTS
discussed about plan with resident on rounds.  ID#[id number]. c/o left sided flank pain overall unchanged - tender to palpation and pain with movement. no recent fall. states has been intermittent since previous fall.   labs and imaging reviewed.  I agree with the above findings, assessment, and plan with the following additions and exceptions:  Patient admitted with intoxication concern for dementia 2/2 long standing Wernikes encephalopathy s/p CIWA.  s/p IV thiamine x 2 courses with improvement in mental status.     #new left flank pain with tenderness on palpation - xray with likely rib fracture - pain control - no surgery indicated. patient states intermittent left sided pain since previous fall - continue pain management - tylenol - told to limit intake.     #RUE IV infiltration vs cellulitis- with overlying erythema and induration, marked improvement but has an area of induration with small collection. Surgery called s/p I+D, repeat soft tissue US with small residual collection - packing removed by surgery - gauze dressings (will need to give patient some supply on discharge). Cx MRSA sensitive to bactrim - ID consult appreciated - bactrim x 5 days (abx to be picked up from our pharmacy and given to patient prior to discharge)    #Onychomycosis/ elongated toenails    #Main issue is disposition as patient is homeless no insurance. Previously with no capacity but now improved and per psych has capacity and patient requesting to go back to [facility] in [place]. patient to continue dressing changes daily outpatient and follow up appointment with clinic trying to be arranged.   discharge time - 36 minutes

## 2021-06-08 NOTE — PROGRESS NOTE ADULT - SUBJECTIVE AND OBJECTIVE BOX
MADHAVI RAMOS 60y [id number]    Patient is a 60y old  Male who presents with a chief complaint of fall (08 Jun 2021 06:38)      Follow Up/CC:  ID following for    Interval History/ROS:    Allergies    No Known Allergies    Intolerances        ANTIMICROBIALS:  trimethoprim  160 mG/sulfamethoxazole 800 mG 1 two times a day      MEDICATIONS  (STANDING):  chlorhexidine 2% Cloths 1 Application(s) Topical daily  cyanocobalamin 1000 MICROGram(s) Oral daily  diclofenac sodium 1% Gel 4 Gram(s) Topical four times a day  enoxaparin Injectable 40 milliGRAM(s) SubCutaneous daily  folic acid 1 milliGRAM(s) Oral daily  lidocaine   Patch 1 Patch Transdermal every 24 hours  lidocaine 1% Injectable 20 milliLiter(s) Local Injection once  nicotine -   7 mG/24Hr(s) Patch 1 patch Transdermal daily  thiamine 100 milliGRAM(s) Oral daily  trimethoprim  160 mG/sulfamethoxazole 800 mG 1 Tablet(s) Oral two times a day    MEDICATIONS  (PRN):  acetaminophen   Tablet .. 650 milliGRAM(s) Oral every 6 hours PRN Temp greater or equal to 38C (100.4F), Mild Pain (1 - 3)        Vital Signs Last 24 Hrs  T(C): 36.5 (08 Jun 2021 11:47), Max: 36.6 (07 Jun 2021 17:46)  T(F): 97.7 (08 Jun 2021 11:47), Max: 97.9 (07 Jun 2021 17:46)  HR: 72 (08 Jun 2021 11:47) (67 - 84)  BP: 107/64 (08 Jun 2021 11:47) (107/64 - 120/76)  BP(mean): --  RR: 19 (08 Jun 2021 11:47) (18 - 19)  SpO2: 94% (08 Jun 2021 11:47) (94% - 97%)    CBC Full  -  ( 07 Jun 2021 06:57 )  WBC Count : 8.52 K/uL  RBC Count : 3.78 M/uL  Hemoglobin : 12.2 g/dL  Hematocrit : 36.8 %  Platelet Count - Automated : 245 K/uL  Mean Cell Volume : 97.4 fl  Mean Cell Hemoglobin : 32.3 pg  Mean Cell Hemoglobin Concentration : 33.2 gm/dL  Auto Neutrophil # : x  Auto Lymphocyte # : x  Auto Monocyte # : x  Auto Eosinophil # : x  Auto Basophil # : x  Auto Neutrophil % : x  Auto Lymphocyte % : x  Auto Monocyte % : x  Auto Eosinophil % : x  Auto Basophil % : x    06-07    132<L>  |  98  |  29<H>  ----------------------------<  103<H>  4.3   |  20<L>  |  1.16    Ca    10.1      07 Jun 2021 06:57    TPro  7.6  /  Alb  4.1  /  TBili  0.4  /  DBili  x   /  AST  19  /  ALT  22  /  AlkPhos  73  06-07    LIVER FUNCTIONS - ( 07 Jun 2021 06:57 )  Alb: 4.1 g/dL / Pro: 7.6 g/dL / ALK PHOS: 73 U/L / ALT: 22 U/L / AST: 19 U/L / GGT: x               MICROBIOLOGY:  Skin Right forearm abscess  06-05-21   Moderate Methicillin resistant Staphylococcus aureus  --  Methicillin resistant Staphylococcus aureus      .Blood Blood-Peripheral  06-02-21   No Growth Final  --  --      .Urine Clean Catch (Midstream)  05-15-21   No growth  --  --              v            RADIOLOGY     MADHAVI RAMOS 60y [id number]    Patient is a 60y old  Male who presents with a chief complaint of fall (08 Jun 2021 06:38)      Follow Up/CC:  ID following for mrsa abscess    Interval History/ROS: no fever, still with some left sided CP    Allergies    No Known Allergies    Intolerances        ANTIMICROBIALS:  trimethoprim  160 mG/sulfamethoxazole 800 mG 1 two times a day      MEDICATIONS  (STANDING):  chlorhexidine 2% Cloths 1 Application(s) Topical daily  cyanocobalamin 1000 MICROGram(s) Oral daily  diclofenac sodium 1% Gel 4 Gram(s) Topical four times a day  enoxaparin Injectable 40 milliGRAM(s) SubCutaneous daily  folic acid 1 milliGRAM(s) Oral daily  lidocaine   Patch 1 Patch Transdermal every 24 hours  lidocaine 1% Injectable 20 milliLiter(s) Local Injection once  nicotine -   7 mG/24Hr(s) Patch 1 patch Transdermal daily  thiamine 100 milliGRAM(s) Oral daily  trimethoprim  160 mG/sulfamethoxazole 800 mG 1 Tablet(s) Oral two times a day    MEDICATIONS  (PRN):  acetaminophen   Tablet .. 650 milliGRAM(s) Oral every 6 hours PRN Temp greater or equal to 38C (100.4F), Mild Pain (1 - 3)        Vital Signs Last 24 Hrs  T(C): 36.5 (08 Jun 2021 11:47), Max: 36.6 (07 Jun 2021 17:46)  T(F): 97.7 (08 Jun 2021 11:47), Max: 97.9 (07 Jun 2021 17:46)  HR: 72 (08 Jun 2021 11:47) (67 - 84)  BP: 107/64 (08 Jun 2021 11:47) (107/64 - 120/76)  BP(mean): --  RR: 19 (08 Jun 2021 11:47) (18 - 19)  SpO2: 94% (08 Jun 2021 11:47) (94% - 97%)    CBC Full  -  ( 07 Jun 2021 06:57 )  WBC Count : 8.52 K/uL  RBC Count : 3.78 M/uL  Hemoglobin : 12.2 g/dL  Hematocrit : 36.8 %  Platelet Count - Automated : 245 K/uL  Mean Cell Volume : 97.4 fl  Mean Cell Hemoglobin : 32.3 pg  Mean Cell Hemoglobin Concentration : 33.2 gm/dL  Auto Neutrophil # : x  Auto Lymphocyte # : x  Auto Monocyte # : x  Auto Eosinophil # : x  Auto Basophil # : x  Auto Neutrophil % : x  Auto Lymphocyte % : x  Auto Monocyte % : x  Auto Eosinophil % : x  Auto Basophil % : x    06-07    132<L>  |  98  |  29<H>  ----------------------------<  103<H>  4.3   |  20<L>  |  1.16    Ca    10.1      07 Jun 2021 06:57    TPro  7.6  /  Alb  4.1  /  TBili  0.4  /  DBili  x   /  AST  19  /  ALT  22  /  AlkPhos  73  06-07    LIVER FUNCTIONS - ( 07 Jun 2021 06:57 )  Alb: 4.1 g/dL / Pro: 7.6 g/dL / ALK PHOS: 73 U/L / ALT: 22 U/L / AST: 19 U/L / GGT: x               MICROBIOLOGY:  Skin Right forearm abscess  06-05-21   Moderate Methicillin resistant Staphylococcus aureus  --  Methicillin resistant Staphylococcus aureus      .Blood Blood-Peripheral  06-02-21   No Growth Final  --  --      .Urine Clean Catch (Midstream)  05-15-21   No growth  --  --          RADIOLOGY    < from: Xray Ribs 2 Views, Left (06.07.21 @ 20:28) >  Suspected minimally offset lower lateral left rib fracture deformities, correlate for point tenderness. No additional suspected rib fractures or other gross rib pathology.    L1 compression deformity again noted. Mild left AC joint arthrosis. Intact remaining imaged osseous structures.    < end of copied text >

## 2021-06-08 NOTE — PROGRESS NOTE ADULT - SUBJECTIVE AND OBJECTIVE BOX
**********************************************  Paulette Santos, PGY-1  Internal Medicine   [facility] Pager #: [phone]  **********************************************     Patient is a 60y old  Male who presents with a chief complaint of fall (07 Jun 2021 13:42)    SUBJECTIVE / OVERNIGHT EVENTS:     OBJECTIVE:  Vital Signs Last 24 Hrs  T(C): 36.4 (08 Jun 2021 04:35), Max: 37 (07 Jun 2021 15:39)  T(F): 97.5 (08 Jun 2021 04:35), Max: 98.6 (07 Jun 2021 15:39)  HR: 67 (08 Jun 2021 04:35) (67 - 84)  BP: 111/65 (08 Jun 2021 04:35) (109/67 - 130/73)  BP(mean): --  RR: 18 (08 Jun 2021 04:35) (18 - 18)  SpO2: 97% (08 Jun 2021 04:35) (96% - 97%)    I&O's Summary    06 Jun 2021 07:01  -  07 Jun 2021 07:00  --------------------------------------------------------  IN: 0 mL / OUT: 600 mL / NET: -600 mL    07 Jun 2021 07:01  -  08 Jun 2021 06:38  --------------------------------------------------------  IN: 150 mL / OUT: 1070 mL / NET: -920 mL      Physical Examination:      Labs:                12.2   8.52  )-----------( 245      ( 07 Jun 2021 06:57 )             36.8     06-07    132<L>  |  98  |  29<H>  ----------------------------<  103<H>  4.3   |  20<L>  |  1.16    Ca    10.1      07 Jun 2021 06:57    TPro  7.6  /  Alb  4.1  /  TBili  0.4  /  DBili  x   /  AST  19  /  ALT  22  /  AlkPhos  73  06-07        Culture - Other (collected 05 Jun 2021 15:16)  Source: Skin Right forearm abscess  Final Report (07 Jun 2021 09:46):    Moderate Methicillin resistant Staphylococcus aureus  Organism: Methicillin resistant Staphylococcus aureus (07 Jun 2021 09:46)  Organism: Methicillin resistant Staphylococcus aureus (07 Jun 2021 09:46)        Imaging Personally Reviewed:      MEDICATIONS  (STANDING):  chlorhexidine 2% Cloths 1 Application(s) Topical daily  cyanocobalamin 1000 MICROGram(s) Oral daily  diclofenac sodium 1% Gel 4 Gram(s) Topical four times a day  enoxaparin Injectable 40 milliGRAM(s) SubCutaneous daily  folic acid 1 milliGRAM(s) Oral daily  lidocaine   Patch 1 Patch Transdermal every 24 hours  lidocaine 1% Injectable 20 milliLiter(s) Local Injection once  nicotine -   7 mG/24Hr(s) Patch 1 patch Transdermal daily  thiamine 100 milliGRAM(s) Oral daily  trimethoprim  160 mG/sulfamethoxazole 800 mG 1 Tablet(s) Oral two times a day    MEDICATIONS  (PRN):  acetaminophen   Tablet .. 650 milliGRAM(s) Oral every 6 hours PRN Temp greater or equal to 38C (100.4F), Mild Pain (1 - 3)   **********************************************  Paulette Mccrackened, PGY-1  Internal Medicine   [facility] Pager #: [phone]  **********************************************     Patient is a 60y old  Male who presents with a chief complaint of fall (07 Jun 2021 13:42)    SUBJECTIVE / OVERNIGHT EVENTS:   -No acute events overnight  -Pt examined at bedside this AM, c/o LUQ pain  -Denies CP, palpitations, SOB, n/v/d, abdominal pain     OBJECTIVE:  Vital Signs Last 24 Hrs  T(C): 36.4 (08 Jun 2021 04:35), Max: 37 (07 Jun 2021 15:39)  T(F): 97.5 (08 Jun 2021 04:35), Max: 98.6 (07 Jun 2021 15:39)  HR: 67 (08 Jun 2021 04:35) (67 - 84)  BP: 111/65 (08 Jun 2021 04:35) (109/67 - 130/73)  RR: 18 (08 Jun 2021 04:35) (18 - 18)  SpO2: 97% (08 Jun 2021 04:35) (96% - 97%)    I&O's Summary    06 Jun 2021 07:01  -  07 Jun 2021 07:00  --------------------------------------------------------  IN: 0 mL / OUT: 600 mL / NET: -600 mL    07 Jun 2021 07:01  -  08 Jun 2021 06:38  --------------------------------------------------------  IN: 150 mL / OUT: 1070 mL / NET: -920 mL      Physical Exam:     General: No acute distress, well-appearing    Eyes: PERRL, EOMI     ENT: MMM, no oropharyngeal lesions or erythema appreciated     Pulm: No increased WOB. CTAB. LUQ ttp     CV: RRR. S1&S2+. No M/R/G appreciated.     Abdomen: +BS. Soft, NTND. No organomegaly.     MSK: Nml ROM    Extremities: No peripheral edema or cyanosis.     Neuro: A&Ox3, no focal deficits     Skin: Warm and dry. No visible rash. R forearm wound open, mild surrounding erythema       Labs:                12.2   8.52  )-----------( 245      ( 07 Jun 2021 06:57 )             36.8     06-07    132<L>  |  98  |  29<H>  ----------------------------<  103<H>  4.3   |  20<L>  |  1.16    Ca    10.1      07 Jun 2021 06:57    TPro  7.6  /  Alb  4.1  /  TBili  0.4  /  DBili  x   /  AST  19  /  ALT  22  /  AlkPhos  73  06-07        Culture - Other (collected 05 Jun 2021 15:16)  Source: Skin Right forearm abscess  Final Report (07 Jun 2021 09:46):    Moderate Methicillin resistant Staphylococcus aureus  Organism: Methicillin resistant Staphylococcus aureus (07 Jun 2021 09:46)  Organism: Methicillin resistant Staphylococcus aureus (07 Jun 2021 09:46)        Imaging Personally Reviewed:      MEDICATIONS  (STANDING):  chlorhexidine 2% Cloths 1 Application(s) Topical daily  cyanocobalamin 1000 MICROGram(s) Oral daily  diclofenac sodium 1% Gel 4 Gram(s) Topical four times a day  enoxaparin Injectable 40 milliGRAM(s) SubCutaneous daily  folic acid 1 milliGRAM(s) Oral daily  lidocaine   Patch 1 Patch Transdermal every 24 hours  lidocaine 1% Injectable 20 milliLiter(s) Local Injection once  nicotine -   7 mG/24Hr(s) Patch 1 patch Transdermal daily  thiamine 100 milliGRAM(s) Oral daily  trimethoprim  160 mG/sulfamethoxazole 800 mG 1 Tablet(s) Oral two times a day    MEDICATIONS  (PRN):  acetaminophen   Tablet .. 650 milliGRAM(s) Oral every 6 hours PRN Temp greater or equal to 38C (100.4F), Mild Pain (1 - 3)

## 2021-06-08 NOTE — PROGRESS NOTE ADULT - PROBLEM SELECTOR PLAN 1
#RUE swelling  -Pt found to have erythema/swelling/tenderness around peripheral IV removal site (6/2)  -RUE Duplex negative for DVT   -Bcx 6/2 NGTD. Wound culture came back positive for MRSA (6/4)  -c/w warm compresses and Tylenol PRN   -s/p ancef x 5d (6/3-6/7)  -XR forearm w/o foreign body  -s/p I&D 6/5/21; US performed of R forearm cellulitis wound (s/p I&D on 6/5), small residual collection.  - contact precaution for MRSA.  - ID consulted. appreciate rec. #RUE swelling  -Pt found to have erythema/swelling/tenderness around peripheral IV removal site (6/2)  -RUE Duplex negative for DVT   -Bcx 6/2 NGTD. Wound culture came back positive for MRSA (6/4)  -c/w warm compresses and Tylenol PRN   -s/p ancef x 5d (6/3-6/7)  -XR forearm w/o foreign body  -s/p I&D 6/5/21; US performed of R forearm cellulitis wound (s/p I&D on 6/5), small residual collection.  - contact precaution for MRSA.  - ID consulted. appreciate rec. Bactrim x 5d (6/7-6/10)

## 2021-06-08 NOTE — ED ADULT NURSE NOTE - NSFALLRSKASSESSTYPE_ED_ALL_ED
[FreeTextEntry1] : Mr. Gusman is a 68 years old male who has a history of poor sleep (OSAS), prediabetes, mild asthma and obesity, HTN, arthritis, stable from a pulmonary perspective. -#1 issue is lack of exercise and #2 obesity still; s/p Covid-19 vaccine \par ****forms filled out for DMV***** 10/2020\par \par problem 1: insomnia (I-STOP renewed)\par - Continue Ambien 10 mg QHS on Weekdays \par - Continue Ambien CR 12.5 mg QHS and Xanax 0.5 mg QHS on Weekends. \par -failed Trazodone/ Silenor/ Belsomra\par \par Problem 1A: DMV forms \par -filled out\par \par problem 2: poor sleep hygiene \par -recommended to use sunglasses before bed \par -Good sleep hygiene was encouraged including avoiding watching television an hour before bed, keeping caffeine at a low,  avoiding reading, television, or anything, in bed, no drinking any liquids three hours before bedtime, and only getting into bed when tired and ready for sleep. \par \par problem 3: BUCK\par -continue to use the CPAP machine and tolerating it well (dream wear mask) ([employer]) (again)\par - Restudy done by Israel Ponce\par -recommended to use Nozovent when unable to use CPAP\par -Discussed the risks/associations with coronary artery disease, atrial fibrillation, arrhythmia, memory loss, issues with concentration, stroke risk, hypertension, nocturia, chronic reflux/Segovia’s esophagus some but not all inclusive. Treatment options discussed including CPAP/BiPAP machine, oral appliance, ProVent therapy, Oxy-Aid by [employer], new technologies, or positional sleep. \par \par problem 4: mild reactive airways disease (asthma)\par -no therapy indicated\par -quiet\par -Asthma is  believed to be caused by inherited (genetic) and environmental factor, but its exact cause is unknown. Asthma may be triggered by allergens, lung infections, or irritants in the air. Asthma triggers are different for each person \par \par problem 5: obesity\par -complete Functional Medicine Evaluation with Dr. Walker and Dr. Wells\par - Recommended the book "Bright Spots and Land mines" by Yovani Torres\par -Weight loss, exercise, and diet control were discussed and are highly encouraged. Treatment options were given such as, aqua therapy, and contacting a nutritionist. Recommended to use the elliptical, stationary bike, less use of treadmill.  Obesity is associated with worsening asthma, shortness of breath, and potential for cardiac disease, diabetes, and other underlying medical conditions. \par \par problem 6: psychiatry\par -instructed to follow up with a new psychiatrist for Xanax\par \par Problem 7a: COVID-19 precautionary Immune Support Recommendations:\par -OTC Vitamin C 500mg BID \par -OTC Quercetin 250-500mg BID \par -OTC Zinc 75-100mg per day \par -OTC Melatonin 1 or 2mg a night \par -OTC Vitamin D 1-4000mg per day \par -OTC Tonic Water 8oz per day\par -Water 0.5-1 gallon per day\par \par problem 7: health maintenance \par -s/p influenza vaccination 2020\par -recommended strep pneumonia vaccines: Prevnar-13 vaccine, followed by Pneumo vaccine 23 one year following (completed) \par -recommended early intervention for URIs\par -recommended regular osteoporosis evaluations\par -recommended early dermatological evaluations\par -recommended after the age of 50 to the age of 70, colonoscopy every 5 years \par  \par F/U in 6 months SPI\par He is encouraged to call with any changes, concerns, or questions.   Initial (On Arrival)

## 2021-06-08 NOTE — PROGRESS NOTE ADULT - PROBLEM SELECTOR PLAN 6
Altered, found down. NOW RESOLVED.   - BAL 0  - concern for Wernicke  - s/p high dose thiamine  - CTH normal  - may have some underlying dementia/cognitive change from chronic alcohol use.   - Infectious workup limited to UA (negative) as patients vitals WNL and normal WBC, low -no concern for infxn Altered, found down. NOW RESOLVED.   - BAL 0  - concern for Wernicke  - s/p high dose thiamine  - CTH normal  - may have some underlying dementia/cognitive change from chronic alcohol use.   - Infectious workup limited to UA (negative) as patients vitals WNL and normal WBC, low -no concern for infxn initially No

## 2021-06-08 NOTE — PROGRESS NOTE ADULT - ASSESSMENT
59yo M with PMH of EtOH abuse, chronic pancreatitis, falls in the setting of alcohol use c/b recent L nasal bone fracture, vertebral fracture of transverse process and left facial wound admitted for recurrent falls c/f possible Wernicke's, s/p 3 days of thiamine. Hospital course c/b right forearm cellulitis and abscess s/p incision & drainage, with wound cx + for MRSA, prompting ID consult.     # MRSA in right forearm abscess   -better  -cont bactrim - total 5 days   -care per primary team     Dwight Gomez  Attending Physician   Division of Infectious Disease  Pager #[phone]  Available on Microsoft Teams also  After 5pm/weekend or no response, call #[phone]

## 2021-06-09 VITALS — WEIGHT: 154.54 LBS

## 2021-06-09 PROCEDURE — 99232 SBSQ HOSP IP/OBS MODERATE 35: CPT | Mod: GC

## 2021-06-09 PROCEDURE — 99232 SBSQ HOSP IP/OBS MODERATE 35: CPT

## 2021-06-09 RX ADMIN — DICLOFENAC SODIUM 4 GRAM(S): 30 GEL TOPICAL at 09:54

## 2021-06-09 RX ADMIN — Medication 1 PATCH: at 11:42

## 2021-06-09 RX ADMIN — Medication 1 TABLET(S): at 05:56

## 2021-06-09 RX ADMIN — ENOXAPARIN SODIUM 40 MILLIGRAM(S): 100 INJECTION SUBCUTANEOUS at 11:42

## 2021-06-09 RX ADMIN — DICLOFENAC SODIUM 4 GRAM(S): 30 GEL TOPICAL at 05:57

## 2021-06-09 RX ADMIN — PREGABALIN 1000 MICROGRAM(S): 225 CAPSULE ORAL at 11:42

## 2021-06-09 RX ADMIN — Medication 100 MILLIGRAM(S): at 11:41

## 2021-06-09 RX ADMIN — Medication 1 PATCH: at 11:57

## 2021-06-09 RX ADMIN — Medication 650 MILLIGRAM(S): at 09:54

## 2021-06-09 RX ADMIN — LIDOCAINE 1 PATCH: 4 CREAM TOPICAL at 11:59

## 2021-06-09 RX ADMIN — Medication 650 MILLIGRAM(S): at 10:24

## 2021-06-09 RX ADMIN — Medication 1 MILLIGRAM(S): at 11:42

## 2021-06-09 NOTE — PROGRESS NOTE ADULT - PROBLEM SELECTOR PLAN 1
#RUE swelling  -Pt found to have erythema/swelling/tenderness around peripheral IV removal site (6/2)  -RUE Duplex negative for DVT   -Bcx 6/2 NGTD. Wound culture came back positive for MRSA (6/4)  -c/w warm compresses and Tylenol PRN   -s/p ancef x 5d (6/3-6/7)  -XR forearm w/o foreign body  -s/p I&D 6/5/21; US performed of R forearm cellulitis wound (s/p I&D on 6/5), small residual collection.  - contact precaution for MRSA.  - ID consulted. appreciate rec. Bactrim x 5d (6/7-6/10)

## 2021-06-09 NOTE — PROGRESS NOTE ADULT - SUBJECTIVE AND OBJECTIVE BOX
MADHAVI RAMOS 60y [id number]    Patient is a 60y old  Male who presents with a chief complaint of fall (09 Jun 2021 07:49)      Follow Up/CC:  ID following for MRSA    Interval History/ROS: no fever    Allergies    No Known Allergies    Intolerances        ANTIMICROBIALS:  trimethoprim  160 mG/sulfamethoxazole 800 mG 1 two times a day      MEDICATIONS  (STANDING):  chlorhexidine 2% Cloths 1 Application(s) Topical daily  cyanocobalamin 1000 MICROGram(s) Oral daily  diclofenac sodium 1% Gel 4 Gram(s) Topical four times a day  enoxaparin Injectable 40 milliGRAM(s) SubCutaneous daily  folic acid 1 milliGRAM(s) Oral daily  lidocaine   Patch 1 Patch Transdermal every 24 hours  lidocaine 1% Injectable 20 milliLiter(s) Local Injection once  nicotine -   7 mG/24Hr(s) Patch 1 patch Transdermal daily  thiamine 100 milliGRAM(s) Oral daily  trimethoprim  160 mG/sulfamethoxazole 800 mG 1 Tablet(s) Oral two times a day    MEDICATIONS  (PRN):  acetaminophen   Tablet .. 650 milliGRAM(s) Oral every 6 hours PRN Temp greater or equal to 38C (100.4F), Mild Pain (1 - 3)        Vital Signs Last 24 Hrs  T(C): 37.1 (09 Jun 2021 11:18), Max: 37.1 (09 Jun 2021 11:18)  T(F): 98.7 (09 Jun 2021 11:18), Max: 98.7 (09 Jun 2021 11:18)  HR: 67 (09 Jun 2021 11:18) (67 - 74)  BP: 112/69 (09 Jun 2021 11:18) (112/69 - 134/79)  BP(mean): --  RR: 18 (09 Jun 2021 11:18) (18 - 19)  SpO2: 95% (09 Jun 2021 11:18) (95% - 96%)                  MICROBIOLOGY:  Skin Right forearm abscess  06-05-21   Moderate Methicillin resistant Staphylococcus aureus  --  Methicillin resistant Staphylococcus aureus      .Blood Blood-Peripheral  06-02-21   No Growth Final  --  --      .Urine Clean Catch (Midstream)  05-15-21   No growth  --  --            RADIOLOGY    < from: Xray Ribs 2 Views, Left (06.07.21 @ 20:28) >  Suspected minimally offset lower lateral left rib fracture deformities, correlate for point tenderness. No additional suspected rib fractures or other gross rib pathology.    L1 compression deformity again noted. Mild left AC joint arthrosis. Intact remaining imaged osseous structures.    Clear visualized lungs. No pleural effusion and no pneumothorax.    < end of copied text >

## 2021-06-09 NOTE — PROGRESS NOTE ADULT - SUBJECTIVE AND OBJECTIVE BOX
**********************************************  Paulette Mccrackened, PGY-1  Internal Medicine   [facility] Pager #: [phone]  **********************************************     Patient is a 60y old  Male who presents with a chief complaint of fall (08 Jun 2021 12:41)    SUBJECTIVE / OVERNIGHT EVENTS:     OBJECTIVE:  Vital Signs Last 24 Hrs  T(C): 36.7 (08 Jun 2021 21:18), Max: 36.7 (08 Jun 2021 21:18)  T(F): 98.1 (08 Jun 2021 21:18), Max: 98.1 (08 Jun 2021 21:18)  HR: 74 (08 Jun 2021 21:18) (72 - 74)  BP: 134/79 (08 Jun 2021 21:18) (107/64 - 134/79)  RR: 19 (08 Jun 2021 21:18) (19 - 19)  SpO2: 96% (08 Jun 2021 21:18) (94% - 96%)    I&O's Summary    08 Jun 2021 07:01  -  09 Jun 2021 07:00  --------------------------------------------------------  IN: 1080 mL / OUT: 1620 mL / NET: -540 mL      Physical Examination:      Labs:        Imaging Personally Reviewed:      MEDICATIONS  (STANDING):  chlorhexidine 2% Cloths 1 Application(s) Topical daily  cyanocobalamin 1000 MICROGram(s) Oral daily  diclofenac sodium 1% Gel 4 Gram(s) Topical four times a day  enoxaparin Injectable 40 milliGRAM(s) SubCutaneous daily  folic acid 1 milliGRAM(s) Oral daily  lidocaine   Patch 1 Patch Transdermal every 24 hours  lidocaine 1% Injectable 20 milliLiter(s) Local Injection once  nicotine -   7 mG/24Hr(s) Patch 1 patch Transdermal daily  thiamine 100 milliGRAM(s) Oral daily  trimethoprim  160 mG/sulfamethoxazole 800 mG 1 Tablet(s) Oral two times a day    MEDICATIONS  (PRN):  acetaminophen   Tablet .. 650 milliGRAM(s) Oral every 6 hours PRN Temp greater or equal to 38C (100.4F), Mild Pain (1 - 3)   **********************************************  Paulette Santos, PGY-1  Internal Medicine   [facility] Pager #: [phone]  **********************************************     Patient is a 60y old  Male who presents with a chief complaint of fall (08 Jun 2021 12:41)    SUBJECTIVE / OVERNIGHT EVENTS:   -No acute events overnight  -Attempted to discharge patient yesterday, he refused due to the inclement weather. 24 hour notice given.   -Pt examined at bedside this AM, no subjective complaints  -Denies CP, palpitations, SOB, n/v/d     OBJECTIVE:  Vital Signs Last 24 Hrs  T(C): 36.7 (08 Jun 2021 21:18), Max: 36.7 (08 Jun 2021 21:18)  T(F): 98.1 (08 Jun 2021 21:18), Max: 98.1 (08 Jun 2021 21:18)  HR: 74 (08 Jun 2021 21:18) (72 - 74)  BP: 134/79 (08 Jun 2021 21:18) (107/64 - 134/79)  RR: 19 (08 Jun 2021 21:18) (19 - 19)  SpO2: 96% (08 Jun 2021 21:18) (94% - 96%)    I&O's Summary    08 Jun 2021 07:01  -  09 Jun 2021 07:00  --------------------------------------------------------  IN: 1080 mL / OUT: 1620 mL / NET: -540 mL      Physical Examination:    General: No acute distress, well-appearing    Eyes: PERRL, EOMI     ENT: MMM, no oropharyngeal lesions or erythema appreciated     Pulm: No increased WOB. CTAB. No wheezing.     CV: RRR. S1&S2+. No M/R/G appreciated. L chest wall ttp around ribs     Abdomen: +BS. Soft, NTND. No organomegaly.     MSK: Nml ROM    Extremities: No peripheral edema or cyanosis.     Neuro: A&Ox3, no focal deficits     Skin: Warm and dry. No visible rash. +R forearm I&D site with open wound, c/d    Labs:        Imaging Personally Reviewed:      MEDICATIONS  (STANDING):  chlorhexidine 2% Cloths 1 Application(s) Topical daily  cyanocobalamin 1000 MICROGram(s) Oral daily  diclofenac sodium 1% Gel 4 Gram(s) Topical four times a day  enoxaparin Injectable 40 milliGRAM(s) SubCutaneous daily  folic acid 1 milliGRAM(s) Oral daily  lidocaine   Patch 1 Patch Transdermal every 24 hours  lidocaine 1% Injectable 20 milliLiter(s) Local Injection once  nicotine -   7 mG/24Hr(s) Patch 1 patch Transdermal daily  thiamine 100 milliGRAM(s) Oral daily  trimethoprim  160 mG/sulfamethoxazole 800 mG 1 Tablet(s) Oral two times a day    MEDICATIONS  (PRN):  acetaminophen   Tablet .. 650 milliGRAM(s) Oral every 6 hours PRN Temp greater or equal to 38C (100.4F), Mild Pain (1 - 3)

## 2021-06-09 NOTE — PROGRESS NOTE ADULT - NUTRITIONAL ASSESSMENT
US performed of R forearm cellulitis wound (s/p I&D on 6/5), small residual collection. Given Dilaudid 0.5 mg once. Plan to try cyclobenzaprine if recurs.

## 2021-06-09 NOTE — PROGRESS NOTE ADULT - ATTENDING COMMENTS
discussed about plan with resident on rounds.  ID#[id number]. c/o left sided flank pain but able to walk in room and to bathroom with walker. states he wants to take a cab and can pay for it when offered a metrocard by social work. abx paid for and delivered to bedside.   discussed with patient need to comply with antibiotics until completion and importance of continued dressing changes - supplies to be given by nurse (discussed with team at bedside - nurse, social work).   discharge time - 45 minutes

## 2021-06-09 NOTE — PROGRESS NOTE ADULT - PROVIDER SPECIALTY LIST ADULT
Internal Medicine
Plastic Surgery
Infectious Disease
Internal Medicine
Internal Medicine
Plastic Surgery
Plastic Surgery
Internal Medicine
Infectious Disease

## 2021-06-09 NOTE — PROGRESS NOTE ADULT - SUBJECTIVE AND OBJECTIVE BOX
PLASTIC SURGERY PROGRESS NOTE    SUBJECTIVE:  Denies forearm pain. Denies pain, numbness or tingling of hand.    OBJECTIVE:  Vital Signs Last 24 Hrs  T(C): 36.7 (08 Jun 2021 21:18), Max: 36.7 (08 Jun 2021 21:18)  T(F): 98.1 (08 Jun 2021 21:18), Max: 98.1 (08 Jun 2021 21:18)  HR: 74 (08 Jun 2021 21:18) (72 - 74)  BP: 134/79 (08 Jun 2021 21:18) (107/64 - 134/79)  BP(mean): --  RR: 19 (08 Jun 2021 21:18) (19 - 19)  SpO2: 96% (08 Jun 2021 21:18) (94% - 96%)      06-08-21 @ 07:01  -  06-09-21 @ 07:00  --------------------------------------------------------  IN: 1080 mL / OUT: 1620 mL / NET: -540 mL        Physical Examination:  GEN: NAD, resting quietly  PULM: symmetric chest rise bilaterally, no increased WOB  EXTR: right medial proximal forearm wound clean, no drainage, minimal erythema, no fluctuance, dressing replaced (gauze and tape)

## 2021-06-09 NOTE — PROGRESS NOTE ADULT - RS GEN PE MLT RESP DETAILS PC
clear to auscultation bilaterally/no wheezes
respirations non-labored/clear to auscultation bilaterally/no wheezes

## 2021-06-09 NOTE — PROGRESS NOTE ADULT - ASSESSMENT
59yo M Vietnamese speaking with PMH of EtOH abuse, chronic pancreatitis s/p L nasal bone fracture, vertebral fracture of transverse process, recent admission for Etoh abuse BIBEMS after a witnessed fall. Plastic Surgery consulted for abscess from infected IV site on medial proximal forearm, now s/p bedside I&D (6/5), healing well.    Plan/Recommendations:  - Wound cultures with moderate staph aureus sensitive to bactrim.  - Abx per ID (plan for 5 day course of bactrim)  - Continue daily dressing changes by nursing  - No contraindication to discharge from plastic surgery perspective    Plastic Surgery  [facility]: [phone]

## 2021-06-09 NOTE — PROGRESS NOTE ADULT - NSICDXPILOT_GEN_ALL_CORE
[place]

## 2021-06-09 NOTE — PROGRESS NOTE ADULT - ASSESSMENT
59yo M with PMH of EtOH abuse, chronic pancreatitis, falls in the setting of alcohol use c/b recent L nasal bone fracture, vertebral fracture of transverse process and left facial wound admitted for recurrent falls c/f possible Wernicke's, s/p 3 days of thiamine. Hospital course c/b right forearm cellulitis and abscess s/p incision & drainage, with wound cx + for MRSA, prompting ID consult.     # MRSA in right forearm abscess   -better  -cont bactrim - total 5 days   -care per primary team     Dwight Gomez  Attending Physician   Division of Infectious Disease  Pager #[phone]  Available on [employer] Teams also  After 5pm/weekend or no response, call #[phone]

## 2021-06-09 NOTE — PROGRESS NOTE ADULT - ATTENDING SUPERVISION STATEMENT
Resident

## 2021-06-09 NOTE — PROGRESS NOTE ADULT - PROBLEM SELECTOR PLAN 7
-Back pain: Tylenol PRN (given Dilaudid 0.5 mg IV once on 6/6); consider cyclobenzaprine if recurs  -DVT ppx: lovenox  -Diet: regular  -Discussed +COVID PCR on 5/23 with Infxn control, confirmed that no isolation precautions needed as pt's COVID antibodies positive and pt asymptomatic. Repeat COVID PCR negative on 6/1.  - Dispo: Discussed with SW ([id number]). Patient has been evaluated multiple times by psych. Patient deemed to not have capacity initially and was pursued for guardianship, but as pt's capacity recovered. no longer pursuing guardianship. Patient is refusing all help from SW including shelter placement, etc, and this has been recurring discussion since 2014 with his hospitalization. Patient adamant about returning to street.

## 2021-06-09 NOTE — PROGRESS NOTE ADULT - PROBLEM SELECTOR PLAN 6
Altered, found down. NOW RESOLVED.   - BAL 0  - concern for Wernicke  - s/p high dose thiamine  - CTH normal  - may have some underlying dementia/cognitive change from chronic alcohol use.   - Infectious workup limited to UA (negative) as patients vitals WNL and normal WBC, low -no concern for infxn initially

## 2021-06-09 NOTE — PROGRESS NOTE ADULT - ASSESSMENT
59yo M with PMH of EtOH abuse, chronic pancreatitis presenting after observed fall, likely in the setting of alcohol use, found to have L nasal bone fracture, vertebral fracture of transverse process and left facial wound. C/f possible Wernicke's, s/p 3 days of thiamine. Pt is now being treated for cellulitis of the R forearm, s/p I&D of abscess.

## 2021-06-09 NOTE — PROGRESS NOTE ADULT - PROBLEM SELECTOR PLAN 2
Fracture of vertebra, lumbar from previous fall. CT with transverse process fracture of the L1 vertebra, healing transverse process fracture of L2 vertebra and healing fractures of right 9th and 12th ribs. Pt with new fall now 5/15  -CT H/C spine no sign of bleed or fracture   -PT recs RICHARD but patient refusing and also has no insurance, undocumented  -per ortho: no need for surgery or need for TLSO brace, outpatient f/u

## 2021-06-22 PROCEDURE — 97530 THERAPEUTIC ACTIVITIES: CPT

## 2021-06-22 PROCEDURE — 99285 EMERGENCY DEPT VISIT HI MDM: CPT | Mod: 25

## 2021-06-22 PROCEDURE — 85610 PROTHROMBIN TIME: CPT

## 2021-06-22 PROCEDURE — 71100 X-RAY EXAM RIBS UNI 2 VIEWS: CPT

## 2021-06-22 PROCEDURE — 72125 CT NECK SPINE W/O DYE: CPT

## 2021-06-22 PROCEDURE — 85025 COMPLETE CBC W/AUTO DIFF WBC: CPT

## 2021-06-22 PROCEDURE — 80048 BASIC METABOLIC PNL TOTAL CA: CPT

## 2021-06-22 PROCEDURE — 85014 HEMATOCRIT: CPT

## 2021-06-22 PROCEDURE — 82607 VITAMIN B-12: CPT

## 2021-06-22 PROCEDURE — U0005: CPT

## 2021-06-22 PROCEDURE — 80053 COMPREHEN METABOLIC PANEL: CPT

## 2021-06-22 PROCEDURE — 70450 CT HEAD/BRAIN W/O DYE: CPT

## 2021-06-22 PROCEDURE — 73090 X-RAY EXAM OF FOREARM: CPT

## 2021-06-22 PROCEDURE — 87086 URINE CULTURE/COLONY COUNT: CPT

## 2021-06-22 PROCEDURE — 81001 URINALYSIS AUTO W/SCOPE: CPT

## 2021-06-22 PROCEDURE — 73564 X-RAY EXAM KNEE 4 OR MORE: CPT

## 2021-06-22 PROCEDURE — U0003: CPT

## 2021-06-22 PROCEDURE — 85730 THROMBOPLASTIN TIME PARTIAL: CPT

## 2021-06-22 PROCEDURE — 83690 ASSAY OF LIPASE: CPT

## 2021-06-22 PROCEDURE — 86780 TREPONEMA PALLIDUM: CPT

## 2021-06-22 PROCEDURE — 84100 ASSAY OF PHOSPHORUS: CPT

## 2021-06-22 PROCEDURE — 82746 ASSAY OF FOLIC ACID SERUM: CPT

## 2021-06-22 PROCEDURE — 87040 BLOOD CULTURE FOR BACTERIA: CPT

## 2021-06-22 PROCEDURE — 71045 X-RAY EXAM CHEST 1 VIEW: CPT

## 2021-06-22 PROCEDURE — 85027 COMPLETE CBC AUTOMATED: CPT

## 2021-06-22 PROCEDURE — 97110 THERAPEUTIC EXERCISES: CPT

## 2021-06-22 PROCEDURE — 76882 US LMTD JT/FCL EVL NVASC XTR: CPT

## 2021-06-22 PROCEDURE — 82435 ASSAY OF BLOOD CHLORIDE: CPT

## 2021-06-22 PROCEDURE — 82803 BLOOD GASES ANY COMBINATION: CPT

## 2021-06-22 PROCEDURE — 97162 PT EVAL MOD COMPLEX 30 MIN: CPT

## 2021-06-22 PROCEDURE — 72170 X-RAY EXAM OF PELVIS: CPT

## 2021-06-22 PROCEDURE — 80307 DRUG TEST PRSMV CHEM ANLYZR: CPT

## 2021-06-22 PROCEDURE — 97161 PT EVAL LOW COMPLEX 20 MIN: CPT

## 2021-06-22 PROCEDURE — 93971 EXTREMITY STUDY: CPT

## 2021-06-22 PROCEDURE — 96374 THER/PROPH/DIAG INJ IV PUSH: CPT

## 2021-06-22 PROCEDURE — 84443 ASSAY THYROID STIM HORMONE: CPT

## 2021-06-22 PROCEDURE — 76377 3D RENDER W/INTRP POSTPROCES: CPT

## 2021-06-22 PROCEDURE — 84425 ASSAY OF VITAMIN B-1: CPT

## 2021-06-22 PROCEDURE — 87186 SC STD MICRODIL/AGAR DIL: CPT

## 2021-06-22 PROCEDURE — 73590 X-RAY EXAM OF LOWER LEG: CPT

## 2021-06-22 PROCEDURE — 70486 CT MAXILLOFACIAL W/O DYE: CPT

## 2021-06-22 PROCEDURE — 82330 ASSAY OF CALCIUM: CPT

## 2021-06-22 PROCEDURE — 74177 CT ABD & PELVIS W/CONTRAST: CPT

## 2021-06-22 PROCEDURE — 84295 ASSAY OF SERUM SODIUM: CPT

## 2021-06-22 PROCEDURE — 85018 HEMOGLOBIN: CPT

## 2021-06-22 PROCEDURE — 87070 CULTURE OTHR SPECIMN AEROBIC: CPT

## 2021-06-22 PROCEDURE — 86769 SARS-COV-2 COVID-19 ANTIBODY: CPT

## 2021-06-22 PROCEDURE — 71260 CT THORAX DX C+: CPT

## 2021-06-22 PROCEDURE — 83735 ASSAY OF MAGNESIUM: CPT

## 2021-06-22 PROCEDURE — 97116 GAIT TRAINING THERAPY: CPT

## 2021-06-22 PROCEDURE — 82947 ASSAY GLUCOSE BLOOD QUANT: CPT

## 2021-06-22 PROCEDURE — 72100 X-RAY EXAM L-S SPINE 2/3 VWS: CPT

## 2021-06-22 PROCEDURE — 83605 ASSAY OF LACTIC ACID: CPT

## 2021-06-22 PROCEDURE — 82962 GLUCOSE BLOOD TEST: CPT

## 2021-06-22 PROCEDURE — 84132 ASSAY OF SERUM POTASSIUM: CPT

## 2021-07-24 NOTE — H&P ADULT - PROBLEM SELECTOR PROBLEM 3
61M PMHx seizure disorder, schizophrenia, HTN here with altered mental status.    #seizures  - currently on keppra 1000mg bid  - on/off non-compliant with medications  - EEG noted epileptic activity    #Dementia with psychotic features, stable   -continue with current medication regimen as per Psych  - EKG QTc <449  -IM haldol for severe agitation/code greys   -No contraindication to discharge as per psych consult.  - cont support care    #tinea pedis   -Resolved s/p clotrimazole cream    #HTN   - norvasc 10mg + enalapril 10 mg    #Seizure d/o   - Divalproex 500mg bid, gabapentin 100mg tid, Keppra 1000 bid    #DVT ppx -- cont  Code -- Full  Dispo --DC pending case mgmt/SW; pt is placement issue, citizenship, court ordered treatment against objection. guardian to be appointed   Alcohol Abuse

## 2021-08-19 NOTE — PHYSICAL THERAPY INITIAL EVALUATION ADULT - ASR EQUIP NEEDS DISCH PT EVAL
Currently on glimepiride at 2 mg once daily, metformin 1000 mg 2 times daily, Tradjenta, Jardiance 25 mg daily. Continue on the same dose of medication. Follow-up evaluation after labs.   Eye exam completed per Dr. Ken Montaño R/W

## 2021-09-29 NOTE — ED ADULT NURSE NOTE - NSSISCREENINGQ3_ED_A_ED
No Plan: F/U in 6 months Continue Regimen: Clobetasol, Eucrisa, Elidel, tacrolimus as needed for flares. Moisturizing cream twice daily Detail Level: Zone

## 2021-10-12 NOTE — ED ADULT NURSE NOTE - CAS DISCH BELONGINGS RETURNED
[de-identified] : congestion [FreeTextEntry6] : \par -pt has been ill x 2 weeks. Initial c/o congestion, ST, EA, neck pain\par   Was seen at [facility] in [facility]. Dx: sinusitis   Rx augmentin 875 bid x 7d+po steroid (did not take steroid)\par Pt states he is better, but still congested\par -h/o depression. On zoloft 50 qd. Sees counsellor at college\par   Pt states mood is "up and down". Sleeps a lot. Has some friends at school. Grades so-so Yes

## 2021-11-29 ENCOUNTER — EMERGENCY (EMERGENCY)
Facility: HOSPITAL | Age: 61
LOS: 1 days | Discharge: HOME | End: 2021-11-29
Attending: EMERGENCY MEDICINE
Payer: MEDICAID

## 2021-11-29 VITALS — HEART RATE: 78 BPM | OXYGEN SATURATION: 98 % | SYSTOLIC BLOOD PRESSURE: 119 MMHG | DIASTOLIC BLOOD PRESSURE: 71 MMHG

## 2021-11-29 DIAGNOSIS — Z98.89 OTHER SPECIFIED POSTPROCEDURAL STATES: Chronic | ICD-10-CM

## 2021-11-29 PROCEDURE — 99284 EMERGENCY DEPT VISIT MOD MDM: CPT

## 2021-11-29 PROCEDURE — 73562 X-RAY EXAM OF KNEE 3: CPT | Mod: 26,RT

## 2021-11-29 RX ORDER — ACETAMINOPHEN 500 MG
650 TABLET ORAL ONCE
Refills: 0 | Status: COMPLETED | OUTPATIENT
Start: 2021-11-29 | End: 2021-11-29

## 2021-11-29 RX ADMIN — Medication 650 MILLIGRAM(S): at 17:33

## 2021-11-29 NOTE — ED PROVIDER NOTE - PROGRESS NOTE DETAILS
Myrtle Jane, PGY-1, EM:  s/w social work regarding pt. pt well known, can be discharged when clinically sober. TRACEE Mares, Attending: [facility]. Ambulated in ER. Clinically sober. Taxi called at pt request.

## 2021-11-29 NOTE — ED PROVIDER NOTE - OBJECTIVE STATEMENT
62 yo un-domiciled M p/w intoxication. Pt is a poor history but mentions pain in his R knee, lumbar pain from a previous slipped disc. He was BIBEMS after being found intoxicated on the street. He alleges he fell today. Denies ingesting any drugs but explains he had alcohol 2 days. Pt denies fever, chills, cough, shortness of breath, abdominal pain.

## 2021-11-29 NOTE — ED PROVIDER NOTE - PATIENT PORTAL LINK FT
You can access the FollowMyHealth Patient Portal offered by [facility] by registering at the following website: [email or web address]. By joining [employer]’s FollowMyHealth portal, you will also be able to view your health information using other applications (apps) compatible with our system. You can access the FollowMyHealth Patient Portal offered by [facility] by registering at the following website: [email or web address]. By joining [employer]’s FollowMyHealth portal, you will also be able to view your health information using other applications (apps) compatible with our system.

## 2021-11-29 NOTE — ED ADULT NURSE NOTE - OBJECTIVE STATEMENT
patient BIBA from a parking garage, lying on the ground. Gave his full name & , aware he  is currently in the hospital. Patient was full of stools, some already dried up. Noted an old wound, crusty in appearance +tenderness to tight lower leg & right hand, +swollen. All extremities actively mobile.  by Md. mcguire historian.

## 2021-11-29 NOTE — ED ADULT NURSE NOTE - NSIMPLEMENTINTERV_GEN_ALL_ED
Implemented All Fall with Harm Risk Interventions:  [place] to call system. Call bell, personal items and telephone within reach. Instruct patient to call for assistance. Room bathroom lighting operational. Non-slip footwear when patient is off stretcher. Physically safe environment: no spills, clutter or unnecessary equipment. Stretcher in lowest position, wheels locked, appropriate side rails in place. Provide visual cue, wrist band, yellow gown, etc. Monitor gait and stability. Monitor for mental status changes and reorient to person, place, and time. Review medications for side effects contributing to fall risk. Reinforce activity limits and safety measures with patient and family. Provide visual clues: red socks.

## 2021-11-29 NOTE — ED PROVIDER NOTE - PHYSICAL EXAMINATION
General: WN/WD NAD, disheveled, dirt present on his hands  Head: Atraumatic, normocephalic, scalp has yellow crusting, no bruising  Eyes: EOM grossly in tact, no scleral icterus  ENT: moist mucous membranes  Neurology: A&Ox1, nonfocal, FRANCIS x 4  Respiratory: CTAB, no wheezing, normal respiratory effort  CV: RRR, good s1/s2, no S3, Extremities warm and well perfused  Abdominal: Soft, non-distended, non-tender, no masses  Extremities: No edema, no deformities, abrasion on his R shin  Skin: warm and dry. No rashes, healing abrasions on b/l hands

## 2021-11-29 NOTE — ED PROVIDER NOTE - NSFOLLOWUPINSTRUCTIONS_ED_ALL_ED_FT
Alcohol Intoxication  WHAT YOU NEED TO KNOW:  Alcohol intoxication is a harmful physical condition caused when you drink more alcohol than your body can handle. It is also called ethanol poisoning, or being drunk.  DISCHARGE INSTRUCTIONS:  Call your local emergency number (911 in the ) if:   •You have sudden trouble breathing or chest pain.  •You have a seizure.  •You feel sad enough to harm yourself or others.  Call your doctor if:   •You have hallucinations (you see or hear things that are not real).  •You cannot stop vomiting.  •You have questions or concerns about your condition or care.  Recommended alcohol limits:   •Men 21 to 64 years should limit alcohol to 2 drinks a day. Do not have more than 4 drinks in 1 day or more than 14 in 1 week.  •All women, and men 65 or older should limit alcohol to 1 drink in a day. Do not have more than 3 drinks in 1 day or more than 7 in 1 week. No amount of alcohol is okay during pregnancy.  Manage alcohol use:   •Decrease the amount you drink. This can help prevent health problems such as brain, heart, and liver damage, high blood pressure, diabetes, and cancer. If you cannot stop completely, healthcare providers can help you set goals to decrease the amount you drink.  •Plan weekly alcohol use. You will be less likely to drink more than the recommended limit if you plan ahead.  •Have food when you drink alcohol. Food will prevent alcohol from getting into your system too quickly. Eat before you have your first alcohol drink.  •Time your drinks carefully. Have no more than 1 drink in an hour. Have a liquid such as water, coffee, or a soft drink between alcohol drinks.  •Do not drive if you have had alcohol. Make sure someone who has not been drinking can help you get home.  •Do not drink alcohol if you are taking medicine. Alcohol is dangerous when you combine it with certain medicines, such as acetaminophen or blood pressure medicine. Talk to your healthcare provider about all the medicines you currently take.  For more information:   •Alcoholics Anonymous  Web Address: http://www.aa.org  •Substance Abuse and Mental Health Services Administration  [address]  [place] [address]  Web Address: http://www.[facility].gov  Follow up with your healthcare provider as directed: Write down your questions so you remember to ask them during your visits.

## 2021-11-29 NOTE — ED PROVIDER NOTE - CLINICAL SUMMARY MEDICAL DECISION MAKING FREE TEXT BOX
60 yo un-domiciled M p/w intoxication. this is concerning for a possible occult fracture. pt difficult to gain history from but appears to be resting comfortably. will order tylenol, xr of R knee. 62 yo un-domiciled M p/w intoxication. this is concerning for a possible occult fracture. pt difficult to gain history from but appears to be resting comfortably. will order tylenol, xr of R knee.    Dr. Price (Attending Physician)

## 2021-11-29 NOTE — ED PROVIDER NOTE - ATTENDING CONTRIBUTION TO CARE
Dr. Price (Attending Physician)  I performed a history and physical exam of the patient and discussed their management with the resident. I reviewed the resident's note and agree with the documented findings and plan of care. My medical decision making and observations are found above.

## 2021-11-29 NOTE — CHART NOTE - NSCHARTNOTEFT_GEN_A_CORE
ED SW: Patient is cleared for discharge as per medical team. Patient is Hungarian speaking and  Dar assisted, Patient is requesting to remain the ED. Nurse and LMSW explained to patient he is discharged and he cannot remain in the ED.  Patient offered [employer] which he accepted. Patient is undocumented and uninsured.  Patient declines all further resources.

## 2021-11-29 NOTE — ED ADULT NURSE REASSESSMENT NOTE - NS ED NURSE REASSESS COMMENT FT1
Report received from DERIK Magaña. Pt at this time refusing to leave states "I want to sleep." MD Price made aware. Social work seen at bedside to provide resources to go home. Pt provided with food and drink. Per MD, pt is to stay in stretcher to rest until pt able to be discharged. Plan of care discussed with pt, and verbalizes understanding.

## 2021-11-30 VITALS
OXYGEN SATURATION: 98 % | RESPIRATION RATE: 15 BRPM | TEMPERATURE: 98 F | DIASTOLIC BLOOD PRESSURE: 60 MMHG | SYSTOLIC BLOOD PRESSURE: 103 MMHG | HEART RATE: 74 BPM

## 2021-11-30 NOTE — ED ADULT NURSE REASSESSMENT NOTE - NS ED NURSE REASSESS COMMENT FT1
Pt well-appearing, resting in stretcher, VSS. No further RN intervention needed at this time. Safety and comfort measures maintained.

## 2021-12-05 ENCOUNTER — EMERGENCY (EMERGENCY)
Facility: HOSPITAL | Age: 61
LOS: 1 days | Discharge: HOME | End: 2021-12-05
Attending: EMERGENCY MEDICINE
Payer: MEDICAID

## 2021-12-05 VITALS
SYSTOLIC BLOOD PRESSURE: 148 MMHG | RESPIRATION RATE: 18 BRPM | WEIGHT: 160.06 LBS | OXYGEN SATURATION: 100 % | HEIGHT: 63 IN | TEMPERATURE: 98 F | DIASTOLIC BLOOD PRESSURE: 94 MMHG | HEART RATE: 86 BPM

## 2021-12-05 DIAGNOSIS — Z98.89 OTHER SPECIFIED POSTPROCEDURAL STATES: Chronic | ICD-10-CM

## 2021-12-05 LAB
ALBUMIN SERPL ELPH-MCNC: 4.2 G/DL — SIGNIFICANT CHANGE UP (ref 3.3–5)
ALP SERPL-CCNC: 84 U/L — SIGNIFICANT CHANGE UP (ref 40–120)
ALT FLD-CCNC: 27 U/L — SIGNIFICANT CHANGE UP (ref 10–45)
ANION GAP SERPL CALC-SCNC: 18 MMOL/L — HIGH (ref 5–17)
AST SERPL-CCNC: 72 U/L — HIGH (ref 10–40)
BASOPHILS # BLD AUTO: 0.06 K/UL — SIGNIFICANT CHANGE UP (ref 0–0.2)
BASOPHILS NFR BLD AUTO: 0.8 % — SIGNIFICANT CHANGE UP (ref 0–2)
BILIRUB SERPL-MCNC: 0.7 MG/DL — SIGNIFICANT CHANGE UP (ref 0.2–1.2)
BUN SERPL-MCNC: 14 MG/DL — SIGNIFICANT CHANGE UP (ref 7–23)
CALCIUM SERPL-MCNC: 9 MG/DL — SIGNIFICANT CHANGE UP (ref 8.4–10.5)
CHLORIDE SERPL-SCNC: 102 MMOL/L — SIGNIFICANT CHANGE UP (ref 96–108)
CO2 SERPL-SCNC: 20 MMOL/L — LOW (ref 22–31)
CREAT SERPL-MCNC: 0.92 MG/DL — SIGNIFICANT CHANGE UP (ref 0.5–1.3)
EOSINOPHIL # BLD AUTO: 0.07 K/UL — SIGNIFICANT CHANGE UP (ref 0–0.5)
EOSINOPHIL NFR BLD AUTO: 1 % — SIGNIFICANT CHANGE UP (ref 0–6)
ETHANOL SERPL-MCNC: 314 MG/DL — HIGH (ref 0–10)
GLUCOSE SERPL-MCNC: 124 MG/DL — HIGH (ref 70–99)
HCT VFR BLD CALC: 40 % — SIGNIFICANT CHANGE UP (ref 39–50)
HGB BLD-MCNC: 13 G/DL — SIGNIFICANT CHANGE UP (ref 13–17)
IMM GRANULOCYTES NFR BLD AUTO: 0.7 % — SIGNIFICANT CHANGE UP (ref 0–1.5)
LIDOCAIN IGE QN: 28 U/L — SIGNIFICANT CHANGE UP (ref 7–60)
LYMPHOCYTES # BLD AUTO: 1.33 K/UL — SIGNIFICANT CHANGE UP (ref 1–3.3)
LYMPHOCYTES # BLD AUTO: 18.1 % — SIGNIFICANT CHANGE UP (ref 13–44)
MCHC RBC-ENTMCNC: 32.5 GM/DL — SIGNIFICANT CHANGE UP (ref 32–36)
MCHC RBC-ENTMCNC: 33.7 PG — SIGNIFICANT CHANGE UP (ref 27–34)
MCV RBC AUTO: 103.6 FL — HIGH (ref 80–100)
MONOCYTES # BLD AUTO: 0.56 K/UL — SIGNIFICANT CHANGE UP (ref 0–0.9)
MONOCYTES NFR BLD AUTO: 7.6 % — SIGNIFICANT CHANGE UP (ref 2–14)
NEUTROPHILS # BLD AUTO: 5.28 K/UL — SIGNIFICANT CHANGE UP (ref 1.8–7.4)
NEUTROPHILS NFR BLD AUTO: 71.8 % — SIGNIFICANT CHANGE UP (ref 43–77)
NRBC # BLD: 0 /100 WBCS — SIGNIFICANT CHANGE UP (ref 0–0)
PLATELET # BLD AUTO: 244 K/UL — SIGNIFICANT CHANGE UP (ref 150–400)
POTASSIUM SERPL-MCNC: 3.9 MMOL/L — SIGNIFICANT CHANGE UP (ref 3.5–5.3)
POTASSIUM SERPL-SCNC: 3.9 MMOL/L — SIGNIFICANT CHANGE UP (ref 3.5–5.3)
PROT SERPL-MCNC: 7.4 G/DL — SIGNIFICANT CHANGE UP (ref 6–8.3)
RBC # BLD: 3.86 M/UL — LOW (ref 4.2–5.8)
RBC # FLD: 14.6 % — HIGH (ref 10.3–14.5)
SODIUM SERPL-SCNC: 140 MMOL/L — SIGNIFICANT CHANGE UP (ref 135–145)
WBC # BLD: 7.35 K/UL — SIGNIFICANT CHANGE UP (ref 3.8–10.5)
WBC # FLD AUTO: 7.35 K/UL — SIGNIFICANT CHANGE UP (ref 3.8–10.5)

## 2021-12-05 PROCEDURE — 70450 CT HEAD/BRAIN W/O DYE: CPT | Mod: 26,MA

## 2021-12-05 PROCEDURE — 99285 EMERGENCY DEPT VISIT HI MDM: CPT

## 2021-12-05 RX ORDER — SODIUM CHLORIDE 9 MG/ML
1000 INJECTION INTRAMUSCULAR; INTRAVENOUS; SUBCUTANEOUS ONCE
Refills: 0 | Status: COMPLETED | OUTPATIENT
Start: 2021-12-05 | End: 2021-12-05

## 2021-12-05 RX ADMIN — SODIUM CHLORIDE 1000 MILLILITER(S): 9 INJECTION INTRAMUSCULAR; INTRAVENOUS; SUBCUTANEOUS at 18:43

## 2021-12-05 NOTE — ED PROVIDER NOTE - PROGRESS NOTE DETAILS
Attending Vicente: received sign out pending sobriety. CTH neg. Alcohol > 300. Will allow to metabolize, PO challenge, ambulatory challenge. Jasiel Rivas PGY-2 spoke to patient using , endorsing low back, right knee pain  pt new abrasion to right knee. only abrasions over hands and hips. unable to sit up due to pain. will obtain further imaging Jasiel Rivas PGY-2 spoke to patient using , endorsing low back, right knee pain  pt new abrasion to right knee. only abrasions over hands and hips. unable to sit up due to pain. will obtain further imaging   [id number] Pt re evaluated, awake and alert and complains of lower back pain. CT results reviewed, spoke to Neurosurgery, no surgical intervention at this time. Will treat pain and PO challenge patient and reassess. UJRAL Patient is reassessed, states feeling much better at this time. Requesting to go home. Pt tolerated PO, walked with a cane and requesting to go home. Discharge plan and return precautions discussed. RGUJDUGLAS PRANAV Lamb MD  patient reassessed, alert, oriented, no signs of trauma, able to ambulate of cane, SW to arrange transport, will dc.

## 2021-12-05 NOTE — ED PROVIDER NOTE - PHYSICAL EXAMINATION
GENERAL: +sleeping comfortably in bed, inebriated, no acute distress, non-toxic appearing  HEAD: normocephalic, atraumatic  HEENT: normal conjunctiva, oral mucosa moist, neck supple  CARDIAC: regular rate and rhythm, normal S1 and S2,  no appreciable murmurs  PULM: clear to ascultation bilaterally, no crackles, rales, rhonchi, or wheezing  GI: abdomen nondistended, soft, nontender, no guarding or rebound tenderness  NEURO: +pt did not follow neuro commands  MSK: no spinal midline tenderness, no visible deformities, no peripheral edema, calf tenderness/redness/swelling,   SKIN: +superificial abrasions on bilateral hands and shins, no visible rashes, dry, well-perfused  PSYCH: appropriate mood and affect

## 2021-12-05 NOTE — ED PROVIDER NOTE - CLINICAL SUMMARY MEDICAL DECISION MAKING FREE TEXT BOX
60 yo M with hx of pancreatitis and etoh abuse presents inebriated, VSS - pt will get basic labs, lipase given hx, fluids. CTH to evalaute for ICH. 60 yo M with hx of pancreatitis and etoh abuse presents inebriated, VSS - pt will get basic labs, lipase given hx, fluids. CTH to evalaute for ICH. reassess ZR

## 2021-12-05 NOTE — ED PROVIDER NOTE - NSFOLLOWUPINSTRUCTIONS_ED_ALL_ED_FT
Activities as tolerated. Please encourage good oral and fluid intake. For pain, please take Motrin 400mg every 4 hours as needed, or Tylenol 650mg every 6 hours as needed.    Please see your primary care doctor within 24-48 hours for further management of your symptoms.  Weight bearing as tolerated.    Please seek emergent medical management if you have any worsening signs or symptoms.

## 2021-12-05 NOTE — ED PROVIDER NOTE - NS ED ROS FT
GENERAL: no fever, no chills, no weight loss  CARDIAC: no chest pain, no palpitations   PULMONARY: no cough, no shortness of breath, no wheezing  GI: no abdominal pain, no nausea, no vomiting, no diarrhea, no constipation, no melena, no hematochezia, no hematemesis  : no changes in urination, no dysuria, no frequency, no hematuria, no discharge  SKIN: no rashes  NEURO: no headache, no numbness, no weakness  MSK: +back pain, +bilateral leg pain, no joint pain, no muscle pain,  no calf pain

## 2021-12-05 NOTE — ED PROVIDER NOTE - OBJECTIVE STATEMENT
62 yo M pmh of ETOH abuse, chronic pancreatitis presents 60 yo M pmh of ETOH abuse, chronic pancreatitis presents from park while sleeping in bushes. EMS was called by bystanders. Patient complains of chronic back and leg pain. Denies fevers, CP, SOB, nausea, vomiting, issues with BMs or urination. Says he has hit his head multiple times and does not ambulate at baseline.

## 2021-12-05 NOTE — ED ADULT NURSE REASSESSMENT NOTE - NS ED NURSE REASSESS COMMENT FT1
Patient handoff received from DERIK arora. Patient is AOx3, NAD at this time. safety maintained, awaiting dispo.

## 2021-12-05 NOTE — ED ADULT NURSE NOTE - OBJECTIVE STATEMENT
61 y.o male, A&Ox2 alert to name and place, PMH ETOH abuse, pt presents to ED via EMS for alcohol intoxication, pt was found in a park sleeping on the ground and bystanders called 911. pt brought in with soiled clothing, changed into a clean dry gown, laying comfortably, calm behavior. safety and comfort provided.

## 2021-12-05 NOTE — ED PROVIDER NOTE - PATIENT PORTAL LINK FT
You can access the FollowMyHealth Patient Portal offered by [facility] by registering at the following website: [email or web address]. By joining [employer]’s FollowMyHealth portal, you will also be able to view your health information using other applications (apps) compatible with our system.

## 2021-12-06 VITALS
RESPIRATION RATE: 18 BRPM | OXYGEN SATURATION: 97 % | SYSTOLIC BLOOD PRESSURE: 155 MMHG | DIASTOLIC BLOOD PRESSURE: 77 MMHG | TEMPERATURE: 99 F | HEART RATE: 82 BPM

## 2021-12-06 PROCEDURE — 72170 X-RAY EXAM OF PELVIS: CPT | Mod: 26

## 2021-12-06 PROCEDURE — 83690 ASSAY OF LIPASE: CPT

## 2021-12-06 PROCEDURE — 80053 COMPREHEN METABOLIC PANEL: CPT

## 2021-12-06 PROCEDURE — 73562 X-RAY EXAM OF KNEE 3: CPT

## 2021-12-06 PROCEDURE — 72131 CT LUMBAR SPINE W/O DYE: CPT | Mod: MA

## 2021-12-06 PROCEDURE — 99284 EMERGENCY DEPT VISIT MOD MDM: CPT | Mod: 25

## 2021-12-06 PROCEDURE — 82962 GLUCOSE BLOOD TEST: CPT

## 2021-12-06 PROCEDURE — 72170 X-RAY EXAM OF PELVIS: CPT

## 2021-12-06 PROCEDURE — 80307 DRUG TEST PRSMV CHEM ANLYZR: CPT

## 2021-12-06 PROCEDURE — 70450 CT HEAD/BRAIN W/O DYE: CPT | Mod: MA

## 2021-12-06 PROCEDURE — 96375 TX/PRO/DX INJ NEW DRUG ADDON: CPT

## 2021-12-06 PROCEDURE — 72131 CT LUMBAR SPINE W/O DYE: CPT | Mod: 26,MA

## 2021-12-06 PROCEDURE — 73562 X-RAY EXAM OF KNEE 3: CPT | Mod: 26,RT

## 2021-12-06 PROCEDURE — 96374 THER/PROPH/DIAG INJ IV PUSH: CPT

## 2021-12-06 PROCEDURE — 85025 COMPLETE CBC W/AUTO DIFF WBC: CPT

## 2021-12-06 RX ORDER — LIDOCAINE 4 G/100G
1 CREAM TOPICAL ONCE
Refills: 0 | Status: COMPLETED | OUTPATIENT
Start: 2021-12-06 | End: 2021-12-06

## 2021-12-06 RX ORDER — KETOROLAC TROMETHAMINE 30 MG/ML
15 SYRINGE (ML) INJECTION ONCE
Refills: 0 | Status: DISCONTINUED | OUTPATIENT
Start: 2021-12-06 | End: 2021-12-06

## 2021-12-06 RX ORDER — ACETAMINOPHEN 500 MG
1000 TABLET ORAL ONCE
Refills: 0 | Status: COMPLETED | OUTPATIENT
Start: 2021-12-06 | End: 2021-12-06

## 2021-12-06 RX ADMIN — LIDOCAINE 1 PATCH: 4 CREAM TOPICAL at 08:27

## 2021-12-06 RX ADMIN — Medication 15 MILLIGRAM(S): at 06:13

## 2021-12-06 RX ADMIN — Medication 400 MILLIGRAM(S): at 08:27

## 2021-12-06 NOTE — CHART NOTE - NSCHARTNOTEFT_GEN_A_CORE
EMERGENCY : Social work was consulted by Dr. Lamb for assistance with transportation. Per medical team, patient has been cleared for discharge. LMSW met with patient at bedside and introduced self and role. Patient verbalized an understanding. Patient stated that he will be returning to [place] [facility], which is where he was prior to coming to the ED. Patient expressed wanting to travel by taxi since the bus makes too many stops. LMSW offered to assist patient with scheduling transportation, and he accepted. LMSW contacted Libby rich [phone]. LMSW secured a transportation trip for the patient with a 15–20-minute wait time. LMSW informed patient of the wait time and the cost (23.15$) for transportation. Patient in agreement. Social work remains available as needed.

## 2021-12-06 NOTE — ED ADULT NURSE REASSESSMENT NOTE - NS ED NURSE REASSESS COMMENT FT1
pt at this is unable to get up, pt reports pain to knee. as per MD pt to receive x rays and pain meds to be assessed at a later time for discharge

## 2021-12-06 NOTE — CHART NOTE - NSCHARTNOTESELECT_GEN_ALL_CORE
Event Note Pt last saw PCP 1/12/21 for an annual exam.   BP at this office visit was 146/94 P 73    Per visit notes:   \"She monitors her blood pressure at home and has BP log with her today. Her blood pressure typically runs in 120-130's/80's at home.\"    \"-BP is elevated in the office today. She monitors BP at home and it is well controlled, consistently 120-130's/80's.\"    Writer contacts pt who states she has not taken any updated BP readings since her office visit because [place].     Pt is agreeable to call in to PCP's office when she takes her next home BP reading.     Please update this reading in Epic in the \"Vitals- Pt Reported\" tab. If it is elevated, please forward the reading on to PCP.

## 2021-12-06 NOTE — ED ADULT NURSE REASSESSMENT NOTE - NS ED NURSE REASSESS COMMENT FT1
pt opkmfor discharge per md pt ambulated to bathroom and is dressing himself at this time pending further orders

## 2021-12-07 ENCOUNTER — EMERGENCY (EMERGENCY)
Facility: HOSPITAL | Age: 61
LOS: 1 days | Discharge: HOME | End: 2021-12-07
Attending: EMERGENCY MEDICINE
Payer: MEDICAID

## 2021-12-07 VITALS
DIASTOLIC BLOOD PRESSURE: 64 MMHG | RESPIRATION RATE: 18 BRPM | OXYGEN SATURATION: 98 % | TEMPERATURE: 98 F | SYSTOLIC BLOOD PRESSURE: 103 MMHG | HEART RATE: 82 BPM

## 2021-12-07 VITALS
RESPIRATION RATE: 18 BRPM | HEART RATE: 66 BPM | SYSTOLIC BLOOD PRESSURE: 120 MMHG | OXYGEN SATURATION: 98 % | HEIGHT: 63 IN | DIASTOLIC BLOOD PRESSURE: 80 MMHG | WEIGHT: 149.91 LBS | TEMPERATURE: 98 F

## 2021-12-07 DIAGNOSIS — Z98.89 OTHER SPECIFIED POSTPROCEDURAL STATES: Chronic | ICD-10-CM

## 2021-12-07 PROCEDURE — 73590 X-RAY EXAM OF LOWER LEG: CPT | Mod: 26,RT

## 2021-12-07 PROCEDURE — 99285 EMERGENCY DEPT VISIT HI MDM: CPT | Mod: 25

## 2021-12-07 PROCEDURE — 90471 IMMUNIZATION ADMIN: CPT

## 2021-12-07 PROCEDURE — 73590 X-RAY EXAM OF LOWER LEG: CPT

## 2021-12-07 PROCEDURE — 90715 TDAP VACCINE 7 YRS/> IM: CPT

## 2021-12-07 PROCEDURE — 73120 X-RAY EXAM OF HAND: CPT

## 2021-12-07 PROCEDURE — 99284 EMERGENCY DEPT VISIT MOD MDM: CPT

## 2021-12-07 PROCEDURE — 73120 X-RAY EXAM OF HAND: CPT | Mod: 26,RT

## 2021-12-07 RX ORDER — ACETAMINOPHEN 500 MG
975 TABLET ORAL ONCE
Refills: 0 | Status: COMPLETED | OUTPATIENT
Start: 2021-12-07 | End: 2021-12-07

## 2021-12-07 RX ORDER — THIAMINE MONONITRATE (VIT B1) 100 MG
100 TABLET ORAL ONCE
Refills: 0 | Status: COMPLETED | OUTPATIENT
Start: 2021-12-07 | End: 2021-12-07

## 2021-12-07 RX ORDER — FOLIC ACID 0.8 MG
1 TABLET ORAL ONCE
Refills: 0 | Status: COMPLETED | OUTPATIENT
Start: 2021-12-07 | End: 2021-12-07

## 2021-12-07 RX ORDER — KETOROLAC TROMETHAMINE 30 MG/ML
30 SYRINGE (ML) INJECTION ONCE
Refills: 0 | Status: DISCONTINUED | OUTPATIENT
Start: 2021-12-07 | End: 2021-12-07

## 2021-12-07 RX ORDER — TETANUS TOXOID, REDUCED DIPHTHERIA TOXOID AND ACELLULAR PERTUSSIS VACCINE, ADSORBED 5; 2.5; 8; 8; 2.5 [IU]/.5ML; [IU]/.5ML; UG/.5ML; UG/.5ML; UG/.5ML
0.5 SUSPENSION INTRAMUSCULAR ONCE
Refills: 0 | Status: COMPLETED | OUTPATIENT
Start: 2021-12-07 | End: 2021-12-07

## 2021-12-07 RX ADMIN — Medication 1 MILLIGRAM(S): at 19:03

## 2021-12-07 RX ADMIN — Medication 1 TABLET(S): at 19:03

## 2021-12-07 RX ADMIN — Medication 975 MILLIGRAM(S): at 20:21

## 2021-12-07 RX ADMIN — TETANUS TOXOID, REDUCED DIPHTHERIA TOXOID AND ACELLULAR PERTUSSIS VACCINE, ADSORBED 0.5 MILLILITER(S): 5; 2.5; 8; 8; 2.5 SUSPENSION INTRAMUSCULAR at 19:03

## 2021-12-07 RX ADMIN — Medication 30 MILLIGRAM(S): at 20:23

## 2021-12-07 RX ADMIN — Medication 100 MILLIGRAM(S): at 19:02

## 2021-12-07 NOTE — ED ADULT NURSE NOTE - OBJECTIVE STATEMENT
61 y M presents to the ED with EMS after being found asleep in a park. Pt reports drinking. Arousable to verbal to stimuli and able to use urinal independently. Pt answering minimal questions, follows basic commands. On assessment, A&Ox2. Breathing spontaneously and unlabored on Room air. Pt safety maintained. Call bell within reach. Side rails in upward position. Seen and evaluated by MD. Awaiting dispo.

## 2021-12-07 NOTE — ED PROVIDER NOTE - NSFOLLOWUPINSTRUCTIONS_ED_ALL_ED_FT
You were seen and evaluated today for back pain and alcohol intoxication.     - Follow up with your primary care physician within 7-10 days    Alcohol Abuse    Alcohol intoxication occurs when the amount of alcohol that a person has consumed impairs his or her ability to mentally and physically function. Chronic alcohol consumption can also lead to a variety of health issues including neurological disease, stomach disease, heart disease, liver disease, etc. Do not drive after drinking alcohol. Drinking enough alcohol to end up in an Emergency Room suggests you may have an alcohol abuse problem. Seek help at a drug addiction center.    SEEK IMMEDIATE MEDICAL CARE IF YOU HAVE ANY OF THE FOLLOWING SYMPTOMS: seizures, vomiting blood, blood in your stool, lightheadedness/dizziness, or becoming shaky to tremulous when you stop drinking.    Please return to the Emergency Department should you experience any of the following:  - New or worsening pain  - Numbness or weakness of your legs or feet  - Fever, unexplained weight loss, night sweats  - Blood in stool or in urine  - New weakness, fatigue, or fainting  - Any new concerning symptoms

## 2021-12-07 NOTE — ED PROVIDER NOTE - PATIENT PORTAL LINK FT
You can access the FollowMyHealth Patient Portal offered by [facility] by registering at the following website: [email or web address]. By joining [employer]’s FollowMyHealth portal, you will also be able to view your health information using other applications (apps) compatible with our system. You can access the FollowMyHealth Patient Portal offered by [facility] by registering at the following website: [email or web address]. By joining [employer]’s FollowMyHealth portal, you will also be able to view your health information using other applications (apps) compatible with our system.

## 2021-12-07 NOTE — ED PROVIDER NOTE - ATTENDING CONTRIBUTION TO CARE
Attending Statement (KIRT Marin MD):    HPI: 62y/o M with h/o ETOH use disorder, multiple prior ED visits and admissions for ETOH related complications, presents to the ED BIBEMS after found sleeping on ground of park, endorsing alcohol use today (initially only alittle, then admitting "more"); states has some chronic pain in back, drinking to help this pain; but also new cuts on shins/wrist; denies fall.     Review of Systems: limited: intoxicated  -General: no fever   -Pulmonary: no shortness of breath  -Cardiac: no chest pain  -Gastrointestinal: no abdominal pain  -Genitourinary: no blood or pain with urination  -Musculoskeletal: no back or neck pain  -Endocrine: No h/o diabetes  -Neurologic: No new weakness or numbness in extremities    All else negative unless otherwise specified elsewhere in this note.    PSH/PMH as noted above    On Physical Exam:  General: well appearing, in NAD, speaking clearly in full sentences and without difficulty; cooperative with exam  HEENT: airway patent, PERRL  Neck: no JVD  Cardiac: s1s2  Lungs: CTABL  Abdomen: soft nontender/nondistended  : no bladder tenderness or distension  Skin: multiple scattered scrapes on shins/knees bilaterally (mostly old appearing)  Extremities: no peripheral edema, no gross deformities; FROM of hips/knees/ankles bilaterally; FROM of shoulders/elbows/wrists/fingers bilaterally;   Neuro: no gross neurologic deficits    MDM: Attending Statement (KIRT Marin MD):    HPI: 62y/o M with h/o ETOH use disorder, multiple prior ED visits and admissions for ETOH related complications, presents to the ED BIBEMS after found sleeping on ground of park, endorsing alcohol use today (initially only alittle, then admitting "more"); states has some chronic pain in back, drinking to help this pain; but also new cuts on shins/wrist; denies fall.     Review of Systems: limited: intoxicated  -General: no fever   -Pulmonary: no shortness of breath  -Cardiac: no chest pain  -Gastrointestinal: no abdominal pain  -Genitourinary: no blood or pain with urination  -Musculoskeletal: no back or neck pain  -Endocrine: No h/o diabetes  -Neurologic: No new weakness or numbness in extremities    All else negative unless otherwise specified elsewhere in this note.    PSH/PMH as noted above    On Physical Exam:  General: well appearing, in NAD, speaking clearly in full sentences and without difficulty; cooperative with exam  HEENT: airway patent, PERRL  Neck: no JVD  Cardiac: s1s2  Lungs: CTABL  Abdomen: soft nontender/nondistended  : no bladder tenderness or distension  Skin: multiple scattered scrapes on shins/knees bilaterally (mostly old appearing)  Extremities: no peripheral edema, no gross deformities; FROM of hips/knees/ankles bilaterally; FROM of shoulders/elbows/wrists/fingers bilaterally;   Neuro: no gross neurologic deficits     MDM: 61 M presenting with acute alcohol intoxication; many ED visits for same; head appears NCAT; will observe for clinical sobriety, and then likely dc. Attending Statement (KIRT Marin MD):    HPI: 62y/o M with h/o ETOH use disorder, multiple prior ED visits and admissions for ETOH related complications, presents to the ED BIBEMS after found sleeping on ground of park, endorsing alcohol use today (initially only alittle, then admitting "more"); states has some chronic pain in back, drinking to help this pain; but also new cuts on shins/wrist; denies fall.      Review of Systems: limited: intoxicated  -General: no fever   -Pulmonary: no shortness of breath  -Cardiac: no chest pain  -Gastrointestinal: no abdominal pain  -Genitourinary: no blood or pain with urination  -Musculoskeletal: no back or neck pain  -Endocrine: No h/o diabetes  -Neurologic: No new weakness or numbness in extremities    All else negative unless otherwise specified elsewhere in this note.    PSH/PMH as noted above    On Physical Exam:  General: unkempts, awake/alert in NAD, speaking clearly in full sentences and without difficulty; cooperative with exam  HEENT: airway patent, PERRL; no periorbita/periauricular ecchymoses, no hemotympanum b/l; head appears NCAT  Neck: no JVD  Cardiac: s1s2  Lungs: CTABL  Abdomen: soft nontender/nondistended  : no bladder tenderness or distension  Skin: multiple scattered scrapes on shins/knees bilaterally (mostly old appearing)  Extremities: no peripheral edema, no gross deformities; FROM of hips/knees/ankles bilaterally; FROM of shoulders/elbows/wrists/fingers bilaterally;   Neuro: no gross neurologic deficits     MDM: 61 M presenting with acute alcohol intoxication; many ED visits for same; head appears NCAT; will observe for clinical sobriety, and then likely dc. Attending Statement (KIRT Marin MD):    HPI: 60y/o M with h/o ETOH use disorder, multiple prior ED visits and admissions for ETOH related complications, presents to the ED BIBEMS after found sleeping on ground of park, endorsing alcohol use today (initially only "a little," then admitting "more"); states has some chronic pain in back, drinking to help this pain; but also new cuts on shins/wrist; denies fall.      Review of Systems: limited: intoxicated  -General: no fever   -Pulmonary: no shortness of breath  -Cardiac: no chest pain  -Gastrointestinal: no abdominal pain  -Genitourinary: no blood or pain with urination  -Musculoskeletal: no back or neck pain  -Endocrine: No h/o diabetes  -Neurologic: No new weakness or numbness in extremities    All else negative unless otherwise specified elsewhere in this note.    PSH/PMH as noted above    On Physical Exam:  General: unkempts, awake/alert in NAD, speaking clearly in full sentences and without difficulty; cooperative with exam  HEENT: airway patent, PERRL; no periorbita/periauricular ecchymoses, no hemotympanum b/l; head appears NCAT  Neck: no JVD  Cardiac: s1s2  Lungs: CTABL  Abdomen: soft nontender/nondistended  : no bladder tenderness or distension  Back: no lacerations, no abrasions, no ecchymoses; no midline T or L spine tenderness  Skin: multiple scattered scrapes on shins/knees bilaterally (mostly old appearing)  Extremities: no peripheral edema, no gross deformities; FROM of hips/knees/ankles bilaterally; FROM of shoulders/elbows/wrists/fingers bilaterally;   Neuro: no gross neurologic deficits; 5/5 str in hips, knees and ankles b/l; no gross regions of sensation loss in lower extremities reported.     MDM: 61 M presenting with acute alcohol intoxication; many ED visits for same; head appears NCAT; will observe for clinical sobriety, and then likely dc. Attending Statement (KIRT Marin MD):    HPI: 60y/o M with h/o ETOH use disorder, multiple prior ED visits and admissions for ETOH related complications, presents to the ED BIBEMS after found sleeping on ground of park, endorsing alcohol use today (initially only "a little," then admitting "more"); states has some chronic pain in back, drinking to help this pain; but also new cuts on shins/wrist; denies fall.      Review of Systems: limited: intoxicated  -General: no fever   -Pulmonary: no shortness of breath  -Cardiac: no chest pain  -Gastrointestinal: no abdominal pain  -Genitourinary: no blood or pain with urination  -Musculoskeletal: no back or neck pain  -Endocrine: No h/o diabetes  -Neurologic: No new weakness or numbness in extremities    All else negative unless otherwise specified elsewhere in this note.    PSH/PMH as noted above    On Physical Exam:  General: unkempt, awake/alert in NAD, speaking clearly in full sentences and without difficulty; cooperative with exam  HEENT: airway patent, PERRL; no periorbita/periauricular ecchymoses, no hemotympanum b/l; head appears NCAT  Neck: no JVD  Cardiac: s1s2  Lungs: CTABL  Abdomen: soft nontender/nondistended  : no bladder tenderness or distension  Back: no lacerations, no abrasions, no ecchymoses; no midline T or L spine tenderness  Skin: multiple scattered scrapes on shins/knees bilaterally (mostly old appearing)  Extremities: no peripheral edema, no gross deformities; FROM of hips/knees/ankles bilaterally; FROM of shoulders/elbows/wrists/fingers bilaterally;   Neuro: no gross neurologic deficits; 5/5 str in hips, knees and ankles b/l; no gross regions of sensation loss in lower extremities reported.     MDM: 61 M presenting with acute alcohol intoxication; many ED visits for same; head appears NCAT; will observe for clinical sobriety, and then likely dc.

## 2021-12-07 NOTE — ED PROVIDER NOTE - OBJECTIVE STATEMENT
62yo M etOH-use disorder, chronic lower back pain, pancreatitis BIBEMS after found sleeping on the ground in the park. EMS reports was easily arousable, smelled of etOH. Pt discharged from ED yesterday after evaluated for etOH intoxication. Pt reports only drank a little today, will not quantify how much or what he drank. Denies any trauma. Reports lower back pain unchanged from yesterday. Also has pain of his R hand where cut it in a bush and R shin which he says he scraped at the park.

## 2021-12-07 NOTE — ED PROVIDER NOTE - CLINICAL SUMMARY MEDICAL DECISION MAKING FREE TEXT BOX
60yo M found sleeping in park smelling of alcohol appears etOH intoxication on exam. HD stable with no signs of new trauma. PE is unremarkable. Back pain is chronic, unchanged from yesterday, was previously assessed by neurosurgery and denies any new falls or injury. Will observe until clinically sober, no intervention indicated at this time. Will give nutrition.

## 2021-12-07 NOTE — ED PROVIDER NOTE - PHYSICAL EXAMINATION
GENERAL: non-toxic appearing, alert, in NAD  HEENT: atraumatic, normocephalic, Vision grossly intact, no conjunctivitis or discharge, hearing grossly intact,  no nasal discharge, epistaxis   CARDIAC: RRR, normal S1S2,  no appreciable murmurs, no cyanosis, cap refill < 2 seconds  PULM: normal work of breathing, oxygen saturation on RA wnl, CTAB, no crackles, rales, rhonchi, or wheezing  GI: abdomen nondistended, soft, nontender, no guarding or rebound tenderness, no palpable masses  NEURO: awake and sleepy, follows commands, slurred speech, PERRLA, EOMI, no focal motor or sensory deficits  MSK: spine appears normal, no joint swelling or erythema, ranging all extremities with no appreciable loss of ROM  EXT: no peripheral edema, calf tenderness, redness or swelling  SKIN: small skin tear of base of first digit R hand, no laceration. Abrasions to anterior R shin. Otherwise no lacerations or wounds, no rashes.   PSYCH: appropriate mood and affect

## 2021-12-07 NOTE — CHART NOTE - NSCHARTNOTEFT_GEN_A_CORE
Per medical team patient is cleared for discharge.  Patient was seen in  the ED yesterday with similar concerns.  Patient was escorted to the waiting room where he ambulated independently. LSMW contacted a local cab company to arrange discharge transportation. No barriers for discharge identified.  LMSW will follow up as needed.

## 2021-12-16 ENCOUNTER — EMERGENCY (EMERGENCY)
Facility: HOSPITAL | Age: 61
LOS: 1 days | Discharge: HOME | End: 2021-12-16
Attending: STUDENT IN AN ORGANIZED HEALTH CARE EDUCATION/TRAINING PROGRAM
Payer: MEDICAID

## 2021-12-16 VITALS
OXYGEN SATURATION: 100 % | DIASTOLIC BLOOD PRESSURE: 68 MMHG | TEMPERATURE: 98 F | WEIGHT: 160.06 LBS | RESPIRATION RATE: 16 BRPM | HEART RATE: 76 BPM | HEIGHT: 63 IN | SYSTOLIC BLOOD PRESSURE: 128 MMHG

## 2021-12-16 DIAGNOSIS — Z98.89 OTHER SPECIFIED POSTPROCEDURAL STATES: Chronic | ICD-10-CM

## 2021-12-16 PROCEDURE — 99285 EMERGENCY DEPT VISIT HI MDM: CPT

## 2021-12-16 PROCEDURE — 82962 GLUCOSE BLOOD TEST: CPT

## 2021-12-16 PROCEDURE — 99284 EMERGENCY DEPT VISIT MOD MDM: CPT

## 2021-12-16 NOTE — ED PROVIDER NOTE - PROGRESS NOTE DETAILS
Sign out received from ARYAN Valle.  EtOH intox. Plan is PO challenge, sober, then DC.  Andrae Lau PA-C Attending MD Barnett: Patient observed drinking water during shift, had not achieved clinical sobriety.  Signed out to overnight team pending clinical sobriety. Nishant, PGY2: patient ambulating unassisted, tolerating po. appears clinically sober, safe for discharge. Nishant, PGY2: patient ambulating unassisted, tolerating po. appears clinically sober, safe for discharge. noted to have elevated HR to 110s. patient is upset with being discharged because he "wants to stay until the sun comes up." no s/s of withdrawal.

## 2021-12-16 NOTE — ED PROVIDER NOTE - CLINICAL SUMMARY MEDICAL DECISION MAKING FREE TEXT BOX
61 year old M etOH-use disorder, chronic lower back pain, pancreatitis BIBEMS after found sleeping on the ground in the park with 8 bottles of empty pints of vodka. will observe, offer SBIRT/ SW, PO challenge, BGL reassess. 61 year old M etOH-use disorder, chronic lower back pain, pancreatitis BIBEMS after found sleeping on the ground in the park with 8 bottles of empty pints of vodka. smelling of alcohol appears etOH intoxication on exam. HD stable with no signs of new trauma. PE is unremarkable. Back pain is chronic, unchanged from previous visits, offer SBIRT/ SW, PO challenge, BGL, will observe until clinically sober, no intervention indicated at this time. Will give nutrition. 61 year old M etOH-use disorder, chronic lower back pain, pancreatitis BIBEMS after found sleeping on the ground in the park with 8 bottles of empty pints of vodka. smelling of alcohol appears etOH intoxication on exam. HD stable with no signs of new trauma. PE is unremarkable. Back pain is chronic, unchanged from previous visits, offer SBIRT/ SW, PO challenge, BGL, will observe until clinically sober, no intervention indicated at this time. Will give nutrition.    TRACEE Mares, Attending: seen with ACP and reviewed note.    61 yoM, known to ED for frequent etoh use. BIB EMS after being found sleep on ground (usual presentation). +AOB. Bottles found near patient. No reports of trauma.    Clinically intoxicated. Vitals reassuring. No outward signs of trauma. No wounds. Abd soft.    Plan to check FSG, undress and check body for wounds/rash/trauma and observe for sobriety.

## 2021-12-16 NOTE — ED PROVIDER NOTE - NSFOLLOWUPINSTRUCTIONS_ED_ALL_ED_FT
Stop drinking to excess.  Return to ER for seizures, shaking, confusion, intractable vomiting, blood in vomit or any other concerning symptoms.  Please do not drink and drive.   - Follow up with your primary care physician within 7-10 days    Alcohol Abuse    Alcohol intoxication occurs when the amount of alcohol that a person has consumed impairs his or her ability to mentally and physically function. Chronic alcohol consumption can also lead to a variety of health issues including neurological disease, stomach disease, heart disease, liver disease, etc. Do not drive after drinking alcohol. Drinking enough alcohol to end up in an Emergency Room suggests you may have an alcohol abuse problem. Seek help at a drug addiction center.    SEEK IMMEDIATE MEDICAL CARE IF YOU HAVE ANY OF THE FOLLOWING SYMPTOMS: seizures, vomiting blood, blood in your stool, lightheadedness/dizziness, or becoming shaky to tremulous when you stop drinking.    Please return to the Emergency Department should you experience any of the following:  - New or worsening pain  - Numbness or weakness of your legs or feet  - Fever, unexplained weight loss, night sweats  - Blood in stool or in urine  - New weakness, fatigue, or fainting  - Any new concerning symptoms

## 2021-12-16 NOTE — ED ADULT NURSE NOTE - OBJECTIVE STATEMENT
Patient came in picked up by ambulance on the street intoxicated. As per EMS, pt was found with empty bottles of Vodka. No distress. +alcohol Pt poor hygiene and clothing saturated with urine. Pt given a bed bath. No respiratory distress noted. Multiple scabs noted on different parts of the body but no draining open wounds noted. Pt with multiple visits to the ER with intoxication.

## 2021-12-16 NOTE — ED PROVIDER NOTE - PHYSICAL EXAMINATION
On Physical Exam:  General: well appearing, in NAD, Uncooperative with exam  HEENT: PERRL, MMM  Neck: no neck tenderness, no nuchal rigidity  Cardiac: normal s1, s2; RRR; no MGR  Lungs: CTABL  Abdomen: soft nontender/nondistended  : no bladder tenderness or distension  Skin: no obvious bruising or deformities or signs of trauma, warm, intact, no rash  Extremities: no peripheral edema, no gross deformities  Neuro:  awake and sleepy, follows commands, slurred speech, no focal motor or sensory deficits

## 2021-12-16 NOTE — ED PROVIDER NOTE - SHIFT CHANGE DETAILS
Attending MD Barnett: 61M with frequent hospital visits from EtOH intoxication, here for Intox, evaluated and no noted traumatic injuries, FS wnl, pending clinical sobriety and po challenge Attending MD Barnett: 61M with frequent hospital visits from EtOH intoxication, here for Intox, evaluated and no noted traumatic injuries, FS wnl, pending clinical sobriety and po challenge    Attending MD Barnett: Anibal John 12:23am 12/17/21 Awaiting clinical sobriety

## 2021-12-16 NOTE — ED PROVIDER NOTE - PATIENT PORTAL LINK FT
You can access the FollowMyHealth Patient Portal offered by [facility] by registering at the following website: [email or web address]. By joining [employer]’s FollowMyHealth portal, you will also be able to view your health information using other applications (apps) compatible with our system.

## 2021-12-16 NOTE — ED ADULT NURSE NOTE - NSIMPLEMENTINTERV_GEN_ALL_ED
Implemented All Fall with Harm Risk Interventions:  [place] to call system. Call bell, personal items and telephone within reach. Instruct patient to call for assistance. Room bathroom lighting operational. Non-slip footwear when patient is off stretcher. Physically safe environment: no spills, clutter or unnecessary equipment. Stretcher in lowest position, wheels locked, appropriate side rails in place. Provide visual cue, wrist band, yellow gown, etc. Monitor gait and stability. Monitor for mental status changes and reorient to person, place, and time. Review medications for side effects contributing to fall risk. Reinforce activity limits and safety measures with patient and family. Provide visual clues: red socks.

## 2021-12-16 NOTE — ED PROVIDER NOTE - OBJECTIVE STATEMENT
61 year old M etOH-use disorder, chronic lower back pain, pancreatitis BIBEMS after found sleeping on the ground in the park with 8 bottles of empty pints of vodka.  Pt reports only drank does not quantify how much or what he drank endorsing lower back pain. EMS reports was easily arousable, clothes drenched in urine and feces, smelled of ETOH.  Pt discharged from [facility] few days ago as per EMS and discharged from [facility] 1 week ago for evaluated for ETOH intoxication. Pt reports only drank does not quantify how much or what he drank. Denies any trauma falls, injury or pain.

## 2021-12-16 NOTE — ED ADULT TRIAGE NOTE - NS ED NURSE DIRECT TO ROOM YN
Nephrology Progress Note  [facility]  [email or web address]                  Phone - [phone]       Patient: Carlie Rinne   Date- 2/4/2020        Admit Date: 1/26/2020  YOB: 1945             CC: Follow up for    mariel on ckd       Subjective: Interval History:   -  Cr. Slightly increased to 1.8  Patient seen in pre op area  No c/o sob,   No c/o chest pain,   No c/o nausea or vomiting  No c/o  fever. ROS:- as above   Assessment & Plan:     Chronic renal insufficiency, stage III.  Baseline creat ~1.6.  Renal function s/p cardiac cath and CT angio.       Renal artery stenosis noted in 4/17.  Angio CT on 2/18: bilateral renal arteries patent with moderate right and moderate severe left proximal atherosclerosis.     H/o hyperkalemia     Metabolic acidosis,      Edema     Acute STMI.   An emergent cath was done on 1/27 which showed:  \"severe triple vessel disease involving the distal left main of 60%-70% with ulcerated plaque, total occlusion of left anterior descending artery, a stent in the proximal portion.  Patent left circumflex and patent first large diagonal or the ramus intermedius branch.  Totally occluded RCA with collateralization coming from the left system. \"   A CABG is planned for next week     Severe COPD and continued tobacco abuse     Hypertension.   Obesity. s/p an AAA repair in 4/17. s/p repair of an incarcerated hernia in 4/17.     PVD.  CT angio done on 1/29: complete occlusion of the right internal carotid artery is unchanged since ultrasound 1 day ago. Severe stenosis of the right vertebral artery V4 segment.   Lobulated infrarenal abdominal aortic aneurysm measures 5.1 cm in AP diameter. Liver lesions may represent perfusional variant. Right common iliac artery aneurysm measures 3.3 cm.  Heterogeneous hemorrhage surrounds the right external iliac artery and tracks proximally in the right retroperitoneum.       PLAN-  - continue po bicarb  - follow bmp  - avoid acei or arb       Physical exam:   GEN: NAD  NECK- Supple, no mass  RESP: Clear b/l, no wheezing  CVS: S1,S2  RRR  ABDO: soft , non tender, No mass  NEURO: normal speech, non focal  EXT: + Edema     Care Plan discussed with: patient  Objective:   Visit Vitals  /79 (BP 1 Location: Right arm, BP Patient Position: At rest)   Pulse (!) 56   Temp 98.1 °F (36.7 °C)   Resp 19   Ht 5' 9\" (1.753 m)   Wt 105.9 kg (233 lb 7.5 oz)   SpO2 95%   BMI 34.48 kg/m²     Last 3 Recorded Weights in this Encounter    02/02/20 0646 02/03/20 0625 02/03/20 0902   Weight: 105.7 kg (233 lb 0.4 oz) 105.9 kg (233 lb 7.5 oz) 105.9 kg (233 lb 7.5 oz)     02/02 1901 - 02/04 0700  In: 1671.4 [P.O.:600; I.V.:1071.4]  Out: 1750 [Urine:1750]    Intake/Output Summary (Last 24 hours) at 2/4/2020 1222  Last data filed at 2/4/2020 1145  Gross per 24 hour   Intake 1431.37 ml   Output 1090 ml   Net 341.37 ml      Chart reviewed. Pertinent Notes reviewed.        Medication list  reviewed   Current Facility-Administered Medications   Medication    lactated Ringers infusion    0.9% sodium chloride infusion    sodium chloride (NS) flush 5-40 mL    sodium chloride (NS) flush 5-40 mL    lidocaine (PF) (XYLOCAINE) 10 mg/mL (1 %) injection 0.1 mL    midazolam (VERSED) injection 1 mg    lactated Ringers infusion    heparin (porcine) 1,000 unit/mL injection 2,000 Units    sodium chloride (NS) flush 5-40 mL    sodium phosphate (FLEET'S) enema 1 Enema    mupirocin (BACTROBAN) 2 % ointment 1 g    chlorhexidine (PERIDEX) 0.12 % mouthwash 15 mL    0.9% sodium chloride infusion    DOBUTamine (DOBUTREX) 500 mg/250 mL (2,000 mcg/mL) infusion    DOPamine (INTROPIN) 800 mg in dextrose 5% 250 mL infusion    magnesium sulfate 2 g/50 ml IVPB (premix or compounded)    nitroglycerin (Tridil) 200 mcg/ml infusion    potassium chloride 20 mEq in 50 ml IVPB    amiodarone (CORDARONE) 900 mg/250 ml D5W infusion    dexmedeTOMidine (PRECEDEX) 400 mcg in 0.9% sodium chloride 100 mL infusion    insulin regular (NOVOLIN R, HUMULIN R) 100 Units in 0.9% sodium chloride 100 mL infusion    PHENYLephrine (JONATHAN-SYNEPHRINE) 10 mg in 0.9% sodium chloride 250 mL infusion    PHENYLephrine (JONATHAN-SYNEPHRINE) 20 mg in 0.9% sodium chloride 250 mL infusion    PHENYLephrine (JONATHAN-SYNEPHRINE) 30 mg in 0.9% sodium chloride 250 mL infusion    niCARdipine (CARDENE) 25 mg in 0.9% sodium chloride 250 mL infusion    heparin (porcine) 30,000 Units in 0.9% sodium chloride 1,000 mL perfusion irrigation 1000 mL    amiodarone (CORDARONE) 150 mg in dextrose 5% 100 mL bolus infusion    EPINEPHrine (ADRENALIN) 2 mg in 0.9% sodium chloride 250 mL infusion    protamine injection 250 mg    desmopressin (DDAVP) 4 mcg/mL injection 2 mcg    cardioplegia infusion    aminocaproic acid (AMICAR) 15 g in 0.9% sodium chloride 150 mL infusion    NOREPINephrine (LEVOPHED) 8,000 mcg in dextrose 5% 250 mL infusion    nitroglycerin (Tridil) 200 mcg/ml infusion    ezetimibe (ZETIA) tablet 10 mg    guaiFENesin ER (MUCINEX) tablet 600 mg    heparin (porcine) injection 4,000 Units    heparin (porcine) injection 2,000 Units    guaiFENesin-dextromethorphan (ROBITUSSIN DM) 100-10 mg/5 mL syrup 10 mL    albuterol (PROVENTIL VENTOLIN) nebulizer solution 1.25 mg    melatonin tablet 9 mg    zolpidem (AMBIEN) tablet 5 mg    sodium bicarbonate tablet 650 mg    cefTRIAXone (ROCEPHIN) 1 g in 0.9% sodium chloride (MBP/ADV) 50 mL    diphenhydrAMINE (BENADRYL) capsule 50 mg    nicotine (NICODERM CQ) 7 mg/24 hr patch 1 Patch    metoprolol succinate (TOPROL-XL) XL tablet 50 mg    nitroglycerin (NITROBID) 2 % ointment 1 Inch    amiodarone (CORDARONE) tablet 200 mg    benzonatate (TESSALON) capsule 100 mg    ondansetron (ZOFRAN) injection 4 mg    tamsulosin (FLOMAX) capsule 0.4 mg    aspirin delayed-release tablet 81 mg    sodium chloride (NS) flush 5-40 mL    acetaminophen (TYLENOL) tablet 650 mg    nitroglycerin (NITROSTAT) tablet 0.4 mg           Data Review :  Recent Labs     02/04/20  0440 02/03/20  0201 02/02/20  0908    138 139   K 4.1 4.4 4.1   * 111* 110*   CO2 21 20* 21   BUN 22* 23* 24*   CREA 1.78* 1.64* 1.82*   * 114* 196*   CA 8.5 8.7 8.5     Recent Labs     02/03/20  0203 02/02/20  0908   WBC 6.9 6.6   HGB 10.1* 9.9*   HCT 31.3* 30.4*    210     No results for input(s): FE, TIBC, PSAT, FERR in the last 72 hours. No results for input(s): CPK, CKNDX, TROIQ in the last 72 hours. No lab exists for component: CPKMB  Lab Results   Component Value Date/Time    Color YELLOW/STRAW 01/31/2020 11:45 AM    Appearance TURBID (A) 01/31/2020 11:45 AM    Specific gravity 1.023 01/31/2020 11:45 AM    pH (UA) 6.0 01/31/2020 11:45 AM    Protein 100 (A) 01/31/2020 11:45 AM    Glucose NEGATIVE  01/31/2020 11:45 AM    Ketone NEGATIVE  01/31/2020 11:45 AM    Bilirubin NEGATIVE  01/31/2020 11:45 AM    Urobilinogen 1.0 01/31/2020 11:45 AM    Nitrites NEGATIVE  01/31/2020 11:45 AM    Leukocyte Esterase NEGATIVE  01/31/2020 11:45 AM    Epithelial cells FEW 01/31/2020 11:45 AM    Bacteria NEGATIVE  01/31/2020 11:45 AM    WBC 0-4 01/31/2020 11:45 AM    RBC 0-5 01/31/2020 11:45 AM     Lab Results   Component Value Date/Time    Culture result: MODERATE NORMAL RESPIRATORY XUAN 01/27/2020 09:20 AM     No results found for: SDES  Lab Results   Component Value Date/Time    Sodium,urine random 33 04/02/2017 09:38 AM    Creatinine, urine 129.00 04/02/2017 09:38 AM     Results from Hospital Encounter encounter on 01/21/20   XR CHEST PA LAT    Narrative EXAM:  XR CHEST PA LAT    INDICATION:   R07.89    COMPARISON: 2019. FINDINGS: PA and lateral radiographs of the chest demonstrate clear lungs. The  cardiac and mediastinal contours and pulmonary vascularity are normal.  There is  mild degenerative spurring mid to lower thoracic spine. Sid Rosales Impression IMPRESSION: No acute abnormality identified. Steven Qiu MD  [facility]   [email or web address]  [facility]  [address], [place], [address]  Phone - [phone]         Fax - [phone] No

## 2021-12-17 VITALS
RESPIRATION RATE: 18 BRPM | OXYGEN SATURATION: 96 % | HEART RATE: 113 BPM | SYSTOLIC BLOOD PRESSURE: 122 MMHG | DIASTOLIC BLOOD PRESSURE: 75 MMHG

## 2021-12-17 NOTE — ED ADULT NURSE REASSESSMENT NOTE - NS ED NURSE REASSESS COMMENT FT1
pt awake, asking for water, passed PO challenge and was able to ambulate to wheelchair. Per MD, pt is OK for DC. Pt does not want to be discharged at this time, he wants to wait until it is light outside. MD aware, pt ok to wait in triage for taxi.  used to translate.

## 2021-12-30 ENCOUNTER — INPATIENT (INPATIENT)
Facility: HOSPITAL | Age: 61
LOS: 4 days | Discharge: HOME | DRG: 897 | End: 2022-01-04
Attending: STUDENT IN AN ORGANIZED HEALTH CARE EDUCATION/TRAINING PROGRAM | Admitting: HOSPITALIST
Payer: MEDICAID

## 2021-12-30 VITALS
OXYGEN SATURATION: 98 % | DIASTOLIC BLOOD PRESSURE: 71 MMHG | RESPIRATION RATE: 14 BRPM | SYSTOLIC BLOOD PRESSURE: 105 MMHG | WEIGHT: 149.91 LBS | HEART RATE: 61 BPM | HEIGHT: 63 IN

## 2021-12-30 DIAGNOSIS — Z98.89 OTHER SPECIFIED POSTPROCEDURAL STATES: Chronic | ICD-10-CM

## 2021-12-30 LAB
ALBUMIN SERPL ELPH-MCNC: 3.8 G/DL — SIGNIFICANT CHANGE UP (ref 3.3–5)
ALP SERPL-CCNC: 64 U/L — SIGNIFICANT CHANGE UP (ref 40–120)
ALT FLD-CCNC: 44 U/L — SIGNIFICANT CHANGE UP (ref 10–45)
ANION GAP SERPL CALC-SCNC: 13 MMOL/L — SIGNIFICANT CHANGE UP (ref 5–17)
AST SERPL-CCNC: 45 U/L — HIGH (ref 10–40)
BASOPHILS # BLD AUTO: 0.06 K/UL — SIGNIFICANT CHANGE UP (ref 0–0.2)
BASOPHILS NFR BLD AUTO: 0.9 % — SIGNIFICANT CHANGE UP (ref 0–2)
BILIRUB SERPL-MCNC: 0.7 MG/DL — SIGNIFICANT CHANGE UP (ref 0.2–1.2)
BUN SERPL-MCNC: 18 MG/DL — SIGNIFICANT CHANGE UP (ref 7–23)
CALCIUM SERPL-MCNC: 8.9 MG/DL — SIGNIFICANT CHANGE UP (ref 8.4–10.5)
CHLORIDE SERPL-SCNC: 103 MMOL/L — SIGNIFICANT CHANGE UP (ref 96–108)
CO2 SERPL-SCNC: 20 MMOL/L — LOW (ref 22–31)
CREAT SERPL-MCNC: 0.97 MG/DL — SIGNIFICANT CHANGE UP (ref 0.5–1.3)
EOSINOPHIL # BLD AUTO: 0.1 K/UL — SIGNIFICANT CHANGE UP (ref 0–0.5)
EOSINOPHIL NFR BLD AUTO: 1.5 % — SIGNIFICANT CHANGE UP (ref 0–6)
ETHANOL SERPL-MCNC: 183 MG/DL — HIGH (ref 0–10)
GLUCOSE SERPL-MCNC: 89 MG/DL — SIGNIFICANT CHANGE UP (ref 70–99)
HCT VFR BLD CALC: 30.3 % — LOW (ref 39–50)
HGB BLD-MCNC: 9.8 G/DL — LOW (ref 13–17)
IMM GRANULOCYTES NFR BLD AUTO: 1.2 % — SIGNIFICANT CHANGE UP (ref 0–1.5)
LYMPHOCYTES # BLD AUTO: 1.98 K/UL — SIGNIFICANT CHANGE UP (ref 1–3.3)
LYMPHOCYTES # BLD AUTO: 30.5 % — SIGNIFICANT CHANGE UP (ref 13–44)
MCHC RBC-ENTMCNC: 32.3 GM/DL — SIGNIFICANT CHANGE UP (ref 32–36)
MCHC RBC-ENTMCNC: 33.6 PG — SIGNIFICANT CHANGE UP (ref 27–34)
MCV RBC AUTO: 103.8 FL — HIGH (ref 80–100)
MONOCYTES # BLD AUTO: 0.71 K/UL — SIGNIFICANT CHANGE UP (ref 0–0.9)
MONOCYTES NFR BLD AUTO: 10.9 % — SIGNIFICANT CHANGE UP (ref 2–14)
NEUTROPHILS # BLD AUTO: 3.57 K/UL — SIGNIFICANT CHANGE UP (ref 1.8–7.4)
NEUTROPHILS NFR BLD AUTO: 55 % — SIGNIFICANT CHANGE UP (ref 43–77)
NRBC # BLD: 0 /100 WBCS — SIGNIFICANT CHANGE UP (ref 0–0)
PLATELET # BLD AUTO: 401 K/UL — HIGH (ref 150–400)
POTASSIUM SERPL-MCNC: 3.9 MMOL/L — SIGNIFICANT CHANGE UP (ref 3.5–5.3)
POTASSIUM SERPL-SCNC: 3.9 MMOL/L — SIGNIFICANT CHANGE UP (ref 3.5–5.3)
PROT SERPL-MCNC: 6.7 G/DL — SIGNIFICANT CHANGE UP (ref 6–8.3)
RBC # BLD: 2.92 M/UL — LOW (ref 4.2–5.8)
RBC # FLD: 13.7 % — SIGNIFICANT CHANGE UP (ref 10.3–14.5)
SODIUM SERPL-SCNC: 136 MMOL/L — SIGNIFICANT CHANGE UP (ref 135–145)
WBC # BLD: 6.5 K/UL — SIGNIFICANT CHANGE UP (ref 3.8–10.5)
WBC # FLD AUTO: 6.5 K/UL — SIGNIFICANT CHANGE UP (ref 3.8–10.5)

## 2021-12-30 PROCEDURE — 99285 EMERGENCY DEPT VISIT HI MDM: CPT

## 2021-12-30 RX ORDER — SODIUM CHLORIDE 9 MG/ML
1000 INJECTION, SOLUTION INTRAVENOUS ONCE
Refills: 0 | Status: COMPLETED | OUTPATIENT
Start: 2021-12-30 | End: 2021-12-30

## 2021-12-30 RX ADMIN — SODIUM CHLORIDE 1000 MILLILITER(S): 9 INJECTION, SOLUTION INTRAVENOUS at 22:04

## 2021-12-30 NOTE — ED ADULT NURSE NOTE - NSIMPLEMENTINTERV_GEN_ALL_ED
Implemented All Fall Risk Interventions:  [place] to call system. Call bell, personal items and telephone within reach. Instruct patient to call for assistance. Room bathroom lighting operational. Non-slip footwear when patient is off stretcher. Physically safe environment: no spills, clutter or unnecessary equipment. Stretcher in lowest position, wheels locked, appropriate side rails in place. Provide visual cue, wrist band, yellow gown, etc. Monitor gait and stability. Monitor for mental status changes and reorient to person, place, and time. Review medications for side effects contributing to fall risk. Reinforce activity limits and safety measures with patient and family.

## 2021-12-30 NOTE — ED PROVIDER NOTE - PHYSICAL EXAMINATION
GENERAL: no acute distress, non-toxic appearing  HEAD: normocephalic, atraumatic  HEENT: normal conjunctiva, oral mucosa moist, neck supple  CARDIAC: regular rate and rhythm, normal S1 and S2,  no appreciable murmurs  PULM: clear to ascultation bilaterally, no crackles, rales, rhonchi, or wheezing  GI: abdomen nondistended, soft, nontender, no guarding or rebound tenderness  : no CVA tenderness, no suprapubic tenderness  NEURO: alert and oriented x 3, slurred speech, no focal motor or sensory deficits  MSK: no visible deformities, no peripheral edema, calf tenderness/redness/swelling  SKIN: no visible rashes, dry, well-perfused  PSYCH: appropriate mood and affect

## 2021-12-30 NOTE — ED PROVIDER NOTE - PROGRESS NOTE DETAILS
pt hypothermic, believed to be due to environmental exposure, pt warmed up, w/ dropping BP, concern that pt w/ vasodilation w/ inability to drink H2O and compensate for insensible volume loss, pt given IV hydration, hgb low baseline in the 11s, current 9, pt refused rectal attemped by resident Evelyn, pt to be admitted for evaluation of his anemia, low suspicion for sepsis, ct head ordered case signed out to Dr. Chip Tanner pending ct head and C spine imaging

## 2021-12-30 NOTE — ED PROVIDER NOTE - ATTENDING CONTRIBUTION TO CARE
61 M w/ hx of EtOH abuse, chronic lower back pain, pancreatitis BIBEMS after being found intoxicated in a parking lot. hx limited as pt not cooperative w/ exam, he states he drank a lot of alcohol, he is known to ER for ETOH intox, by standers called, pt w/ no complaints. On exam, pt hypothermic, normotensive, no tachycardic, he is arousabel to tactile stimulation is moving all extremities, had old healing bruising on his body, no c/t/l spine tenderness, he has no chest wall tenderness, no abdominal tenderness, no pelvis instability. pt refusing to ambulate, he states he wants a bottle to urinate in, he has no open bleeding wounds. Plan for accucheck, and reassessment until sober. Pt continuously checked whiel in the ER, he has improvement in mental status aaox2-3 will check labs, and reassess.

## 2021-12-30 NOTE — ED PROVIDER NOTE - OBJECTIVE STATEMENT
60 yo EtOH abuse, chronic lower back pain, pancreatitis BIBEMS after being found intoxicated in a parking lot. He admits to drinking alcohol tonight. He is well known to the ED for ETOH intoxication. Patient denies any symptoms at bedside.

## 2021-12-30 NOTE — ED ADULT NURSE NOTE - OBJECTIVE STATEMENT
60 y/o male presents to ED via EMS. EMS reports pt was found in a park in [place], friend called on pt. On exam, awake and alert. Unlabored, spontaneous respirations, NAD. Abdomen soft, non-tender, non-distended. Awaiting evaluation by MD at this time.

## 2021-12-30 NOTE — ED PROVIDER NOTE - CLINICAL SUMMARY MEDICAL DECISION MAKING FREE TEXT BOX
intoxicated in a parking lot. He admits to drinking alcohol tonight. He is well known to the ED for ETOH intoxication. Patient denies any symptoms at bedside. vitals notable for hypothermia, likely related to environmental reasons. will recheck temp. other vitals wnl. PE as noted above. concerns for alcohol intoxication. will allow patient to metabolize and reassess when clinically sober. intoxicated in a parking lot. He admits to drinking alcohol tonight. He is well known to the ED for ETOH intoxication. Patient denies any symptoms at bedside. vitals notable for hypothermia, likely related to environmental reasons. will recheck temp. other vitals wnl. PE as noted above. concerns for alcohol intoxication. will allow patient to metabolize and reassess when clinically sober.    Admitted given anemia

## 2021-12-31 DIAGNOSIS — D64.9 ANEMIA, UNSPECIFIED: ICD-10-CM

## 2021-12-31 DIAGNOSIS — R68.0 HYPOTHERMIA, NOT ASSOCIATED WITH LOW ENVIRONMENTAL TEMPERATURE: ICD-10-CM

## 2021-12-31 DIAGNOSIS — R71.0 PRECIPITOUS DROP IN HEMATOCRIT: ICD-10-CM

## 2021-12-31 DIAGNOSIS — F10.10 ALCOHOL ABUSE, UNCOMPLICATED: ICD-10-CM

## 2021-12-31 LAB
ALBUMIN SERPL ELPH-MCNC: 3.5 G/DL — SIGNIFICANT CHANGE UP (ref 3.3–5)
ALP SERPL-CCNC: 56 U/L — SIGNIFICANT CHANGE UP (ref 40–120)
ALT FLD-CCNC: 43 U/L — SIGNIFICANT CHANGE UP (ref 10–45)
ANION GAP SERPL CALC-SCNC: 13 MMOL/L — SIGNIFICANT CHANGE UP (ref 5–17)
APTT BLD: 29.3 SEC — SIGNIFICANT CHANGE UP (ref 27.5–35.5)
AST SERPL-CCNC: 45 U/L — HIGH (ref 10–40)
BASOPHILS # BLD AUTO: 0.05 K/UL — SIGNIFICANT CHANGE UP (ref 0–0.2)
BASOPHILS NFR BLD AUTO: 0.7 % — SIGNIFICANT CHANGE UP (ref 0–2)
BILIRUB SERPL-MCNC: 0.8 MG/DL — SIGNIFICANT CHANGE UP (ref 0.2–1.2)
BUN SERPL-MCNC: 16 MG/DL — SIGNIFICANT CHANGE UP (ref 7–23)
CALCIUM SERPL-MCNC: 8.4 MG/DL — SIGNIFICANT CHANGE UP (ref 8.4–10.5)
CHLORIDE SERPL-SCNC: 105 MMOL/L — SIGNIFICANT CHANGE UP (ref 96–108)
CO2 SERPL-SCNC: 20 MMOL/L — LOW (ref 22–31)
CREAT SERPL-MCNC: 1.03 MG/DL — SIGNIFICANT CHANGE UP (ref 0.5–1.3)
EOSINOPHIL # BLD AUTO: 0.07 K/UL — SIGNIFICANT CHANGE UP (ref 0–0.5)
EOSINOPHIL NFR BLD AUTO: 0.9 % — SIGNIFICANT CHANGE UP (ref 0–6)
FERRITIN SERPL-MCNC: 160 NG/ML — SIGNIFICANT CHANGE UP (ref 30–400)
GLUCOSE SERPL-MCNC: 121 MG/DL — HIGH (ref 70–99)
HCT VFR BLD CALC: 28 % — LOW (ref 39–50)
HGB BLD-MCNC: 9.2 G/DL — LOW (ref 13–17)
IMM GRANULOCYTES NFR BLD AUTO: 0.8 % — SIGNIFICANT CHANGE UP (ref 0–1.5)
INR BLD: 0.9 RATIO — SIGNIFICANT CHANGE UP (ref 0.88–1.16)
IRON SATN MFR SERPL: 32 % — SIGNIFICANT CHANGE UP (ref 16–55)
IRON SATN MFR SERPL: 78 UG/DL — SIGNIFICANT CHANGE UP (ref 45–165)
LDH SERPL L TO P-CCNC: 220 U/L — SIGNIFICANT CHANGE UP (ref 50–242)
LYMPHOCYTES # BLD AUTO: 1.15 K/UL — SIGNIFICANT CHANGE UP (ref 1–3.3)
LYMPHOCYTES # BLD AUTO: 15.5 % — SIGNIFICANT CHANGE UP (ref 13–44)
MAGNESIUM SERPL-MCNC: 1.6 MG/DL — SIGNIFICANT CHANGE UP (ref 1.6–2.6)
MCHC RBC-ENTMCNC: 32.9 GM/DL — SIGNIFICANT CHANGE UP (ref 32–36)
MCHC RBC-ENTMCNC: 34.5 PG — HIGH (ref 27–34)
MCV RBC AUTO: 104.9 FL — HIGH (ref 80–100)
MONOCYTES # BLD AUTO: 0.8 K/UL — SIGNIFICANT CHANGE UP (ref 0–0.9)
MONOCYTES NFR BLD AUTO: 10.8 % — SIGNIFICANT CHANGE UP (ref 2–14)
NEUTROPHILS # BLD AUTO: 5.28 K/UL — SIGNIFICANT CHANGE UP (ref 1.8–7.4)
NEUTROPHILS NFR BLD AUTO: 71.3 % — SIGNIFICANT CHANGE UP (ref 43–77)
NRBC # BLD: 0 /100 WBCS — SIGNIFICANT CHANGE UP (ref 0–0)
PHOSPHATE SERPL-MCNC: 2.6 MG/DL — SIGNIFICANT CHANGE UP (ref 2.5–4.5)
PLATELET # BLD AUTO: 363 K/UL — SIGNIFICANT CHANGE UP (ref 150–400)
POTASSIUM SERPL-MCNC: 4 MMOL/L — SIGNIFICANT CHANGE UP (ref 3.5–5.3)
POTASSIUM SERPL-SCNC: 4 MMOL/L — SIGNIFICANT CHANGE UP (ref 3.5–5.3)
PROT SERPL-MCNC: 6.3 G/DL — SIGNIFICANT CHANGE UP (ref 6–8.3)
PROTHROM AB SERPL-ACNC: 10.9 SEC — SIGNIFICANT CHANGE UP (ref 10.6–13.6)
RAPID RVP RESULT: SIGNIFICANT CHANGE UP
RBC # BLD: 2.67 M/UL — LOW (ref 4.2–5.8)
RBC # BLD: 2.67 M/UL — LOW (ref 4.2–5.8)
RBC # FLD: 14 % — SIGNIFICANT CHANGE UP (ref 10.3–14.5)
RETICS #: 43.8 K/UL — SIGNIFICANT CHANGE UP (ref 25–125)
RETICS/RBC NFR: 1.6 % — SIGNIFICANT CHANGE UP (ref 0.5–2.5)
SARS-COV-2 RNA SPEC QL NAA+PROBE: SIGNIFICANT CHANGE UP
SODIUM SERPL-SCNC: 138 MMOL/L — SIGNIFICANT CHANGE UP (ref 135–145)
TIBC SERPL-MCNC: 247 UG/DL — SIGNIFICANT CHANGE UP (ref 220–430)
TROPONIN T, HIGH SENSITIVITY RESULT: 19 NG/L — SIGNIFICANT CHANGE UP (ref 0–51)
TSH SERPL-MCNC: 2.87 UIU/ML — SIGNIFICANT CHANGE UP (ref 0.27–4.2)
UIBC SERPL-MCNC: 168 UG/DL — SIGNIFICANT CHANGE UP (ref 110–370)
VIT B12 SERPL-MCNC: 885 PG/ML — SIGNIFICANT CHANGE UP (ref 232–1245)
WBC # BLD: 7.41 K/UL — SIGNIFICANT CHANGE UP (ref 3.8–10.5)
WBC # FLD AUTO: 7.41 K/UL — SIGNIFICANT CHANGE UP (ref 3.8–10.5)

## 2021-12-31 PROCEDURE — 72125 CT NECK SPINE W/O DYE: CPT | Mod: 26,MA

## 2021-12-31 PROCEDURE — 70450 CT HEAD/BRAIN W/O DYE: CPT | Mod: 26,MA

## 2021-12-31 PROCEDURE — 99223 1ST HOSP IP/OBS HIGH 75: CPT

## 2021-12-31 RX ORDER — THIAMINE MONONITRATE (VIT B1) 100 MG
100 TABLET ORAL DAILY
Refills: 0 | Status: DISCONTINUED | OUTPATIENT
Start: 2021-12-31 | End: 2022-01-04

## 2021-12-31 RX ORDER — ACETAMINOPHEN 500 MG
650 TABLET ORAL EVERY 6 HOURS
Refills: 0 | Status: DISCONTINUED | OUTPATIENT
Start: 2021-12-31 | End: 2022-01-04

## 2021-12-31 RX ORDER — PANTOPRAZOLE SODIUM 20 MG/1
40 TABLET, DELAYED RELEASE ORAL ONCE
Refills: 0 | Status: COMPLETED | OUTPATIENT
Start: 2021-12-31 | End: 2021-12-31

## 2021-12-31 RX ORDER — FOLIC ACID 0.8 MG
1 TABLET ORAL DAILY
Refills: 0 | Status: DISCONTINUED | OUTPATIENT
Start: 2021-12-31 | End: 2022-01-04

## 2021-12-31 RX ADMIN — Medication 1 MILLIGRAM(S): at 09:22

## 2021-12-31 RX ADMIN — PANTOPRAZOLE SODIUM 40 MILLIGRAM(S): 20 TABLET, DELAYED RELEASE ORAL at 02:22

## 2021-12-31 RX ADMIN — Medication 2 MILLIGRAM(S): at 09:22

## 2021-12-31 RX ADMIN — Medication 1 TABLET(S): at 09:22

## 2021-12-31 RX ADMIN — Medication 100 MILLIGRAM(S): at 21:59

## 2021-12-31 NOTE — H&P ADULT - HISTORY OF PRESENT ILLNESS
61M [place] only speaking, undomiciled, ETOH abuse, chronic lower back pain, pancreatitis, frequent ED visits for intoxication, brought in for intoxication from parking lot.     [id number]. Pt is a poor historian, provides limited history, somewhat drowsy during interview. Pt's only complaint at this time is his chronic back pain. Denies any fevers, chills, URI symptoms, blood anywhere. Pt denies blood in stool, and says the color of stool depends on what he eats. Pt states he's never had seizures or hallucinations. Pt reports only drink 2 days of the week, and drinks vodka, last drink yesterday. Pt would not answer whether he's ever had a colonoscopy before. Pt denies any relieving or exacerbating factors.

## 2021-12-31 NOTE — H&P ADULT - NSHPREVIEWOFSYSTEMS_GEN_ALL_CORE
REVIEW OF SYSTEMS:  CONSTITUTIONAL: No weakness. No fevers. No chills.  EYES: No blurry or double vision. No eye pain.  ENT: No hearing difficulty. No vertigo. No dysphagia. No sore throat. No Sinusitis/rhinorrhea.   NECK: No pain. No stiffness/rigidity.  CARDIAC: No chest pain. No palpitations. No lightheadedness.   RESPIRATORY: No cough. No SOB. No hemoptysis.  GASTROINTESTINAL: No abdominal pain. No nausea. No vomiting. No diarrhea. No constipation.   GENITOURINARY: No dysuria. No frequency. No hematuria.  NEUROLOGICAL: No numbness/tingling. No focal weakness. +unsteady gait.  BACK: +back pain. No flank pain.  EXTREMITIES: No lower extremity edema. Full ROM.   SKIN: No rashes. No itching.   ALLERGIC: No lip swelling. No hives.  All other review of systems is negative unless indicated above.  Unless indicated above, unable to assess ROS 2/2

## 2021-12-31 NOTE — H&P ADULT - NSHPPHYSICALEXAM_GEN_ALL_CORE
PHYSICAL EXAM:   GENERAL: Arousable. Drowsy +confused. No acute distress. Not thin. Not cachectic. Not obese.  HEAD:  Atraumatic. Normocephalic.  EYES: EOMI. Normal conjunctiva/sclera.  ENT: Neck supple. No JVD. Moist oral mucosa. Not edentulous. No thrush.  LYMPH: Normal supraclavicular/cervical lymph nodes.   CARDIAC: Not tachy, Not lauren. Regular rhythm. Not irregularly irregular. S1. S2.   LUNG/CHEST: CTAB. BS equal bilaterally. No wheezes. No rales. No rhonchi.  ABDOMEN: Soft. No tenderness. No distension. No fluid wave. Normal bowel sounds.  BACK: No midline/vertebral tenderness. No flank tenderness.  VASCULAR: +2 b/l radial or ulnar pulses. Palpable DP pulses.  EXTREMITIES:  No clubbing. No cyanosis. No edema. Moving all 4.  NEUROLOGY: A&Ox2. Non-focal exam. Cranial nerves intact. slow speech. low volume voice. Sensation intact.  PSYCH: Drowsy behavior. Flat affect.  SKIN: No jaundice. No erythema. dirt on b/l hands. Scabs on all extremities.  Vascular Access:     ICU Vital Signs Last 24 Hrs  T(C): 36.8 (30 Dec 2021 20:09), Max: 36.8 (30 Dec 2021 20:09)  T(F): 98.2 (30 Dec 2021 20:09), Max: 98.2 (30 Dec 2021 20:09)  HR: 76 (30 Dec 2021 21:30) (61 - 82)  BP: 93/56 (30 Dec 2021 21:30) (91/55 - 105/71)  BP(mean): 77 (30 Dec 2021 16:18) (77 - 77)  ABP: --  ABP(mean): --  RR: 18 (30 Dec 2021 21:30) (14 - 18)  SpO2: 98% (30 Dec 2021 21:30) (95% - 100%)      I&O's Summary

## 2021-12-31 NOTE — PHYSICAL THERAPY INITIAL EVALUATION ADULT - PERTINENT HX OF CURRENT PROBLEM, REHAB EVAL
61M [place] Islander only speaking, undomiciled, ETOH abuse, chronic lower back pain, pancreatitis, frequent ED visits for intoxication, brought in for intoxication from parking lot.  [id number]. Pt is a poor historian, provides limited history, somewhat drowsy during interview. Pt's only complaint at this time is his chronic back pain. Pt denies blood in stool, and says the color of stool depends on what he eats

## 2021-12-31 NOTE — H&P ADULT - ASSESSMENT
61M Greek only speaking, undomiciled, ETOH abuse, chronic lower back pain, pancreatitis, frequent ED visits for intoxication, pw etoh intoxication and anemia

## 2021-12-31 NOTE — H&P ADULT - NSHPLABSRESULTS_GEN_ALL_CORE
Personally reviewed old records.  Personally reviewed labs.  Personally reviewed imaging.                        9.8    6.50  )-----------( 401      ( 30 Dec 2021 22:42 )             30.3       12-30    136  |  103  |  18  ----------------------------<  89  3.9   |  20<L>  |  0.97    Ca    8.9      30 Dec 2021 22:42    TPro  6.7  /  Alb  3.8  /  TBili  0.7  /  DBili  x   /  AST  45<H>  /  ALT  44  /  AlkPhos  64  12-30            LIVER FUNCTIONS - ( 30 Dec 2021 22:42 )  Alb: 3.8 g/dL / Pro: 6.7 g/dL / ALK PHOS: 64 U/L / ALT: 44 U/L / AST: 45 U/L / GGT: x

## 2021-12-31 NOTE — PHYSICAL THERAPY INITIAL EVALUATION ADULT - PRECAUTIONS/LIMITATIONS, REHAB EVAL
Pt states he's never had seizures or hallucinations. Pt reports only drink 2 days of the week, and drinks vodka, last drink yesterday. Pt would not answer whether he's ever had a colonoscopy before. Pt denies any relieving or exacerbating factors. CT BRAIN: No acute intracranial bleeding, mass effect, or acute calvarium fracture. Involutional and chronic microvascular ischemic gliotic changes. Paranasal sinus disease as described. CT CERVICAL SPINE: No acute cervical spine fracture, subluxation or evidence of traumatic malalignment. Multilevel degenerative changes as described above. Provided no contraindications, MRI can be performed if there is concern for subtle osseous, ligamentous or cord injury. Pt states he's never had seizures or hallucinations. Pt reports only drink 2 days of the week, and drinks vodka, last drink yesterday. Pt would not answer whether he's ever had a colonoscopy before. Pt denies any relieving or exacerbating factors. CT BRAIN: No acute intracranial bleeding, mass effect, or acute calvarium fracture. Involutional and chronic microvascular ischemic gliotic changes. Paranasal sinus disease as described. CT CERVICAL SPINE: No acute cervical spine fracture, subluxation or evidence of traumatic malalignment. Multilevel degenerative changes as described above. Provided no contraindications, MRI can be performed if there is concern for subtle osseous, ligamentous or cord injury./fall precautions

## 2021-12-31 NOTE — H&P ADULT - PROBLEM SELECTOR PLAN 1
- unclear etiol  - occult blood  - anemia labs  - pending results of above, may need GI consult in AM

## 2021-12-31 NOTE — PROGRESS NOTE ADULT - SUBJECTIVE AND OBJECTIVE BOX
MD jey Mathur Chief Resident Physician  Internal Medicine  Pager: [phone] l [phone]       SUBJECTIVE / OVERNIGHT EVENTS:  - Used  [id number] patient not answering all questions  - A/O x 3, reports pain all over  - Denies HA, tremors, nausea, vomiting, or hallucinations    ADDITIONAL REVIEW OF SYSTEMS:    MEDICATIONS  (STANDING):  folic acid 1 milliGRAM(s) Oral daily  multivitamin 1 Tablet(s) Oral daily  thiamine 100 milliGRAM(s) Oral daily    MEDICATIONS  (PRN):  acetaminophen     Tablet .. 650 milliGRAM(s) Oral every 6 hours PRN Temp greater or equal to 38C (100.4F), Mild Pain (1 - 3)  LORazepam     Tablet 2 milliGRAM(s) Oral every 2 hours PRN CIWA-Ar score increase by 2 points and a total score of 7 or less  LORazepam   Injectable 2 milliGRAM(s) IV Push every 1 hour PRN CIWA-Ar score 8 or greater      I&O's Summary    31 Dec 2021 07:01  -  31 Dec 2021 17:22  --------------------------------------------------------  IN: 0 mL / OUT: 400 mL / NET: -400 mL        PHYSICAL EXAM:  Vital Signs Last 24 Hrs  T(C): 36.9 (31 Dec 2021 16:35), Max: 37.4 (31 Dec 2021 04:39)  T(F): 98.4 (31 Dec 2021 16:35), Max: 99.4 (31 Dec 2021 04:39)  HR: 81 (31 Dec 2021 16:36) (71 - 89)  BP: 130/70 (31 Dec 2021 16:36) (91/55 - 130/70)  BP(mean): --  RR: 18 (31 Dec 2021 16:35) (16 - 18)  SpO2: 97% (31 Dec 2021 16:36) (95% - 99%)    CONSTITUTIONAL: NAD,   EYES: PERRLA; conjunctiva and sclera clear  ENMT: Poor oral dentition  NECK: Supple  RESPIRATORY: Normal respiratory effort; lungs are clear to auscultation bilaterally  CARDIOVASCULAR: Regular rate and rhythm, normal S1 and S2, no murmur/rub/gallop; No lower extremity edema; Peripheral pulses are 2+ bilaterally  ABDOMEN: Nontender to palpation, normoactive bowel sounds, no rebound/guarding; No hepatosplenomegaly  MUSCULOSKELETAL: no clubbing or cyanosis of digits; no joint swelling or tenderness to palpation  PSYCH: A+O to person, place, and time; affect appropriate  NEUROLOGY: No focal deficits   SKIN: No rashes    LABS:                        9.2    7.41  )-----------( 363      ( 31 Dec 2021 03:54 )             28.0     12-31    138  |  105  |  16  ----------------------------<  121<H>  4.0   |  20<L>  |  1.03    Ca    8.4      31 Dec 2021 03:54  Phos  2.6     12-31  Mg     1.6     12-31    TPro  6.3  /  Alb  3.5  /  TBili  0.8  /  DBili  x   /  AST  45<H>  /  ALT  43  /  AlkPhos  56  12-31    PT/INR - ( 31 Dec 2021 06:05 )   PT: 10.9 sec;   INR: 0.90 ratio         PTT - ( 31 Dec 2021 06:05 )  PTT:29.3 sec            RADIOLOGY & ADDITIONAL TESTS:  Results Reviewed:   Imaging Personally Reviewed:  Electrocardiogram Personally Reviewed:    COORDINATION OF CARE:  Care Discussed with Consultants/Other Providers [Y/N]:  Prior or Outpatient Records Reviewed [Y/N]:

## 2021-12-31 NOTE — H&P ADULT - NSHPSOCIALHISTORY_GEN_ALL_CORE
Social History:    Marital Status: (  ) , ( x ) Single, (  ) , (  ) , (  )   # of Children: 0  Lives with: ( x ) alone, (  ) children, (  ) spouse, (  ) parents, (  ) siblings, (  ) friends, (  ) other:   Occupation:     Substance Use/Illicit Drugs: (  ) never used vs other:   Tobacco Usage: ( x ) never smoked, (  ) former smoker, (  ) current smoker and Total Pack-Years:   Last Alcohol Usage/Frequency/Amount/Withdrawal/Hx of Abuse:  last drink "a small amount of vodka" yesterday  Foreign travel:   Animal exposure:

## 2021-12-31 NOTE — H&P ADULT - NSICDXFAMILYHX_GEN_ALL_CORE_FT
Physical Therapy Daily Treatment    [facility]  [address]  [place] [address]    INSURANCE / VISIT COUNT:   Visit Count: 6/8   Progress Note Due: 8 visit  Primary Insurance: [facility]  }Requirements: THERAPY BENEFITS:  PAYOR: MEDICAID - [facility] ([facility])  COPAY: DO NOT COLLECT  VISIT LIMIT: MEDICAL NECESSITY  AUTHORIZATION NEEDED: NO  CALL REF # ONLINE  THIS QUOTE OF BENEFITS IS NOT A GUARANTEE OF PAYMENT  Secondary Insurance: N/A  Date of Initial Evaluation: 12/15/16  Next Referring Physician Visit: 2/13/17    DIAGNOSIS / PRESCRIPTION INFORMATION:   Referred by: Dr. Fannie Schuster MD  Medical Diagnosis (from order):   Pain in both lower extremities [M79.604, M79.605]  - Primary       Chronic low back pain with sciatica, sciatica laterality unspecified, unspecified back        Precautions:  History of HTN;DM    CURRENT SUBJECTIVE / SYMPTOMS:   Current Symptoms:  Current pain:  (R) anterior knee and thigh  pain 8/10, LE pain over the anterior both LEs with (R) usually greater (L) with numbness / tingling in all toes  ,  3/10 lumbar. Patient is very frustrated with lack of pain relief of LE pain. Patient feels leg pain is from neuropathy. Patient states leg pain is so severe that she asked for new medication and was prescribed requip  Functional Change:  Patient reporting on 1/1/2017 swelling of the (R) knee and continues and patient noting increase pain with full weight support.  8/10 when started   Lumbar pain intermittent 7/10 at times and other times mild   Walking with wheeled walker  1/2 block and complains of right knee buckling with near fall 1 x per day.  OBJECTIVE:     Lumbar AROM: * denotes pain  Comments: Only those motions that were assessed are noted.    AROM °  AROM % Comment/Position   Flexion 70° % stretching lumbar   Extension 20° % Right buttocks pain right    Sidebend Left 20° %     Sidebend Right  20° %     Rotation Left  ° 50%      Rotation Right  ° 50%     Can measure either degrees or percentage of available motion or both.   1/6/2017:  Assessment of the (R) knee - without swelling and warmth.  Active ROM complete and overpressures tolerated but pain present with overpressures and at the end of the motions.  Self limits weight support on the (R) LE    right knee flexion 0-110; left 0-130  Strength right knee flexion and extension 4-/5   Left 4-/5  Circumference right knee joint line 39.5 cm   ; left 38.5 cm   negative ant and post  Drawer; MCL;LCL and thessalys  TODAY'S TREATMENT:   Therapeutic Exercise:   Exercise Repetitions Sets Position/Cues   sktc   5     Heel slide abdominal bracing 10 1          Side lying glut med  10 1 Working with lower LE pressing down to get transverse abdominus to contract   Bridging  10 1 Working with gluteal set and then bridge   Bike seat # 0 level 2 x 5 minutes       6\" step ups forwrd 10 1    Mini squats  10 1    standing hip extension and abduction with level 1 theraband (B)  10 each  1    Comments:   Gait : Wheeled walker up to 30 minutes   Stairs with 1 rail and reciprocal pattern with fair control  Manual Therapy:    patella mobs right knee    tape to correct right patella lateral glide   Modalities:  Patient has been made aware of potential contraindications and possible risks associated with the use of the following modalities and has agreed.  Ultrasound (01686):  Location: bilateral lumbar; Position: prone; Duration: 10 minutes.  Intensity: 1.5 W/cm2, Duty Cycle: 100% duty cycle; Frequency: 1 Mhz.   Modality treatment resulted in decreased pain.  Patient reports no adverse reaction to treatment.    Modalities:  Patient has been made aware of potential contraindications and possible risks associated with the use of the following modalities and has agreed.  Mechanical Traction (04077): lumbar  Position: hooklying; Duration: 10 minutes; Parameters: Intermittent: 30/15 pounds, 30/20 seconds  hold/relax  Joint Distraction: force: 50 lbs or 50% body weight; hold/relax time: 15/15; total traction time: 20-30 minutes   Modality treatment resulted in decreased pain.  Patient reports no adverse reaction to treatment.  Home Exercise Program:  Abdominal bracing with knee lift heel slide   Bridging  Side lying glut med   ASSESSMENT:   Patient   Pain after treatment: 3/10. Knee, lumbar 0/10 , LEs 0/10  Patient Verbalizes understanding, Demonstrates understanding and Needs reinforcement of education as documented/outlined above.  Treatment focus on right knee pain and weakness , Continue  working with home program and education of stability of the lumbar spine, strengthening right knee   GOALS:   To be obtained by end of this plan of care:     1. Patient independent with modified and progressed home exercise program.  2. Patient will demonstrate proper body mechanics for sitting and standing.  3. Patient will increase lumbar active range of motion to within functional limits to aid in age appropriate activities, dressing, independent transfers and bed mobility, positional transitions, self care activities and activity tolerance.  4. Patient will increase involved lower extremity strength to 4/5 to aid in ambulating 30 minutes for independent living, sitting/standing for 30 minutes to cook/eat a meal, age appropriate activities, completing household tasks, dressing, independent transfers and bed mobility, positional transitions, self care activities and activity tolerance.  5. Patient will be able to sleep 6 hours without disruption from pain.   6. Patient will be able to tolerate standing activities for greater than or equal to 30 minutes without  pain/difficulty.   7. Oswestry: Patient will complete form to reflect an improved score from initial score of 51 to less than or equal to 15% (0-20% = minimal disability; 20-40% = moderate disability; 40-60% = severe disability; 60-80% = crippled; % = bed bound) to  indicate pt reported improvement in function/disability/impairment (minimal detectable change: 12%).      Goals: goals slow progress  PLAN:    Next session: lumbar stabilization   US/IFC  Joint mobs to lumbar spine  Posture and body mechanics education   LE strengthening   check effectiveness of tape   Work on positioning in sitting and standing and even when using supports  Check effectiveness of traction  Therapy Daily Billing:    [facility] [place]  Secondary Insurance: N/A    Evaluation Procedures:  No evaluation codes were used on this date of service    Timed Procedures:  Therapeutic Activity, 10 minutes  Therapeutic Exercise, 20 minutes  Ultrasound, 10 minutes    Untimed Procedures:  Mechanical Traction    Total Treatment Time: 55 minutes    Certification from: 12/15/16 through: 3/15/17.  I certify the need for these services, furnished under this plan of treatment and while under my care  Electronically sent for physician signature   FAMILY HISTORY:  Patient's father is

## 2022-01-01 DIAGNOSIS — E83.42 HYPOMAGNESEMIA: ICD-10-CM

## 2022-01-01 LAB
ALBUMIN SERPL ELPH-MCNC: 3.8 G/DL — SIGNIFICANT CHANGE UP (ref 3.3–5)
ALP SERPL-CCNC: 66 U/L — SIGNIFICANT CHANGE UP (ref 40–120)
ALT FLD-CCNC: 37 U/L — SIGNIFICANT CHANGE UP (ref 10–45)
ANION GAP SERPL CALC-SCNC: 14 MMOL/L — SIGNIFICANT CHANGE UP (ref 5–17)
AST SERPL-CCNC: 41 U/L — HIGH (ref 10–40)
BILIRUB SERPL-MCNC: 0.8 MG/DL — SIGNIFICANT CHANGE UP (ref 0.2–1.2)
BUN SERPL-MCNC: 11 MG/DL — SIGNIFICANT CHANGE UP (ref 7–23)
CALCIUM SERPL-MCNC: 8.6 MG/DL — SIGNIFICANT CHANGE UP (ref 8.4–10.5)
CHLORIDE SERPL-SCNC: 99 MMOL/L — SIGNIFICANT CHANGE UP (ref 96–108)
CO2 SERPL-SCNC: 21 MMOL/L — LOW (ref 22–31)
CREAT SERPL-MCNC: 0.88 MG/DL — SIGNIFICANT CHANGE UP (ref 0.5–1.3)
GLUCOSE SERPL-MCNC: 117 MG/DL — HIGH (ref 70–99)
HCT VFR BLD CALC: 31.1 % — LOW (ref 39–50)
HGB BLD-MCNC: 10 G/DL — LOW (ref 13–17)
MAGNESIUM SERPL-MCNC: 1.4 MG/DL — LOW (ref 1.6–2.6)
MCHC RBC-ENTMCNC: 32.2 GM/DL — SIGNIFICANT CHANGE UP (ref 32–36)
MCHC RBC-ENTMCNC: 33.6 PG — SIGNIFICANT CHANGE UP (ref 27–34)
MCV RBC AUTO: 104.4 FL — HIGH (ref 80–100)
NRBC # BLD: 0 /100 WBCS — SIGNIFICANT CHANGE UP (ref 0–0)
PHOSPHATE SERPL-MCNC: 3.5 MG/DL — SIGNIFICANT CHANGE UP (ref 2.5–4.5)
PLATELET # BLD AUTO: 404 K/UL — HIGH (ref 150–400)
POTASSIUM SERPL-MCNC: 4.3 MMOL/L — SIGNIFICANT CHANGE UP (ref 3.5–5.3)
POTASSIUM SERPL-SCNC: 4.3 MMOL/L — SIGNIFICANT CHANGE UP (ref 3.5–5.3)
PROT SERPL-MCNC: 6.6 G/DL — SIGNIFICANT CHANGE UP (ref 6–8.3)
RBC # BLD: 2.98 M/UL — LOW (ref 4.2–5.8)
RBC # FLD: 13.6 % — SIGNIFICANT CHANGE UP (ref 10.3–14.5)
SODIUM SERPL-SCNC: 134 MMOL/L — LOW (ref 135–145)
WBC # BLD: 6.25 K/UL — SIGNIFICANT CHANGE UP (ref 3.8–10.5)
WBC # FLD AUTO: 6.25 K/UL — SIGNIFICANT CHANGE UP (ref 3.8–10.5)

## 2022-01-01 PROCEDURE — 99232 SBSQ HOSP IP/OBS MODERATE 35: CPT

## 2022-01-01 RX ORDER — MAGNESIUM SULFATE 500 MG/ML
2 VIAL (ML) INJECTION ONCE
Refills: 0 | Status: COMPLETED | OUTPATIENT
Start: 2022-01-01 | End: 2022-01-01

## 2022-01-01 RX ORDER — INFLUENZA VIRUS VACCINE 15; 15; 15; 15 UG/.5ML; UG/.5ML; UG/.5ML; UG/.5ML
0.5 SUSPENSION INTRAMUSCULAR ONCE
Refills: 0 | Status: DISCONTINUED | OUTPATIENT
Start: 2022-01-01 | End: 2022-01-04

## 2022-01-01 RX ADMIN — Medication 25 GRAM(S): at 18:05

## 2022-01-01 RX ADMIN — Medication 650 MILLIGRAM(S): at 22:46

## 2022-01-01 RX ADMIN — Medication 650 MILLIGRAM(S): at 04:43

## 2022-01-01 RX ADMIN — Medication 1 MILLIGRAM(S): at 12:02

## 2022-01-01 RX ADMIN — Medication 650 MILLIGRAM(S): at 06:04

## 2022-01-01 RX ADMIN — Medication 650 MILLIGRAM(S): at 16:45

## 2022-01-01 RX ADMIN — Medication 100 MILLIGRAM(S): at 12:02

## 2022-01-01 RX ADMIN — Medication 650 MILLIGRAM(S): at 15:57

## 2022-01-01 RX ADMIN — Medication 1 TABLET(S): at 12:02

## 2022-01-01 RX ADMIN — Medication 650 MILLIGRAM(S): at 22:20

## 2022-01-01 NOTE — PATIENT PROFILE ADULT - FALL HARM RISK - HARM RISK INTERVENTIONS

## 2022-01-01 NOTE — PATIENT PROFILE ADULT - BILL PAYMENT - OTHER DETAILS
Homeless, no bills. Couldn't pay any bills either way, homeless with no job. Sell bottles just to get food.

## 2022-01-01 NOTE — PROGRESS NOTE ADULT - SUBJECTIVE AND OBJECTIVE BOX
[facility] Division of Hospital Medicine  Jluis Monique  Pager: [phone]      Patient is a 61y old  Male who presents with a chief complaint of intoxicated, anemia (31 Dec 2021 17:20)      SUBJECTIVE / OVERNIGHT EVENTS:  ADDITIONAL REVIEW OF SYSTEMS:    MEDICATIONS  (STANDING):  folic acid 1 milliGRAM(s) Oral daily  influenza   Vaccine 0.5 milliLiter(s) IntraMuscular once  multivitamin 1 Tablet(s) Oral daily  thiamine 100 milliGRAM(s) Oral daily    MEDICATIONS  (PRN):  acetaminophen     Tablet .. 650 milliGRAM(s) Oral every 6 hours PRN Temp greater or equal to 38C (100.4F), Mild Pain (1 - 3)  LORazepam     Tablet 2 milliGRAM(s) Oral every 2 hours PRN CIWA-Ar score increase by 2 points and a total score of 7 or less  LORazepam   Injectable 2 milliGRAM(s) IV Push every 1 hour PRN CIWA-Ar score 8 or greater      CAPILLARY BLOOD GLUCOSE        I&O's Summary    31 Dec 2021 07:01  -  01 Jan 2022 07:00  --------------------------------------------------------  IN: 820 mL / OUT: 1200 mL / NET: -380 mL        PHYSICAL EXAM:  Vital Signs Last 24 Hrs  T(C): 37 (01 Jan 2022 04:55), Max: 37.1 (31 Dec 2021 14:02)  T(F): 98.6 (01 Jan 2022 04:55), Max: 98.7 (31 Dec 2021 14:02)  HR: 82 (01 Jan 2022 04:55) (69 - 82)  BP: 109/65 (01 Jan 2022 04:55) (106/63 - 130/70)  BP(mean): --  RR: 18 (01 Jan 2022 04:55) (18 - 18)  SpO2: 98% (01 Jan 2022 04:55) (97% - 98%)  CONSTITUTIONAL: NAD, well-developed, well-groomed  EYES: PERRLA; conjunctiva and sclera clear  ENMT: Moist oral mucosa, no pharyngeal injection or exudates; normal dentition  NECK: Supple, no palpable masses; no thyromegaly  RESPIRATORY: Normal respiratory effort; lungs are clear to auscultation bilaterally  CARDIOVASCULAR: Regular rate and rhythm, normal S1 and S2, no murmur/rub/gallop; No lower extremity edema; Peripheral pulses are 2+ bilaterally  ABDOMEN: Nontender to palpation, normoactive bowel sounds, no rebound/guarding; No hepatosplenomegaly  MUSCULOSKELETAL:  Normal gait; no clubbing or cyanosis of digits; no joint swelling or tenderness to palpation  PSYCH: A+O to person, place, and time; affect appropriate  NEUROLOGY: CN 2-12 are intact and symmetric; no gross sensory deficits   SKIN: No rashes; no palpable lesions    LABS:                        9.2    7.41  )-----------( 363      ( 31 Dec 2021 03:54 )             28.0     12-31    138  |  105  |  16  ----------------------------<  121<H>  4.0   |  20<L>  |  1.03    Ca    8.4      31 Dec 2021 03:54  Phos  2.6     12-31  Mg     1.6     12-31    TPro  6.3  /  Alb  3.5  /  TBili  0.8  /  DBili  x   /  AST  45<H>  /  ALT  43  /  AlkPhos  56  12-31    PT/INR - ( 31 Dec 2021 06:05 )   PT: 10.9 sec;   INR: 0.90 ratio         PTT - ( 31 Dec 2021 06:05 )  PTT:29.3 sec            RADIOLOGY & ADDITIONAL TESTS:  Results Reviewed:   Imaging Personally Reviewed:  Electrocardiogram Personally Reviewed:    COORDINATION OF CARE:  Care Discussed with Consultants/Other Providers [Y/N]:  Prior or Outpatient Records Reviewed [Y/N]:   [facility] Division of Hospital Medicine  Jluis Monique  Pager: [phone]      Patient is a 61y old  Male who presents with a chief complaint of intoxicated, anemia (31 Dec 2021 17:20)      SUBJECTIVE / OVERNIGHT EVENTS: no events overnight. Eating. Hg is stable. Endorses LE pain.  ADDITIONAL REVIEW OF SYSTEMS: no nausea, vomiting, diarrhea, abd pain.     MEDICATIONS  (STANDING):  folic acid 1 milliGRAM(s) Oral daily  influenza   Vaccine 0.5 milliLiter(s) IntraMuscular once  multivitamin 1 Tablet(s) Oral daily  thiamine 100 milliGRAM(s) Oral daily    MEDICATIONS  (PRN):  acetaminophen     Tablet .. 650 milliGRAM(s) Oral every 6 hours PRN Temp greater or equal to 38C (100.4F), Mild Pain (1 - 3)  LORazepam     Tablet 2 milliGRAM(s) Oral every 2 hours PRN CIWA-Ar score increase by 2 points and a total score of 7 or less  LORazepam   Injectable 2 milliGRAM(s) IV Push every 1 hour PRN CIWA-Ar score 8 or greater      CAPILLARY BLOOD GLUCOSE        I&O's Summary    31 Dec 2021 07:01  -  01 Jan 2022 07:00  --------------------------------------------------------  IN: 820 mL / OUT: 1200 mL / NET: -380 mL        PHYSICAL EXAM:  Vital Signs Last 24 Hrs  T(C): 37 (01 Jan 2022 04:55), Max: 37.1 (31 Dec 2021 14:02)  T(F): 98.6 (01 Jan 2022 04:55), Max: 98.7 (31 Dec 2021 14:02)  HR: 82 (01 Jan 2022 04:55) (69 - 82)  BP: 109/65 (01 Jan 2022 04:55) (106/63 - 130/70)  BP(mean): --  RR: 18 (01 Jan 2022 04:55) (18 - 18)  SpO2: 98% (01 Jan 2022 04:55) (97% - 98%)  CONSTITUTIONAL: NAD, well-developed, well-groomed  EYES: PERRLA; conjunctiva and sclera clear  ENMT: Moist oral mucosa, no pharyngeal injection or exudates; normal dentition  NECK: Supple, no palpable masses; no thyromegaly  RESPIRATORY: Normal respiratory effort; lungs are clear to auscultation bilaterally  CARDIOVASCULAR: Regular rate and rhythm, normal S1 and S2, no murmur/rub/gallop; No lower extremity edema; Peripheral pulses are 2+ bilaterally  ABDOMEN: Nontender to palpation, normoactive bowel sounds, no rebound/guarding; No hepatosplenomegaly  MUSCULOSKELETALno clubbing or cyanosis of digits; no joint swelling or tenderness to palpation  PSYCH: A+O to person, place, and time; affect appropriate  NEUROLOGY: CN 2-12 are intact and symmetric; no gross sensory deficits   SKIN: No rashes; no palpable lesions; multiple scabs along hands and legs    LABS:                        9.2    7.41  )-----------( 363      ( 31 Dec 2021 03:54 )             28.0     12-31    138  |  105  |  16  ----------------------------<  121<H>  4.0   |  20<L>  |  1.03    Ca    8.4      31 Dec 2021 03:54  Phos  2.6     12-31  Mg     1.6     12-31    TPro  6.3  /  Alb  3.5  /  TBili  0.8  /  DBili  x   /  AST  45<H>  /  ALT  43  /  AlkPhos  56  12-31    PT/INR - ( 31 Dec 2021 06:05 )   PT: 10.9 sec;   INR: 0.90 ratio         PTT - ( 31 Dec 2021 06:05 )  PTT:29.3 sec            RADIOLOGY & ADDITIONAL TESTS:  Results Reviewed:   Imaging Personally Reviewed:  Electrocardiogram Personally Reviewed:    COORDINATION OF CARE:  Care Discussed with Consultants/Other Providers [Y/N]: y  Prior or Outpatient Records Reviewed [Y/N]:

## 2022-01-02 LAB
ALBUMIN SERPL ELPH-MCNC: 3.8 G/DL — SIGNIFICANT CHANGE UP (ref 3.3–5)
ALP SERPL-CCNC: 67 U/L — SIGNIFICANT CHANGE UP (ref 40–120)
ALT FLD-CCNC: 32 U/L — SIGNIFICANT CHANGE UP (ref 10–45)
ANION GAP SERPL CALC-SCNC: 12 MMOL/L — SIGNIFICANT CHANGE UP (ref 5–17)
AST SERPL-CCNC: 32 U/L — SIGNIFICANT CHANGE UP (ref 10–40)
BILIRUB SERPL-MCNC: 0.5 MG/DL — SIGNIFICANT CHANGE UP (ref 0.2–1.2)
BUN SERPL-MCNC: 17 MG/DL — SIGNIFICANT CHANGE UP (ref 7–23)
CALCIUM SERPL-MCNC: 8.9 MG/DL — SIGNIFICANT CHANGE UP (ref 8.4–10.5)
CHLORIDE SERPL-SCNC: 100 MMOL/L — SIGNIFICANT CHANGE UP (ref 96–108)
CO2 SERPL-SCNC: 23 MMOL/L — SIGNIFICANT CHANGE UP (ref 22–31)
CREAT SERPL-MCNC: 1.05 MG/DL — SIGNIFICANT CHANGE UP (ref 0.5–1.3)
GLUCOSE SERPL-MCNC: 106 MG/DL — HIGH (ref 70–99)
HCT VFR BLD CALC: 32.9 % — LOW (ref 39–50)
HGB BLD-MCNC: 11.1 G/DL — LOW (ref 13–17)
MAGNESIUM SERPL-MCNC: 1.9 MG/DL — SIGNIFICANT CHANGE UP (ref 1.6–2.6)
MCHC RBC-ENTMCNC: 33.7 GM/DL — SIGNIFICANT CHANGE UP (ref 32–36)
MCHC RBC-ENTMCNC: 34 PG — SIGNIFICANT CHANGE UP (ref 27–34)
MCV RBC AUTO: 100.9 FL — HIGH (ref 80–100)
NRBC # BLD: 0 /100 WBCS — SIGNIFICANT CHANGE UP (ref 0–0)
PHOSPHATE SERPL-MCNC: 3.6 MG/DL — SIGNIFICANT CHANGE UP (ref 2.5–4.5)
PLATELET # BLD AUTO: 414 K/UL — HIGH (ref 150–400)
POTASSIUM SERPL-MCNC: 4.2 MMOL/L — SIGNIFICANT CHANGE UP (ref 3.5–5.3)
POTASSIUM SERPL-SCNC: 4.2 MMOL/L — SIGNIFICANT CHANGE UP (ref 3.5–5.3)
PROT SERPL-MCNC: 7 G/DL — SIGNIFICANT CHANGE UP (ref 6–8.3)
RBC # BLD: 3.26 M/UL — LOW (ref 4.2–5.8)
RBC # FLD: 13.3 % — SIGNIFICANT CHANGE UP (ref 10.3–14.5)
SODIUM SERPL-SCNC: 135 MMOL/L — SIGNIFICANT CHANGE UP (ref 135–145)
WBC # BLD: 5.13 K/UL — SIGNIFICANT CHANGE UP (ref 3.8–10.5)
WBC # FLD AUTO: 5.13 K/UL — SIGNIFICANT CHANGE UP (ref 3.8–10.5)

## 2022-01-02 PROCEDURE — 99232 SBSQ HOSP IP/OBS MODERATE 35: CPT

## 2022-01-02 RX ADMIN — Medication 650 MILLIGRAM(S): at 09:00

## 2022-01-02 RX ADMIN — Medication 1 MILLIGRAM(S): at 12:39

## 2022-01-02 RX ADMIN — Medication 100 MILLIGRAM(S): at 12:39

## 2022-01-02 RX ADMIN — Medication 650 MILLIGRAM(S): at 17:20

## 2022-01-02 RX ADMIN — Medication 650 MILLIGRAM(S): at 08:35

## 2022-01-02 RX ADMIN — Medication 650 MILLIGRAM(S): at 16:44

## 2022-01-02 RX ADMIN — Medication 1 TABLET(S): at 12:39

## 2022-01-02 NOTE — PROGRESS NOTE ADULT - SUBJECTIVE AND OBJECTIVE BOX
MD jey Mathur Chief Resident Physician  Internal Medicine  Pager: [phone] l [phone]       SUBJECTIVE / OVERNIGHT EVENTS:  - No acute events overnight  - Used  [id number]: patient reports he is homeless currently complaining of 6/10 back pain non-radiating with no weakness in legs urinary/bowel incontinence   - Denies cp, sob, abdominal pain, nausea, vomiting, headaches, tremors     ADDITIONAL REVIEW OF SYSTEMS:    MEDICATIONS  (STANDING):  folic acid 1 milliGRAM(s) Oral daily  influenza   Vaccine 0.5 milliLiter(s) IntraMuscular once  multivitamin 1 Tablet(s) Oral daily  thiamine 100 milliGRAM(s) Oral daily    MEDICATIONS  (PRN):  acetaminophen     Tablet .. 650 milliGRAM(s) Oral every 6 hours PRN Temp greater or equal to 38C (100.4F), Mild Pain (1 - 3)  LORazepam     Tablet 2 milliGRAM(s) Oral every 2 hours PRN CIWA-Ar score increase by 2 points and a total score of 7 or less  LORazepam   Injectable 2 milliGRAM(s) IV Push every 1 hour PRN CIWA-Ar score 8 or greater      I&O's Summary    01 Jan 2022 07:01  -  02 Jan 2022 07:00  --------------------------------------------------------  IN: 50 mL / OUT: 3475 mL / NET: -3425 mL    02 Jan 2022 07:01  -  02 Jan 2022 15:07  --------------------------------------------------------  IN: 0 mL / OUT: 400 mL / NET: -400 mL        PHYSICAL EXAM:  Vital Signs Last 24 Hrs  T(C): 36.8 (02 Jan 2022 13:29), Max: 37.4 (02 Jan 2022 04:20)  T(F): 98.3 (02 Jan 2022 13:29), Max: 99.3 (02 Jan 2022 04:20)  HR: 65 (02 Jan 2022 13:29) (63 - 78)  BP: 103/62 (02 Jan 2022 13:29) (103/62 - 127/70)  BP(mean): --  RR: 18 (02 Jan 2022 13:29) (18 - 18)  SpO2: 98% (02 Jan 2022 13:29) (96% - 98%)    CONSTITUTIONAL: NAD, well-developed, well-groomed  EYES: PERRLA; conjunctiva and sclera clear  ENMT: no tongue fasciculations   NECK: Supple, no palpable masses; no thyromegaly  RESPIRATORY: Normal respiratory effort; lungs are clear to auscultation bilaterally  CARDIOVASCULAR: Regular rate and rhythm, normal S1 and S2, no murmur/rub/gallop; No lower extremity edema; Peripheral pulses are 2+ bilaterally  ABDOMEN: Nontender to palpation, normoactive bowel sounds, no rebound/guarding; No hepatosplenomegaly  MUSCULOSKELETAL:  tenderness to palpation left paraspinal muscle in lower back  PSYCH: A+O to person, place, and time; affect appropriate  NEUROLOGY: no focal neuro deficits   SKIN: No rashes; no palpable lesions    LABS:                        11.1   5.13  )-----------( 414      ( 02 Jan 2022 06:11 )             32.9     01-02    135  |  100  |  17  ----------------------------<  106<H>  4.2   |  23  |  1.05    Ca    8.9      02 Jan 2022 06:11  Phos  3.6     01-02  Mg     1.9     01-02    TPro  7.0  /  Alb  3.8  /  TBili  0.5  /  DBili  x   /  AST  32  /  ALT  32  /  AlkPhos  67  01-02                RADIOLOGY & ADDITIONAL TESTS:  Results Reviewed:   Imaging Personally Reviewed:  Electrocardiogram Personally Reviewed:    COORDINATION OF CARE:  Care Discussed with Consultants/Other Providers [Y/N]:  Prior or Outpatient Records Reviewed [Y/N]:

## 2022-01-03 LAB
HCT VFR BLD CALC: 33.3 % — LOW (ref 39–50)
HGB BLD-MCNC: 11 G/DL — LOW (ref 13–17)
MCHC RBC-ENTMCNC: 33 GM/DL — SIGNIFICANT CHANGE UP (ref 32–36)
MCHC RBC-ENTMCNC: 33.6 PG — SIGNIFICANT CHANGE UP (ref 27–34)
MCV RBC AUTO: 101.8 FL — HIGH (ref 80–100)
NRBC # BLD: 0 /100 WBCS — SIGNIFICANT CHANGE UP (ref 0–0)
PLATELET # BLD AUTO: 409 K/UL — HIGH (ref 150–400)
RBC # BLD: 3.27 M/UL — LOW (ref 4.2–5.8)
RBC # FLD: 13.3 % — SIGNIFICANT CHANGE UP (ref 10.3–14.5)
WBC # BLD: 6.76 K/UL — SIGNIFICANT CHANGE UP (ref 3.8–10.5)
WBC # FLD AUTO: 6.76 K/UL — SIGNIFICANT CHANGE UP (ref 3.8–10.5)

## 2022-01-03 PROCEDURE — 99232 SBSQ HOSP IP/OBS MODERATE 35: CPT

## 2022-01-03 RX ORDER — LIDOCAINE 4 G/100G
1 CREAM TOPICAL DAILY
Refills: 0 | Status: DISCONTINUED | OUTPATIENT
Start: 2022-01-03 | End: 2022-01-04

## 2022-01-03 RX ADMIN — Medication 100 MILLIGRAM(S): at 11:13

## 2022-01-03 RX ADMIN — Medication 650 MILLIGRAM(S): at 15:50

## 2022-01-03 RX ADMIN — Medication 650 MILLIGRAM(S): at 15:20

## 2022-01-03 RX ADMIN — LIDOCAINE 1 PATCH: 4 CREAM TOPICAL at 18:34

## 2022-01-03 RX ADMIN — LIDOCAINE 1 PATCH: 4 CREAM TOPICAL at 18:53

## 2022-01-03 RX ADMIN — Medication 1 MILLIGRAM(S): at 11:13

## 2022-01-03 RX ADMIN — Medication 1 TABLET(S): at 11:13

## 2022-01-03 NOTE — PROGRESS NOTE ADULT - SUBJECTIVE AND OBJECTIVE BOX
Patient is a 61y old  Male who presents with a chief complaint of intoxicated, anemia (02 Jan 2022 15:06)    : [id number]     SUBJECTIVE / OVERNIGHT EVENTS: Patient seen and examined at bedside.   - complains of back pain (chronic), 4/10  - able to ambulate without assistance      ROS:  All other review of systems negative    Allergies    No Known Allergies    Intolerances        MEDICATIONS  (STANDING):  folic acid 1 milliGRAM(s) Oral daily  influenza   Vaccine 0.5 milliLiter(s) IntraMuscular once  multivitamin 1 Tablet(s) Oral daily  thiamine 100 milliGRAM(s) Oral daily    MEDICATIONS  (PRN):  acetaminophen     Tablet .. 650 milliGRAM(s) Oral every 6 hours PRN Temp greater or equal to 38C (100.4F), Mild Pain (1 - 3)  LORazepam     Tablet 2 milliGRAM(s) Oral every 2 hours PRN CIWA-Ar score increase by 2 points and a total score of 7 or less  LORazepam   Injectable 2 milliGRAM(s) IV Push every 1 hour PRN CIWA-Ar score 8 or greater      Vital Signs Last 24 Hrs  T(C): 36.8 (03 Jan 2022 10:39), Max: 36.9 (03 Jan 2022 00:20)  T(F): 98.2 (03 Jan 2022 10:39), Max: 98.4 (03 Jan 2022 00:20)  HR: 75 (03 Jan 2022 10:39) (65 - 79)  BP: 105/64 (03 Jan 2022 10:39) (103/62 - 119/66)  BP(mean): --  RR: 18 (03 Jan 2022 10:39) (18 - 18)  SpO2: 97% (03 Jan 2022 10:39) (97% - 98%)  CAPILLARY BLOOD GLUCOSE        I&O's Summary    02 Jan 2022 07:01  -  03 Jan 2022 07:00  --------------------------------------------------------  IN: 0 mL / OUT: 600 mL / NET: -600 mL        PHYSICAL EXAM:  GENERAL: NAD, well-developed  HEAD:  Atraumatic, Normocephalic  EYES: EOMI, PERRLA, conjunctiva and sclera clear  NECK: Supple, No JVD  CHEST/LUNG: Clear to auscultation bilaterally; No wheeze  HEART: Regular rate and rhythm; No murmurs, rubs, or gallops  ABDOMEN: Soft, Nontender, Nondistended; Bowel sounds present  EXTREMITIES:  2+ Peripheral Pulses, No clubbing, cyanosis, or edema  NEUROLOGY: AAOx3, non-focal  PSYCH: calm  SKIN: No rashes or lesions    LABS:                        11.0   6.76  )-----------( 409      ( 03 Jan 2022 06:44 )             33.3     01-02    135  |  100  |  17  ----------------------------<  106<H>  4.2   |  23  |  1.05    Ca    8.9      02 Jan 2022 06:11  Phos  3.6     01-02  Mg     1.9     01-02    TPro  7.0  /  Alb  3.8  /  TBili  0.5  /  DBili  x   /  AST  32  /  ALT  32  /  AlkPhos  67  01-02              RADIOLOGY & ADDITIONAL TESTS:    Care Discussed with Consultants/Other Providers: Medicine ACP and WALT

## 2022-01-04 ENCOUNTER — TRANSCRIPTION ENCOUNTER (OUTPATIENT)
Age: 62
End: 2022-01-04

## 2022-01-04 VITALS
DIASTOLIC BLOOD PRESSURE: 63 MMHG | RESPIRATION RATE: 18 BRPM | TEMPERATURE: 98 F | OXYGEN SATURATION: 95 % | HEART RATE: 85 BPM | SYSTOLIC BLOOD PRESSURE: 107 MMHG

## 2022-01-04 PROCEDURE — 97110 THERAPEUTIC EXERCISES: CPT

## 2022-01-04 PROCEDURE — 82607 VITAMIN B-12: CPT

## 2022-01-04 PROCEDURE — 84484 ASSAY OF TROPONIN QUANT: CPT

## 2022-01-04 PROCEDURE — 82728 ASSAY OF FERRITIN: CPT

## 2022-01-04 PROCEDURE — 85027 COMPLETE CBC AUTOMATED: CPT

## 2022-01-04 PROCEDURE — 80053 COMPREHEN METABOLIC PANEL: CPT

## 2022-01-04 PROCEDURE — 97116 GAIT TRAINING THERAPY: CPT

## 2022-01-04 PROCEDURE — 85045 AUTOMATED RETICULOCYTE COUNT: CPT

## 2022-01-04 PROCEDURE — 84100 ASSAY OF PHOSPHORUS: CPT

## 2022-01-04 PROCEDURE — 36415 COLL VENOUS BLD VENIPUNCTURE: CPT

## 2022-01-04 PROCEDURE — 83735 ASSAY OF MAGNESIUM: CPT

## 2022-01-04 PROCEDURE — 83550 IRON BINDING TEST: CPT

## 2022-01-04 PROCEDURE — 82962 GLUCOSE BLOOD TEST: CPT

## 2022-01-04 PROCEDURE — 99285 EMERGENCY DEPT VISIT HI MDM: CPT | Mod: 25

## 2022-01-04 PROCEDURE — 97161 PT EVAL LOW COMPLEX 20 MIN: CPT

## 2022-01-04 PROCEDURE — 83540 ASSAY OF IRON: CPT

## 2022-01-04 PROCEDURE — 72125 CT NECK SPINE W/O DYE: CPT | Mod: MA

## 2022-01-04 PROCEDURE — 85610 PROTHROMBIN TIME: CPT

## 2022-01-04 PROCEDURE — 85025 COMPLETE CBC W/AUTO DIFF WBC: CPT

## 2022-01-04 PROCEDURE — 99239 HOSP IP/OBS DSCHRG MGMT >30: CPT

## 2022-01-04 PROCEDURE — 85730 THROMBOPLASTIN TIME PARTIAL: CPT

## 2022-01-04 PROCEDURE — 84443 ASSAY THYROID STIM HORMONE: CPT

## 2022-01-04 PROCEDURE — 83615 LACTATE (LD) (LDH) ENZYME: CPT

## 2022-01-04 PROCEDURE — 80307 DRUG TEST PRSMV CHEM ANLYZR: CPT

## 2022-01-04 PROCEDURE — 0225U NFCT DS DNA&RNA 21 SARSCOV2: CPT

## 2022-01-04 PROCEDURE — 70450 CT HEAD/BRAIN W/O DYE: CPT | Mod: MA

## 2022-01-04 RX ADMIN — Medication 1 TABLET(S): at 12:41

## 2022-01-04 RX ADMIN — Medication 100 MILLIGRAM(S): at 12:41

## 2022-01-04 RX ADMIN — Medication 1 MILLIGRAM(S): at 12:41

## 2022-01-04 RX ADMIN — LIDOCAINE 1 PATCH: 4 CREAM TOPICAL at 12:39

## 2022-01-04 RX ADMIN — LIDOCAINE 1 PATCH: 4 CREAM TOPICAL at 12:00

## 2022-01-04 NOTE — PROGRESS NOTE ADULT - SUBJECTIVE AND OBJECTIVE BOX
Patient is a 61y old  Male who presents with a chief complaint of intoxicated, anemia (03 Jan 2022 10:52)      SUBJECTIVE / OVERNIGHT EVENTS:  Patient seen and examined, no concerns today, siting in comfortably in chair     ROS:  All other review of systems negative    Allergies    No Known Allergies    Intolerances        MEDICATIONS  (STANDING):  folic acid 1 milliGRAM(s) Oral daily  influenza   Vaccine 0.5 milliLiter(s) IntraMuscular once  lidocaine   4% Patch 1 Patch Transdermal daily  multivitamin 1 Tablet(s) Oral daily  thiamine 100 milliGRAM(s) Oral daily    MEDICATIONS  (PRN):  acetaminophen     Tablet .. 650 milliGRAM(s) Oral every 6 hours PRN Temp greater or equal to 38C (100.4F), Mild Pain (1 - 3)      Vital Signs Last 24 Hrs  T(C): 36.4 (04 Jan 2022 13:13), Max: 37.4 (04 Jan 2022 05:17)  T(F): 97.5 (04 Jan 2022 13:13), Max: 99.4 (04 Jan 2022 05:17)  HR: 85 (04 Jan 2022 13:13) (73 - 94)  BP: 107/63 (04 Jan 2022 13:13) (107/63 - 126/63)  BP(mean): --  RR: 18 (04 Jan 2022 13:13) (18 - 19)  SpO2: 95% (04 Jan 2022 13:13) (95% - 98%)  CAPILLARY BLOOD GLUCOSE        I&O's Summary    03 Jan 2022 07:01  -  04 Jan 2022 07:00  --------------------------------------------------------  IN: 1500 mL / OUT: 2240 mL / NET: -740 mL    04 Jan 2022 07:01  -  04 Jan 2022 13:16  --------------------------------------------------------  IN: 240 mL / OUT: 0 mL / NET: 240 mL        PHYSICAL EXAM:  GENERAL: NAD, well-developed  HEAD:  Atraumatic, Normocephalic  EYES: EOMI, PERRLA, conjunctiva and sclera clear  NECK: Supple, No JVD  CHEST/LUNG: Clear to auscultation bilaterally; No wheeze  HEART: Regular rate and rhythm; No murmurs, rubs, or gallops  ABDOMEN: Soft, Nontender, Nondistended; Bowel sounds present  EXTREMITIES:  2+ Peripheral Pulses, No clubbing, cyanosis, or edema  NEUROLOGY: AAOx3, non-focal  PSYCH: calm  SKIN: No rashes or lesions    LABS:                        11.0   6.76  )-----------( 409      ( 03 Jan 2022 06:44 )             33.3                     RADIOLOGY & ADDITIONAL TESTS:    Imaging Personally Reviewed:    Consultant(s) Notes Reviewed:      Care Discussed with Consultants/Other Providers:    Case Discussed with Family:    Goals of Care: Patient is a 61y old  Male who presents with a chief complaint of intoxicated, anemia (03 Jan 2022 10:52)      SUBJECTIVE / OVERNIGHT EVENTS:  Patient seen and examined, no concerns today, siting in comfortably in chair     ROS:  All other review of systems negative    Allergies    No Known Allergies    Intolerances        MEDICATIONS  (STANDING):  folic acid 1 milliGRAM(s) Oral daily  influenza   Vaccine 0.5 milliLiter(s) IntraMuscular once  lidocaine   4% Patch 1 Patch Transdermal daily  multivitamin 1 Tablet(s) Oral daily  thiamine 100 milliGRAM(s) Oral daily    MEDICATIONS  (PRN):  acetaminophen     Tablet .. 650 milliGRAM(s) Oral every 6 hours PRN Temp greater or equal to 38C (100.4F), Mild Pain (1 - 3)      Vital Signs Last 24 Hrs  T(C): 36.4 (04 Jan 2022 13:13), Max: 37.4 (04 Jan 2022 05:17)  T(F): 97.5 (04 Jan 2022 13:13), Max: 99.4 (04 Jan 2022 05:17)  HR: 85 (04 Jan 2022 13:13) (73 - 94)  BP: 107/63 (04 Jan 2022 13:13) (107/63 - 126/63)  BP(mean): --  RR: 18 (04 Jan 2022 13:13) (18 - 19)  SpO2: 95% (04 Jan 2022 13:13) (95% - 98%)  CAPILLARY BLOOD GLUCOSE        I&O's Summary    03 Jan 2022 07:01  -  04 Jan 2022 07:00  --------------------------------------------------------  IN: 1500 mL / OUT: 2240 mL / NET: -740 mL    04 Jan 2022 07:01  -  04 Jan 2022 13:16  --------------------------------------------------------  IN: 240 mL / OUT: 0 mL / NET: 240 mL        PHYSICAL EXAM:  GENERAL: NAD, well-developed  HEAD:  Atraumatic, Normocephalic  EYES: EOMI, PERRLA, conjunctiva and sclera clear  NECK: Supple, No JVD  CHEST/LUNG: Clear to auscultation bilaterally; No wheeze  HEART: Regular rate and rhythm; No murmurs, rubs, or gallops  ABDOMEN: Soft, Nontender, Nondistended; Bowel sounds present  EXTREMITIES:  2+ Peripheral Pulses, No clubbing, cyanosis, or edema  NEUROLOGY: AAOx3, non-focal  PSYCH: calm  SKIN: No rashes or lesions    LABS:                        11.0   6.76  )-----------( 409      ( 03 Jan 2022 06:44 )             33.3                     RADIOLOGY & ADDITIONAL TESTS:    Consultant(s) Notes Reviewed:  PT rec noted     Care Discussed with Consultants/Other Providers: Medicine ACP

## 2022-01-04 NOTE — DISCHARGE NOTE PROVIDER - NSFOLLOWUPCLINICS_GEN_ALL_ED_FT
[facility] [place]  Internal Medicine  [address] [place] [address]  Phone: [phone]  Fax: [phone]

## 2022-01-04 NOTE — DISCHARGE NOTE PROVIDER - HOSPITAL COURSE
61M Turkish only speaking, undomiciled, ETOH abuse, chronic lower back pain, pancreatitis, frequent ED visits for intoxication, brought in for intoxication from parking lot.     [id number]. Patient is a poor historian, provides limited history, somewhat drowsy during interview. Pt's only complaint at this time is his chronic back pain. Denies any fevers, chills, URI symptoms, blood anywhere. Pt denies blood in stool, and says the color of stool depends on what he eats. Pt states he's never had seizures or hallucinations. Pt reports only drink 2 days of the week, and drinks vodka, last drink yesterday. Pt would not answer whether he's ever had a colonoscopy before. Pt denies any relieving or exacerbating factors.    Patient was admitted to medicine for further management.  Patient was placed on CIWA protocol, given Multi-vitamin and folic acid, and thiamine, and social work was consulted.  Patient declined all social work resources. CIWA protocol  which was eventually discontinued after patient's intoxication resolved without symptoms.  Patient also found to have anemia, which improved without intervention.  Patient's hypothermia also resolved.     Patient evaluated by physical therapy and found to have no acute PT needs.     Patient cleared for discharge, with outpatient follow up with PCP.   61M Khmer only speaking, undomiciled, ETOH abuse, chronic lower back pain, pancreatitis, frequent ED visits for intoxication, brought in for intoxication from parking lot.    Patient was admitted to medicine for further management.  Patient was placed on CIWA protocol, given Multi-vitamin and folic acid, and thiamine, and social work was consulted.  Patient declined all social work resources. CIWA protocol  which was eventually discontinued after patient's intoxication resolved without symptoms.  Patient also found to have anemia, which improved without intervention.  Patient's hypothermia also resolved.     Patient evaluated by physical therapy and found to have no acute PT needs.     Patient cleared for discharge, with outpatient follow up with PCP.

## 2022-01-04 NOTE — PROGRESS NOTE ADULT - PROBLEM SELECTOR PLAN 1
- unclear etiology, iron studies unremarkable retic count shows hypoproliferative marrow 2/2 to EtOH abuse?  - FOBT pending - no BM so far   - can hold off on GI consult   - h/h is stable 9/2 -->10 --> 11.1   - hold dvt ppx due to anemia
- unclear etiology, iron studies unremarkable retic count shows hypoproliferative marrow 2/2 to EtOH abuse?  - FOBT pending  - GI consulted
- unclear etiology, iron studies unremarkable retic count shows hypoproliferative marrow 2/2 to EtOH abuse?  - h/h is stable ~11
- unclear etiology, iron studies unremarkable retic count shows hypoproliferative marrow 2/2 to EtOH abuse?  - h/h is stable ~11   - hold dvt ppx due to anemia, Patient is low risk ambulating per nursing staff
- unclear etiology, iron studies unremarkable retic count shows hypoproliferative marrow 2/2 to EtOH abuse?  - FOBT pending - no BM so far   - GI consulted  - h/h is stable 9/2 -->10  - hold dvt ppx due to anemia

## 2022-01-04 NOTE — PROGRESS NOTE ADULT - PROBLEM SELECTOR PLAN 2
- low risk CIWA, CIWA 1-3  - pt is also undomiciled  - Continue MVI, Folic acid and thiamine   - Evaluated by PT today not following much commands   - sw consult
- low risk CIWA, CIWA 1-3  - pt is also undomiciled  - Continue MVI, Folic acid and thiamine   -  consult  - alert today
- low risk CIWA, CIWA 1-3   - d/c'ed CIWA today  - pt is also undomiciled  - Continue MVI, Folic acid and thiamine   - sw consulted
- d/c'ed CIWA   - pt is also undomiciled  - Continue MVI, Folic acid and thiamine   - sw consulted
- low risk CIWA, CIWA 1 if remains low can d/c CIWA tomorrow   - pt is also undomiciled  - Continue MVI, Folic acid and thiamine   -  consult  - alert today

## 2022-01-04 NOTE — DISCHARGE NOTE NURSING/CASE MANAGEMENT/SOCIAL WORK - NSDCPEFALRISK_GEN_ALL_CORE
For information on Fall & Injury Prevention, visit: [email or web address] OR  [email or web address] OR  https://www.cdc.gov/steadi/patient.html

## 2022-01-04 NOTE — PROGRESS NOTE ADULT - ASSESSMENT
61M Swedish only speaking, undomiciled, ETOH abuse, chronic lower back pain, pancreatitis, frequent ED visits for intoxication, pw etoh intoxication and anemia
61M Albanian only speaking, undomiciled, ETOH abuse, chronic lower back pain, pancreatitis, frequent ED visits for intoxication, pw etoh intoxication and anemia which is improving 
61M English only speaking, undomiciled, ETOH abuse, chronic lower back pain, pancreatitis, frequent ED visits for intoxication, pw etoh intoxication and anemia which is improving 
61M Ukrainian only speaking, undomiciled, ETOH abuse, chronic lower back pain, pancreatitis, frequent ED visits for intoxication, pw etoh intoxication and anemia
61M Uzbek only speaking, undomiciled, ETOH abuse, chronic lower back pain, pancreatitis, frequent ED visits for intoxication, pw etoh intoxication and anemia which is improving

## 2022-01-04 NOTE — DISCHARGE NOTE NURSING/CASE MANAGEMENT/SOCIAL WORK - NSDCPEWEB_GEN_ALL_CORE
[facility] for Tobacco Control website --- [email or web address] website --- [email or web address]

## 2022-01-04 NOTE — DISCHARGE NOTE NURSING/CASE MANAGEMENT/SOCIAL WORK - PATIENT PORTAL LINK FT
You can access the FollowMyHealth Patient Portal offered by [facility] by registering at the following website: [email or web address]. By joining [employer]’s FollowMyHealth portal, you will also be able to view your health information using other applications (apps) compatible with our system.

## 2022-01-04 NOTE — DISCHARGE NOTE NURSING/CASE MANAGEMENT/SOCIAL WORK - NSDCVIVACCINE_GEN_ALL_CORE_FT
influenza, injectable, quadrivalent, preservative free; 04-Oct-2018 16:10; Saniya Hodges (RN); [employer]; [id number] (Exp. Date: 30-Jun-2019); IntraMuscular; Deltoid Left.; 0.5 milliLiter(s); VIS (VIS Published: 07-Aug-2015, VIS Presented: 04-Oct-2018);   Td (adult) preservative free; 01-Feb-2016 06:50; Melanie Moralez (RN); [employer]; [id number]; IntraMuscular; Deltoid Left.; 0.5 milliLiter(s); VIS (VIS Published: 01-Sep-2015, VIS Presented: 01-Feb-2016);   Tdap; 30-Mar-2016 13:53; Chrystal Jung (RN); [employer]; [id number]; IntraMuscular; Deltoid Right.; 0.5 milliLiter(s); VIS (VIS Published: 09-May-2013, VIS Presented: 30-Mar-2016);   Tdap; 08-Nov-2019 19:35; Genevieve Oswald (RN); [employer]; [id number] (Exp. Date: 17-Sep-2021); IntraMuscular; Deltoid Left.; 0.5 milliLiter(s); VIS (VIS Published: 09-May-2013, VIS Presented: 08-Nov-2019);   Tdap; 16-Apr-2021 20:55; Nathaly Hinds (RN); [employer]; [id number] (Exp. Date: 01-Oct-2022); IntraMuscular; Deltoid Left.; 0.5 milliLiter(s); VIS (VIS Published: 09-May-2013, VIS Presented: 16-Apr-2021);   Tdap; 07-Dec-2021 19:03; Sarah Dave (RN); [employer]; [id number] (Exp. Date: 09-Sep-2023); IntraMuscular; Deltoid Left.; 0.5 milliLiter(s); VIS (VIS Published: 09-May-2013, VIS Presented: 07-Dec-2021);

## 2022-01-04 NOTE — DISCHARGE NOTE NURSING/CASE MANAGEMENT/SOCIAL WORK - NSDCPEEMAIL_GEN_ALL_CORE
[facility] for Tobacco Control email [email or web address]

## 2022-01-04 NOTE — PROGRESS NOTE ADULT - NSPROGADDITIONALINFOA_GEN_ALL_CORE
discussed with ACP.
d/w Medicine ACP Kennedy
Discussed plan with NP
Discussed with ACP.
d/w Medicine ACP Gilda

## 2022-01-04 NOTE — PROGRESS NOTE ADULT - PROBLEM SELECTOR PROBLEM 3
Hypothermia due to non-environmental cause

## 2022-01-04 NOTE — PROGRESS NOTE ADULT - PROBLEM SELECTOR PLAN 4
RESOLVED
RESOLVED      Dispo: Patient is medically stable for d/c today, PT final rec pending, SW consulted as patient is undomiciled
RESOLVED      Dispo: Patient is medically stable for d/c today, Time spent 35 min. Patient reeval by PT today rec no skilled PT needs
likely due to ETOH use/ poor PO intake  - repleted today

## 2022-01-04 NOTE — DISCHARGE NOTE PROVIDER - NSDCCPCAREPLAN_GEN_ALL_CORE_FT
PRINCIPAL DISCHARGE DIAGNOSIS  Diagnosis: Drop in hemoglobin  Assessment and Plan of Treatment: Your anemia improved without intervention.   Please follow up with your PCP.   If you develop SOB, chest pain, bright red blood per rectum, or dark coffee stools please report to the ED for evaluation.      SECONDARY DISCHARGE DIAGNOSES  Diagnosis: Alcohol intoxication  Assessment and Plan of Treatment: You were admitted for alcohol intoxication.   We encourage a safe lifestyle and cessation/reduction of alcohol consumption.

## 2022-03-04 ENCOUNTER — EMERGENCY (EMERGENCY)
Facility: HOSPITAL | Age: 62
LOS: 1 days | Discharge: HOME | End: 2022-03-04
Attending: EMERGENCY MEDICINE
Payer: MEDICAID

## 2022-03-04 VITALS
RESPIRATION RATE: 22 BRPM | HEIGHT: 65 IN | TEMPERATURE: 98 F | OXYGEN SATURATION: 100 % | DIASTOLIC BLOOD PRESSURE: 74 MMHG | WEIGHT: 130.07 LBS | HEART RATE: 70 BPM | SYSTOLIC BLOOD PRESSURE: 117 MMHG

## 2022-03-04 DIAGNOSIS — Z98.89 OTHER SPECIFIED POSTPROCEDURAL STATES: Chronic | ICD-10-CM

## 2022-03-04 PROCEDURE — 70450 CT HEAD/BRAIN W/O DYE: CPT | Mod: 26,MA

## 2022-03-04 PROCEDURE — 99285 EMERGENCY DEPT VISIT HI MDM: CPT | Mod: 25

## 2022-03-04 PROCEDURE — 12013 RPR F/E/E/N/L/M 2.6-5.0 CM: CPT

## 2022-03-04 PROCEDURE — 70486 CT MAXILLOFACIAL W/O DYE: CPT | Mod: 26,MA

## 2022-03-04 PROCEDURE — 76377 3D RENDER W/INTRP POSTPROCES: CPT | Mod: 26

## 2022-03-04 PROCEDURE — 72125 CT NECK SPINE W/O DYE: CPT | Mod: 26,MA

## 2022-03-04 RX ORDER — TETANUS TOXOID, REDUCED DIPHTHERIA TOXOID AND ACELLULAR PERTUSSIS VACCINE, ADSORBED 5; 2.5; 8; 8; 2.5 [IU]/.5ML; [IU]/.5ML; UG/.5ML; UG/.5ML; UG/.5ML
0.5 SUSPENSION INTRAMUSCULAR ONCE
Refills: 0 | Status: DISCONTINUED | OUTPATIENT
Start: 2022-03-04 | End: 2022-03-04

## 2022-03-04 NOTE — ED PROVIDER NOTE - GASTROINTESTINAL [+], MLM
**ATTENDING ADDENDUM (Dr. Derek Yan): POSITIVE history of pancreatitis secondary to chronic alcoholism

## 2022-03-04 NOTE — ED PROVIDER NOTE - CHIEF COMPLAINT
The patient is a 61y Male complaining of fall. The patient is a 61-year-old man presenting with sequela of head trauma (abrasions, lacerations, dried blood, ecchymoses) and altered mental state (secondary from presumed intoxication?) after being found outside on the ground.

## 2022-03-04 NOTE — ED PROVIDER NOTE - RESPIRATORY, MLM
Breath sounds clear and equal bilaterally. **ATTENDING ADDENDUM (Dr. Derek Yan): BREATHING CLEAR. POSITIVE BILATERAL BREATH SOUNDS auscultated. NO stridor, drooling, tripoding, or wheezing. POSITIVE bilateral chest wall expansion WITHOUT crepitus, tenderness, or deformity. POSITIVE midline trachea.

## 2022-03-04 NOTE — ED PROVIDER NOTE - NEUROLOGICAL LEVEL OF CONSCIOUSNESS
**ATTENDING ADDENDUM (Dr. Derek Yan): arousable and verbal at times with verbal and painful stimuli. Appears intoxicated./CONFUSED/OBTUNDED

## 2022-03-04 NOTE — ED PROVIDER NOTE - MUSCULOSKELETAL NECK EXAM
**ATTENDING ADDENDUM (Dr. Derek Yan): NO obvious cervical-thoracic-lumbar-sacral midline bony tenderness but generalized soreness of back reported./trachea midline

## 2022-03-04 NOTE — ED PROVIDER NOTE - PATIENT PORTAL LINK FT
You can access the FollowMyHealth Patient Portal offered by [facility] by registering at the following website: [email or web address]. By joining [employer]’s FollowMyHealth portal, you will also be able to view your health information using other applications (apps) compatible with our system.

## 2022-03-04 NOTE — ED PROVIDER NOTE - SHIFT CHANGE DETAILS
**ATTENDING ADDENDUM - HANDOFF (from Dr. Derek Yan): (1) Please perform serial reassessments and reevaluate the patient / continue observation (3) disposition pending, likely discharge home when presumed intoxication improves.

## 2022-03-04 NOTE — ED PROVIDER NOTE - PHYSICAL EXAMINATION
NCAT. PERRL, EOMI.  Neck supple, no LAD.   Lungs CTAB. No w/r/r  Cardiac +S1S2, RRR, No m/r/g.   Abd soft, NT/ND, +BS, no rebound or guarding.   Extremities: cap refill <2, pulses in distal extremities 4+, no edema.   Skin: laceration above R. eyebrow.   No focal Defecits, moving all extremities NCAT. PERRL, EOMI.  Neck supple, no LAD.   Lungs CTAB. No w/r/r  Cardiac +S1S2, RRR, No m/r/g.   Abd soft, NT/ND, +BS, no rebound or guarding.   Extremities: cap refill <2, pulses in distal extremities 4+, no edema.   Skin: laceration above R. eyebrow.   No focal Defecits, moving all extremities  **ATTENDING ADDENDUM (Dr. Derek Yan): I have reviewed and substantially contributed to the elements of the PE as documented above. I have directly performed an examination of this patient in conjunction with the other members (EM resident/PA/NP) of the patient care team. I have personally reviewed the patient's vital signs at the time of the patient's initial presentation to the ED and repeatedly throughout the ED course. Of note, and in addition to the above, there is a laceration involving the right eyebrow area, approximately 4 cm in total length, and a smaller laceration along the lateral aspect of the right eye (approximately 1 cm.

## 2022-03-04 NOTE — ED PROCEDURE NOTE - PROCEDURE ADDITIONAL DETAILS
**ATTENDING ADDENDUM (Dr. Derek Yan): I was present during the key portions of the procedure and immediately available during the entire procedure. Agree with plan and management. Anticipatory guidance provided. **ATTENDING ADDENDUM (Dr. Derek Yan): I was present during the key portions of the procedure and immediately available during the entire procedure. Agree with plan and management. Anticipatory guidance provided. Of note, and in addition to the above, there were two lacerations closed (total length of both lacerations = 5 cm). One laceration (4 cm) involved the right eyebrow, the other involved the right lateral-inferior face proximate to the lateral canthus/lower lid area of the face. Both closed by MELLISA Leigh as noted. Excellent approximation and hemostasis.

## 2022-03-04 NOTE — ED PROVIDER NOTE - PLAN OF CARE
**ATTENDING ADDENDUM (Dr. Derek Yan): Goals of care include resolution of emergent/urgent symptoms and concerns, and restoration to baseline level of homeostasis.

## 2022-03-04 NOTE — ED PROVIDER NOTE - ATTENDING CONTRIBUTION TO CARE
**ATTENDING ADDENDUM (Dr. Derek Yan): I attest that I have directly examined this patient and reviewed and formulated the diagnostic and therapeutic management plan in collaboration with the advanced practitioner (NP, PA). Please see MDM note and remainder of EMR for findings from CC, HPI, ROS, and PE. (Lu)

## 2022-03-04 NOTE — ED PROVIDER NOTE - MUSCULOSKELETAL MINIMAL EXAM
**ATTENDING ADDENDUM (Dr. Derek Yan): Symmetrically equal strength, 5/5, in the bilateral upper and lower extremities./normal range of motion

## 2022-03-04 NOTE — ED PROVIDER NOTE - GASTROINTESTINAL, MLM
Abdomen soft, non-tender, no guarding. **ATTENDING ADDENDUM (Dr. Derek Yan): NO guarding, rebound, or rigidity. NO pulsatile or non-pulsatile masses. NO hernias. NO obvious hepatosplenomegaly. NOTE: ability to elicit pain impaired by patient's altered mental state

## 2022-03-04 NOTE — ED PROVIDER NOTE - EYE, RIGHT
**ATTENDING ADDENDUM (Dr. Derek Yan): POSITIVE periorbital soft-tissue swelling noted of the lids involving the right eye. NO obvious entrapment noted./TENDERNESS/SWELLING

## 2022-03-04 NOTE — ED PROVIDER NOTE - PROGRESS NOTE DETAILS
**ATTENDING ADDENDUM (Dr. Derek Yan): patient serially evaluated throughout ED course by ED team. NO acute deterioration up to this time in the ED. Personally reassessed. ED diagnostics up to this time acknowledged, reviewed and noted. NO evidence of acute intracerebral hemorrhage, skull fracture, cord/spine injury, intra-thoracic hemorrhage (hemothorax), intra-abdominal hemorrhage (hemoperitoneum), pelvic or other extremity fracture or other worrisome concerns at this time. NO seizures or posturing. Unable to clear patient re: altered state, presumably from intoxication. Will continue to observe and monitor closely.  Likely to require several hours to "metabolize to freedom." Will continue to observe and monitor closely. **ATTENDING ADDENDUM (Dr. Derek Yan): patient serially evaluated throughout ED course by ED team. NO acute deterioration up to this time in the ED. Personally reassessed. Directly supervised key aspects of the primary closure of two wounds on the patient's right face (one proximate to the right eyebrow, the other along the inferior-lateral aspect of the right cheek) by MELLISA Leigh. Unable to clear patient at this time (still with altered mental state from presumed intoxication). Will likely require handoff to overnight ED team for continued observation and serial reassessments. Will continue to observe and monitor closely. **ATTENDING ADDENDUM (Dr. Derek Yan): Agree with goals/plan of ED care as described in EMR, including diagnostics, therapeutics and consultation as clinically warranted. Will continue to observe and monitor closely. Anticipatory guidance provided by ED team at time of initial presentation. Attending MD Aguilera: patient received in sign out. Here for etoh intoxication. Wake and ate food but still clinically intoxicated. Will continue observation Alberto, PGY-3: signout received on patient, patient ambulatory and tolerating po, complains of back pain and stiffness. States he wants to stay in the ED as he is homeless and does not have anywhere to go. . Requesting social work assistance. Nishant, PGY2: Patient received as sign out, initially presented with fall. pending social work evaluation for dispo. Nishant, PGY2: Patient received as sign out, initially presented with fall. patient was intoxicated on presentation, has ambulated in the department without assistance. pending sober eval and social work evaluation for dispo. Nishant, PGY2: SW spoke with patient, complaining the he cannot walk. patient walking to the bathroom unassisted. complaining now of R lower back pain. patient nontender on exam of back/abdomen. reports chronic pain from an injury several months ago. will place lido patch and discharge. patient in agreement with plan.

## 2022-03-04 NOTE — ED ADULT NURSE NOTE - NSIMPLEMENTINTERV_GEN_ALL_ED
Implemented All Fall Risk Interventions:  [place] to call system. Call bell, personal items and telephone within reach. Instruct patient to call for assistance. Room bathroom lighting operational. Non-slip footwear when patient is off stretcher. Physically safe environment: no spills, clutter or unnecessary equipment. Stretcher in lowest position, wheels locked, appropriate side rails in place. Provide visual cue, wrist band, yellow gown, etc. Monitor gait and stability. Monitor for mental status changes and reorient to person, place, and time. Review medications for side effects contributing to fall risk. Reinforce activity limits and safety measures with patient and family.

## 2022-03-04 NOTE — ED PROVIDER NOTE - OBJECTIVE STATEMENT
62 yo male with no known medical problems, BIBEMS being found on the ground with + laceration above R eye brow, intoxicated. No current complaints. 62 yo male with no known medical problems, BIBEMS being found on the ground with + laceration above R eye brow, intoxicated. No current complaints.  **ATTENDING ADDENDUM (Dr. Derek Yan): I attest that I have directly examined this patient and elicited a comparable history of present illness and review of systems with my collaborating provider (NP/PA). I attest that I have made significant contributions to the documentation where necessary and as noted in the EMR. Of note, and in addition to the above, this is a 61-year-old man presenting with sequela of head trauma (abrasions, lacerations, dried blood, ecchymoses) and altered mental state (secondary from presumed intoxication?) after being found outside on the ground. Patient is unable to provide a reliable HPI and review of systems related to the events leading up to his ED presentation. NO acute episodes (e.g. seizures, hemodynamic instability, etc.) noted during EMS transport. ED staff report familiarity with patient (?frequent ED utilizer) and patient's past medical/surgical history; review of EMR reveals prior history of alcoholism, acute intoxication, and sequela of alcoholism (pancreatitis, anemia). VAS 0-1/10 at time of initial ED presentation.

## 2022-03-04 NOTE — ED PROVIDER NOTE - RIGHT FACE
**ATTENDING ADDENDUM (Dr. Derek Yan): POSITIVE lacerations. POSITIVE periorbital soft-tissue swelling of the upper and lower right eyelids. POSITIVE lacerations and abrasions (see comment box)

## 2022-03-04 NOTE — ED PROVIDER NOTE - CARE PLAN
Principal Discharge DX:	Altered mental status  Goal:	**ATTENDING ADDENDUM (Dr. Derek Yan): Goals of care include resolution of emergent/urgent symptoms and concerns, and restoration to baseline level of homeostasis.  Secondary Diagnosis:	Head trauma  Secondary Diagnosis:	Facial laceration, initial encounter  Secondary Diagnosis:	Skin abrasion   1

## 2022-03-04 NOTE — ED PROVIDER NOTE - NS ED ROS FT
noncontributory to ROS noncontributory to review of systems  **ATTENDING ADDENDUM (Dr. Derek Yan): During my interview with the patient, I have personally obtained and/or have directly verified the elements in the past medical/surgical history and other histories as noted earlier in the EMR, in conjunction with the other members (EM resident/PA/NP) of the patient care team. I have also personally obtained and/or have directly verified/reviewed the review of systems as documented below, in conjunction with the other members (EM resident/PA/NP) of the patient care team. noncontributory to review of systems  **ATTENDING ADDENDUM (Dr. Derek Yan): During my interview with the patient, I have personally obtained and/or have directly verified the elements in the past medical/surgical history and other histories as noted earlier in the EMR, in conjunction with the other members (EM resident/PA/NP) of the patient care team. I have also personally obtained and/or have directly verified/reviewed the review of systems as documented below, in conjunction with the other members (EM resident/PA/NP) of the patient care team. Of note, and in addition to the above, a review of the EMR reveals alcoholism and associated sequela (pancreatitis, anemia)

## 2022-03-04 NOTE — ED PROVIDER NOTE - NSFOLLOWUPINSTRUCTIONS_ED_ALL_ED_FT
You were evaluated in the Emergency Department for facial injury.      There are no signs of emergency conditions requiring admission to the hospital on today's workup.      We recommend that you see your primary care physician within the next 3 days for follow up.  Bring a copy of your discharge paperwork (including any test results) to your doctor.    You had sutures placed which will dissolve on their own.     ***Return to the emergency department if you have any other new/concerning symptoms, including but not limited to: vomiting, dizziness, weakness, fevers***

## 2022-03-04 NOTE — ED ADULT NURSE REASSESSMENT NOTE - NS ED NURSE REASSESS COMMENT FT1
Pt educated multiple times about needing c-collar, pt not tolerating c-collar and removed it despite knowing the risks.

## 2022-03-04 NOTE — ED PROVIDER NOTE - BOTH EYES:     20/
**ATTENDING ADDENDUM (Dr. Derek Yan): unable to assess/evaluate the visual acuity. Extraocular muscle movements intact. Clear corneas bilaterally, pupils equal and round. NO scleral icterus bilaterally. NO anisocoria.

## 2022-03-04 NOTE — ED PROVIDER NOTE - UNABLE TO OBTAIN
**ATTENDING ADDENDUM (Dr. Derek Yan): Exploration of CC, HPI and review of systems limited by patient's acuity and altered mental status (presumed intoxication). Unresponsive

## 2022-03-04 NOTE — ED PROVIDER NOTE - CONSTITUTIONAL MANNER
**ATTENDING ADDENDUM (Dr. Derek Yan): POSITIVE somnolent, lethargic, likely intoxication/INAPPROPRIATE FOR SITUATION

## 2022-03-04 NOTE — ED PROVIDER NOTE - CLINICAL SUMMARY MEDICAL DECISION MAKING FREE TEXT BOX
**ATTENDING MEDICAL DECISION MAKING/SYNTHESIS (Dr. Derek Yan): I have reviewed the Chief Concern(s), the HPI, the ROS, and have directly performed and confirmed the findings on the Physical Examination. I have reviewed the medical decision making with all providers, as applicable. The PROBLEM REPRESENTATION at this time is: 61-year-old man presenting with sequela of head trauma (abrasions, lacerations, dried blood, ecchymoses) and altered mental state (secondary from presumed intoxication?) after being found outside on the ground. The MOST LIKELY DIAGNOSIS, and the LIST OF DIFFERENTIAL DIAGNOSES, includes (but is not limited to) the following: ALTERED MENTAL STATE: acute intoxication (likely, multiple toxicants likely, including ethanol; toxidrome most consistent with ethanol (hypnotic-sedative), favored over sympathomimetic, anticholinergic, or acute withdrawal toxidrome, SEQUELA OF FALL: cord/spine injury, intracerebral hemorrhage, intra-thoracic hemorrhage (hemothorax), pneumothorax, rib fracture(s) or flail chest, intra-abdominal hemorrhage (hemoperitoneum), pelvis or other extremity injury, hemoperitoneum, concussion, contusions, lacerations/abrasions (obvious, on right side of face), sprain/strain, facial bone fractures, CAUSE FOR FALL OTHER THAN MECHANICAL (unlikely): arrhythmia, dysequilibrium, cerebrovascular accident, transient ischemic attack, acute coronary syndrome, syncope, near-syncope, vasovagal syndrome, dehydration, electrolyte-metabolic-endocrine derangements, anemia, deconditioning, dysequilibrium, orthostasis, POTS, ADR/SE from toxicants (as noted above). The likelihood of each of these diagnoses has been appropriately considered in the context of this patient's presentation and my evaluation. PLAN: as described in EMR, including diagnostics, therapeutics and consultation as clinically warranted. I will continue to reevaluate the patient, including the results of all testing, and monitor response to therapy throughout the patient's course in the ED.

## 2022-03-04 NOTE — ED ADULT NURSE NOTE - OBJECTIVE STATEMENT
62 yo M pmh of ETOH abuse brought in via EMS to ED s/p unwitnessed fall found down for unknown time.  Pt presents with laceration on the forehead.  MD present at bedside. Denies any complaints at this time.  Will continue to monitor.

## 2022-03-04 NOTE — ED PROVIDER NOTE - MOUTH
**ATTENDING ADDENDUM (Dr. Derek Yan): AIRWAY CLEAR. NO pooling of secretions, POSITIVE gag reflex, NO debris, ABLE TO SELF-PROTECT AIRWAY. POSITIVE full range of motion of the mandible. NO temporomandibular joint tenderness with palpation. NO dental trauma. NO malocclusion. NO facial or nasal deformity or bony tenderness. NO epistaxis or septal hematoma noted./DRY MUCOUS MEMBRANES

## 2022-03-05 VITALS
OXYGEN SATURATION: 98 % | TEMPERATURE: 98 F | DIASTOLIC BLOOD PRESSURE: 75 MMHG | HEART RATE: 80 BPM | SYSTOLIC BLOOD PRESSURE: 140 MMHG | RESPIRATION RATE: 18 BRPM

## 2022-03-05 PROCEDURE — 76377 3D RENDER W/INTRP POSTPROCES: CPT

## 2022-03-05 PROCEDURE — 70486 CT MAXILLOFACIAL W/O DYE: CPT | Mod: MA

## 2022-03-05 PROCEDURE — 70450 CT HEAD/BRAIN W/O DYE: CPT | Mod: MA

## 2022-03-05 PROCEDURE — 99284 EMERGENCY DEPT VISIT MOD MDM: CPT | Mod: 25

## 2022-03-05 PROCEDURE — 72125 CT NECK SPINE W/O DYE: CPT | Mod: MA

## 2022-03-05 PROCEDURE — 12013 RPR F/E/E/N/L/M 2.6-5.0 CM: CPT

## 2022-03-05 RX ORDER — ACETAMINOPHEN 500 MG
650 TABLET ORAL ONCE
Refills: 0 | Status: COMPLETED | OUTPATIENT
Start: 2022-03-05 | End: 2022-03-05

## 2022-03-05 RX ORDER — LIDOCAINE 4 G/100G
1 CREAM TOPICAL ONCE
Refills: 0 | Status: COMPLETED | OUTPATIENT
Start: 2022-03-05 | End: 2022-03-05

## 2022-03-05 RX ADMIN — Medication 650 MILLIGRAM(S): at 07:31

## 2022-03-05 RX ADMIN — LIDOCAINE 1 PATCH: 4 CREAM TOPICAL at 10:05

## 2022-03-05 RX ADMIN — Medication 650 MILLIGRAM(S): at 09:43

## 2022-03-05 NOTE — CHART NOTE - NSCHARTNOTEFT_GEN_A_CORE
EMERGENCY : Social work consulted by Resident and RN to assist with transportation. Patient is medically cleared for discharge requesting to speak with social work for transportation. Chart reviewed. Per nurse note, patient is a "62 yo M pmh of ETOH abuse brought in via EMS to ED s/p unwitnessed fall found down for unknown time.  Pt presents with laceration on the forehead" Patient Swedish speaking. LMSW able to communicate fluently with patient in patient's primary language. LMSW met with patient at bedside and introduced self and role of social work. Patient verbalized understanding. Patient confirmed demographics stating that he is undomiciled, does not have a cell phone, and does not have money for transportation. Patient reported that he will be returning to [facility] [address] [facility]. Patient known to LMSW. Patient declined resources for shelters [place]. Patient reported ETOH use, but stated that he did not want to discuss his use at this time. LMSW offered to provide patient with resources but he declined. SBIRT consult closed.     LMSW contacted [facility] [phone] and spoke with rep uHff who states that the patient is not eligible for Medicaid transportation due to his destination as they require a physical home address or shelter. LMSW communicated information with patient and explored other methods such as obtaining ride from a friend or family member or metro card. Patient reported that he cannot obtain a ride and does not have money for a taxi. LMSW offered to provide patient with a metro card. Patient reported that he struggles ambulating, cannot properly see, and has back pain. LMSW communicated with RN and Resident regarding patient's concerns. LMSW informed that patient has been able to ambulate throughout ED stay and was given pain medication. LMSW at bedside while patient ambulated to the restroom.     LMSW and Resident met with patient at bedside. Patient complaining of back pain from a fall, which occurred a month ago. Patient stated that he is not able to walk. Resident offered to provide patient with a patch for his pain being that he already received medication and was able to ambulate prior with the pain. LMSW provided patient with metro card. Patient was able to explain to LMSW which bus he would need to take and is in agreement. No further concerns. Social work remains available as needed.

## 2022-03-05 NOTE — ED ADULT NURSE REASSESSMENT NOTE - NS ED NURSE REASSESS COMMENT FT1
report received from pm RN, patient asleep in no acute distress. Pending SW for discharge. Reports some R flank discomfort, ED ME aware, patient prescribed pain medication.

## 2022-03-05 NOTE — ED ADULT NURSE REASSESSMENT NOTE - NS ED NURSE REASSESS COMMENT FT1
patient refusing to leave, able to walk independently.  called security for assistance in discharge.

## 2022-04-12 NOTE — ED PROVIDER NOTE - CARDIAC RHYTHM
Postpartum Progress Note     Tamia Smallwood 28 year old  s/p Vaginal Delivery PPD#1. Pain is well controlled on oral medication. Passing gas.    Denies HA, visual changes, SOB, CP, RUQ/epigastric pain, lower extremity pain.       PHYSICAL EXAM  Visit Vitals  /75 (BP Location: RUE - Right upper extremity, Patient Position: Sitting)   Pulse 79   Temp 97.8 °F (36.6 °C) (Oral)   Resp 16   Ht 5' 6\" (1.676 m)   Wt 92.1 kg   LMP 2021   SpO2 97%   Breastfeeding Yes   BMI 32.77 kg/m²      No data recorded  Gen:  alert, normal affect, no apparent distress.  Skin: Normal color, texture, turgor, no rashes/bruises/active lesions.  Lungs: CTA bilaterally, no rales/rhonchi/wheezes; normal effort.  Heart: RRR, no abnormal heart sounds.  Abd: Soft, non-tender, uterine fundus firm and below umbilicus; normoactive bowel sounds.   Extr: No clubbing, no cyanosis,  edema; normal muscle tone and development bilaterally.    Labs:   Recent Labs   Lab 22  0802   HGB 9.2*   HCT 28.2*          Rubella:   Rubella Antibody IgG (no units)   Date Value   09/15/2021 IMMUNE   , immune TDaP given during pregnancy      ASSESSMENT/PLAN:  Tamia Smallwood 28 year old  s/p , PPD#1. Afebrile. Stable.  Pain well controlled.       Patient Active Problem List   Diagnosis   • Rheumatoid arthritis (CMS/[facility])   • Acute bronchitis due to other specified organisms   • Obesity (BMI 30-39.9)   • Asthma with acute exacerbation in adult   • COVID-19 virus infection     Past Medical History:   Diagnosis Date   • Arthritis    • Asthma with acute exacerbation in adult 2021   • Bronchitis    • RA (rheumatoid arthritis) (CMS/[facility])    • RA (rheumatoid arthritis) (CMS/[facility]ly [facility]) 2020       1. Postpartum care:   1. Encourage ambulation.  2. Pain control: adequate   3. Regular diet.  4. Anemia - asymptomatic, postpartum H/H 9.2/28.2. On Fe daily  5. Rh status: Positive  6. Immunizations: Tdap given during pregnancy  7. Birth Control:  undecided    2. Infant: Providing Breast milk and formula, desires circumcision.    Disposition: Anticipate discharge home on PPD #2.  Follow up with Dr. Franco in 2 and 6 weeks.    Keke Woodward MD   4/12/2022    regular

## 2022-04-17 ENCOUNTER — INPATIENT (INPATIENT)
Facility: HOSPITAL | Age: 62
LOS: 8 days | Discharge: HOME | DRG: 897 | End: 2022-04-26
Attending: INTERNAL MEDICINE | Admitting: INTERNAL MEDICINE
Payer: MEDICAID

## 2022-04-17 VITALS
SYSTOLIC BLOOD PRESSURE: 128 MMHG | TEMPERATURE: 98 F | DIASTOLIC BLOOD PRESSURE: 83 MMHG | HEART RATE: 62 BPM | RESPIRATION RATE: 16 BRPM | HEIGHT: 65 IN | OXYGEN SATURATION: 98 %

## 2022-04-17 DIAGNOSIS — Z98.89 OTHER SPECIFIED POSTPROCEDURAL STATES: Chronic | ICD-10-CM

## 2022-04-17 PROCEDURE — 73130 X-RAY EXAM OF HAND: CPT | Mod: 26,RT

## 2022-04-17 PROCEDURE — 73630 X-RAY EXAM OF FOOT: CPT | Mod: 26,RT

## 2022-04-17 PROCEDURE — 76376 3D RENDER W/INTRP POSTPROCES: CPT | Mod: 26

## 2022-04-17 PROCEDURE — 70450 CT HEAD/BRAIN W/O DYE: CPT | Mod: 26,MA

## 2022-04-17 PROCEDURE — 70486 CT MAXILLOFACIAL W/O DYE: CPT | Mod: 26,MA

## 2022-04-17 PROCEDURE — 72125 CT NECK SPINE W/O DYE: CPT | Mod: 26,MA

## 2022-04-17 PROCEDURE — 99285 EMERGENCY DEPT VISIT HI MDM: CPT

## 2022-04-17 RX ORDER — ACETAMINOPHEN 500 MG
650 TABLET ORAL ONCE
Refills: 0 | Status: COMPLETED | OUTPATIENT
Start: 2022-04-17 | End: 2022-04-17

## 2022-04-17 RX ADMIN — Medication 650 MILLIGRAM(S): at 21:33

## 2022-04-17 NOTE — ED ADULT NURSE REASSESSMENT NOTE - NS ED NURSE REASSESS COMMENT FT1
Medicated as ordered for pain, patient tolerated PO pudding. Pt resting in stretcher at this time, calm and cooperative .

## 2022-04-17 NOTE — ED PROVIDER NOTE - PHYSICAL EXAMINATION
Attn- arousal by verbal stimulation. abrasion forehead, dry mm, lungs - clear, cor - rr, no M, abdo - soft, NT, ND, back - NT, Extrem - abrasions dorsum of hand.  Neuro - no focal.

## 2022-04-17 NOTE — ED ADULT NURSE NOTE - OBJECTIVE STATEMENT
61y M hx pancreatitis and ETOH abuse presents via EMS s/p fall. per EMS pt is undomiciled and frequently intoxicated requiring multiple hospital visits. today, EMS states pt was found down, intoxicated. abrasions noted to forehead, L knee and R hand. EMS states pt can be combative and aggressive but at this time pt is calm. responsive to voice and easily arrousable. pt undressed and placed in hospital gown. MD at bedside for assessment. pt moves all extremities and only complaint is frontal head pain. FS 89. pending CTH. per MD no need for IVL at this time.

## 2022-04-17 NOTE — ED PROVIDER NOTE - PROGRESS NOTE DETAILS
Pt signed out to me by Dr. Weaver. Pt reassessed, brought into the hallway, sleeping arousable to stimuli. CT results reviewed. Continue to monitor in the ED. JOHN Dharmesh, PGY3: pts imaging without acute findings, pt has been sleeping comfortably, no signs of any clinically signif alc withdrawal thus far, put IV line in incase pt needs meds, on chart review pt needs swk for dispo given undomiciled. Will continue to monitor, likely wait for swk in am. Dharmesh, PGY3: 61M Ukrainian only speaking, undomiciled, ETOH abuse, chronic lower back pain, pancreatitis, frequent ED visits for intoxication, presents with EMS after pt had witnessed fall by a bystander with +headstrike though no LOC. Pt seems intox, though with EMS that knows pt well and they state that pt doesn't seem out of character is at baseline ms though intox. Unclear if pt has hx of alc withdrawals. Pt reports some pain to R foot when he moves. Per chart review pt had tetanus shot updated 2 times in 2021. EMS state vitals reassuring for them.   Pt comes intox, pupils 3mm bilat reactive, abrasion to mid forehead, scattered abrasions to dorsum bilat hands and to bilat ant knees, pt with bilat breath sounds, 2+ equal pulses all extremities, good full ROM of all large joints, nontender abdomen, no pelvic ttp, no stepoffs to spine Patient awake and resting in bed. Continue to monitor. [facility]: pt sleeping comfortably in bed, no s/o clinically signif withdrawal will continue to monitor Dharmesh, PGY3: pts reena Dharmesh, PGY3: cleaned pts abrasions, no deep lacs, pt with good amount of pain to ant L knee, seems some swelling developed from initial exam, low suspicion fx though will get xray as pt high risk to be lost to f/u Orlando JIMENEZ, EM/IM PGY-1: Pt signed out to me at 7am. Last set of vitals at 0607 normal. Pt with hx alcohol use and homelessness presented with intoxication and a fall, no LOC, + head strike and hand/foot/knee pain. POCT glu 89. Xray of R hand and R foot negative for acute injury. CT cervical spine, head, max-fac negative for acute fracture, dislocation, intracranial hemorrhage, chronic maxillary fractures, unchanged other chronic findings as well. Pending Knee x-ray and morning social work eval. Orlando JIMENEZ, EM/IM PGY-1: Pt signed out to me at 7am. Last set of vitals at 0607 normal. Pt with hx alcohol use and homelessness presented with intoxication and a fall, no LOC, + head strike and hand/foot/knee pain. POCT glu 89. Xray of R hand and R foot negative for acute injury. CT cervical spine, head, max-fac negative for acute fracture, dislocation, intracranial hemorrhage, chronic maxillary fractures, unchanged other chronic findings as well. Pt received Tylsnol 650x2 and Ibuprofen 400x1. Pending Knee x-ray and morning social work eval. Orlando JIMENEZ, EM/IM PGY-1: Spoke to patient using  [id number]. Pt reports that [place] in the [place] and walks with a walker at baseline. Pt currently with significant difficulty standing due to pain in bilateral lower extremities, abrasions to knees bilaterally and pain in the ankles and feet. Currently still with pain in his neck and head as well. Unable to answer how much alcohol he drinks daily, "a bit", currently without any signs of withdrawal. Pt will need disposition to a shelter [place], pending social work eval. Orlando JIMENEZ, EM/IM PGY-1: Spoke to patient using  [id number]. Pt reports that he lives in the [place] station and walks with a walker at baseline. Pt currently with significant difficulty standing due to pain in bilateral lower extremities, abrasions to knees bilaterally and pain in the ankles and feet. Currently still with pain in his neck and head as well. Unable to answer how much alcohol he drinks daily, "a bit", currently without any signs of withdrawal. Pt will need disposition to a shelter or short term rehab [place], pending social work eval. Orlando JIMENEZ, EM/IM PGY-1: Pt eating sandwich. Spoke to PT on the phone, they will come down for eval. SW aware of patient, well known to them, will wait for PT recs for dispo. Orlando JIMENEZ, EM/IM PGY-1: Pt without signs of withdrawal, PT eval and recommend short term rehab. SW unable to dispo from ED as he requires a denisse rehab due to insurance. Admitted to unattached hospitalist for placement.

## 2022-04-17 NOTE — ED PROVIDER NOTE - OBJECTIVE STATEMENT
Attn - pt seen in Rm 21 - BIB EMS - pt is homeless and well known to EMS - witnessed tripping and falling and striking forehead.  pt endorses alcohol. pt denies complaints.  pt not able to provide reliable hx at this time.

## 2022-04-17 NOTE — ED PROVIDER NOTE - DATE/TIME 9
You can access the FollowMyHealth Patient Portal offered by [facility] by registering at the following website: [email or web address]. By joining [facility]’s FollowMyHealth portal, you will also be able to view your health information using other applications (apps) compatible with our system.
18-Apr-2022 08:19

## 2022-04-17 NOTE — ED PROVIDER NOTE - CLINICAL SUMMARY MEDICAL DECISION MAKING FREE TEXT BOX
Attn - homeless pt with hx of alcohol abuse and dependance with witness fall - CT head and neck, reassess.

## 2022-04-18 DIAGNOSIS — M79.606 PAIN IN LEG, UNSPECIFIED: ICD-10-CM

## 2022-04-18 LAB
ALBUMIN SERPL ELPH-MCNC: 4.3 G/DL — SIGNIFICANT CHANGE UP (ref 3.3–5)
ALP SERPL-CCNC: 82 U/L — SIGNIFICANT CHANGE UP (ref 40–120)
ALT FLD-CCNC: 12 U/L — SIGNIFICANT CHANGE UP (ref 10–45)
ANION GAP SERPL CALC-SCNC: 19 MMOL/L — HIGH (ref 5–17)
AST SERPL-CCNC: 24 U/L — SIGNIFICANT CHANGE UP (ref 10–40)
BASOPHILS # BLD AUTO: 0.03 K/UL — SIGNIFICANT CHANGE UP (ref 0–0.2)
BASOPHILS NFR BLD AUTO: 0.4 % — SIGNIFICANT CHANGE UP (ref 0–2)
BILIRUB SERPL-MCNC: 1 MG/DL — SIGNIFICANT CHANGE UP (ref 0.2–1.2)
BUN SERPL-MCNC: 17 MG/DL — SIGNIFICANT CHANGE UP (ref 7–23)
CALCIUM SERPL-MCNC: 9 MG/DL — SIGNIFICANT CHANGE UP (ref 8.4–10.5)
CHLORIDE SERPL-SCNC: 102 MMOL/L — SIGNIFICANT CHANGE UP (ref 96–108)
CO2 SERPL-SCNC: 18 MMOL/L — LOW (ref 22–31)
CREAT SERPL-MCNC: 0.98 MG/DL — SIGNIFICANT CHANGE UP (ref 0.5–1.3)
EGFR: 87 ML/MIN/1.73M2 — SIGNIFICANT CHANGE UP
EOSINOPHIL # BLD AUTO: 0.16 K/UL — SIGNIFICANT CHANGE UP (ref 0–0.5)
EOSINOPHIL NFR BLD AUTO: 2.1 % — SIGNIFICANT CHANGE UP (ref 0–6)
GLUCOSE SERPL-MCNC: 128 MG/DL — HIGH (ref 70–99)
HCT VFR BLD CALC: 35.8 % — LOW (ref 39–50)
HGB BLD-MCNC: 11.6 G/DL — LOW (ref 13–17)
IMM GRANULOCYTES NFR BLD AUTO: 0.3 % — SIGNIFICANT CHANGE UP (ref 0–1.5)
LYMPHOCYTES # BLD AUTO: 1.62 K/UL — SIGNIFICANT CHANGE UP (ref 1–3.3)
LYMPHOCYTES # BLD AUTO: 21.1 % — SIGNIFICANT CHANGE UP (ref 13–44)
MAGNESIUM SERPL-MCNC: 1.8 MG/DL — SIGNIFICANT CHANGE UP (ref 1.6–2.6)
MCHC RBC-ENTMCNC: 32.4 GM/DL — SIGNIFICANT CHANGE UP (ref 32–36)
MCHC RBC-ENTMCNC: 32.7 PG — SIGNIFICANT CHANGE UP (ref 27–34)
MCV RBC AUTO: 100.8 FL — HIGH (ref 80–100)
MONOCYTES # BLD AUTO: 0.65 K/UL — SIGNIFICANT CHANGE UP (ref 0–0.9)
MONOCYTES NFR BLD AUTO: 8.5 % — SIGNIFICANT CHANGE UP (ref 2–14)
NEUTROPHILS # BLD AUTO: 5.18 K/UL — SIGNIFICANT CHANGE UP (ref 1.8–7.4)
NEUTROPHILS NFR BLD AUTO: 67.6 % — SIGNIFICANT CHANGE UP (ref 43–77)
NRBC # BLD: 0 /100 WBCS — SIGNIFICANT CHANGE UP (ref 0–0)
PCP SPEC-MCNC: SIGNIFICANT CHANGE UP
PHOSPHATE SERPL-MCNC: 2.5 MG/DL — SIGNIFICANT CHANGE UP (ref 2.5–4.5)
PLATELET # BLD AUTO: 247 K/UL — SIGNIFICANT CHANGE UP (ref 150–400)
POTASSIUM SERPL-MCNC: 3.7 MMOL/L — SIGNIFICANT CHANGE UP (ref 3.5–5.3)
POTASSIUM SERPL-SCNC: 3.7 MMOL/L — SIGNIFICANT CHANGE UP (ref 3.5–5.3)
PROT SERPL-MCNC: 7.3 G/DL — SIGNIFICANT CHANGE UP (ref 6–8.3)
RBC # BLD: 3.55 M/UL — LOW (ref 4.2–5.8)
RBC # FLD: 13.2 % — SIGNIFICANT CHANGE UP (ref 10.3–14.5)
SARS-COV-2 RNA SPEC QL NAA+PROBE: SIGNIFICANT CHANGE UP
SODIUM SERPL-SCNC: 139 MMOL/L — SIGNIFICANT CHANGE UP (ref 135–145)
WBC # BLD: 7.66 K/UL — SIGNIFICANT CHANGE UP (ref 3.8–10.5)
WBC # FLD AUTO: 7.66 K/UL — SIGNIFICANT CHANGE UP (ref 3.8–10.5)

## 2022-04-18 PROCEDURE — 73562 X-RAY EXAM OF KNEE 3: CPT | Mod: 26,LT

## 2022-04-18 RX ORDER — IBUPROFEN 200 MG
400 TABLET ORAL ONCE
Refills: 0 | Status: COMPLETED | OUTPATIENT
Start: 2022-04-18 | End: 2022-04-18

## 2022-04-18 RX ORDER — HEPARIN SODIUM 5000 [USP'U]/ML
5000 INJECTION INTRAVENOUS; SUBCUTANEOUS EVERY 8 HOURS
Refills: 0 | Status: DISCONTINUED | OUTPATIENT
Start: 2022-04-18 | End: 2022-04-26

## 2022-04-18 RX ORDER — LIDOCAINE 4 G/100G
1 CREAM TOPICAL ONCE
Refills: 0 | Status: COMPLETED | OUTPATIENT
Start: 2022-04-18 | End: 2022-04-18

## 2022-04-18 RX ORDER — THIAMINE MONONITRATE (VIT B1) 100 MG
100 TABLET ORAL DAILY
Refills: 0 | Status: DISCONTINUED | OUTPATIENT
Start: 2022-04-18 | End: 2022-04-26

## 2022-04-18 RX ORDER — SODIUM CHLORIDE 9 MG/ML
1000 INJECTION INTRAMUSCULAR; INTRAVENOUS; SUBCUTANEOUS
Refills: 0 | Status: DISCONTINUED | OUTPATIENT
Start: 2022-04-18 | End: 2022-04-21

## 2022-04-18 RX ORDER — ACETAMINOPHEN 500 MG
650 TABLET ORAL ONCE
Refills: 0 | Status: COMPLETED | OUTPATIENT
Start: 2022-04-18 | End: 2022-04-18

## 2022-04-18 RX ORDER — FOLIC ACID 0.8 MG
1 TABLET ORAL DAILY
Refills: 0 | Status: DISCONTINUED | OUTPATIENT
Start: 2022-04-18 | End: 2022-04-26

## 2022-04-18 RX ADMIN — HEPARIN SODIUM 5000 UNIT(S): 5000 INJECTION INTRAVENOUS; SUBCUTANEOUS at 21:00

## 2022-04-18 RX ADMIN — LIDOCAINE 1 PATCH: 4 CREAM TOPICAL at 20:02

## 2022-04-18 RX ADMIN — LIDOCAINE 1 PATCH: 4 CREAM TOPICAL at 11:54

## 2022-04-18 RX ADMIN — LIDOCAINE 1 PATCH: 4 CREAM TOPICAL at 23:08

## 2022-04-18 RX ADMIN — SODIUM CHLORIDE 75 MILLILITER(S): 9 INJECTION INTRAMUSCULAR; INTRAVENOUS; SUBCUTANEOUS at 20:56

## 2022-04-18 RX ADMIN — Medication 400 MILLIGRAM(S): at 06:40

## 2022-04-18 RX ADMIN — Medication 650 MILLIGRAM(S): at 04:36

## 2022-04-18 NOTE — PHYSICAL THERAPY INITIAL EVALUATION ADULT - ACTIVE RANGE OF MOTION EXAMINATION, REHAB EVAL
decreased R hand  2/2 arthritis and swelling/bilateral upper extremity Active ROM was WFL (within functional limits)/bilateral  lower extremity Active ROM was WFL (within functional limits)

## 2022-04-18 NOTE — H&P ADULT - NSHPPHYSICALEXAM_GEN_ALL_CORE
Vital Signs Last 24 Hrs  T(C): 36.9 (18 Apr 2022 14:59), Max: 36.9 (18 Apr 2022 14:38)  T(F): 98.4 (18 Apr 2022 14:59), Max: 98.4 (18 Apr 2022 14:38)  HR: 89 (18 Apr 2022 14:59) (62 - 89)  BP: 127/75 (18 Apr 2022 14:59) (112/68 - 128/83)  BP(mean): 92 (18 Apr 2022 14:59) (92 - 92)  RR: 20 (18 Apr 2022 14:59) (16 - 20)  SpO2: 95% (18 Apr 2022 14:59) (95% - 98%)    Appearance: awake, calm, deshevield   HEENT:   Normal oral mucosa, PERRL, EOMI	  Lymphatic: No lymphadenopathy , no edema  Cardiovascular: Normal S1 S2  Respiratory: Lungs clear to auscultation, normal effort 	  Gastrointestinal:  Soft, Non-tender, + BS	  Skin: multiple skin abrasion   Musculoskeletal: Normal range of motion, normal strength  Psychiatry:  Mood & affect appropriate  Ext: No edema

## 2022-04-18 NOTE — H&P ADULT - ASSESSMENT
Patient is a 60 Y/O Male, undomiciled, ETOH abuse, chronic lower back pain, pancreatitis, frequent ED visits for intoxication, presents with EMS after pt had witnessed fall by a bystander with +head strike though no LOC. Pt seems intox, though with EMS that knows pt well and they state that pt doesn't seem out of character is at baseline ms though intox. Unclear if pt has hx of alc withdrawals. Pt reports some pain to R foot when he moves. Per chart review pt had tetanus shot updated 2 times in 2021      # Fall  likely due to ETOH  mechanical   pan scan noted   fall precautions  IV hydration       # ETOH  monitor for withdrawal   thiamin and folic acid  IV hydration  DT protocol PRN     # Homeless   social work eval     DVT and gI PPX

## 2022-04-18 NOTE — PATIENT PROFILE ADULT - LIVING ENVIRONMENT
[No Acute Distress] : no acute distress [Well Nourished] : well nourished [Well Developed] : well developed [Well-Appearing] : well-appearing [Normal Sclera/Conjunctiva] : normal sclera/conjunctiva [PERRL] : pupils equal round and reactive to light [EOMI] : extraocular movements intact [Normal Outer Ear/Nose] : the outer ears and nose were normal in appearance [Normal Oropharynx] : the oropharynx was normal [No JVD] : no jugular venous distention [No Lymphadenopathy] : no lymphadenopathy [Supple] : supple [Thyroid Normal, No Nodules] : the thyroid was normal and there were no nodules present [No Respiratory Distress] : no respiratory distress  [No Accessory Muscle Use] : no accessory muscle use [Clear to Auscultation] : lungs were clear to auscultation bilaterally [Normal Rate] : normal rate  [Regular Rhythm] : with a regular rhythm [Normal S1, S2] : normal S1 and S2 [No Murmur] : no murmur heard [No Carotid Bruits] : no carotid bruits [No Abdominal Bruit] : a ~M bruit was not heard ~T in the abdomen [No Varicosities] : no varicosities [Pedal Pulses Present] : the pedal pulses are present [No Edema] : there was no peripheral edema [No Palpable Aorta] : no palpable aorta [No Extremity Clubbing/Cyanosis] : no extremity clubbing/cyanosis [Soft] : abdomen soft [Non Tender] : non-tender [Non-distended] : non-distended [No Masses] : no abdominal mass palpated [No HSM] : no HSM [Normal Bowel Sounds] : normal bowel sounds [Normal Posterior Cervical Nodes] : no posterior cervical lymphadenopathy [Normal Anterior Cervical Nodes] : no anterior cervical lymphadenopathy [No CVA Tenderness] : no CVA  tenderness [No Spinal Tenderness] : no spinal tenderness [No Joint Swelling] : no joint swelling [Grossly Normal Strength/Tone] : grossly normal strength/tone [No Rash] : no rash [Coordination Grossly Intact] : coordination grossly intact [No Focal Deficits] : no focal deficits [Normal Gait] : normal gait [Deep Tendon Reflexes (DTR)] : deep tendon reflexes were 2+ and symmetric [Normal Affect] : the affect was normal [Normal Insight/Judgement] : insight and judgment were intact yes

## 2022-04-18 NOTE — H&P ADULT - HISTORY OF PRESENT ILLNESS
Patient is a 62 Y/O Male, undomiciled, ETOH abuse, chronic lower back pain, pancreatitis, frequent ED visits for intoxication, presents with EMS after pt had witnessed fall by a bystander with +head strike though no LOC. Pt seems intox, though with EMS that knows pt well and they state that pt doesn't seem out of character is at baseline ms though intox. Unclear if pt has hx of alc withdrawals. Pt reports some pain to R foot when he moves. Per chart review pt had tetanus shot updated 2 times in 2021

## 2022-04-18 NOTE — PATIENT PROFILE ADULT - FALL HARM RISK - HARM RISK INTERVENTIONS

## 2022-04-18 NOTE — PHYSICAL THERAPY INITIAL EVALUATION ADULT - PERTINENT HX OF CURRENT PROBLEM, REHAB EVAL
62 y/o Tajik speaking male admitted s/p fall BIB EMS ([facility]) and alcohol intoxication. Pt has  bruising and wound of forehead, R hand and L shin. All imaging Negative for fx.

## 2022-04-18 NOTE — PHYSICAL THERAPY INITIAL EVALUATION ADULT - PLANNED THERAPY INTERVENTIONS, PT EVAL
Stairs Goal: Pt will go up/down 10 steps with + handrail independently in 2 weeks./balance training/bed mobility training/gait training/strengthening

## 2022-04-18 NOTE — PHYSICAL THERAPY INITIAL EVALUATION ADULT - GAIT PATTERN USED, PT EVAL
pt had 2 episodes LOB requiring assist to corrrect; pt needs frequent cues to maintain proper position to RW/3-point gait

## 2022-04-19 LAB
A1C WITH ESTIMATED AVERAGE GLUCOSE RESULT: 4.9 % — SIGNIFICANT CHANGE UP (ref 4–5.6)
ALBUMIN SERPL ELPH-MCNC: 3.5 G/DL — SIGNIFICANT CHANGE UP (ref 3.3–5)
ALP SERPL-CCNC: 71 U/L — SIGNIFICANT CHANGE UP (ref 40–120)
ALT FLD-CCNC: 10 U/L — SIGNIFICANT CHANGE UP (ref 10–45)
ANION GAP SERPL CALC-SCNC: 12 MMOL/L — SIGNIFICANT CHANGE UP (ref 5–17)
AST SERPL-CCNC: 20 U/L — SIGNIFICANT CHANGE UP (ref 10–40)
BASOPHILS # BLD AUTO: 0.03 K/UL — SIGNIFICANT CHANGE UP (ref 0–0.2)
BASOPHILS NFR BLD AUTO: 0.5 % — SIGNIFICANT CHANGE UP (ref 0–2)
BILIRUB SERPL-MCNC: 1.5 MG/DL — HIGH (ref 0.2–1.2)
BUN SERPL-MCNC: 12 MG/DL — SIGNIFICANT CHANGE UP (ref 7–23)
CALCIUM SERPL-MCNC: 7.9 MG/DL — LOW (ref 8.4–10.5)
CHLORIDE SERPL-SCNC: 105 MMOL/L — SIGNIFICANT CHANGE UP (ref 96–108)
CHOLEST SERPL-MCNC: 152 MG/DL — SIGNIFICANT CHANGE UP
CO2 SERPL-SCNC: 20 MMOL/L — LOW (ref 22–31)
CREAT SERPL-MCNC: 0.77 MG/DL — SIGNIFICANT CHANGE UP (ref 0.5–1.3)
EGFR: 101 ML/MIN/1.73M2 — SIGNIFICANT CHANGE UP
EOSINOPHIL # BLD AUTO: 0.22 K/UL — SIGNIFICANT CHANGE UP (ref 0–0.5)
EOSINOPHIL NFR BLD AUTO: 3.9 % — SIGNIFICANT CHANGE UP (ref 0–6)
ESTIMATED AVERAGE GLUCOSE: 94 MG/DL — SIGNIFICANT CHANGE UP (ref 68–114)
GLUCOSE SERPL-MCNC: 86 MG/DL — SIGNIFICANT CHANGE UP (ref 70–99)
HCT VFR BLD CALC: 33.7 % — LOW (ref 39–50)
HDLC SERPL-MCNC: 93 MG/DL — SIGNIFICANT CHANGE UP
HGB BLD-MCNC: 10.9 G/DL — LOW (ref 13–17)
IMM GRANULOCYTES NFR BLD AUTO: 0.2 % — SIGNIFICANT CHANGE UP (ref 0–1.5)
LIPID PNL WITH DIRECT LDL SERPL: 50 MG/DL — SIGNIFICANT CHANGE UP
LYMPHOCYTES # BLD AUTO: 1.52 K/UL — SIGNIFICANT CHANGE UP (ref 1–3.3)
LYMPHOCYTES # BLD AUTO: 27.1 % — SIGNIFICANT CHANGE UP (ref 13–44)
MCHC RBC-ENTMCNC: 32.2 PG — SIGNIFICANT CHANGE UP (ref 27–34)
MCHC RBC-ENTMCNC: 32.3 GM/DL — SIGNIFICANT CHANGE UP (ref 32–36)
MCV RBC AUTO: 99.4 FL — SIGNIFICANT CHANGE UP (ref 80–100)
MONOCYTES # BLD AUTO: 0.53 K/UL — SIGNIFICANT CHANGE UP (ref 0–0.9)
MONOCYTES NFR BLD AUTO: 9.5 % — SIGNIFICANT CHANGE UP (ref 2–14)
NEUTROPHILS # BLD AUTO: 3.29 K/UL — SIGNIFICANT CHANGE UP (ref 1.8–7.4)
NEUTROPHILS NFR BLD AUTO: 58.8 % — SIGNIFICANT CHANGE UP (ref 43–77)
NON HDL CHOLESTEROL: 59 MG/DL — SIGNIFICANT CHANGE UP
NRBC # BLD: 0 /100 WBCS — SIGNIFICANT CHANGE UP (ref 0–0)
PLATELET # BLD AUTO: 243 K/UL — SIGNIFICANT CHANGE UP (ref 150–400)
POTASSIUM SERPL-MCNC: 3.4 MMOL/L — LOW (ref 3.5–5.3)
POTASSIUM SERPL-SCNC: 3.4 MMOL/L — LOW (ref 3.5–5.3)
PROT SERPL-MCNC: 6.1 G/DL — SIGNIFICANT CHANGE UP (ref 6–8.3)
RBC # BLD: 3.39 M/UL — LOW (ref 4.2–5.8)
RBC # FLD: 13.1 % — SIGNIFICANT CHANGE UP (ref 10.3–14.5)
SODIUM SERPL-SCNC: 137 MMOL/L — SIGNIFICANT CHANGE UP (ref 135–145)
TRIGL SERPL-MCNC: 45 MG/DL — SIGNIFICANT CHANGE UP
TSH SERPL-MCNC: 3.47 UIU/ML — SIGNIFICANT CHANGE UP (ref 0.27–4.2)
WBC # BLD: 5.6 K/UL — SIGNIFICANT CHANGE UP (ref 3.8–10.5)
WBC # FLD AUTO: 5.6 K/UL — SIGNIFICANT CHANGE UP (ref 3.8–10.5)

## 2022-04-19 PROCEDURE — 99222 1ST HOSP IP/OBS MODERATE 55: CPT

## 2022-04-19 RX ORDER — POTASSIUM CHLORIDE 20 MEQ
20 PACKET (EA) ORAL ONCE
Refills: 0 | Status: COMPLETED | OUTPATIENT
Start: 2022-04-19 | End: 2022-04-19

## 2022-04-19 RX ORDER — ACETAMINOPHEN 500 MG
650 TABLET ORAL EVERY 6 HOURS
Refills: 0 | Status: DISCONTINUED | OUTPATIENT
Start: 2022-04-19 | End: 2022-04-26

## 2022-04-19 RX ORDER — POTASSIUM CHLORIDE 20 MEQ
40 PACKET (EA) ORAL ONCE
Refills: 0 | Status: COMPLETED | OUTPATIENT
Start: 2022-04-19 | End: 2022-04-19

## 2022-04-19 RX ADMIN — HEPARIN SODIUM 5000 UNIT(S): 5000 INJECTION INTRAVENOUS; SUBCUTANEOUS at 21:17

## 2022-04-19 RX ADMIN — Medication 650 MILLIGRAM(S): at 21:08

## 2022-04-19 RX ADMIN — Medication 1 MILLIGRAM(S): at 13:01

## 2022-04-19 RX ADMIN — Medication 650 MILLIGRAM(S): at 13:00

## 2022-04-19 RX ADMIN — HEPARIN SODIUM 5000 UNIT(S): 5000 INJECTION INTRAVENOUS; SUBCUTANEOUS at 13:31

## 2022-04-19 RX ADMIN — Medication 100 MILLIGRAM(S): at 13:01

## 2022-04-19 RX ADMIN — HEPARIN SODIUM 5000 UNIT(S): 5000 INJECTION INTRAVENOUS; SUBCUTANEOUS at 05:26

## 2022-04-19 RX ADMIN — Medication 40 MILLIEQUIVALENT(S): at 10:26

## 2022-04-19 RX ADMIN — SODIUM CHLORIDE 75 MILLILITER(S): 9 INJECTION INTRAMUSCULAR; INTRAVENOUS; SUBCUTANEOUS at 20:38

## 2022-04-19 RX ADMIN — Medication 650 MILLIGRAM(S): at 13:45

## 2022-04-19 RX ADMIN — SODIUM CHLORIDE 75 MILLILITER(S): 9 INJECTION INTRAMUSCULAR; INTRAVENOUS; SUBCUTANEOUS at 17:45

## 2022-04-19 RX ADMIN — Medication 650 MILLIGRAM(S): at 20:38

## 2022-04-19 RX ADMIN — Medication 20 MILLIEQUIVALENT(S): at 10:26

## 2022-04-19 NOTE — CONSULT NOTE ADULT - SUBJECTIVE AND OBJECTIVE BOX
Wound SURGERY CONSULT NOTE    HPI:  Patient is a 62 Y/O Male, undomiciled, ETOH abuse, chronic lower back pain, pancreatitis, frequent ED visits for intoxication, presents with EMS after pt had witnessed fall by a bystander with +head strike though no LOC. Pt seemed intox, though with EMS that knows pt well and they state that pt doesn't seem out of character is at baseline ms though intox. Unclear if pt has hx of alc withdrawals.  CIWA protocol initiated. Pt reports some pain to R foot when he moves. Per chart review pt had tetanus shot updated 2 times in 2021.  Pt c/o feet hurt as shoes are too small.  Pt w/o  N/V/D,  palp/ sob/dyspnea/ cp, or F/C/S.  Wound consult requested by team to assist w/ management of forehead wound.   Pt denies  c/o pain, drainage, odor, color change, or swelling. Offloading and pericare initiated. Pt spoken with assistance of  services.  All questions asked and answered to pt's expressed understanding.    Current Diet: Diet, Regular (04-18-22 @ 17:26)      PAST MEDICAL & SURGICAL HISTORY:  Alcohol Abuse    Macrocytic anemia    Alcohol-induced chronic pancreatitis    s/p left knee surgery        REVIEW OF SYSTEMS: General/Skin MSK: see HPI  All other systems negative    MEDICATIONS  (STANDING):  folic acid 1 milliGRAM(s) Oral daily  heparin   Injectable 5000 Unit(s) SubCutaneous every 8 hours  sodium chloride 0.9%. 1000 milliLiter(s) (75 mL/Hr) IV Continuous <Continuous>  thiamine 100 milliGRAM(s) Oral daily    MEDICATIONS  (PRN):  acetaminophen     Tablet .. 650 milliGRAM(s) Oral every 6 hours PRN Moderate Pain (4 - 6)    No Known Allergies    SOCIAL HISTORY: homeless, ETOH abuse No smoking,, drugs    FAMILY HISTORY:   no significant    PHYSICAL EXAM:  Vital Signs Last 24 Hrs  T(C): 36.5 (19 Apr 2022 14:31), Max: 37.1 (18 Apr 2022 19:35)  T(F): 97.7 (19 Apr 2022 14:31), Max: 98.8 (18 Apr 2022 19:35)  HR: 73 (19 Apr 2022 14:31) (63 - 82)  BP: 116/71 (19 Apr 2022 14:31) (116/71 - 146/83)  BP(mean): --  RR: 18 (19 Apr 2022 14:31) (18 - 20)  SpO2: 96% (19 Apr 2022 14:31) (95% - 98%)    NAD,  A&Ox3, Disheveled  Versa Care P500 bed  HEENT:  NC, EOMI, sclera clear, mucosa moist, throat clear, trachea midline, neck supple      Forehead with dried blood and serous crusting       no hematoma nor drainage  No odor, erythema, increased warmth, tenderness, induration, fluctuance, nor crepitus  Respiratory:  equal chest rise  Gastrointestinal soft NT/ND (+)BS   Neurology:  strength & sensation grossly intact  Psych: calm/ appropriate  Musculoskeletal: FROM, no deformities/ contractures  Vascular: BLE equally warm,  no cyanosis, clubbing, edema       BLE DP/PT pulses palpable, no acute ischemia noted  Skin:  moist w/ good turgor      several superficial abrasions dry on hands and knees  No odor, erythema, increased warmth, tenderness, induration, fluctuance, nor crepitus  BLE w/ elongated fungal toe nails  Rt 5th toe dried callous      LABS/ CULTURES/ RADIOLOGY:                        10.9   5.60  )-----------( 243      ( 19 Apr 2022 07:18 )             33.7       137  |  105  |  12  ----------------------------<  86      [04-19-22 @ 07:15]  3.4   |  20  |  0.77        Ca     7.9     [04-19-22 @ 07:15]      Mg     1.8     [04-18-22 @ 11:53]      Phos  2.5     [04-18-22 @ 11:53]    TPro  6.1  /  Alb  3.5  /  TBili  1.5  /  DBili  x   /  AST  20  /  ALT  10  /  AlkPhos  71  [04-19-22 @ 07:15]                 Wound SURGERY CONSULT NOTE    HPI:  Patient is a 60 Y/O Male, undomiciled, ETOH abuse, chronic lower back pain, pancreatitis, frequent ED visits for intoxication, presents with EMS after pt had witnessed fall by a bystander with +head strike though no LOC. Pt seemed intox, though with EMS that knows pt well and they state that pt doesn't seem out of character is at baseline ms though intox. Unclear if pt has hx of alc withdrawals.  CIWA protocol initiated. Pt reports some pain to R foot when he moves. Per chart review pt had tetanus shot updated 2 times in 2021.  Pt c/o feet hurt as shoes are too small.  Pt w/o  N/V/D,  palp/ sob/dyspnea/ cp, or F/C/S.  Wound consult requested by team to assist w/ management of forehead wound.   Pt denies  c/o pain, drainage, odor, color change, or swelling. Offloading and pericare initiated. Pt spoken with assistance of  services.  All questions asked and answered to pt's expressed understanding.    Current Diet: Diet, Regular (04-18-22 @ 17:26)      PAST MEDICAL & SURGICAL HISTORY:  Alcohol Abuse    Macrocytic anemia    Alcohol-induced chronic pancreatitis    s/p left knee surgery        REVIEW OF SYSTEMS: General/Skin MSK: see HPI  All other systems negative    MEDICATIONS  (STANDING):  folic acid 1 milliGRAM(s) Oral daily  heparin   Injectable 5000 Unit(s) SubCutaneous every 8 hours  sodium chloride 0.9%. 1000 milliLiter(s) (75 mL/Hr) IV Continuous <Continuous>  thiamine 100 milliGRAM(s) Oral daily    MEDICATIONS  (PRN):  acetaminophen     Tablet .. 650 milliGRAM(s) Oral every 6 hours PRN Moderate Pain (4 - 6)    No Known Allergies    SOCIAL HISTORY: homeless, ETOH abuse No smoking,, drugs    FAMILY HISTORY:   no significant    PHYSICAL EXAM:  Vital Signs Last 24 Hrs  T(C): 36.5 (19 Apr 2022 14:31), Max: 37.1 (18 Apr 2022 19:35)  T(F): 97.7 (19 Apr 2022 14:31), Max: 98.8 (18 Apr 2022 19:35)  HR: 73 (19 Apr 2022 14:31) (63 - 82)  BP: 116/71 (19 Apr 2022 14:31) (116/71 - 146/83)  BP(mean): --  RR: 18 (19 Apr 2022 14:31) (18 - 20)  SpO2: 96% (19 Apr 2022 14:31) (95% - 98%)    NAD,  A&Ox3, Disheveled  Versa Care P500 bed  HEENT:  NC, EOMI, sclera clear, mucosa moist, throat clear, trachea midline, neck supple      Forehead with dried blood and serous crusting       no hematoma nor drainage  No odor, erythema, increased warmth, tenderness, induration, fluctuance, nor crepitus  Respiratory:  equal chest rise  Gastrointestinal soft NT/ND (+)BS   Neurology:  strength & sensation grossly intact  Psych: calm/ appropriate  Musculoskeletal: FROM, no deformities/ contractures  Vascular: BLE equally warm,  no cyanosis, clubbing, edema       BLE DP pulses palpable, no acute ischemia noted  Skin:  moist w/ good turgor      several superficial abrasions dry on hands and knees  No odor, erythema, increased warmth, tenderness, induration, fluctuance, nor crepitus  BLE w/ elongated fungal toe nails  Rt 5th toe dried callous      LABS/ CULTURES/ RADIOLOGY:                        10.9   5.60  )-----------( 243      ( 19 Apr 2022 07:18 )             33.7       137  |  105  |  12  ----------------------------<  86      [04-19-22 @ 07:15]  3.4   |  20  |  0.77        Ca     7.9     [04-19-22 @ 07:15]      Mg     1.8     [04-18-22 @ 11:53]      Phos  2.5     [04-18-22 @ 11:53]    TPro  6.1  /  Alb  3.5  /  TBili  1.5  /  DBili  x   /  AST  20  /  ALT  10  /  AlkPhos  71  [04-19-22 @ 07:15]

## 2022-04-19 NOTE — PROGRESS NOTE ADULT - ASSESSMENT
Patient is a 62 Y/O Male, undomiciled, ETOH abuse, chronic lower back pain, pancreatitis, frequent ED visits for intoxication, presents with EMS after pt had witnessed fall by a bystander with +head strike though no LOC. Pt seems intox, though with EMS that knows pt well and they state that pt doesn't seem out of character is at baseline ms though intox. Unclear if pt has hx of alc withdrawals. Pt reports some pain to R foot when he moves. Per chart review pt had tetanus shot updated 2 times in 2021      # Fall  - Likely due to ETOH  - Mechanical   - Pan scan noted   - Fall precautions  - IV hydration   - PT/OT follow up    ETOH  - Monitor for withdrawal   - Thiamine and folic acid  - IV hydration  - DT protocol PRN     Homeless   - Social work eval     Hypokalemia  - S/P K supplementation today  - F/U on AM BMP results   - Monitor and replete electrolytes as needed     Skin abrasions  - Wound care consult placed; f/u recs     DVT and gI PPX   Patient is a 62 Y/O Male, undomiciled, ETOH abuse, chronic lower back pain, pancreatitis, frequent ED visits for intoxication, presents with EMS after pt had witnessed fall by a bystander with +head strike though no LOC. Pt seems intox, though with EMS that knows pt well and they state that pt doesn't seem out of character is at baseline ms though intox. Unclear if pt has hx of alc withdrawals. Pt reports some pain to R foot when he moves. Per chart review pt had tetanus shot updated 2 times in 2021      # Fall  - Likely due to ETOH  - Mechanical   - Pan scan noted   - Fall precautions  - IV hydration   - PT/OT follow up    ETOH  - Monitor for withdrawal   - Thiamine and folic acid  - IV hydration  - DT protocol PRN   - CIWA Protocol   - Monitor for signs/symptoms of withdrawal     Homeless   - Social work eval     Hypokalemia  - S/P K supplementation today  - F/U on AM BMP results   - Monitor and replete electrolytes as needed     Skin abrasions  - Wound care consult placed; f/u recs     DVT and gI PPX

## 2022-04-19 NOTE — PROGRESS NOTE ADULT - SUBJECTIVE AND OBJECTIVE BOX
Subjective:       Patient ID: Kylie Velazquez is a 23 y.o.W/ female.    Chief Complaint: Fatigue and Generalized Body Aches    HPI         She comes in today by herself and has the above problem.  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  Yesterday evening she started developing a low-grade fever.  She felt bad but she really didn't have any other symptoms until this morning.  She awakened with nausea and threw up once but has not had any constipation or diarrhea.  Her appetite is fine now.  Last menstrual period was one month ago and she's due any day now.  She doesn't think she's pregnant.  She is on the OCP.  She denies any problem with nasal congestion, earache, sore throat, cough, respiratory symptoms of any kind, abdominal cramping, reflux of acid, hematemesis, melena, bright red rectal bleeding.  There is no history of liver infection.  She doesn't have any gallbladder disease.    Review of Systems    Otherwise negative concerning the RESPIRATORY, PULMONARY, GI, and  system review.    Objective:      Physical Exam    ENT: All within normal limits.  She is well hydrated inside the mouth and there is no throat erythema in the pharynx or the soft palate.  She has no exudates or halitosis.  Neck glands are nontender and she has no cervical adenopathy.  CHEST: Clear BS anterior to posterior with no wheeze, rhonchi, or rales.  ABDOMEN: Flat and soft with no rigidity.  Bowel sounds are normal.  There is no organomegaly or mass felt palpation percussion.  There is no guarding or rebound.  She is nonicteric in both the skin and the eyes.    Assessment:       1. Viral gastritis        Plan:     1.  Recommend diet as tolerated but avoid grease and spicy foods.  2.  Start Zofran 4 mg ODT as needed for nausea.  Also use Tylenol instead of NSAIDs for her elevated temperature to avoid stomach upset further with NSAIDs.  3.  She can probably go back to school today but she only had one class.   She did not need a note.         Name of Patient : MADHAVI RAMOS  MRN: [id number]  Date of visit: 04-19-22 @ 13:36      Subjective: Patient seen and examined. No new events except as noted.     REVIEW OF SYSTEMS:    CONSTITUTIONAL: No weakness, fevers or chills  EYES/ENT: No visual changes;  No vertigo or throat pain   NECK: No pain or stiffness  RESPIRATORY: No cough, wheezing, hemoptysis; No shortness of breath  CARDIOVASCULAR: No chest pain or palpitations  GASTROINTESTINAL: No abdominal or epigastric pain. No nausea, vomiting, or hematemesis; No diarrhea or constipation. No melena or hematochezia.  GENITOURINARY: No dysuria, frequency or hematuria  NEUROLOGICAL: No numbness or weakness  SKIN: No itching, burning, rashes, or lesions   All other review of systems is negative unless indicated above.    MEDICATIONS:  MEDICATIONS  (STANDING):  folic acid 1 milliGRAM(s) Oral daily  heparin   Injectable 5000 Unit(s) SubCutaneous every 8 hours  sodium chloride 0.9%. 1000 milliLiter(s) (75 mL/Hr) IV Continuous <Continuous>  thiamine 100 milliGRAM(s) Oral daily      PHYSICAL EXAM:  T(C): 36.7 (04-19-22 @ 12:02), Max: 37.1 (04-18-22 @ 19:35)  HR: 71 (04-19-22 @ 12:02) (63 - 89)  BP: 126/80 (04-19-22 @ 12:02) (123/67 - 146/83)  RR: 18 (04-19-22 @ 12:02) (18 - 20)  SpO2: 97% (04-19-22 @ 12:02) (95% - 98%)  Wt(kg): --  I&O's Summary    18 Apr 2022 07:01  -  19 Apr 2022 07:00  --------------------------------------------------------  IN: 1020 mL / OUT: 800 mL / NET: 220 mL    19 Apr 2022 07:01  -  19 Apr 2022 13:36  --------------------------------------------------------  IN: 480 mL / OUT: 750 mL / NET: -270 mL          Appearance: Awake, Calm, Disheveled male 	  HEENT:  ADI   Lymphatic: No lymphadenopathy   Cardiovascular: Normal S1 S2, no JVD  Respiratory: normal effort , clear  Gastrointestinal:  Soft, Non-tender  Skin: Multiple skin abrasions  Psychiatry:  Mood & affect appropriate  Musculuskeletal: No edema            04-18-22 @ 07:01  -  04-19-22 @ 07:00  --------------------------------------------------------  IN: 1020 mL / OUT: 800 mL / NET: 220 mL    04-19-22 @ 07:01  -  04-19-22 @ 13:36  --------------------------------------------------------  IN: 480 mL / OUT: 750 mL / NET: -270 mL                                  10.9   5.60  )-----------( 243      ( 19 Apr 2022 07:18 )             33.7               04-19    137  |  105  |  12  ----------------------------<  86  3.4<L>   |  20<L>  |  0.77    Ca    7.9<L>      19 Apr 2022 07:15  Phos  2.5     04-18  Mg     1.8     04-18    TPro  6.1  /  Alb  3.5  /  TBili  1.5<H>  /  DBili  x   /  AST  20  /  ALT  10  /  AlkPhos  71  04-19

## 2022-04-19 NOTE — CONSULT NOTE ADULT - ASSESSMENT
A/P: 60 Y/O Male, undomiciled, ETOH abuse, chronic lower back pain, pancreatitis, frequent ED visits for intoxication, presents with EMS after pt had witnessed fall    Wound Consult requested to assist w/ management of  Forehead traumatic wound  Multiple small abrasions      Forehead wound- Medihoney dressing QD  Abrasions Hands and BLE- CAvilon QD  DPM consulted for Toe nail hygiene   XRay, CT reviewed- no new fx or dislocations- old healed ones and hardware noted  BLE elevation & Compression  Abx per Medicine/ ID  Moisturize intact skin w/ SWEEN cream BID  Nutrition Consult for optimization     encourage high quality protein, MVI & Vit C to promote wound healing  Continue turning and positioning w/ offloading assistive devices as per protocol  Juan David to buttocks BID and prn soiling Continue w/ attends under pads and Pericare as per protocol  Waffle Cushion to chair when oob to chair  Continue w/ low air loss pressure redistribution bed surface   Care as per medicine, will follow w/ you  Upon discharge [facility] [address] [place], [place] [phone]  Seen w/ attng and D/w team & RN  Thank you for this consult  Jyoti Nguyen PA-C [id number]  we spent 50minutes face to face w/ this pt of which more than 50% of the time was spent counseling & coordinating care of this pt.

## 2022-04-19 NOTE — OCCUPATIONAL THERAPY INITIAL EVALUATION ADULT - ORIENTATION, REHAB EVAL
pt unable to correctly name year despite verbal cues/choices. Pt unable to correctly recall what happened prior to admission./person/place A+O to self, birthday, month, hospital. unable to recall year despite verbal cues for choices and reason for hospital visit/person/place

## 2022-04-19 NOTE — OCCUPATIONAL THERAPY INITIAL EVALUATION ADULT - PERTINENT HX OF CURRENT PROBLEM, REHAB EVAL
Patient is a 61 M, undomiciled, ETOH abuse, chronic lower back pain, pancreatitis, frequent ED visits for intoxication, presents with EMS after pt had witnessed fall by a bystander with +head strike though no LOC. Pt seems intox, though with EMS that knows pt well and they state that pt doesn't seem out of character is at baseline ms though intox. Unclear if pt has hx of alc withdrawals. Pt reports some pain to R foot when he moves.

## 2022-04-19 NOTE — OCCUPATIONAL THERAPY INITIAL EVALUATION ADULT - PRECAUTIONS/LIMITATIONS, REHAB EVAL
4/17 CT c-spine/head (-). 4/18 XRay L knee Mild peripatellar soft tissue swelling. No tracking gas collections or radiopaque foreign bodies.Intact partially visualized upper end of a left tibial IM tianna/nail with interlocking screw.Healed proximal to mid tibial and fibular diaphyseal fracture deformities. No dislocations or acute appearing fractures or joint effusion.Preserved knee joint spaces and no joint margin erosions or intra-articular or periarticular calcifications. Fabella present adjacent to the lateral femoral condyle.Generalized mild osteopenia otherwise no discrete suspicious lytic or blastic lesions./fall precautions 4/17 CT c-spine/head (-). 4/18 XRay L knee Mild peripatellar soft tissue swelling. No tracking gas collections or radiopaque foreign bodies.Intact partially visualized upper end of a left tibial IM tianna/nail with interlocking screw.Healed proximal to mid tibial and fibular diaphyseal fracture deformities. No dislocations or acute appearing fractures or joint effusion.Preserved knee joint spaces and no joint margin erosions or intra-articular or periarticular calcifications. Fabella present adjacent to the lateral femoral condyle.Generalized mild osteopenia otherwise no discrete suspicious lytic or blastic lesions. R hand XRay 4/17(-) XRay R foot 4/17(-)/fall precautions

## 2022-04-19 NOTE — CONSULT NOTE ADULT - NS ATTEND AMEND GEN_ALL_CORE FT
Pt seen and examined with ACP.  Assessment and plan discussed. Agree with above.  Interpretor device used to communicate with patient.  Status of wounds and treatment recommendations d/w  pt.  All questions answered.   Pt expressed understanding.

## 2022-04-19 NOTE — OCCUPATIONAL THERAPY INITIAL EVALUATION ADULT - NS ASR FOLLOW COMMAND OT EVAL
requires increased repetition 2* cog vs. language barrier despite use of /75% of the time/able to follow single-step instructions

## 2022-04-19 NOTE — OCCUPATIONAL THERAPY INITIAL EVALUATION ADULT - SHORT TERM MEMORY, REHAB EVAL
A+O to self, birthday, month, hospital. unable to recall year despite verbal cues for choices. and reason for hospital choices/impaired

## 2022-04-20 LAB
ANION GAP SERPL CALC-SCNC: 12 MMOL/L — SIGNIFICANT CHANGE UP (ref 5–17)
BUN SERPL-MCNC: 17 MG/DL — SIGNIFICANT CHANGE UP (ref 7–23)
CALCIUM SERPL-MCNC: 8.8 MG/DL — SIGNIFICANT CHANGE UP (ref 8.4–10.5)
CHLORIDE SERPL-SCNC: 105 MMOL/L — SIGNIFICANT CHANGE UP (ref 96–108)
CO2 SERPL-SCNC: 21 MMOL/L — LOW (ref 22–31)
CREAT SERPL-MCNC: 1.02 MG/DL — SIGNIFICANT CHANGE UP (ref 0.5–1.3)
EGFR: 83 ML/MIN/1.73M2 — SIGNIFICANT CHANGE UP
GLUCOSE SERPL-MCNC: 93 MG/DL — SIGNIFICANT CHANGE UP (ref 70–99)
MAGNESIUM SERPL-MCNC: 1.7 MG/DL — SIGNIFICANT CHANGE UP (ref 1.6–2.6)
PHOSPHATE SERPL-MCNC: 3.7 MG/DL — SIGNIFICANT CHANGE UP (ref 2.5–4.5)
POTASSIUM SERPL-MCNC: 4.1 MMOL/L — SIGNIFICANT CHANGE UP (ref 3.5–5.3)
POTASSIUM SERPL-SCNC: 4.1 MMOL/L — SIGNIFICANT CHANGE UP (ref 3.5–5.3)
SODIUM SERPL-SCNC: 138 MMOL/L — SIGNIFICANT CHANGE UP (ref 135–145)

## 2022-04-20 PROCEDURE — 93010 ELECTROCARDIOGRAM REPORT: CPT

## 2022-04-20 RX ADMIN — HEPARIN SODIUM 5000 UNIT(S): 5000 INJECTION INTRAVENOUS; SUBCUTANEOUS at 21:35

## 2022-04-20 RX ADMIN — Medication 650 MILLIGRAM(S): at 13:10

## 2022-04-20 RX ADMIN — SODIUM CHLORIDE 75 MILLILITER(S): 9 INJECTION INTRAMUSCULAR; INTRAVENOUS; SUBCUTANEOUS at 13:12

## 2022-04-20 RX ADMIN — HEPARIN SODIUM 5000 UNIT(S): 5000 INJECTION INTRAVENOUS; SUBCUTANEOUS at 05:16

## 2022-04-20 RX ADMIN — Medication 650 MILLIGRAM(S): at 03:43

## 2022-04-20 RX ADMIN — Medication 100 MILLIGRAM(S): at 13:11

## 2022-04-20 RX ADMIN — Medication 650 MILLIGRAM(S): at 03:13

## 2022-04-20 RX ADMIN — Medication 650 MILLIGRAM(S): at 22:10

## 2022-04-20 RX ADMIN — Medication 650 MILLIGRAM(S): at 18:11

## 2022-04-20 RX ADMIN — Medication 650 MILLIGRAM(S): at 20:50

## 2022-04-20 RX ADMIN — HEPARIN SODIUM 5000 UNIT(S): 5000 INJECTION INTRAVENOUS; SUBCUTANEOUS at 13:11

## 2022-04-20 RX ADMIN — Medication 1 MILLIGRAM(S): at 13:10

## 2022-04-20 NOTE — PROGRESS NOTE ADULT - SUBJECTIVE AND OBJECTIVE BOX
Patient is a 61y old  Male who presents with a chief complaint of     HPI:  Patient is a 60 Y/O Male, undomiciled, ETOH abuse, chronic lower back pain, pancreatitis, frequent ED visits for intoxication, presents with EMS after pt had witnessed fall by a bystander with +head strike though no LOC. Pt seems intox, though with EMS that knows pt well and they state that pt doesn't seem out of character is at baseline ms though intox. Unclear if pt has hx of alc withdrawals. Pt reports some pain to R foot when he moves. Per chart review pt had tetanus shot updated 2 times in 2021 (18 Apr 2022 17:16)      PAST MEDICAL & SURGICAL HISTORY:  Alcohol Abuse    ETOH abuse    Macrocytic anemia    Alcohol-induced chronic pancreatitis    History of left knee surgery        MEDICATIONS  (STANDING):  folic acid 1 milliGRAM(s) Oral daily  heparin   Injectable 5000 Unit(s) SubCutaneous every 8 hours  sodium chloride 0.9%. 1000 milliLiter(s) (75 mL/Hr) IV Continuous <Continuous>  thiamine 100 milliGRAM(s) Oral daily    MEDICATIONS  (PRN):  acetaminophen     Tablet .. 650 milliGRAM(s) Oral every 6 hours PRN Moderate Pain (4 - 6)      Allergies    No Known Allergies    Intolerances        VITALS:    Vital Signs Last 24 Hrs  T(C): 36.9 (20 Apr 2022 12:15), Max: 37.1 (19 Apr 2022 19:50)  T(F): 98.4 (20 Apr 2022 12:15), Max: 98.7 (19 Apr 2022 19:50)  HR: 71 (20 Apr 2022 12:15) (59 - 76)  BP: 157/61 (20 Apr 2022 12:15) (116/71 - 157/61)  BP(mean): --  RR: 18 (20 Apr 2022 12:15) (18 - 18)  SpO2: 98% (20 Apr 2022 12:15) (96% - 98%)    LABS:                          10.9   5.60  )-----------( 243      ( 19 Apr 2022 07:18 )             33.7       04-20    138  |  105  |  17  ----------------------------<  93  4.1   |  21<L>  |  1.02    Ca    8.8      20 Apr 2022 07:20  Phos  3.7     04-20  Mg     1.7     04-20    TPro  6.1  /  Alb  3.5  /  TBili  1.5<H>  /  DBili  x   /  AST  20  /  ALT  10  /  AlkPhos  71  04-19      CAPILLARY BLOOD GLUCOSE              LOWER EXTREMITY PHYSICAL EXAM:    Vascular: 2/4DP/PT 24, B/L, CFT <3 seconds B/L, Temperature gradient wnl B/L.   Neuro: Epicritic sensation intact to the level of _toes B/L.  Musculoskeletal/Ortho: hammer toes 2-5 both feet  Skin: thick, dystrophic elongated mycotic appearing painful toenails 1,2,3,4,5 both feet

## 2022-04-20 NOTE — PROGRESS NOTE ADULT - SUBJECTIVE AND OBJECTIVE BOX
Name of Patient : MADHAVI RAMOS  MRN: [id number]  Date of visit: 04-20-22 @ 13:06      Subjective: Patient seen and examined. No new events except as noted.       MEDICATIONS:  MEDICATIONS  (STANDING):  folic acid 1 milliGRAM(s) Oral daily  heparin   Injectable 5000 Unit(s) SubCutaneous every 8 hours  sodium chloride 0.9%. 1000 milliLiter(s) (75 mL/Hr) IV Continuous <Continuous>  thiamine 100 milliGRAM(s) Oral daily      PHYSICAL EXAM:  T(C): 36.9 (04-20-22 @ 12:15), Max: 37.1 (04-19-22 @ 19:50)  HR: 71 (04-20-22 @ 12:15) (59 - 76)  BP: 157/61 (04-20-22 @ 12:15) (116/71 - 157/61)  RR: 18 (04-20-22 @ 12:15) (18 - 18)  SpO2: 98% (04-20-22 @ 12:15) (96% - 98%)  Wt(kg): --  I&O's Summary    19 Apr 2022 07:01  -  20 Apr 2022 07:00  --------------------------------------------------------  IN: 1440 mL / OUT: 3550 mL / NET: -2110 mL    20 Apr 2022 07:01  -  20 Apr 2022 13:06  --------------------------------------------------------  IN: 480 mL / OUT: 300 mL / NET: 180 mL          Appearance: Awake, Calm, Disheveled male 	  HEENT:  PERRLA   Lymphatic: No lymphadenopathy   Cardiovascular: Normal S1 S2, no JVD  Respiratory: normal effort , clear  Gastrointestinal:  Soft, Non-tender  Skin: Multiple skin abrasions; Dressing on forehead  Psychiatry:  Mood & affect appropriate  Musculoskeletal: No edema          04-19-22 @ 07:01  -  04-20-22 @ 07:00  --------------------------------------------------------  IN: 1440 mL / OUT: 3550 mL / NET: -2110 mL    04-20-22 @ 07:01  -  04-20-22 @ 13:06  --------------------------------------------------------  IN: 480 mL / OUT: 300 mL / NET: 180 mL                                  10.9   5.60  )-----------( 243      ( 19 Apr 2022 07:18 )             33.7               04-20    138  |  105  |  17  ----------------------------<  93  4.1   |  21<L>  |  1.02    Ca    8.8      20 Apr 2022 07:20  Phos  3.7     04-20  Mg     1.7     04-20    TPro  6.1  /  Alb  3.5  /  TBili  1.5<H>  /  DBili  x   /  AST  20  /  ALT  10  /  AlkPhos  71  04-19

## 2022-04-20 NOTE — PROGRESS NOTE ADULT - ASSESSMENT
patient visited at bedside INAD    LOWER EXTREMITY PHYSICAL EXAM:    Vascular: 2/4DP/PT 24, B/L, CFT <3 seconds B/L, Temperature gradient wnl B/L.   Neuro: Epicritic sensation intact to the level of _toes B/L.  Musculoskeletal/Ortho: hammer toes 2-5 both feet  Skin: thick, dystrophic elongated mycotic appearing painful toenails 1,2,3,4,5 both feet  Debride all nails with sterile nail clipper without incident  reconsult podiatry as needed

## 2022-04-20 NOTE — PROGRESS NOTE ADULT - ASSESSMENT
Patient is a 60 Y/O Male, undomiciled, ETOH abuse, chronic lower back pain, pancreatitis, frequent ED visits for intoxication, presents with EMS after pt had witnessed fall by a bystander with +head strike though no LOC. Pt seems intox, though with EMS that knows pt well and they state that pt doesn't seem out of character is at baseline ms though intox. Unclear if pt has hx of alc withdrawals. Pt reports some pain to R foot when he moves. Per chart review pt had tetanus shot updated 2 times in 2021      Fall  - Likely due to ETOH  - Mechanical   - Pan scan noted   - Fall precautions  - IV hydration   - PT/OT follow up    ETOH  - Monitor for withdrawal   - Thiamine and folic acid  - IV hydration  - DT protocol PRN   - CIWA Protocol   - Monitor for signs/symptoms of withdrawal     Homeless   - Social work eval     Hypokalemia  - F/U on AM BMP results   - Monitor and replete electrolytes as needed   - K of 4.1    Skin abrasions  - Wound care consult appreciated; f/u recs     DVT and GI PPX   Patient is a 62 Y/O Male, undomiciled, ETOH abuse, chronic lower back pain, pancreatitis, frequent ED visits for intoxication, presents with EMS after pt had witnessed fall by a bystander with +head strike though no LOC. Pt seems intox, though with EMS that knows pt well and they state that pt doesn't seem out of character is at baseline ms though intox. Unclear if pt has hx of alc withdrawals. Pt reports some pain to R foot when he moves. Per chart review pt had tetanus shot updated 2 times in 2021      Fall  - Likely due to ETOH  - Mechanical   - Pan scan noted   - Fall precautions  - IV hydration   - PT/OT follow up    ETOH  - Monitor for withdrawal   - Thiamine and folic acid  - IV hydration  - DT protocol PRN   - CIWA Protocol   - Monitor for signs/symptoms of withdrawal     Homeless   - Social work eval     Hypokalemia  - F/U on AM BMP results   - Monitor and replete electrolytes as needed   - K of 4.1    Skin abrasions  - Wound care consult appreciated; f/u recs   - podiatry follow up     DVT and GI PPX

## 2022-04-21 ENCOUNTER — TRANSCRIPTION ENCOUNTER (OUTPATIENT)
Age: 62
End: 2022-04-21

## 2022-04-21 LAB
ALBUMIN SERPL ELPH-MCNC: 4 G/DL — SIGNIFICANT CHANGE UP (ref 3.3–5)
ALP SERPL-CCNC: 73 U/L — SIGNIFICANT CHANGE UP (ref 40–120)
ALT FLD-CCNC: 9 U/L — LOW (ref 10–45)
ANION GAP SERPL CALC-SCNC: 13 MMOL/L — SIGNIFICANT CHANGE UP (ref 5–17)
APPEARANCE UR: CLEAR — SIGNIFICANT CHANGE UP
AST SERPL-CCNC: 18 U/L — SIGNIFICANT CHANGE UP (ref 10–40)
BILIRUB DIRECT SERPL-MCNC: 0.1 MG/DL — SIGNIFICANT CHANGE UP (ref 0–0.3)
BILIRUB INDIRECT FLD-MCNC: 0.6 MG/DL — SIGNIFICANT CHANGE UP (ref 0.2–1)
BILIRUB SERPL-MCNC: 0.7 MG/DL — SIGNIFICANT CHANGE UP (ref 0.2–1.2)
BILIRUB UR-MCNC: NEGATIVE — SIGNIFICANT CHANGE UP
BUN SERPL-MCNC: 17 MG/DL — SIGNIFICANT CHANGE UP (ref 7–23)
CALCIUM SERPL-MCNC: 9 MG/DL — SIGNIFICANT CHANGE UP (ref 8.4–10.5)
CHLORIDE SERPL-SCNC: 103 MMOL/L — SIGNIFICANT CHANGE UP (ref 96–108)
CO2 SERPL-SCNC: 21 MMOL/L — LOW (ref 22–31)
COLOR SPEC: COLORLESS — SIGNIFICANT CHANGE UP
CREAT SERPL-MCNC: 0.98 MG/DL — SIGNIFICANT CHANGE UP (ref 0.5–1.3)
DIFF PNL FLD: NEGATIVE — SIGNIFICANT CHANGE UP
EGFR: 87 ML/MIN/1.73M2 — SIGNIFICANT CHANGE UP
GLUCOSE SERPL-MCNC: 97 MG/DL — SIGNIFICANT CHANGE UP (ref 70–99)
GLUCOSE UR QL: NEGATIVE — SIGNIFICANT CHANGE UP
KETONES UR-MCNC: NEGATIVE — SIGNIFICANT CHANGE UP
LEUKOCYTE ESTERASE UR-ACNC: NEGATIVE — SIGNIFICANT CHANGE UP
MAGNESIUM SERPL-MCNC: 1.7 MG/DL — SIGNIFICANT CHANGE UP (ref 1.6–2.6)
NITRITE UR-MCNC: NEGATIVE — SIGNIFICANT CHANGE UP
PH UR: 6.5 — SIGNIFICANT CHANGE UP (ref 5–8)
PHOSPHATE SERPL-MCNC: 4.2 MG/DL — SIGNIFICANT CHANGE UP (ref 2.5–4.5)
POTASSIUM SERPL-MCNC: 4.1 MMOL/L — SIGNIFICANT CHANGE UP (ref 3.5–5.3)
POTASSIUM SERPL-SCNC: 4.1 MMOL/L — SIGNIFICANT CHANGE UP (ref 3.5–5.3)
PROT SERPL-MCNC: 6.7 G/DL — SIGNIFICANT CHANGE UP (ref 6–8.3)
PROT UR-MCNC: NEGATIVE — SIGNIFICANT CHANGE UP
SODIUM SERPL-SCNC: 137 MMOL/L — SIGNIFICANT CHANGE UP (ref 135–145)
SP GR SPEC: 1.01 — LOW (ref 1.01–1.02)
UROBILINOGEN FLD QL: NEGATIVE — SIGNIFICANT CHANGE UP

## 2022-04-21 RX ORDER — OXYCODONE HYDROCHLORIDE 5 MG/1
5 TABLET ORAL ONCE
Refills: 0 | Status: DISCONTINUED | OUTPATIENT
Start: 2022-04-21 | End: 2022-04-21

## 2022-04-21 RX ADMIN — OXYCODONE HYDROCHLORIDE 5 MILLIGRAM(S): 5 TABLET ORAL at 13:50

## 2022-04-21 RX ADMIN — Medication 650 MILLIGRAM(S): at 05:07

## 2022-04-21 RX ADMIN — Medication 650 MILLIGRAM(S): at 11:00

## 2022-04-21 RX ADMIN — Medication 650 MILLIGRAM(S): at 21:56

## 2022-04-21 RX ADMIN — Medication 650 MILLIGRAM(S): at 10:13

## 2022-04-21 RX ADMIN — Medication 100 MILLIGRAM(S): at 12:15

## 2022-04-21 RX ADMIN — Medication 650 MILLIGRAM(S): at 21:26

## 2022-04-21 RX ADMIN — HEPARIN SODIUM 5000 UNIT(S): 5000 INJECTION INTRAVENOUS; SUBCUTANEOUS at 14:57

## 2022-04-21 RX ADMIN — Medication 650 MILLIGRAM(S): at 05:37

## 2022-04-21 RX ADMIN — OXYCODONE HYDROCHLORIDE 5 MILLIGRAM(S): 5 TABLET ORAL at 12:58

## 2022-04-21 RX ADMIN — Medication 1 MILLIGRAM(S): at 12:15

## 2022-04-21 RX ADMIN — HEPARIN SODIUM 5000 UNIT(S): 5000 INJECTION INTRAVENOUS; SUBCUTANEOUS at 21:26

## 2022-04-21 RX ADMIN — SODIUM CHLORIDE 75 MILLILITER(S): 9 INJECTION INTRAMUSCULAR; INTRAVENOUS; SUBCUTANEOUS at 21:26

## 2022-04-21 RX ADMIN — SODIUM CHLORIDE 75 MILLILITER(S): 9 INJECTION INTRAMUSCULAR; INTRAVENOUS; SUBCUTANEOUS at 01:56

## 2022-04-21 RX ADMIN — SODIUM CHLORIDE 75 MILLILITER(S): 9 INJECTION INTRAMUSCULAR; INTRAVENOUS; SUBCUTANEOUS at 14:58

## 2022-04-21 RX ADMIN — HEPARIN SODIUM 5000 UNIT(S): 5000 INJECTION INTRAVENOUS; SUBCUTANEOUS at 05:09

## 2022-04-21 NOTE — DISCHARGE NOTE PROVIDER - HOSPITAL COURSE
Patient is a 60 Y/O Male, undomiciled, ETOH abuse, chronic lower back pain, pancreatitis, frequent ED visits for intoxication, presents with EMS after pt had witnessed fall by a bystander with +head strike though no LOC. Pt seems intox, though with EMS that knows pt well and they state that pt doesn't seem out of character is at baseline ms though intox. Unclear if pt has hx of alc withdrawals. Pt reports some pain to R foot when he moves. Per chart review pt had tetanus shot updated 2 times in 2021      Fall  - Likely due to ETOH  - Mechanical   - Pan scan noted - negative   - Fall precautions  - s/p IV hydration   - PT/OT follow up    ETOH  - Monitor for withdrawal   - Thiamine and folic acid  - IV hydration  - DT protocol PRN   - CIWA Protocol   - Monitor for signs/symptoms of withdrawal     Homeless   - Social work eval     Hypokalemia  - K of 4.1 resolved     Skin abrasions  - Wound care consult appreciated; f/u recs   - podiatry follow up - Debride all nails with sterile nail clipper without incident    Patient is medically cleared and ready for discharge. Discussed with .      Patient is a 60 Y/O Male, undomiciled, ETOH abuse, chronic lower back pain, pancreatitis, frequent ED visits for intoxication, presents with EMS after pt had witnessed fall by a bystander with +head strike though no LOC. Pt seems intox, though with EMS that knows pt well and they state that pt doesn't seem out of character is at baseline ms though intox. Unclear if pt has hx of alc withdrawals. Pt reports some pain to R foot when he moves. Per chart review pt had tetanus shot updated 2 times in 2021      Fall  - Likely due to ETOH  - Mechanical   - Pan scan noted   - Fall precautions  - IV hydration   - PT/OT follow up  - Rib pain, checking CT C/A/P    Abnormal EKG  - No complaints of CP  - Check Echo     ETOH  - Monitor for withdrawal   - Thiamine and folic acid  - IV hydration  - DT protocol PRN   - CIWA Protocol   - Monitor for signs/symptoms of withdrawal     Homeless   - Social work eval     Hypokalemia  - F/U on AM BMP results   - Monitor and replete electrolytes as needed   - K of 4.1; resolved    Skin abrasions  - Wound care consult appreciated; f/u recs   - Podiatry eval appreciated; f/u recs        MYCOTIC TOE NAILS : S/P Skin: thick, dystrophic elongated mycotic appearing painful toenails 1,2,3,4,5 both feet  Debride all nails with sterile nail clipper without incident  reconsult podiatry as needed      Homeless   - Social work eval     Hypokalemia  - K of 4.1 resolved     Skin abrasions  - Wound care consult appreciated; f/u recs   - podiatry follow up - Debride all nails with sterile nail clipper without incident    Patient is medically cleared and ready for discharge. Discussed with .      Patient is a 62 Y/O Male, undomiciled, ETOH abuse, chronic lower back pain, pancreatitis, frequent ED visits for intoxication, presents with EMS after pt had witnessed fall by a bystander with +head strike though no LOC. Pt seems intox, though with EMS that knows pt well and they state that pt doesn't seem out of character is at baseline ms though intox. Unclear if pt has hx of alc withdrawals. Pt reports some pain to R foot when he moves. Per chart review pt had tetanus shot updated 2 times in 2021      Fall  - Likely due to ETOH  - Mechanical   - Pan scan noted , imaging negative for fracture   - Fall precautions  - s/p  IV hydration   -  - Rib pain, ct abd pelvis revealed Mildly displaced acute fracture of the posterior aspect right 11th rib. Possible nondisplaced acute fracture of posterior aspect left 12th rib. No pneumothorax    Complete opacification of the visualized portions of the right maxillary sinus may represent acute versus chronic sinusitis.    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 If additional imaging is clinically warranted, consider pelvic MRI on nonemergent basis    Diffuse esophageal wall thickening and suggested proximal gastric wall thickening could represent esophagitis and gastritis. Consider endoscopy correlation    Ptn does not have any insurance and provided information to follow up at the clinic for an appt  option number 2 for further workup of ct scan findings     Abnormal EKG  - No complaints of CP  echocardiogram was done and normal     ETOH  - Monitor for withdrawal   - Thiamine and folic acid  - IV hydration  - DT protocol PRN   - CIWA Protocol   - Monitor for signs/symptoms of withdrawal     Homeless   - Social work eval     Hypokalemia- Monitor and replete electrolytes as needed   - K of 4.1; resolved    Skin abrasions  - seen by podiatry for mycotic toe nails      MYCOTIC TOE NAILS : S/P Skin: thick, dystrophic elongated mycotic appearing painful toenails 1,2,3,4,5 both feet  Debride all nails with sterile nail clipper without incident  reconsult podiatry as needed          Patient is medically cleared and ready for discharge. Discussed with .

## 2022-04-21 NOTE — DISCHARGE NOTE PROVIDER - NSDCCPCAREPLAN_GEN_ALL_CORE_FT
PRINCIPAL DISCHARGE DIAGNOSIS  Diagnosis: Leg pain  Assessment and Plan of Treatment: 62 y/o homeless Armenian speaking male admitted s/p fall BIB EMS  and alcohol intoxication.  Dx: ETOH abuse         Fall - leg pain all imaging negative negative ; he had  CT head/C spine negative      SECONDARY DISCHARGE DIAGNOSES  Diagnosis: Abnormal finding on CT scan  Assessment and Plan of Treatment: Mildly displaced acute fracture of the posterior aspect right 11th rib. Possible nondisplaced acute fracture of posterior aspect left 12th rib. No pneumothorax  Complete opacification of the visualized portions of the right maxillary sinus may represent acute versus chronic sinusitis.  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 If additional imaging is clinically warranted, consider pelvic MRI on nonemergent basis  Diffuse esophageal wall thickening and suggested proximal gastric wall thickening could represent esophagitis and gastritis. Consider endoscopy correlation  PLEASE FOLLOW UP AT THE MEDICAL CLINIC FOR FURTHER CARE AND MANAGMENT OF THE ABOVE FINDINGS , [facility] [phone] OPTION #2    Diagnosis: Alcohol withdrawal  Assessment and Plan of Treatment: ptn now stable no active withdrawal symptoms during inpatient, continue with folate and thiamine as prescribed and follow up at the medical clinic

## 2022-04-21 NOTE — PROGRESS NOTE ADULT - SUBJECTIVE AND OBJECTIVE BOX
Name of Patient : MADHAVI RAMOS  MRN: [id number]  Date of visit: 04-21-22 @ 12:11      Subjective: Patient seen and examined. No new events except as noted.   Patient seen earlier this AM.       MEDICATIONS:  MEDICATIONS  (STANDING):  folic acid 1 milliGRAM(s) Oral daily  heparin   Injectable 5000 Unit(s) SubCutaneous every 8 hours  sodium chloride 0.9%. 1000 milliLiter(s) (75 mL/Hr) IV Continuous <Continuous>  thiamine 100 milliGRAM(s) Oral daily      PHYSICAL EXAM:  T(C): 36.9 (04-21-22 @ 12:06), Max: 37.1 (04-20-22 @ 20:05)  HR: 68 (04-21-22 @ 12:06) (60 - 85)  BP: 123/74 (04-21-22 @ 12:06) (122/69 - 157/61)  RR: 17 (04-21-22 @ 12:06) (17 - 18)  SpO2: 98% (04-21-22 @ 12:06) (98% - 98%)  Wt(kg): --  I&O's Summary    20 Apr 2022 07:01  -  21 Apr 2022 07:00  --------------------------------------------------------  IN: 2040 mL / OUT: 2075 mL / NET: -35 mL    21 Apr 2022 07:01  -  21 Apr 2022 12:11  --------------------------------------------------------  IN: 540 mL / OUT: 575 mL / NET: -35 mL          Appearance: Awake, Calm, Disheveled male 	  HEENT:  PERMERCY   Lymphatic: No lymphadenopathy   Cardiovascular: Normal S1 S2, no JVD  Respiratory: normal effort , clear  Gastrointestinal:  Soft, Non-tender  Skin: Multiple skin abrasions; Dressing on forehead  Psychiatry:  Mood & affect appropriate  Musculoskeletal: No edema            04-20-22 @ 07:01  -  04-21-22 @ 07:00  --------------------------------------------------------  IN: 2040 mL / OUT: 2075 mL / NET: -35 mL    04-21-22 @ 07:01  -  04-21-22 @ 12:11  --------------------------------------------------------  IN: 540 mL / OUT: 575 mL / NET: -35 mL                    04-21    137  |  103  |  17  ----------------------------<  97  4.1   |  21<L>  |  0.98    Ca    9.0      21 Apr 2022 07:02  Phos  4.2     04-21  Mg     1.7     04-21    TPro  6.7  /  Alb  4.0  /  TBili  0.7  /  DBili  0.1  /  AST  18  /  ALT  9<L>  /  AlkPhos  73  04-21                              Name of Patient : MADHAVI RAMOS  MRN: [id number]  Date of visit: 04-21-22 @ 12:11      Subjective: Patient seen and examined. No new events except as noted.   Patient seen earlier this AM. C/O rib pain.       MEDICATIONS:  MEDICATIONS  (STANDING):  folic acid 1 milliGRAM(s) Oral daily  heparin   Injectable 5000 Unit(s) SubCutaneous every 8 hours  sodium chloride 0.9%. 1000 milliLiter(s) (75 mL/Hr) IV Continuous <Continuous>  thiamine 100 milliGRAM(s) Oral daily      PHYSICAL EXAM:  T(C): 36.9 (04-21-22 @ 12:06), Max: 37.1 (04-20-22 @ 20:05)  HR: 68 (04-21-22 @ 12:06) (60 - 85)  BP: 123/74 (04-21-22 @ 12:06) (122/69 - 157/61)  RR: 17 (04-21-22 @ 12:06) (17 - 18)  SpO2: 98% (04-21-22 @ 12:06) (98% - 98%)  Wt(kg): --  I&O's Summary    20 Apr 2022 07:01  -  21 Apr 2022 07:00  --------------------------------------------------------  IN: 2040 mL / OUT: 2075 mL / NET: -35 mL    21 Apr 2022 07:01  -  21 Apr 2022 12:11  --------------------------------------------------------  IN: 540 mL / OUT: 575 mL / NET: -35 mL          Appearance: Awake, Calm, Disheveled male 	  HEENT:  PERRLA   Lymphatic: No lymphadenopathy   Cardiovascular: Normal S1 S2, no JVD  Respiratory: normal effort , clear  Gastrointestinal:  Soft, Non-tender  Skin: Multiple skin abrasions; Dressing on forehead  Psychiatry:  Mood & affect appropriate  Musculoskeletal: No edema            04-20-22 @ 07:01 - 04-21-22 @ 07:00  --------------------------------------------------------  IN: 2040 mL / OUT: 2075 mL / NET: -35 mL    04-21-22 @ 07:01 - 04-21-22 @ 12:11  --------------------------------------------------------  IN: 540 mL / OUT: 575 mL / NET: -35 mL                    04-21    137  |  103  |  17  ----------------------------<  97  4.1   |  21<L>  |  0.98    Ca    9.0      21 Apr 2022 07:02  Phos  4.2     04-21  Mg     1.7     04-21    TPro  6.7  /  Alb  4.0  /  TBili  0.7  /  DBili  0.1  /  AST  18  /  ALT  9<L>  /  AlkPhos  73  04-21

## 2022-04-21 NOTE — DISCHARGE NOTE PROVIDER - PROVIDER TOKENS
FREE:[LAST:[[facility]],FIRST:[clinic],PHONE:[phone]],FAX:[(   )    -]]

## 2022-04-21 NOTE — DISCHARGE NOTE PROVIDER - NSDCMRMEDTOKEN_GEN_ALL_CORE_FT
lidocaine 4% topical film: Apply topically to affected area once a day    folic acid 1 mg oral tablet: 1 tab(s) orally once a day   thiamine 100 mg oral tablet: 1 tab(s) orally once a day   Tylenol 325 mg oral tablet: 1 tab(s) orally every 6 hours as needed for pain

## 2022-04-21 NOTE — DISCHARGE NOTE PROVIDER - YES NO FOR MLM POSITIVE OR NEGATIVE COVID RESULT
I don't see a refill sent to pharmacy.  I spoke to him and fit him in for annual on 7/25-  He said he prefers to go back on lexapro because he only had ed with that.  With zoloft and paxil he had worse side affects and doesn't want to stay on those.     Can you refill lexapro for him to use till 7/25 appt to discuss further ?  It was 20mg stopped 3/26/18.    Pharmacy info is correct.    Thanks leonardo      ,

## 2022-04-21 NOTE — DISCHARGE NOTE PROVIDER - NSFOLLOWUPCLINICS_GEN_ALL_ED_FT
[facility] General Internal Medicine  General Internal Medicine  [address] [id number]  Phone: [phone]  Fax:   Follow Up Time: 1 week

## 2022-04-21 NOTE — PROGRESS NOTE ADULT - ASSESSMENT
Patient is a 60 Y/O Male, undomiciled, ETOH abuse, chronic lower back pain, pancreatitis, frequent ED visits for intoxication, presents with EMS after pt had witnessed fall by a bystander with +head strike though no LOC. Pt seems intox, though with EMS that knows pt well and they state that pt doesn't seem out of character is at baseline ms though intox. Unclear if pt has hx of alc withdrawals. Pt reports some pain to R foot when he moves. Per chart review pt had tetanus shot updated 2 times in 2021      Fall  - Likely due to ETOH  - Mechanical   - Pan scan noted   - Fall precautions  - IV hydration   - PT/OT follow up    ETOH  - Monitor for withdrawal   - Thiamine and folic acid  - IV hydration  - DT protocol PRN   - CIWA Protocol   - Monitor for signs/symptoms of withdrawal     Homeless   - Social work eval     Hypokalemia  - F/U on AM BMP results   - Monitor and replete electrolytes as needed   - K of 4.1; resolved    Skin abrasions  - Wound care consult appreciated; f/u recs   - Podiatry eval appreciated; f/u recs      DVT and GI PPX   Patient is a 60 Y/O Male, undomiciled, ETOH abuse, chronic lower back pain, pancreatitis, frequent ED visits for intoxication, presents with EMS after pt had witnessed fall by a bystander with +head strike though no LOC. Pt seems intox, though with EMS that knows pt well and they state that pt doesn't seem out of character is at baseline ms though intox. Unclear if pt has hx of alc withdrawals. Pt reports some pain to R foot when he moves. Per chart review pt had tetanus shot updated 2 times in 2021      Fall  - Likely due to ETOH  - Mechanical   - Pan scan noted   - Fall precautions  - IV hydration   - PT/OT follow up  - Rib pain, checking CT C/A/P    Abnormal EKG  - No complaints of CP  - Check Echo     ETOH  - Monitor for withdrawal   - Thiamine and folic acid  - IV hydration  - DT protocol PRN   - CIWA Protocol   - Monitor for signs/symptoms of withdrawal     Homeless   - Social work eval     Hypokalemia  - F/U on AM BMP results   - Monitor and replete electrolytes as needed   - K of 4.1; resolved    Skin abrasions  - Wound care consult appreciated; f/u recs   - Podiatry eval appreciated; f/u recs      DVT and GI PPX

## 2022-04-22 PROCEDURE — 93306 TTE W/DOPPLER COMPLETE: CPT | Mod: 26

## 2022-04-22 PROCEDURE — 74177 CT ABD & PELVIS W/CONTRAST: CPT | Mod: 26

## 2022-04-22 PROCEDURE — 71260 CT THORAX DX C+: CPT | Mod: 26

## 2022-04-22 RX ORDER — CHLORHEXIDINE GLUCONATE 213 G/1000ML
1 SOLUTION TOPICAL
Refills: 0 | Status: DISCONTINUED | OUTPATIENT
Start: 2022-04-22 | End: 2022-04-26

## 2022-04-22 RX ADMIN — Medication 650 MILLIGRAM(S): at 13:39

## 2022-04-22 RX ADMIN — Medication 100 MILLIGRAM(S): at 13:09

## 2022-04-22 RX ADMIN — HEPARIN SODIUM 5000 UNIT(S): 5000 INJECTION INTRAVENOUS; SUBCUTANEOUS at 13:09

## 2022-04-22 RX ADMIN — Medication 1 MILLIGRAM(S): at 13:09

## 2022-04-22 RX ADMIN — HEPARIN SODIUM 5000 UNIT(S): 5000 INJECTION INTRAVENOUS; SUBCUTANEOUS at 05:13

## 2022-04-22 RX ADMIN — Medication 650 MILLIGRAM(S): at 13:09

## 2022-04-22 RX ADMIN — HEPARIN SODIUM 5000 UNIT(S): 5000 INJECTION INTRAVENOUS; SUBCUTANEOUS at 21:35

## 2022-04-22 RX ADMIN — Medication 650 MILLIGRAM(S): at 19:28

## 2022-04-22 RX ADMIN — Medication 650 MILLIGRAM(S): at 18:36

## 2022-04-22 NOTE — PROGRESS NOTE ADULT - SUBJECTIVE AND OBJECTIVE BOX
Name of Patient : MADHAVI RAMOS  MRN: [id number]  Date of visit: 22 @ 12:40      Subjective: Patient seen and examined. No new events except as noted.   C/O right sided rib pain. Working with PT prior to visit.     REVIEW OF SYSTEMS:    CONSTITUTIONAL: No weakness, fevers or chills  EYES/ENT: No visual changes;  No vertigo or throat pain   NECK: No pain or stiffness  RESPIRATORY: No cough, wheezing, hemoptysis; No shortness of breath  CARDIOVASCULAR: No chest pain or palpitations  GASTROINTESTINAL: No abdominal or epigastric pain. No nausea, vomiting, or hematemesis; No diarrhea or constipation. No melena or hematochezia.  GENITOURINARY: No dysuria, frequency or hematuria  NEUROLOGICAL: No numbness or weakness  SKIN: Multiple skin abrasions  MSK: Right sided rib pain  All other review of systems is negative unless indicated above.    MEDICATIONS:  MEDICATIONS  (STANDING):  folic acid 1 milliGRAM(s) Oral daily  heparin   Injectable 5000 Unit(s) SubCutaneous every 8 hours  thiamine 100 milliGRAM(s) Oral daily      PHYSICAL EXAM:  T(C): 36.9 (22 @ 04:50), Max: 37.1 (22 @ 14:20)  HR: 73 (22 @ 04:50) (59 - 73)  BP: 117/65 (22 @ 04:50) (117/65 - 145/76)  RR: 18 (22 @ 04:50) (17 - 18)  SpO2: 95% (22 @ 04:50) (95% - 98%)  Wt(kg): --  I&O's Summary    2022 07:01  -  2022 07:00  --------------------------------------------------------  IN: 2920 mL / OUT: 4195 mL / NET: -1275 mL          Appearance: Normal	  HEENT:  PERRLA   Lymphatic: No lymphadenopathy   Cardiovascular: Normal S1 S2, no JVD  Respiratory: normal effort , clear  Gastrointestinal:  Soft, Non-tender  Skin: No rashes,  warm to touch  Psychiatry:  Mood & affect appropriate  Musculoskeletal: Right sided pain            22 @ 07:01  -  22 @ 07:00  --------------------------------------------------------  IN: 2920 mL / OUT: 4195 mL / NET: -1275 mL                            137  |  103  |  17  ----------------------------<  97  4.1   |  21<L>  |  0.98    Ca    9.0      2022 07:02  Phos  4.2       Mg     1.7         TPro  6.7  /  Alb  4.0  /  TBili  0.7  /  DBili  0.1  /  AST  18  /  ALT  9<L>  /  AlkPhos  73                         Urinalysis Basic - ( 2022 15:22 )    Color: Colorless / Appearance: Clear / S.008 / pH: x  Gluc: x / Ketone: Negative  / Bili: Negative / Urobili: Negative   Blood: x / Protein: Negative / Nitrite: Negative   Leuk Esterase: Negative / RBC: x / WBC x   Sq Epi: x / Non Sq Epi: x / Bacteria: x

## 2022-04-22 NOTE — PROGRESS NOTE ADULT - ASSESSMENT
Patient is a 62 Y/O Male, undomiciled, ETOH abuse, chronic lower back pain, pancreatitis, frequent ED visits for intoxication, presents with EMS after pt had witnessed fall by a bystander with +head strike though no LOC. Pt seems intox, though with EMS that knows pt well and they state that pt doesn't seem out of character is at baseline ms though intox. Unclear if pt has hx of alc withdrawals. Pt reports some pain to R foot when he moves. Per chart review pt had tetanus shot updated 2 times in 2021      Fall  - Likely due to ETOH  - Mechanical   - Pan scan noted   - Fall precautions  - IV hydration   - PT/OT follow up  - Rib pain, checking CT C/A/P--pending currently     Abnormal EKG  - No complaints of CP  - Check Echo -- pending     ETOH  - Monitor for withdrawal   - Thiamine and folic acid  - IV hydration  - DT protocol PRN   - CIWA Protocol   - Monitor for signs/symptoms of withdrawal     Homeless   - Social work eval     Hypokalemia  - F/U on AM BMP results   - Monitor and replete electrolytes as needed   - K of 4.1; resolved    Skin abrasions  - Wound care consult appreciated; f/u recs   - Podiatry eval appreciated; f/u recs      DVT and GI PPX

## 2022-04-23 LAB
MRSA PCR RESULT.: SIGNIFICANT CHANGE UP
S AUREUS DNA NOSE QL NAA+PROBE: SIGNIFICANT CHANGE UP

## 2022-04-23 RX ORDER — LIDOCAINE 4 G/100G
1 CREAM TOPICAL DAILY
Refills: 0 | Status: DISCONTINUED | OUTPATIENT
Start: 2022-04-23 | End: 2022-04-26

## 2022-04-23 RX ADMIN — CHLORHEXIDINE GLUCONATE 1 APPLICATION(S): 213 SOLUTION TOPICAL at 05:02

## 2022-04-23 RX ADMIN — LIDOCAINE 1 PATCH: 4 CREAM TOPICAL at 13:05

## 2022-04-23 RX ADMIN — Medication 650 MILLIGRAM(S): at 21:32

## 2022-04-23 RX ADMIN — HEPARIN SODIUM 5000 UNIT(S): 5000 INJECTION INTRAVENOUS; SUBCUTANEOUS at 13:04

## 2022-04-23 RX ADMIN — HEPARIN SODIUM 5000 UNIT(S): 5000 INJECTION INTRAVENOUS; SUBCUTANEOUS at 05:02

## 2022-04-23 RX ADMIN — Medication 100 MILLIGRAM(S): at 13:03

## 2022-04-23 RX ADMIN — LIDOCAINE 1 PATCH: 4 CREAM TOPICAL at 19:40

## 2022-04-23 RX ADMIN — Medication 650 MILLIGRAM(S): at 22:30

## 2022-04-23 RX ADMIN — HEPARIN SODIUM 5000 UNIT(S): 5000 INJECTION INTRAVENOUS; SUBCUTANEOUS at 21:33

## 2022-04-23 RX ADMIN — Medication 1 MILLIGRAM(S): at 13:03

## 2022-04-23 NOTE — PROGRESS NOTE ADULT - SUBJECTIVE AND OBJECTIVE BOX
Name of Patient : MADHAVI RAMOS  MRN: [id number]  Date of visit: 04-23-22       Subjective: Patient seen and examined. No new events except as noted.   side rib pain         MEDICATIONS:  MEDICATIONS  (STANDING):  chlorhexidine 2% Cloths 1 Application(s) Topical <User Schedule>  folic acid 1 milliGRAM(s) Oral daily  heparin   Injectable 5000 Unit(s) SubCutaneous every 8 hours  lidocaine   4% Patch 1 Patch Transdermal daily  thiamine 100 milliGRAM(s) Oral daily      PHYSICAL EXAM:  T(C): 37 (04-23-22 @ 21:00), Max: 37 (04-23-22 @ 21:00)  HR: 76 (04-23-22 @ 21:00) (70 - 76)  BP: 101/66 (04-23-22 @ 21:00) (100/65 - 119/71)  RR: 18 (04-23-22 @ 21:00) (18 - 19)  SpO2: 95% (04-23-22 @ 21:00) (95% - 99%)  Wt(kg): --  I&O's Summary    22 Apr 2022 07:01  -  23 Apr 2022 07:00  --------------------------------------------------------  IN: 2370 mL / OUT: 4250 mL / NET: -1880 mL    23 Apr 2022 07:01  -  23 Apr 2022 23:27  --------------------------------------------------------  IN: 240 mL / OUT: 1100 mL / NET: -860 mL          Appearance: Normal	  HEENT:  PERRLA   Lymphatic: No lymphadenopathy   Cardiovascular: Normal S1 S2, no JVD  Respiratory: normal effort , clear  Gastrointestinal:  Soft, Non-tender  Skin: No rashes,  warm to touch  Psychiatry:  Mood & affect appropriate  Musculuskeletal: No edema      All labs, Imaging and EKGs personally reviewed       04-22-22 @ 07:01  -  04-23-22 @ 07:00  --------------------------------------------------------  IN: 2370 mL / OUT: 4250 mL / NET: -1880 mL    04-23-22 @ 07:01  -  04-23-22 @ 23:27  --------------------------------------------------------  IN: 240 mL / OUT: 1100 mL / NET: -860 mL

## 2022-04-23 NOTE — PROGRESS NOTE ADULT - ASSESSMENT
Patient is a 62 Y/O Male, undomiciled, ETOH abuse, chronic lower back pain, pancreatitis, frequent ED visits for intoxication, presents with EMS after pt had witnessed fall by a bystander with +head strike though no LOC. Pt seems intox, though with EMS that knows pt well and they state that pt doesn't seem out of character is at baseline ms though intox. Unclear if pt has hx of alc withdrawals. Pt reports some pain to R foot when he moves. Per chart review pt had tetanus shot updated 2 times in 2021      Fall  - Likely due to ETOH  - Mechanical   - Pan scan noted   - Fall precautions  - IV hydration   - PT/OT follow up  - Rib pain, checking CT C/A/P--noted  - rib fracture  - aggressive pain control and PT     Abnormal EKG  - No complaints of CP  - Check Echo    ETOH  - Monitor for withdrawal   - Thiamine and folic acid  - IV hydration  - DT protocol PRN   - CIWA Protocol   - Monitor for signs/symptoms of withdrawal     Homeless   - Social work eval     Hypokalemia  - F/U on AM BMP results   - Monitor and replete electrolytes as needed   - K of 4.1; resolved    Skin abrasions  - Wound care consult appreciated; f/u recs   - Podiatry eval appreciated; f/u recs      DVT and GI PPX

## 2022-04-24 RX ADMIN — Medication 100 MILLIGRAM(S): at 13:37

## 2022-04-24 RX ADMIN — CHLORHEXIDINE GLUCONATE 1 APPLICATION(S): 213 SOLUTION TOPICAL at 06:59

## 2022-04-24 RX ADMIN — HEPARIN SODIUM 5000 UNIT(S): 5000 INJECTION INTRAVENOUS; SUBCUTANEOUS at 06:59

## 2022-04-24 RX ADMIN — Medication 650 MILLIGRAM(S): at 12:07

## 2022-04-24 RX ADMIN — Medication 1 MILLIGRAM(S): at 13:37

## 2022-04-24 RX ADMIN — Medication 650 MILLIGRAM(S): at 11:10

## 2022-04-24 RX ADMIN — HEPARIN SODIUM 5000 UNIT(S): 5000 INJECTION INTRAVENOUS; SUBCUTANEOUS at 13:37

## 2022-04-24 RX ADMIN — LIDOCAINE 1 PATCH: 4 CREAM TOPICAL at 11:10

## 2022-04-24 RX ADMIN — HEPARIN SODIUM 5000 UNIT(S): 5000 INJECTION INTRAVENOUS; SUBCUTANEOUS at 20:53

## 2022-04-24 RX ADMIN — LIDOCAINE 1 PATCH: 4 CREAM TOPICAL at 20:00

## 2022-04-24 RX ADMIN — LIDOCAINE 1 PATCH: 4 CREAM TOPICAL at 01:00

## 2022-04-24 NOTE — PROGRESS NOTE ADULT - SUBJECTIVE AND OBJECTIVE BOX
Name of Patient : MADHAVI RAMOS  MRN: [id number]  Date of visit: 04-24-22       Subjective: Patient seen and examined. No new events except as noted.   pain is better     REVIEW OF SYSTEMS:    CONSTITUTIONAL: No weakness, fevers or chills  EYES/ENT: No visual changes;  No vertigo or throat pain   NECK: No pain or stiffness  RESPIRATORY: No cough, wheezing, hemoptysis; No shortness of breath  CARDIOVASCULAR: No chest pain or palpitations  GASTROINTESTINAL: No abdominal or epigastric pain. No nausea, vomiting, or hematemesis; No diarrhea or constipation. No melena or hematochezia.  GENITOURINARY: No dysuria, frequency or hematuria  NEUROLOGICAL: No numbness or weakness  SKIN: No itching, burning, rashes, or lesions   All other review of systems is negative unless indicated above.    MEDICATIONS:  MEDICATIONS  (STANDING):  chlorhexidine 2% Cloths 1 Application(s) Topical <User Schedule>  folic acid 1 milliGRAM(s) Oral daily  heparin   Injectable 5000 Unit(s) SubCutaneous every 8 hours  lidocaine   4% Patch 1 Patch Transdermal daily  thiamine 100 milliGRAM(s) Oral daily      PHYSICAL EXAM:  T(C): 36.9 (04-24-22 @ 19:47), Max: 36.9 (04-24-22 @ 04:25)  HR: 68 (04-24-22 @ 19:47) (62 - 73)  BP: 122/75 (04-24-22 @ 19:47) (105/63 - 127/78)  RR: 18 (04-24-22 @ 19:47) (17 - 18)  SpO2: 99% (04-24-22 @ 19:47) (96% - 99%)  Wt(kg): --  I&O's Summary    23 Apr 2022 07:01  -  24 Apr 2022 07:00  --------------------------------------------------------  IN: 480 mL / OUT: 1600 mL / NET: -1120 mL    24 Apr 2022 07:01  -  24 Apr 2022 23:17  --------------------------------------------------------  IN: 600 mL / OUT: 700 mL / NET: -100 mL          Appearance: Normal	  HEENT:  PERRLA   Lymphatic: No lymphadenopathy   Cardiovascular: Normal S1 S2, no JVD  Respiratory: normal effort , clear  Gastrointestinal:  Soft, Non-tender  Skin: No rashes,  warm to touch  Psychiatry:  Mood & affect appropriate  Musculuskeletal: No edema      All labs, Imaging and EKGs personally reviewed       04-23-22 @ 07:01  -  04-24-22 @ 07:00  --------------------------------------------------------  IN: 480 mL / OUT: 1600 mL / NET: -1120 mL    04-24-22 @ 07:01  -  04-24-22 @ 23:17  --------------------------------------------------------  IN: 600 mL / OUT: 700 mL / NET: -100 mL        < from: Transthoracic Echocardiogram (04.22.22 @ 08:47) >  Conclusions:  Normal left ventricular systolic function. No segmental  wall motion abnormalities.

## 2022-04-24 NOTE — PROGRESS NOTE ADULT - ASSESSMENT
Patient is a 62 Y/O Male, undomiciled, ETOH abuse, chronic lower back pain, pancreatitis, frequent ED visits for intoxication, presents with EMS after pt had witnessed fall by a bystander with +head strike though no LOC. Pt seems intox, though with EMS that knows pt well and they state that pt doesn't seem out of character is at baseline ms though intox. Unclear if pt has hx of alc withdrawals. Pt reports some pain to R foot when he moves. Per chart review pt had tetanus shot updated 2 times in 2021      Fall  - Likely due to ETOH  - Mechanical   - Pan scan noted   - Fall precautions  - IV hydration   - PT/OT follow up  - Rib pain, checking CT C/A/P--noted  - rib fracture  - aggressive pain control and PT     Abnormal EKG  - No complaints of CP  - Check Echo, noted     ETOH  - Monitor for withdrawal   - Thiamine and folic acid  - IV hydration  - DT protocol PRN   - CIWA Protocol   - Monitor for signs/symptoms of withdrawal     Homeless   - Social work eval     Hypokalemia  - F/U on AM BMP results   - Monitor and replete electrolytes as needed   - K of 4.1; resolved    Skin abrasions  - Wound care consult appreciated; f/u recs   - Podiatry eval appreciated; f/u recs      DVT and GI PPX    D/C Planing

## 2022-04-25 ENCOUNTER — TRANSCRIPTION ENCOUNTER (OUTPATIENT)
Age: 62
End: 2022-04-25

## 2022-04-25 LAB — SARS-COV-2 RNA SPEC QL NAA+PROBE: SIGNIFICANT CHANGE UP

## 2022-04-25 RX ADMIN — LIDOCAINE 1 PATCH: 4 CREAM TOPICAL at 01:30

## 2022-04-25 RX ADMIN — Medication 650 MILLIGRAM(S): at 02:56

## 2022-04-25 RX ADMIN — Medication 650 MILLIGRAM(S): at 09:54

## 2022-04-25 RX ADMIN — Medication 650 MILLIGRAM(S): at 03:26

## 2022-04-25 RX ADMIN — LIDOCAINE 1 PATCH: 4 CREAM TOPICAL at 21:03

## 2022-04-25 RX ADMIN — Medication 650 MILLIGRAM(S): at 11:33

## 2022-04-25 RX ADMIN — Medication 1 MILLIGRAM(S): at 12:29

## 2022-04-25 RX ADMIN — HEPARIN SODIUM 5000 UNIT(S): 5000 INJECTION INTRAVENOUS; SUBCUTANEOUS at 21:03

## 2022-04-25 RX ADMIN — Medication 650 MILLIGRAM(S): at 21:03

## 2022-04-25 RX ADMIN — HEPARIN SODIUM 5000 UNIT(S): 5000 INJECTION INTRAVENOUS; SUBCUTANEOUS at 16:10

## 2022-04-25 RX ADMIN — Medication 100 MILLIGRAM(S): at 12:29

## 2022-04-25 RX ADMIN — CHLORHEXIDINE GLUCONATE 1 APPLICATION(S): 213 SOLUTION TOPICAL at 06:02

## 2022-04-25 RX ADMIN — LIDOCAINE 1 PATCH: 4 CREAM TOPICAL at 12:31

## 2022-04-25 RX ADMIN — Medication 650 MILLIGRAM(S): at 21:33

## 2022-04-25 RX ADMIN — HEPARIN SODIUM 5000 UNIT(S): 5000 INJECTION INTRAVENOUS; SUBCUTANEOUS at 05:49

## 2022-04-25 NOTE — DISCHARGE NOTE NURSING/CASE MANAGEMENT/SOCIAL WORK - PATIENT PORTAL LINK FT
You can access the FollowMyHealth Patient Portal offered by [facility] by registering at the following website: [email or web address]. By joining [employer]’s FollowMyHealth portal, you will also be able to view your health information using other applications (apps) compatible with our system.

## 2022-04-25 NOTE — DISCHARGE NOTE NURSING/CASE MANAGEMENT/SOCIAL WORK - NSDCVIVACCINE_GEN_ALL_CORE_FT
influenza, injectable, quadrivalent, preservative free; 04-Oct-2018 16:10; Saniya Hodges (RN); [employer]; [id number] (Exp. Date: 30-Jun-2019); IntraMuscular; Deltoid Left.; 0.5 milliLiter(s); VIS (VIS Published: 07-Aug-2015, VIS Presented: 04-Oct-2018);   Td (adult) preservative free; 01-Feb-2016 06:50; Melanie Moralez (RN); [employer]; [id number]; IntraMuscular; Deltoid Left.; 0.5 milliLiter(s); VIS (VIS Published: 01-Sep-2015, VIS Presented: 01-Feb-2016);   Tdap; 30-Mar-2016 13:53; Chrystal Jung (RN); [employer]; [id number]; IntraMuscular; Deltoid Right.; 0.5 milliLiter(s); VIS (VIS Published: 09-May-2013, VIS Presented: 30-Mar-2016);   Tdap; 08-Nov-2019 19:35; Genevieve Oswald (RN); [employer]; [id number] (Exp. Date: 17-Sep-2021); IntraMuscular; Deltoid Left.; 0.5 milliLiter(s); VIS (VIS Published: 09-May-2013, VIS Presented: 08-Nov-2019);   Tdap; 16-Apr-2021 20:55; Nathaly Hinds (RN); [employer]; [id number] (Exp. Date: 01-Oct-2022); IntraMuscular; Deltoid Left.; 0.5 milliLiter(s); VIS (VIS Published: 09-May-2013, VIS Presented: 16-Apr-2021);   Tdap; 07-Dec-2021 19:03; Sarah Dave (RN); [employer]; [id number] (Exp. Date: 09-Sep-2023); IntraMuscular; Deltoid Left.; 0.5 milliLiter(s); VIS (VIS Published: 09-May-2013, VIS Presented: 07-Dec-2021);

## 2022-04-25 NOTE — DISCHARGE NOTE NURSING/CASE MANAGEMENT/SOCIAL WORK - NSDCPEEMAIL_GEN_ALL_CORE
[facility] for Tobacco Control email [email or web address]

## 2022-04-25 NOTE — DISCHARGE NOTE NURSING/CASE MANAGEMENT/SOCIAL WORK - NSDCPEWEB_GEN_ALL_CORE
[facility] for Tobacco Control website --- [email or web address] --- [email or web address]

## 2022-04-25 NOTE — PROGRESS NOTE ADULT - ASSESSMENT
Patient is a 62 Y/O Male, undomiciled, ETOH abuse, chronic lower back pain, pancreatitis, frequent ED visits for intoxication, presents with EMS after pt had witnessed fall by a bystander with +head strike though no LOC. Pt seems intox, though with EMS that knows pt well and they state that pt doesn't seem out of character is at baseline ms though intox. Unclear if pt has hx of alc withdrawals. Pt reports some pain to R foot when he moves. Per chart review pt had tetanus shot updated 2 times in 2021      Fall  - Likely due to ETOH  - Mechanical   - Pan scan noted   - Fall precautions  - IV hydration   - PT/OT follow up  - Rib pain, checking CT C/A/P--noted  - rib fracture  - aggressive pain control and PT     Abnormal EKG  - No complaints of CP  - Check Echo, noted     ETOH  - Monitor for withdrawal   - Thiamine and folic acid  - IV hydration  - DT protocol PRN   - CIWA Protocol   - Monitor for signs/symptoms of withdrawal     Homeless   - Social work eval     Hypokalemia  - F/U on AM BMP results   - Monitor and replete electrolytes as needed   - K of 4.1; resolved    Skin abrasions  - Wound care consult appreciated; f/u recs   - Podiatry eval appreciated; f/u recs      DVT and GI PPX    D/C Planing underway  Patient is a 62 Y/O Male, undomiciled, ETOH abuse, chronic lower back pain, pancreatitis, frequent ED visits for intoxication, presents with EMS after pt had witnessed fall by a bystander with +head strike though no LOC. Pt seems intox, though with EMS that knows pt well and they state that pt doesn't seem out of character is at baseline ms though intox. Unclear if pt has hx of alc withdrawals. Pt reports some pain to R foot when he moves. Per chart review pt had tetanus shot updated 2 times in 2021      Fall  - Likely due to ETOH  - Mechanical   - Pan scan noted   - Fall precautions  - IV hydration   - PT/OT follow up  - Rib pain, checking CT C/A/P--noted  - rib fracture- incentive spirometer ordered   - aggressive pain control and PT     Neurogenic tumor  - Patient will require follow up either in/outpatient for further evaluation   Abnormal EKG  - No complaints of CP  - Check Echo, noted     ETOH  - Monitor for withdrawal   - Thiamine and folic acid  - IV hydration  - DT protocol PRN   - CIWA Protocol   - Monitor for signs/symptoms of withdrawal     Homeless   - Social work eval     Hypokalemia  - F/U on AM BMP results   - Monitor and replete electrolytes as needed   - K of 4.1; resolved    Skin abrasions  - Wound care consult appreciated; f/u recs   - Podiatry eval appreciated; f/u recs      DVT and GI PPX

## 2022-04-25 NOTE — PROGRESS NOTE ADULT - SUBJECTIVE AND OBJECTIVE BOX
Name of Patient : MADHAVI RAMOS  MRN: [id number]  Date of visit: 04-25-22 @ 14:39      Subjective: Patient seen and examined. No new events except as noted.   Doing okay  Discharge planning for today     REVIEW OF SYSTEMS:    CONSTITUTIONAL: No weakness, fevers or chills  EYES/ENT: No visual changes;  No vertigo or throat pain   NECK: No pain or stiffness  RESPIRATORY: No cough, wheezing, hemoptysis; No shortness of breath  CARDIOVASCULAR: No chest pain or palpitations  GASTROINTESTINAL: No abdominal or epigastric pain. No nausea, vomiting, or hematemesis; No diarrhea or constipation. No melena or hematochezia.  GENITOURINARY: No dysuria, frequency or hematuria  NEUROLOGICAL: No numbness or weakness  SKIN: No itching, burning, rashes, or lesions   All other review of systems is negative unless indicated above.    MEDICATIONS:  MEDICATIONS  (STANDING):  chlorhexidine 2% Cloths 1 Application(s) Topical <User Schedule>  folic acid 1 milliGRAM(s) Oral daily  heparin   Injectable 5000 Unit(s) SubCutaneous every 8 hours  lidocaine   4% Patch 1 Patch Transdermal daily  thiamine 100 milliGRAM(s) Oral daily      PHYSICAL EXAM:  T(C): 36.8 (04-25-22 @ 05:45), Max: 36.9 (04-24-22 @ 19:47)  HR: 74 (04-25-22 @ 09:45) (68 - 74)  BP: 103/73 (04-25-22 @ 05:45) (103/73 - 122/75)  RR: 18 (04-25-22 @ 05:45) (18 - 18)  SpO2: 96% (04-25-22 @ 09:45) (96% - 99%)  Wt(kg): --  I&O's Summary    24 Apr 2022 07:01  -  25 Apr 2022 07:00  --------------------------------------------------------  IN: 720 mL / OUT: 850 mL / NET: -130 mL    25 Apr 2022 07:01  -  25 Apr 2022 14:39  --------------------------------------------------------  IN: 720 mL / OUT: 0 mL / NET: 720 mL          Appearance: Normal	  HEENT:  PERRLA   Lymphatic: No lymphadenopathy   Cardiovascular: Normal S1 S2, no JVD  Respiratory: normal effort , clear  Gastrointestinal:  Soft, Non-tender  Skin: No rashes,  warm to touch  Psychiatry:  Mood & affect appropriate  Musculoskeletal: No edema        04-24-22 @ 07:01  -  04-25-22 @ 07:00  --------------------------------------------------------  IN: 720 mL / OUT: 850 mL / NET: -130 mL    04-25-22 @ 07:01  -  04-25-22 @ 14:39  --------------------------------------------------------  IN: 720 mL / OUT: 0 mL / NET: 720 mL             Name of Patient : MADHAVI RAMOS  MRN: [id number]  Date of visit: 04-25-22 @ 14:39      Subjective: Patient seen and examined. No new events except as noted.   Doing okay      REVIEW OF SYSTEMS:    CONSTITUTIONAL: No weakness, fevers or chills  EYES/ENT: No visual changes;  No vertigo or throat pain   NECK: No pain or stiffness  RESPIRATORY: No cough, wheezing, hemoptysis; No shortness of breath  CARDIOVASCULAR: No chest pain or palpitations  GASTROINTESTINAL: No abdominal or epigastric pain. No nausea, vomiting, or hematemesis; No diarrhea or constipation. No melena or hematochezia.  GENITOURINARY: No dysuria, frequency or hematuria  NEUROLOGICAL: No numbness or weakness  SKIN: No itching, burning, rashes, or lesions   All other review of systems is negative unless indicated above.    MEDICATIONS:  MEDICATIONS  (STANDING):  chlorhexidine 2% Cloths 1 Application(s) Topical <User Schedule>  folic acid 1 milliGRAM(s) Oral daily  heparin   Injectable 5000 Unit(s) SubCutaneous every 8 hours  lidocaine   4% Patch 1 Patch Transdermal daily  thiamine 100 milliGRAM(s) Oral daily      PHYSICAL EXAM:  T(C): 36.8 (04-25-22 @ 05:45), Max: 36.9 (04-24-22 @ 19:47)  HR: 74 (04-25-22 @ 09:45) (68 - 74)  BP: 103/73 (04-25-22 @ 05:45) (103/73 - 122/75)  RR: 18 (04-25-22 @ 05:45) (18 - 18)  SpO2: 96% (04-25-22 @ 09:45) (96% - 99%)  Wt(kg): --  I&O's Summary    24 Apr 2022 07:01  -  25 Apr 2022 07:00  --------------------------------------------------------  IN: 720 mL / OUT: 850 mL / NET: -130 mL    25 Apr 2022 07:01  -  25 Apr 2022 14:39  --------------------------------------------------------  IN: 720 mL / OUT: 0 mL / NET: 720 mL          Appearance: Normal	  HEENT:  PERRLA   Lymphatic: No lymphadenopathy   Cardiovascular: Normal S1 S2, no JVD  Respiratory: normal effort , clear  Gastrointestinal:  Soft, Non-tender  Skin: No rashes,  warm to touch  Psychiatry:  Mood & affect appropriate  Musculoskeletal: No edema        04-24-22 @ 07:01  -  04-25-22 @ 07:00  --------------------------------------------------------  IN: 720 mL / OUT: 850 mL / NET: -130 mL    04-25-22 @ 07:01  -  04-25-22 @ 14:39  --------------------------------------------------------  IN: 720 mL / OUT: 0 mL / NET: 720 mL

## 2022-04-26 VITALS
SYSTOLIC BLOOD PRESSURE: 123 MMHG | RESPIRATION RATE: 18 BRPM | OXYGEN SATURATION: 97 % | TEMPERATURE: 98 F | DIASTOLIC BLOOD PRESSURE: 74 MMHG | HEART RATE: 67 BPM

## 2022-04-26 LAB
ANION GAP SERPL CALC-SCNC: 16 MMOL/L — SIGNIFICANT CHANGE UP (ref 5–17)
BUN SERPL-MCNC: 35 MG/DL — HIGH (ref 7–23)
CALCIUM SERPL-MCNC: 9.4 MG/DL — SIGNIFICANT CHANGE UP (ref 8.4–10.5)
CHLORIDE SERPL-SCNC: 101 MMOL/L — SIGNIFICANT CHANGE UP (ref 96–108)
CO2 SERPL-SCNC: 20 MMOL/L — LOW (ref 22–31)
CREAT SERPL-MCNC: 1.22 MG/DL — SIGNIFICANT CHANGE UP (ref 0.5–1.3)
EGFR: 67 ML/MIN/1.73M2 — SIGNIFICANT CHANGE UP
GLUCOSE SERPL-MCNC: 99 MG/DL — SIGNIFICANT CHANGE UP (ref 70–99)
HCT VFR BLD CALC: 36.7 % — LOW (ref 39–50)
HGB BLD-MCNC: 12.2 G/DL — LOW (ref 13–17)
MCHC RBC-ENTMCNC: 33 PG — SIGNIFICANT CHANGE UP (ref 27–34)
MCHC RBC-ENTMCNC: 33.2 GM/DL — SIGNIFICANT CHANGE UP (ref 32–36)
MCV RBC AUTO: 99.2 FL — SIGNIFICANT CHANGE UP (ref 80–100)
NRBC # BLD: 0 /100 WBCS — SIGNIFICANT CHANGE UP (ref 0–0)
PLATELET # BLD AUTO: 278 K/UL — SIGNIFICANT CHANGE UP (ref 150–400)
POTASSIUM SERPL-MCNC: 4.2 MMOL/L — SIGNIFICANT CHANGE UP (ref 3.5–5.3)
POTASSIUM SERPL-SCNC: 4.2 MMOL/L — SIGNIFICANT CHANGE UP (ref 3.5–5.3)
RBC # BLD: 3.7 M/UL — LOW (ref 4.2–5.8)
RBC # FLD: 13.1 % — SIGNIFICANT CHANGE UP (ref 10.3–14.5)
SODIUM SERPL-SCNC: 137 MMOL/L — SIGNIFICANT CHANGE UP (ref 135–145)
WBC # BLD: 5.83 K/UL — SIGNIFICANT CHANGE UP (ref 3.8–10.5)
WBC # FLD AUTO: 5.83 K/UL — SIGNIFICANT CHANGE UP (ref 3.8–10.5)

## 2022-04-26 RX ORDER — FOLIC ACID 0.8 MG
1 TABLET ORAL
Qty: 30 | Refills: 0
Start: 2022-04-26 | End: 2022-05-25

## 2022-04-26 RX ORDER — THIAMINE MONONITRATE (VIT B1) 100 MG
1 TABLET ORAL
Qty: 30 | Refills: 0
Start: 2022-04-26 | End: 2022-05-25

## 2022-04-26 RX ORDER — SODIUM CHLORIDE 9 MG/ML
1000 INJECTION INTRAMUSCULAR; INTRAVENOUS; SUBCUTANEOUS
Refills: 0 | Status: DISCONTINUED | OUTPATIENT
Start: 2022-04-26 | End: 2022-04-26

## 2022-04-26 RX ORDER — ACETAMINOPHEN 500 MG
1 TABLET ORAL
Qty: 28 | Refills: 0
Start: 2022-04-26 | End: 2022-05-02

## 2022-04-26 RX ORDER — LIDOCAINE 4 G/100G
1 CREAM TOPICAL
Qty: 30 | Refills: 0
Start: 2022-04-26 | End: 2022-05-25

## 2022-04-26 RX ADMIN — CHLORHEXIDINE GLUCONATE 1 APPLICATION(S): 213 SOLUTION TOPICAL at 06:13

## 2022-04-26 RX ADMIN — SODIUM CHLORIDE 60 MILLILITER(S): 9 INJECTION INTRAMUSCULAR; INTRAVENOUS; SUBCUTANEOUS at 11:48

## 2022-04-26 RX ADMIN — Medication 1 MILLIGRAM(S): at 11:48

## 2022-04-26 RX ADMIN — Medication 100 MILLIGRAM(S): at 11:48

## 2022-04-26 RX ADMIN — LIDOCAINE 1 PATCH: 4 CREAM TOPICAL at 11:48

## 2022-04-26 RX ADMIN — HEPARIN SODIUM 5000 UNIT(S): 5000 INJECTION INTRAVENOUS; SUBCUTANEOUS at 05:18

## 2022-04-26 RX ADMIN — LIDOCAINE 1 PATCH: 4 CREAM TOPICAL at 01:28

## 2022-04-26 RX ADMIN — HEPARIN SODIUM 5000 UNIT(S): 5000 INJECTION INTRAVENOUS; SUBCUTANEOUS at 13:49

## 2022-04-26 NOTE — PROGRESS NOTE ADULT - PROVIDER SPECIALTY LIST ADULT
Internal Medicine
Podiatry
Internal Medicine
Internal Medicine

## 2022-04-26 NOTE — PROGRESS NOTE ADULT - SUBJECTIVE AND OBJECTIVE BOX
Name of Patient : MADHAVI RAMOS  MRN: [id number]  Date of visit: 04-26-22 @ 15:10      Subjective: Patient seen and examined. No new events except as noted.   Planning for discharge today  with outpatient GI follow up.     REVIEW OF SYSTEMS:    CONSTITUTIONAL: No weakness, fevers or chills  EYES/ENT: No visual changes;  No vertigo or throat pain   NECK: No pain or stiffness  RESPIRATORY: No cough, wheezing, hemoptysis; No shortness of breath  CARDIOVASCULAR: No chest pain or palpitations  GASTROINTESTINAL: No abdominal or epigastric pain. No nausea, vomiting, or hematemesis; No diarrhea or constipation. No melena or hematochezia.  GENITOURINARY: No dysuria, frequency or hematuria  NEUROLOGICAL: No numbness or weakness  SKIN: No itching, burning, rashes, or lesions   All other review of systems is negative unless indicated above.    MEDICATIONS:  MEDICATIONS  (STANDING):  chlorhexidine 2% Cloths 1 Application(s) Topical <User Schedule>  folic acid 1 milliGRAM(s) Oral daily  heparin   Injectable 5000 Unit(s) SubCutaneous every 8 hours  lidocaine   4% Patch 1 Patch Transdermal daily  sodium chloride 0.9%. 1000 milliLiter(s) (60 mL/Hr) IV Continuous <Continuous>  thiamine 100 milliGRAM(s) Oral daily      PHYSICAL EXAM:  T(C): 36.8 (04-26-22 @ 13:09), Max: 36.8 (04-26-22 @ 13:09)  HR: 67 (04-26-22 @ 13:09) (67 - 88)  BP: 123/74 (04-26-22 @ 13:09) (111/68 - 123/74)  RR: 18 (04-26-22 @ 13:09) (18 - 18)  SpO2: 97% (04-26-22 @ 13:09) (97% - 98%)  Wt(kg): --  I&O's Summary    25 Apr 2022 07:01  -  26 Apr 2022 07:00  --------------------------------------------------------  IN: 840 mL / OUT: 0 mL / NET: 840 mL    26 Apr 2022 07:01  -  26 Apr 2022 15:10  --------------------------------------------------------  IN: 780 mL / OUT: 800 mL / NET: -20 mL          Appearance: Normal	  HEENT:  PERRLA   Lymphatic: No lymphadenopathy   Cardiovascular: Normal S1 S2, no JVD  Respiratory: normal effort , clear  Gastrointestinal:  Soft, Non-tender  Skin: No rashes,  warm to touch  Psychiatry:  Mood & affect appropriate  Musculuoskeletal: No edema          04-25-22 @ 07:01  -  04-26-22 @ 07:00  --------------------------------------------------------  IN: 840 mL / OUT: 0 mL / NET: 840 mL    04-26-22 @ 07:01  -  04-26-22 @ 15:10  --------------------------------------------------------  IN: 780 mL / OUT: 800 mL / NET: -20 mL                                12.2   5.83  )-----------( 278      ( 26 Apr 2022 07:26 )             36.7               04-26    137  |  101  |  35<H>  ----------------------------<  99  4.2   |  20<L>  |  1.22    Ca    9.4      26 Apr 2022 07:26

## 2022-04-26 NOTE — CHART NOTE - NSCHARTNOTEFT_GEN_A_CORE
ptn discharged today and informed  of abnormal ct scan abdomen findings    number [id number] and ptn informed of the above and told to go to the clinic for follow up        ACC: [id number] EXAM: CT ABDOMEN AND PELVIS OC IC  ACC: [id number] EXAM: CT CHEST IC    PROCEDURE DATE: 04/22/2022        INTERPRETATION: CLINICAL INFORMATION: Status post fall    COMPARISON: CT chest abdomen pelvis 5/12/2021    CONTRAST/COMPLICATIONS:  IV Contrast: Omnipaque 350 90 cc administered 10 cc discarded  Oral Contrast: NONE (accession [id number]), Omnipaque 300 (accession [id number])  Complications: None reported at time of study completion    PROCEDURE:  CT of the Chest, Abdomen and Pelvis was performed.  Sagittal and coronal reformats were performed.    FINDINGS: Motion artifacts degrade some of the imaging. Some images are degraded by artifacts from the patient's arms within the scanning field of view.    CHEST:  LUNGS AND LARGE AIRWAYS: Complete opacification of the visualized portions of the right maxillary sinus. Respiratory motion artifact. Mild bibasilar subsegmental atelectasis.  Mild mosaic groundglass densities could be secondary to pulmonary edema versus air trapping or atelectasis. Stable noncalcified right middle lobe pulmonary nodules measuring under 3 mm.  PLEURA: No pleural effusion.  VESSELS: No significant acute abnormality  HEART: Heart size is normal. No pericardial effusion. Mild annular and coronary artery calcification  MEDIASTINUM AND CONCEPCION: No lymphadenopathy. Diffuse esophageal wall thickening  CHEST WALL AND LOWER NECK: Bilateral gynecomastia    ABDOMEN AND PELVIS:  LIVER: Probable elongated Riedel's lobe. Mild hepatic steatosis.  BILE DUCTS: Normal caliber.  GALLBLADDER: Contracted. Contains gallstones  SPLEEN: Within normal limits.  PANCREAS: Within normal limits.  ADRENALS: Within normal limits.  KIDNEYS/URETERS: Symmetric renal enhancement without hydronephrosis. Small cortical cyst anteriorly within the left mid kidney    BLADDER: Mild diffuse wall thickening is probably accentuated by under distended status  REPRODUCTIVE ORGANS: Prostate within normal limits.    BOWEL: No bowel obstruction. Appendix is not visualized. No evidence of inflammation in the pericecal region. Colonic diverticulosis. Proximal gastric wall thickening. Diverticulum of third portion duodenum  PERITONEUM: No ascites.  VESSELS: Atherosclerotic changes.  RETROPERITONEUM/LYMPH NODES: No lymphadenopathy.  ABDOMINAL WALL: Small fat-containing umbilical hernia. Left pelvic floor 2.5 x 2.4 x 2 cm nodule along the left internal iliac vasculature probably represents a neurogenic tumor  BONES: Minimally displaced acute fracture of the posterior aspect right 11th rib.  Possible nondisplaced fracture of the posterior aspect left 11th rib  Age-indeterminate bilateral rib deformities including subacute anterolateral aspect fractures of the right sixth and seventh ribs. Remote lower sternal body fracture   Multilevel degenerative changes throughout the spine including chronic L1 superior endplate deformity.  Remote right transverse process deformity of L1-L3    IMPRESSION:    Mildly displaced acute fracture of the posterior aspect right 11th rib. Possible nondisplaced acute fracture of posterior aspect left 12th rib. No pneumothorax    Complete opacification of the visualized portions of the right maxillary sinus may represent acute versus chronic sinusitis.    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 If additional imaging is clinically warranted, consider pelvic MRI on nonemergent basis    Diffuse esophageal wall thickening and suggested proximal gastric wall thickening could represent esophagitis and gastritis. Consider endoscopy correlation            --- End of Report ---             ALDAIR SYLVESTER MD; Attending Radiologist  This document has been electronically signed. Apr 22 2022 5:04PM                                                         Notes

## 2022-04-26 NOTE — PROGRESS NOTE ADULT - NS ATTEND AMEND GEN_ALL_CORE FT
.
Pt care and plan discussed and reviewed with PA. Plan as outlined above edited by me to reflect our discussion
Pt care and plan discussed and reviewed with PA. Plan as outlined above edited by me to reflect our discussion.
patient has to have close follow up with outpatient clinic  refused rehab placement     social work follow up for discharge planing
Pt care and plan discussed and reviewed with PA. Plan as outlined above edited by me to reflect our discussion.
Pt care and plan discussed and reviewed with PA. Plan as outlined above edited by me to reflect our discussion.
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
## 2022-05-16 NOTE — CONSULT NOTE ADULT - CONSULT REASON
MRSA right forearm abscess
Right forearm abscess
Libtayo Counseling- I discussed with the patient the risks of Libtayo including but not limited to nausea, vomiting, diarrhea, and bone or muscle pain.  The patient verbalized understanding of the proper use and possible adverse effects of Libtayo.  All of the patient's questions and concerns were addressed.

## 2022-05-26 NOTE — ED ADULT TRIAGE NOTE - PATIENT ON (OXYGEN DELIVERY METHOD)
Daughter Lexii Harmon called in. Asking if surgery is still scheduled for 6-1-2022 for Bilateral Femur Rodding by [facility] since [facility] wants patient to have Bilateral Femur CT Scans done prior to surgery. Informed daughter surgery is still currently scheduled for 6-1-2022 but will need to be rescheduled since the CT Scans need done first. New surgery date was not provided at this time. Will call back once a new surgery has been decided. Daughter verbalized understanding.
Due to prior authorization already obtained for surgery and for the Inpatient stay for 6-1-2022, surgery date will be moved instead of cancelling out surgery completely for 6-1-2022. If the surgery is cancelled, new authorization for surgery along with new authorization for the hospital inpatient stay will need obtained again. If surgery is changed to a new date, current authorization can be updated instead. Current Authorization # for DOS 6-1-2022 is [id number]. Authorization includes CPT 27506 x 2 and 1 day Inpatient Stay approved. Authorization is valid from 6-1-2022 through 6-2-2022. [facility]
room air

## 2022-05-30 PROCEDURE — 97166 OT EVAL MOD COMPLEX 45 MIN: CPT

## 2022-05-30 PROCEDURE — 81003 URINALYSIS AUTO W/O SCOPE: CPT

## 2022-05-30 PROCEDURE — 76376 3D RENDER W/INTRP POSTPROCES: CPT

## 2022-05-30 PROCEDURE — 80061 LIPID PANEL: CPT

## 2022-05-30 PROCEDURE — U0003: CPT

## 2022-05-30 PROCEDURE — 96374 THER/PROPH/DIAG INJ IV PUSH: CPT

## 2022-05-30 PROCEDURE — 85025 COMPLETE CBC W/AUTO DIFF WBC: CPT

## 2022-05-30 PROCEDURE — 73130 X-RAY EXAM OF HAND: CPT

## 2022-05-30 PROCEDURE — 70450 CT HEAD/BRAIN W/O DYE: CPT | Mod: MA

## 2022-05-30 PROCEDURE — 99285 EMERGENCY DEPT VISIT HI MDM: CPT | Mod: 25

## 2022-05-30 PROCEDURE — 80307 DRUG TEST PRSMV CHEM ANLYZR: CPT

## 2022-05-30 PROCEDURE — 83036 HEMOGLOBIN GLYCOSYLATED A1C: CPT

## 2022-05-30 PROCEDURE — 82962 GLUCOSE BLOOD TEST: CPT

## 2022-05-30 PROCEDURE — U0005: CPT

## 2022-05-30 PROCEDURE — 85027 COMPLETE CBC AUTOMATED: CPT

## 2022-05-30 PROCEDURE — 80053 COMPREHEN METABOLIC PANEL: CPT

## 2022-05-30 PROCEDURE — 70486 CT MAXILLOFACIAL W/O DYE: CPT | Mod: MA

## 2022-05-30 PROCEDURE — 97162 PT EVAL MOD COMPLEX 30 MIN: CPT

## 2022-05-30 PROCEDURE — 80076 HEPATIC FUNCTION PANEL: CPT

## 2022-05-30 PROCEDURE — 87641 MR-STAPH DNA AMP PROBE: CPT

## 2022-05-30 PROCEDURE — 73562 X-RAY EXAM OF KNEE 3: CPT

## 2022-05-30 PROCEDURE — 97530 THERAPEUTIC ACTIVITIES: CPT

## 2022-05-30 PROCEDURE — 93005 ELECTROCARDIOGRAM TRACING: CPT

## 2022-05-30 PROCEDURE — 97116 GAIT TRAINING THERAPY: CPT

## 2022-05-30 PROCEDURE — 84100 ASSAY OF PHOSPHORUS: CPT

## 2022-05-30 PROCEDURE — 83735 ASSAY OF MAGNESIUM: CPT

## 2022-05-30 PROCEDURE — 93306 TTE W/DOPPLER COMPLETE: CPT

## 2022-05-30 PROCEDURE — 97110 THERAPEUTIC EXERCISES: CPT

## 2022-05-30 PROCEDURE — 72125 CT NECK SPINE W/O DYE: CPT | Mod: MA

## 2022-05-30 PROCEDURE — 84443 ASSAY THYROID STIM HORMONE: CPT

## 2022-05-30 PROCEDURE — 71260 CT THORAX DX C+: CPT

## 2022-05-30 PROCEDURE — 73630 X-RAY EXAM OF FOOT: CPT

## 2022-05-30 PROCEDURE — 74177 CT ABD & PELVIS W/CONTRAST: CPT

## 2022-05-30 PROCEDURE — 97535 SELF CARE MNGMENT TRAINING: CPT

## 2022-05-30 PROCEDURE — 87640 STAPH A DNA AMP PROBE: CPT

## 2022-05-30 PROCEDURE — 80048 BASIC METABOLIC PNL TOTAL CA: CPT

## 2022-06-09 NOTE — ED PROVIDER NOTE - LANGUAGE ASSISTANCE NEEDED
[Time Spent: ___ minutes] : I have spent [unfilled] minutes of time on the encounter. Yes-Patient/Caregiver accepts free interpretation services...

## 2022-09-09 NOTE — BH CONSULTATION LIAISON ASSESSMENT NOTE - NSICDXBHPRIMARYDX_PSY_ALL_CORE
September 9, 2022       Mariza Loo MD  [address] [address]  Via In Basket      Patient: Dylon Sousa   YOB: 2011   Date of Visit: 9/9/2022       Dear Dr. Loo:    Thank you for referring Dylon Sousa to me for evaluation. Below are my notes for this visit with him.    If you have questions, please do not hesitate to call me. I look forward to following your patient along with you.      Sincerely,        Nataly Boudreaux MD        CC: No Recipients  Nataly Boudreaux MD  9/9/2022  6:39 PM  Signed  Pediatric Endocrinology Diabetes Visit Note    Interval History (HPI):  Dylon is a 11 year old 2 month old with Type 1 diabetes.    Date of diagnosis: July 2012    Questions/Concerns:  Dylon recently transitioned to the T slim.  Dad notes they have had more sleep at night as it corrects when he is high.  Dad notes less beeping during the day which has been helpful.  Dad notes issues with DexCom and it falling off before the 10 days.      Illness since last visit:  No    Refills: sent for Basaglar to pharmacy if pump breaks.     CSII:  Pump/Pod: tandem tslim control IQ  Who gives boluses: patient does all injections or boluses  When do you give boluses: before meals and snacks   How many boluses do you miss a week:0  Pump site locations:buttocks  Rotating pump sites: yes  Problems with pump sites or pump: No   Do you have dylan to upload your pump to the cloud: Yes  Site change every 3 days  Active insulin time: 2 hours    Pump upload attached to visit: yes    Total daily dose: 48.29 units/day  Total basal dose: 14.56 units   Percent basal: 30 %   Units/kg/day of insulin: 48.29 / kg = 0.97 mg/kg/day    Dylon Sousa     Pump Instructions    Pump type  T Slim   Rapid acting insulin Humalog   BASAL RATE    Time U/Hr   0000 0.6   0400 0.475   0800 0.5   0900 0.15   1000 0.15   1030 0.15   1130 0.1   1300 0.2   1500 0.4   1700 0.4   1800 0.4   2000 0.5   2200 0.6   INSULIN TO  CARB RATIO   Time 1 u/ __ gm    0000 1 u/ 7 gm    0400 1 u/ 7 gm    0800 1 u/ 7 gm    0900 1 u/ 7 gm    1000  1 u/ 7 gm    1030 1 u/ 43 gm    1130 1 u/ 16 gm   1300 1 u/ 16 gm   1500 1 u/ 16 gm   1700 1 u/ 18 gm   CORRECTION FACTOR (CF)   Time  CF   0000 80   TARGET BLOOD SUGAR   Time __ mg/dL   0000 120 mg/dL       Blood Glucose range/average: see attached Dexcom CGM report      Glucose Monitoring:  Meter brand or model:contour next  How many BG checks per day: 1 -     CGM: yes, Dexcom  Do you manually check BG? Yes, when? to confirm      Hypoglycemia:  Frequency per week (less than 80): 2  How many of those are overnight: 0  Hypoglycemia awareness: Yes- sometimes  Hypoglycemia symptoms: Tremors   Glucagon at home and school: Yes  Use glucagon since last visit: No    Ketones:  Do you check for ketones: Checks  Ketones since last visit: negative  Method: urine ketone strips    Safety:   Hospitalizations since last visit: No  Medical Alert ID: Yes    Nutrition:   Do you eat regular meals: breakfast, lunch and dinner  Do you snack: afternoon and bedtime  Carb counting concerns: No    Other:   Job, sports or extracurricular activities: Yes, soccer 4 days / week for 1-2 hours - does not wear pump   Problems related to diabetes at school: No      PAST MEDICAL HISTORY:  Past Medical History:   Diagnosis Date   • Diabetes mellitus (CMS/HCC)        PAST SURGICAL HISTORY:  No past surgical history on file.    FAMILY HISTORY:  Family History   Problem Relation Age of Onset   • Thyroid Maternal Grandmother    • Diabetes Maternal Grandmother        SOCIAL HISTORY:  Social History     Tobacco Use   • Smoking status: Never Smoker   • Smokeless tobacco: Never Used     Social History     Social History Narrative    In 6th grade    Very active in soccer        Review of Systems   Constitutional: Negative for activity change, appetite change and unexpected weight change.   HENT: Negative for hearing loss, sore throat and trouble  swallowing.    Eyes: Negative for visual disturbance.   Respiratory: Negative for cough and shortness of breath.    Cardiovascular: Negative for chest pain and palpitations.   Gastrointestinal: Negative for abdominal pain, constipation, diarrhea and vomiting.   Endocrine: Negative for cold intolerance and heat intolerance.   Genitourinary:        No increased urination   Musculoskeletal: Negative for myalgias.   Skin: Negative for rash.   Neurological: Negative for dizziness and headaches.   Hematological: Does not bruise/bleed easily.   Psychiatric/Behavioral: Negative for sleep disturbance.       PHYSICAL EXAMINATION  Visit Vitals  /73 (BP Location: LUE - Left upper extremity, Patient Position: Sitting, Cuff Size: Regular)   Pulse 78   Ht 5' 5.51\" (1.664 m)   Wt 49.5 kg (109 lb 0.3 oz)   BMI 17.86 kg/m²     60 %ile (Z= 0.26) based on [facility] (Boys, 2-20 Years) weight-for-age data using vitals from 9/9/2022.  85 %ile (Z= 1.05) based on CDC (Boys, 2-20 Years) Stature-for-age data based on Stature recorded on 9/9/2022.  Body surface area is 1.54 meters squared.  Blood pressure reading is in the normal blood pressure range based on the 2017 AAP Clinical Practice Guideline.  BMI: 37 %ile (Z= -0.32) based on CDC (Boys, 2-20 Years) BMI-for-age based on BMI available as of 9/9/2022.    Physical Exam  Constitutional:       Appearance: He is well-developed.   HENT:      Head: Normocephalic and atraumatic.   Eyes:      Pupils: Pupils are equal, round, and reactive to light.   Neck:      Thyroid: No thyroid mass or thyromegaly.   Cardiovascular:      Rate and Rhythm: Normal rate and regular rhythm.   Pulmonary:      Effort: No respiratory distress.      Breath sounds: Normal breath sounds.   Abdominal:      General: Bowel sounds are normal.      Palpations: Abdomen is soft.      Tenderness: There is no abdominal tenderness.   Musculoskeletal:         General: No swelling. Normal range of motion.      Cervical back: Normal  range of motion and neck supple.   Skin:     General: Skin is warm and dry.      Findings: No rash.   Neurological:      Mental Status: He is alert and oriented to person, place, and time.      Deep Tendon Reflexes: Reflexes are normal and symmetric.         Lab and Imaging Results  Hemoglobin A1C:   Results for orders placed or performed in visit on 09/09/22   POCT GLYCOHEMOGLOBIN ANALYZER   Result Value Ref Range    Hemoglobin A1C, POC 7.0 (A) 4.5 - 5.6 %       Recent Results (from the past 8400 hour(s))   POCT GLYCOHEMOGLOBIN ANALYZER    Collection Time: 12/09/21  8:22 AM   Result Value Ref Range    Hemoglobin A1C, POC 7.7 (A) 4.5 - 5.6 %   POCT GLYCOHEMOGLOBIN ANALYZER    Collection Time: 03/17/22  8:33 AM   Result Value Ref Range    Hemoglobin A1C, POC 8.0 (A) 4.5 - 5.6 %   THYROID STIMULATING HORMONE    Collection Time: 03/17/22  9:31 AM   Result Value Ref Range    TSH 1.355 0.662 - 4.010 mcUnits/mL   FREE T4    Collection Time: 03/17/22  9:31 AM   Result Value Ref Range    T4, Free 1.1 0.8 - 1.4 ng/dL   LIPID PANEL WITH REFLEX    Collection Time: 03/17/22  9:31 AM   Result Value Ref Range    Fasting Status      Cholesterol 127 <=169 mg/dL    Triglycerides 65 <=89 mg/dL    HDL 65 >=46 mg/dL    LDL 49 <=109 mg/dL    Non-HDL Cholesterol 62 <=119 mg/dL    Cholesterol/ HDL Ratio 2.0 <=4.4   CELIAC DISEASE SCR 2Y AND OVER    Collection Time: 03/17/22  9:31 AM   Result Value Ref Range    Immunoglobulin A 139 44 - 395 mg/dL    Tissue Transglutaminase Antibody, IgA 4 <20 Units   MICROALBUMIN URINE RANDOM    Collection Time: 03/17/22  9:31 AM   Result Value Ref Range    Microalbumin, Urine 0.71 mg/dL    Creatinine, Urine 130.00 mg/dL    Microalbumin/ Creatinine Ratio 5.5 <30.0 mg/g   POCT GLYCOHEMOGLOBIN ANALYZER    Collection Time: 06/09/22  8:44 AM   Result Value Ref Range    Hemoglobin A1C, POC 7.3 (A) 4.5 - 5.6 %   POCT GLYCOHEMOGLOBIN ANALYZER    Collection Time: 09/09/22  7:52 AM   Result Value Ref Range     Hemoglobin A1C, POC 7.0 (A) 4.5 - 5.6 %       ASSESSMENT:   1. Type 1 diabetes mellitus with hyperglycemia (CMS/[facility])        Type 1 diabetes in excellent control.  He is on a t slim pump and uses a DexCom sensor with Control IQ.  He has had some higher BG overnight.     Patient and family appropriately following diabetes management.    CGM Report:  Continuous glucose monitoring data from the past 2 weeks was reviewed and discussed with family.  The average BG was 177 mg/dl.  Patient spent 48% of time in range.  They spent 50% of time above target and 2% of time below target.  There is a pattern of elevated BG overnight and after some meals.      Education: Peds Endo Diabetes Education: pump adjustment, hypoglycemia prevention and physical activity were discussed today.     PLAN:   1.  Follow-up in pediatric endocrine clinic in 3 months  2.  Insulin changes and lab tests as planned at clinic visit per instructions.  3.  Monitor blood sugars pre-meals, pre-snack, bedtime and overnight. If on CGM check BG to calibrate and verify high or low blood sugars.   4.  Communicate blood sugar/ carbohydrate and insulin log to the office with instructions provided. Download CGM sensor data for review in 4 weeks     Goal for next visit: maintain HgbA1c    Caden Boudreaux MD          Dementia   F03.90   Dementia associated with alcoholism   F10.27

## 2022-09-30 NOTE — ED PROVIDER NOTE - RESPIRATORY NEGATIVE STATEMENT, MLM
no chest pain, no cough, and no shortness of breath. Oral Minoxidil Counseling- I discussed with the patient the risks of oral minoxidil including but not limited to shortness of breath, swelling of the feet or ankles, dizziness, lightheadedness, unwanted hair growth and allergic reaction.  The patient verbalized understanding of the proper use and possible adverse effects of oral minoxidil.  All of the patient's questions and concerns were addressed.

## 2022-10-03 NOTE — ED ADULT TRIAGE NOTE - MODE OF ARRIVAL
Ambulance

## 2022-10-25 NOTE — PATIENT PROFILE ADULT - LANGUAGE ASSISTANCE NEEDED
This is a patient of Loren's, I've tried to contact patient without success, please contact patient.   Yes-Patient/Caregiver accepts free interpretation services...

## 2022-11-18 NOTE — PATIENT PROFILE ADULT - LIVING ENVIRONMENT
[facility]  Facility/Department: [facility] [address]  Speech Language Pathology   Treatment Note      Ty Krabbe  1945  [id number]  [x]   confirmed      Date: 2022    TIA (transient ischemic attack) [G45.9]  Essential hypertension [I10]  Chronic obstructive pulmonary disease, unspecified COPD type ([facility] [address] [address] [J44.9]  Acute CVA (cerebrovascular accident) ([facility] [address] [I63.9]    Restrictions/Precautions: Fall Risk, NPO    Weight: 160 lb (72.6 kg)     Diet NPO  ADULT TUBE FEEDING; PEG; Standard with Fiber; Continuous; 25; Yes; 25; Q 12 hours; 50; 180; Q 6 hours    SpO2: 95 % (22 0755)  O2 Flow Rate (L/min): 4 L/min (22 0755)  No active isolations      Subjective:  Alert and Confused        Interventions used this date:  Expressive Language, Receptive Language, Dysphagia Treatment, and Oral Motor Treatment      Objective/Assessment:  Patient progressing towards goals:  Short Term Goals  Time Frame for Short Term Goals: 1 week or LOS until goals are met    Goal 1: Within 1-5 days of implementing the ST POC, pt's cognition will be assessed with additional goals added to pt's POC as deemed necesary by treating SLP. Unable to assess due to aphasia    Goal 2: To address pt's cognitive deficits and promote orientation, pt will state name of facility, time within 1 hour, reason in hospital, current month and year with 100% accuracy with min assist, with use of external aid. Patient oriented to self only. Patient able to choose hospital from a field of 3 choices. SLP provided education and models on orientation and current hospital course. Patient appeared confused and continued to state \"what? \"     Goal 3: Pt will complete confrontational naming tasks with 70% accuracy with mild verbal cues to help the patient express their basic wants and needs. 80% accuracy independently increasing to 100% accuracy w/ phonemic cue with items in immediate environment.      Goal 4: Pt will follow 1-2 step directions given orally with 70% accuracy with min cues to increase the pt's ability to follow directions provided by caregivers for safe follow through with ADLs. 1-step: 60% accuracy increasing to 80% accuracy w/ repetition and model    Goal 5: Pt will answer mid level yes/no questions with 80% accuracy with min cues to assist the caregiver in obtaining important information regarding the patient's personal, medical, and safety needs. 20% accuracy regarding immediate environment. Patient answering \"yes\" for majority of questions. Goal 6: Pt will be educated on compensatory strategies to promote speech intelligibility in 5/5 given opportunities so the patient has a better understanding of strategies that will enhance his/her ability to express their wants and needs. Not targeted. Short-term Goals  Timeframe for Short-term Goals: 1 week  Goal 1: Pt will tolerate PO trials deemed appropriate by treating SLP with adequate mastication, oral clearance of bolus, and no overt s/s of aspiration in all given opportunities. Patient's oral cavity appeared clean. No dried secretions noted. Patient unable to sit up at 90 degrees secondary to pain in PEG surgical site. Patient sat up as high as tolerated. Patient trialed ice chip via teaspoon. Patient demonstrated weak labial seal and difficulty with bolus removal from spoon. Patient demonstrated no attempt at manipulating ice chip and oral holding was observed. Patient appeared unable to sense bolus in oral cavity. Swallow as initiated in 2/2 trials with max cues and prompting. No further trials given at this time due to inconsistent ability to initiate a swallow. Goal 2: Pt will complete oral motor ROM/strengthening exercises with 70% acc given cues as needed to increase lingual/labial/buccal strength and decrease risk of pocketing.   Patient completed lingual exercises x5 with max cues and prompting  Patient completed labial exercises x5 with max cues and prompting    Goal 3: Pt will complete pharyngeal exercises with 70% acc given cues as needed to increase pharyngeal musculature and decrease risk of aspiration. Patient unable to follow directions in order to complete exercises this date, despite models and max cues. Goal 4: Pt will participate in MBS study, when appropriate, in order to more objectively assess pharyngeal phase of swallow and determine least restrictive diet. MBS is not recommended at this time due to inability to initiate a swallow. Recommend continuing with NPO diet     Treatment/Activity Tolerance:  Patient tolerated treatment well    Plan:  Continue per POC    Pain Assessment:  Patient does not c/o pain. However, patient's RN Davey Perez reported patient had been in significant pain all morning and patient shouted \"ouch\" upon repositioning and touched her abdomen. RN in room to give pain meds. Pain Re-assessment:  Patient does not c/o pain. Patient/Caregiver Education:  Patient educated on session and progression towards goals. Education needs reinforcement.     Safety Devices:  Bed alarm in place, Call light within reach, and Telesitter in use      Therapy Time  SLP Individual Minutes  Time In: 9991  Time Out: 1101  Minutes: 16            Signature: Electronically signed by BARRON Santana on 11/18/2022 at 11:18 AM no

## 2022-11-22 NOTE — CDI QUERY NOTE - NSCDINOTEPOA_GEN_ALL_CORE_FT
Was the condition present on admission, if so, please document in the chart that “(the condition) was present on admission.” intact

## 2022-12-08 NOTE — PROGRESS NOTE ADULT - PROBLEM SELECTOR PROBLEM 6
[facility] Hospitalist Progress Note      Subjective:  65-year-old female admitted with altered mental status, UTI, proctitis causing sepsis, hypomagnesemia seen in follow-up.    ROS  14 point review of systems reviewed with the patient otherwise negative except confusion improving AO x2 now.  Denies chest pain, no shortness of breath, no abdominal pain, no fevers.    Objective    VITAL SIGNS:    Visit Vitals  /83   Pulse (!) 113   Temp 97.3 °F (36.3 °C) (Oral)   Resp 18   Ht 5' 5\" (1.651 m)   Wt 86.5 kg (190 lb 11.2 oz)   SpO2 100%   BMI 31.73 kg/m²        INTAKE/OUTPUT:      Intake/Output Summary (Last 24 hours) at 12/8/2022 1302  Last data filed at 12/8/2022 0900  Gross per 24 hour   Intake 650 ml   Output --   Net 650 ml        General: No acute distress  Eyes:anicteric  HENT: Normocephalic, oral mucosa is moist.  Neck: Supple, non-tender  Respiratory: Lungs clear to auscultation bilaterally  Cardiovascular: regular rate and rhythm, nl s1/s2  Gastrointestinal : Soft, non-tender, non distended, bowel sounds active  Genitourinary: No costovertebral angle tenderness.  Lymphatics: No lymphadenopathy in neck  Extremities: No edema  Integumentary: Warm, dry, well perfused, intact, no jaundice  Neurologic:aox2.  EOMI.  Moving all extremities.  No facial droop or slurred speech.  Finger-nose intact bilaterally.  Psychiatric: Confused.  Opens eyes, follows commands.  Noncombative.  Confusion appears to be improving.        Labs  Recent Results (from the past 24 hour(s))   Basic Metabolic Panel    Collection Time: 12/08/22  5:53 AM   Result Value Ref Range    Fasting Status      Sodium 145 135 - 145 mmol/L    Potassium 3.3 (L) 3.4 - 5.1 mmol/L    Chloride 111 (H) 97 - 110 mmol/L    Carbon Dioxide 25 21 - 32 mmol/L    Anion Gap 12 7 - 19 mmol/L    Glucose 107 (H) 70 - 99 mg/dL    BUN 5 (L) 6 - 20 mg/dL    Creatinine 0.77 0.51 - 0.95 mg/dL    Glomerular Filtration Rate 84 >=60    BUN/ Creatinine Ratio 6 (L) 7 - 25    Calcium  8.6 8.4 - 10.2 mg/dL   CBC with Automated Differential (performable only)    Collection Time: 12/08/22  5:53 AM   Result Value Ref Range    WBC 7.8 4.2 - 11.0 K/mcL    RBC 2.95 (L) 4.00 - 5.20 mil/mcL    HGB 7.8 (L) 12.0 - 15.5 g/dL    HCT 25.6 (L) 36.0 - 46.5 %    MCV 86.8 78.0 - 100.0 fl    MCH 26.4 26.0 - 34.0 pg    MCHC 30.5 (L) 32.0 - 36.5 g/dL    RDW-CV 15.9 (H) 11.0 - 15.0 %    RDW-SD 49.8 39.0 - 50.0 fL     140 - 450 K/mcL    NRBC 0 <=0 /100 WBC    Neutrophil, Percent 61 %    Lymphocytes, Percent 24 %    Mono, Percent 12 %    Eosinophils, Percent 1 %    Basophils, Percent 1 %    Immature Granulocytes 1 %    Absolute Neutrophils 4.7 1.8 - 7.7 K/mcL    Absolute Lymphocytes 1.9 1.0 - 4.0 K/mcL    Absolute Monocytes 0.9 0.3 - 0.9 K/mcL    Absolute Eosinophils  0.1 0.0 - 0.5 K/mcL    Absolute Basophils 0.1 0.0 - 0.3 K/mcL    Absolute Immmature Granulocytes 0.1 0.0 - 0.2 K/mcL   Procalcitonin    Collection Time: 12/08/22  5:53 AM   Result Value Ref Range    Procalcitonin 0.32 (H) <=0.09 ng/mL   Magnesium    Collection Time: 12/08/22  5:53 AM   Result Value Ref Range    Magnesium 1.5 (L) 1.7 - 2.4 mg/dL       Microbiology Results     None             [unfilled]        Assessment and Plan    Acute Pseudomonas UTI, proctitis causing sepsis present on admission: Treated with IV antibiotics, IV fluids.  Infectious disease consulted.  Discharge on Cipro, Flagyl.  Monitor.    Acute encephalopathy: Improving.  Metabolic versus infectious versus neurologic or combination.  Improving.  Head CT negative for acute change.  MRI brain pending.   Neurology consulted.  Monitor.    Severe hypomagnesemia: Replaced with IV magnesium.  Monitor.  Hypokalemia: Replaced.  Monitor.    Chronic sacral decubitus ulcer: Wound care consulted.  Monitor.    Hypertensive heart disease with CKD 3: Stable.  Continue current medications.    Acute kidney injury on CKD 3: Continue IV fluids.  Improved.  Monitor.    Chronic, stable COPD:  Continue inhalers as needed.    Morbid obesity based on BMI 43: Weight loss encouraged.    Prior alcohol abuse: Continue thiamine, folate.  Monitor.    Prophylaxis: Subcu heparin  CODE STATUS: Full code      Fady Joshi MD  12/8/2022     Preventative health care

## 2023-01-25 NOTE — ED ADULT TRIAGE NOTE - NS ED NURSE DIRECT TO ROOM YN
Impression: Peripheral pterygium, stationary, bilateral: H11.053. Plan: Recommended to protect eyes from sun, dust wind (ultraviolet-blocking sunglasses or goggles if appropriate).  Artificial gel drops OU QID or more if irritation persist. No

## 2023-01-30 NOTE — ED PROVIDER NOTE - PROGRESS NOTE DETAILS
Patient now complaining of vomiting and abdominal pain - will obtain labs to evaluate for pancreatitis given history of alcohol abuse. received s/o from dr alcala. patient clinically sober ambulatory with steady gait. patient c/o chronic back pain x years that is unchanged. will given tylenol. pt advised to be careful with etoh use.. stable for dc. no signs of withdrawal Klisyri Counseling:  I discussed with the patient the risks of Klisyri including but not limited to erythema, scaling, itching, weeping, crusting, and pain.

## 2023-02-14 NOTE — ED ADULT NURSE NOTE - MODE OF DISCHARGE
Any side effects/cause for stopping this? I will send a prescription to pharmacy if not. I have also ordered a DEXA scan for updated bone density measurement.     Thanks  Ambulatory

## 2023-03-25 NOTE — ED PROVIDER NOTE - NSFOLLOWUPINSTRUCTIONS_ED_ALL_ED_FT
Takes lisinopril at home. Held due to OFELIA. Home Bps normal. Hypertensive in ED.     - started on amlodipine and coreg (some hypotension, reduced to 3.125)  - uptitration of coreg and anti-HTNs in setting of NSTEMI, HFrEF. Addition of entresto when able   - Lab and imaging results, if performed, were discussed with you along with your discharge diagnosis    - Follow up with your doctor in 1 week - bring copies of your results if you were given. If you do not have a primary doctor, please call [phone] to find one convenient for you    - Return to the ED for any new, worsening, or concerning symptoms to you    - Continue all prescribed medications    - Rest and keep yourself hydrated with fluids

## 2023-04-03 NOTE — DISCHARGE NOTE PROVIDER - PROVIDER TOKENS
FREE:[LAST:[Medical Clinic],PHONE:[phone]],FAX:[(   )    -],ADDRESS:[[address] [place] [address]],FOLLOWUP:[1 week]] [Negative] : Heme/Lymph [FreeTextEntry5] : see HPI

## 2023-04-07 NOTE — PHYSICAL THERAPY INITIAL EVALUATION ADULT - ORIENTATION, REHAB EVAL
[facility] and placed in bin to scan. place/person

## 2023-04-19 NOTE — DISCHARGE NOTE NURSING/CASE MANAGEMENT/SOCIAL WORK - NSDCPNDISPN_GEN_ALL_CORE
For the Meclizine, take it 4 times a day and then when you get a full day of no dizziness, then wean to 3/day, 2/day, 1/day.    Contact me or your PCP if any worsening or for any new concerns as we discussed.    Opioids not applicable/not prescribed

## 2023-07-27 NOTE — PATIENT PROFILE ADULT. - DIETITIAN.
PAST SURGICAL HISTORY:  H/O arthroscopy of left knee    H/O cystoscopy multiple    History of bladder surgery multiple bladder reconstruction surgery [id number]    History of colon resection 1980's    S/P small bowel resection 1999, 2010    
dietitian/nutrition services

## 2023-08-09 NOTE — ED ADULT NURSE NOTE - NSSEPSISSUSPECTED_ED_A_ED
Occupational Therapy Progress Note     Patient Name: Aurelio Bailon  Today's Date: 8/9/2023  Problem List  Principal Problem:    PAD (peripheral artery disease) ([address])  Active Problems:    Type 2 diabetes mellitus with hyperglycemia, with long-term current use of insulin (HCC)    Acute on chronic systolic heart failure (HCC)    CAD (coronary artery disease)    Essential hypertension    Cardiomyopathy ([address])    Hx of BKA, right ([address])    Anemia    S/P BKA (below knee amputation), left ([address])    MATTHEW (acute kidney injury) ([address])          08/09/23 0929   OT Last Visit   OT Visit Date 08/09/23   Note Type   Note Type Treatment   Pain Assessment   Pain Assessment Tool 0-10   Pain Score No Pain   Restrictions/Precautions   Weight Bearing Precautions Per Order Yes   RLE Weight Bearing Per Order (S)  NWB   LLE Weight Bearing Per Order (S)  NWB   Other Precautions Cognitive; Chair Alarm; Bed Alarm; Fall Risk;WBS   ADL   Where Assessed Edge of bed   Grooming Comments pt deferring teeth brushing tasks at this time. UB Bathing Assistance 4  Minimal Assistance   UB Bathing Deficit Setup; Increased time to complete;Right arm; Abdomen;Left arm; Chest;Supervision/safety;Verbal cueing   UB Bathing Comments pt is able to complete UB bathing w min A, however if he is sitting unsupported, he requires max A to maintain upright posture while seated EOB. LB Bathing Assistance 3  Moderate Assistance   LB Bathing Deficit Setup;Supervision/safety;Verbal cueing; Increased time to complete;Perineal area; Buttocks;Right upper leg;Left upper leg   LB Bathing Comments mod A for cleaning near amputation site- he is able to manage upper LE's and perineal area   UB Dressing Assistance 4  Minimal Assistance   UB Dressing Deficit Thread RUE; Thread LUE   UB Dressing Comments min A for threading UE's 2' lines   Bed Mobility   Supine to Sit 3  Moderate assistance   Additional items Assist x 2; Increased time required;Verbal cues;LE management   Additional Comments found in bed, left in chair w all needs in reach. pt sat EOB about 15 mins for ADL Tasks. he is able to maintain EOB supported sit (using b/l UE's for support) w/ S/CGA. however, when performing unsupported dynamic sitting tasks, he requires max A to maintain EOB sitting. Transfers   Sliding Board transfer 2  Maximal assistance   Additional items Assist x 2; Increased time required;Verbal cues   Additional Comments vc for sequencing steps for slide board transfers. Has difficulty at this time using b/l UE's for support, therefore was max Ax2. Subjective   Subjective "I don't want to stay here all day"   Cognition   Overall Cognitive Status [facility]   Arousal/Participation Responsive; Alert; Cooperative   Attention Within functional limits   Orientation Level Oriented X4   Memory Decreased recall of precautions   Following Commands Follows one step commands without difficulty   Comments cooperative, overall fair safety awareness and insight to condition t/o session. Activity Tolerance   Activity Tolerance Patient tolerated treatment well   Medical Staff Made Aware DPT Swapna 2' pts med complexity, comorbidities and regression from baseline. ok per RN   Assessment   Assessment Pt seen on this date for OT session focusing on ADL retraining,  body mechanics, transfer retraining, increasing activity tolerance/endurance and EOB sitting to increase ability to participate in ADL/functional tasks. Pt was found in bed and was left in chair w/ all needs within reach. Pt completed bed mob w mod Ax2, sat EOB with S/cGA if he had b/l UE support. When sitting unsupported at EOB, he required max Ax1 to maintain upright posture. Completed ADL's while seated EOB: UB: Min A, LB: Mod A. See flowsheet for further detail. Completed slide board transfers with max Ax2 at this time from EOB>chair.  Pt w/ improvements in activity tolerance, transfer ability, ADL task completion, however is still limited 2* decreased ADL/High-level ADL status, decreased activity tolerance/endurance, decreased cognition, decreased self-care trans, decreased safety awareness and insight to condition. The patient's raw score on the AM-PAC Daily Activity Inpatient Short Form is 16. A raw score of less than 19 suggests the patient may benefit from discharge to post-acute rehabilitation services. Please refer to the recommendation of the Occupational Therapist for safe discharge planning. Recommending pt D/C to STR when medically stable. Pt will continue to benefit from acute OT services to meet goals. Plan   Treatment Interventions ADL retraining;Functional transfer training; Endurance training;Patient/family training;Equipment evaluation/education; Compensatory technique education; Energy conservation; Activityengagement   Goal Expiration Date 08/18/23   OT Treatment Day 1   OT Frequency 2-3x/wk   Recommendation   OT Discharge Recommendation Post acute rehabilitation services   [facility] Daily Activity Inpatient   Lower Body Dressing 2   Bathing 2   Toileting 2   Upper Body Dressing 3   Grooming 3   Eating 4   Daily Activity Raw Score 16   Daily Activity Standardized Score (Calc for Raw Score >=11) 35.96   AM-PAC Applied Cognition Inpatient   Following a Speech/Presentation 4   Understanding Ordinary Conversation 4   Taking Medications 4   Remembering Where Things Are Placed or Put Away 4   Remembering List of 4-5 Errands 3   Taking Care of Complicated Tasks 3   Applied Cognition Raw Score 22   Applied Cognition Standardized Score 47.83   Modified Michie Scale   Modified Michie Scale 4   End of Consult   Education Provided Yes   Patient Position at End of Consult Bedside chair; All needs within reach   Nurse Communication Nurse aware of consult       LACHO Maldonado, OTR/L No

## 2023-10-17 NOTE — ED ADULT NURSE NOTE - ED STAT RN HANDOFF WHERE
[facility]   Monday Oct 23rd between 6-9pm  [address] [place] [address]  [address]nd Floor, [address]  [phone]

## 2023-10-24 NOTE — ED ADULT NURSE NOTE - CHPI ED NUR DURATION
today Dutasteride Pregnancy And Lactation Text: This medication is absolutely contraindicated in women, especially during pregnancy and breast feeding. Feminization of male fetuses is possible if taking while pregnant.

## 2024-01-01 ENCOUNTER — INPATIENT (INPATIENT)
Facility: HOSPITAL | Age: 64
LOS: 9 days | DRG: 189 | End: 2024-06-23
Attending: INTERNAL MEDICINE | Admitting: STUDENT IN AN ORGANIZED HEALTH CARE EDUCATION/TRAINING PROGRAM
Payer: COMMERCIAL

## 2024-01-01 ENCOUNTER — RESULT REVIEW (OUTPATIENT)
Age: 64
End: 2024-01-01

## 2024-01-01 VITALS
SYSTOLIC BLOOD PRESSURE: 86 MMHG | DIASTOLIC BLOOD PRESSURE: 53 MMHG | RESPIRATION RATE: 20 BRPM | HEART RATE: 90 BPM | TEMPERATURE: 99 F | OXYGEN SATURATION: 82 %

## 2024-01-01 VITALS — OXYGEN SATURATION: 100 % | HEART RATE: 109 BPM

## 2024-01-01 DIAGNOSIS — Z71.89 OTHER SPECIFIED COUNSELING: ICD-10-CM

## 2024-01-01 DIAGNOSIS — Z51.5 ENCOUNTER FOR PALLIATIVE CARE: ICD-10-CM

## 2024-01-01 DIAGNOSIS — E22.2 SYNDROME OF INAPPROPRIATE SECRETION OF ANTIDIURETIC HORMONE: ICD-10-CM

## 2024-01-01 DIAGNOSIS — K86.1 OTHER CHRONIC PANCREATITIS: ICD-10-CM

## 2024-01-01 DIAGNOSIS — A41.9 SEPSIS, UNSPECIFIED ORGANISM: ICD-10-CM

## 2024-01-01 DIAGNOSIS — J96.01 ACUTE RESPIRATORY FAILURE WITH HYPOXIA: ICD-10-CM

## 2024-01-01 DIAGNOSIS — Z98.89 OTHER SPECIFIED POSTPROCEDURAL STATES: Chronic | ICD-10-CM

## 2024-01-01 DIAGNOSIS — R45.1 RESTLESSNESS AND AGITATION: ICD-10-CM

## 2024-01-01 DIAGNOSIS — R52 PAIN, UNSPECIFIED: ICD-10-CM

## 2024-01-01 DIAGNOSIS — G93.40 ENCEPHALOPATHY, UNSPECIFIED: ICD-10-CM

## 2024-01-01 DIAGNOSIS — R06.00 DYSPNEA, UNSPECIFIED: ICD-10-CM

## 2024-01-01 DIAGNOSIS — Z87.898 PERSONAL HISTORY OF OTHER SPECIFIED CONDITIONS: ICD-10-CM

## 2024-01-01 DIAGNOSIS — Z29.9 ENCOUNTER FOR PROPHYLACTIC MEASURES, UNSPECIFIED: ICD-10-CM

## 2024-01-01 DIAGNOSIS — R13.10 DYSPHAGIA, UNSPECIFIED: ICD-10-CM

## 2024-01-01 DIAGNOSIS — E87.8 OTHER DISORDERS OF ELECTROLYTE AND FLUID BALANCE, NOT ELSEWHERE CLASSIFIED: ICD-10-CM

## 2024-01-01 LAB
ADD ON TEST-SPECIMEN IN LAB: SIGNIFICANT CHANGE UP
ALBUMIN SERPL ELPH-MCNC: 2.2 G/DL — LOW (ref 3.3–5)
ALBUMIN SERPL ELPH-MCNC: 2.4 G/DL — LOW (ref 3.3–5)
ALBUMIN SERPL ELPH-MCNC: 2.6 G/DL — LOW (ref 3.3–5)
ALBUMIN SERPL ELPH-MCNC: 2.6 G/DL — LOW (ref 3.3–5)
ALBUMIN SERPL ELPH-MCNC: 2.7 G/DL — LOW (ref 3.3–5)
ALBUMIN SERPL ELPH-MCNC: 2.8 G/DL — LOW (ref 3.3–5)
ALP SERPL-CCNC: 76 U/L — SIGNIFICANT CHANGE UP (ref 40–120)
ALP SERPL-CCNC: 78 U/L — SIGNIFICANT CHANGE UP (ref 40–120)
ALP SERPL-CCNC: 80 U/L — SIGNIFICANT CHANGE UP (ref 40–120)
ALP SERPL-CCNC: 82 U/L — SIGNIFICANT CHANGE UP (ref 40–120)
ALP SERPL-CCNC: 82 U/L — SIGNIFICANT CHANGE UP (ref 40–120)
ALP SERPL-CCNC: 86 U/L — SIGNIFICANT CHANGE UP (ref 40–120)
ALP SERPL-CCNC: 86 U/L — SIGNIFICANT CHANGE UP (ref 40–120)
ALP SERPL-CCNC: 87 U/L — SIGNIFICANT CHANGE UP (ref 40–120)
ALT FLD-CCNC: 21 U/L — SIGNIFICANT CHANGE UP (ref 10–45)
ALT FLD-CCNC: 22 U/L — SIGNIFICANT CHANGE UP (ref 10–45)
ALT FLD-CCNC: 28 U/L — SIGNIFICANT CHANGE UP (ref 10–45)
ALT FLD-CCNC: 29 U/L — SIGNIFICANT CHANGE UP (ref 10–45)
ALT FLD-CCNC: 37 U/L — SIGNIFICANT CHANGE UP (ref 10–45)
ALT FLD-CCNC: 45 U/L — SIGNIFICANT CHANGE UP (ref 10–45)
ALT FLD-CCNC: 48 U/L — HIGH (ref 10–45)
ALT FLD-CCNC: 50 U/L — HIGH (ref 10–45)
AMMONIA BLD-MCNC: 13 UMOL/L — SIGNIFICANT CHANGE UP (ref 11–55)
AMMONIA BLD-MCNC: 23 UMOL/L — SIGNIFICANT CHANGE UP (ref 11–55)
AMPHET UR-MCNC: NEGATIVE — SIGNIFICANT CHANGE UP
ANION GAP SERPL CALC-SCNC: 10 MMOL/L — SIGNIFICANT CHANGE UP (ref 5–17)
ANION GAP SERPL CALC-SCNC: 10 MMOL/L — SIGNIFICANT CHANGE UP (ref 5–17)
ANION GAP SERPL CALC-SCNC: 11 MMOL/L — SIGNIFICANT CHANGE UP (ref 5–17)
ANION GAP SERPL CALC-SCNC: 11 MMOL/L — SIGNIFICANT CHANGE UP (ref 5–17)
ANION GAP SERPL CALC-SCNC: 12 MMOL/L — SIGNIFICANT CHANGE UP (ref 5–17)
ANION GAP SERPL CALC-SCNC: 12 MMOL/L — SIGNIFICANT CHANGE UP (ref 5–17)
ANION GAP SERPL CALC-SCNC: 13 MMOL/L — SIGNIFICANT CHANGE UP (ref 5–17)
ANION GAP SERPL CALC-SCNC: 15 MMOL/L — SIGNIFICANT CHANGE UP (ref 5–17)
ANION GAP SERPL CALC-SCNC: 21 MMOL/L — HIGH (ref 5–17)
ANION GAP SERPL CALC-SCNC: 9 MMOL/L — SIGNIFICANT CHANGE UP (ref 5–17)
ANISOCYTOSIS BLD QL: SIGNIFICANT CHANGE UP
APPEARANCE UR: ABNORMAL
APTT BLD: 28.1 SEC — SIGNIFICANT CHANGE UP (ref 24.5–35.6)
AST SERPL-CCNC: 13 U/L — SIGNIFICANT CHANGE UP (ref 10–40)
AST SERPL-CCNC: 19 U/L — SIGNIFICANT CHANGE UP (ref 10–40)
AST SERPL-CCNC: 23 U/L — SIGNIFICANT CHANGE UP (ref 10–40)
AST SERPL-CCNC: 25 U/L — SIGNIFICANT CHANGE UP (ref 10–40)
AST SERPL-CCNC: 36 U/L — SIGNIFICANT CHANGE UP (ref 10–40)
AST SERPL-CCNC: 51 U/L — HIGH (ref 10–40)
AST SERPL-CCNC: 76 U/L — HIGH (ref 10–40)
AST SERPL-CCNC: 86 U/L — HIGH (ref 10–40)
BACTERIA # UR AUTO: NEGATIVE /HPF — SIGNIFICANT CHANGE UP
BARBITURATES UR SCN-MCNC: NEGATIVE — SIGNIFICANT CHANGE UP
BASE EXCESS BLDV CALC-SCNC: -10 MMOL/L — LOW (ref -2–3)
BASE EXCESS BLDV CALC-SCNC: -2.7 MMOL/L — LOW (ref -2–3)
BASE EXCESS BLDV CALC-SCNC: -3.1 MMOL/L — LOW (ref -2–3)
BASE EXCESS BLDV CALC-SCNC: -4.5 MMOL/L — LOW (ref -2–3)
BASE EXCESS BLDV CALC-SCNC: -5.8 MMOL/L — LOW (ref -2–3)
BASE EXCESS BLDV CALC-SCNC: -6.4 MMOL/L — LOW (ref -2–3)
BASOPHILS # BLD AUTO: 0 K/UL — SIGNIFICANT CHANGE UP (ref 0–0.2)
BASOPHILS # BLD AUTO: 0 K/UL — SIGNIFICANT CHANGE UP (ref 0–0.2)
BASOPHILS # BLD AUTO: 0.01 K/UL — SIGNIFICANT CHANGE UP (ref 0–0.2)
BASOPHILS # BLD AUTO: 0.02 K/UL — SIGNIFICANT CHANGE UP (ref 0–0.2)
BASOPHILS # BLD AUTO: 0.02 K/UL — SIGNIFICANT CHANGE UP (ref 0–0.2)
BASOPHILS # BLD AUTO: 0.06 K/UL — SIGNIFICANT CHANGE UP (ref 0–0.2)
BASOPHILS NFR BLD AUTO: 0 % — SIGNIFICANT CHANGE UP (ref 0–2)
BASOPHILS NFR BLD AUTO: 0 % — SIGNIFICANT CHANGE UP (ref 0–2)
BASOPHILS NFR BLD AUTO: 0.1 % — SIGNIFICANT CHANGE UP (ref 0–2)
BASOPHILS NFR BLD AUTO: 0.2 % — SIGNIFICANT CHANGE UP (ref 0–2)
BASOPHILS NFR BLD AUTO: 0.3 % — SIGNIFICANT CHANGE UP (ref 0–2)
BASOPHILS NFR BLD AUTO: 0.3 % — SIGNIFICANT CHANGE UP (ref 0–2)
BENZODIAZ UR-MCNC: NEGATIVE — SIGNIFICANT CHANGE UP
BILIRUB SERPL-MCNC: 0.8 MG/DL — SIGNIFICANT CHANGE UP (ref 0.2–1.2)
BILIRUB SERPL-MCNC: 0.8 MG/DL — SIGNIFICANT CHANGE UP (ref 0.2–1.2)
BILIRUB SERPL-MCNC: 0.9 MG/DL — SIGNIFICANT CHANGE UP (ref 0.2–1.2)
BILIRUB SERPL-MCNC: 0.9 MG/DL — SIGNIFICANT CHANGE UP (ref 0.2–1.2)
BILIRUB SERPL-MCNC: 1 MG/DL — SIGNIFICANT CHANGE UP (ref 0.2–1.2)
BILIRUB SERPL-MCNC: 1 MG/DL — SIGNIFICANT CHANGE UP (ref 0.2–1.2)
BILIRUB SERPL-MCNC: 1.1 MG/DL — SIGNIFICANT CHANGE UP (ref 0.2–1.2)
BILIRUB SERPL-MCNC: 1.1 MG/DL — SIGNIFICANT CHANGE UP (ref 0.2–1.2)
BILIRUB UR-MCNC: NEGATIVE — SIGNIFICANT CHANGE UP
BUN SERPL-MCNC: 12 MG/DL — SIGNIFICANT CHANGE UP (ref 7–23)
BUN SERPL-MCNC: 16 MG/DL — SIGNIFICANT CHANGE UP (ref 7–23)
BUN SERPL-MCNC: 31 MG/DL — HIGH (ref 7–23)
BUN SERPL-MCNC: 32 MG/DL — HIGH (ref 7–23)
BUN SERPL-MCNC: 34 MG/DL — HIGH (ref 7–23)
BUN SERPL-MCNC: 34 MG/DL — HIGH (ref 7–23)
BUN SERPL-MCNC: 35 MG/DL — HIGH (ref 7–23)
BUN SERPL-MCNC: 5 MG/DL — LOW (ref 7–23)
BUN SERPL-MCNC: 6 MG/DL — LOW (ref 7–23)
BUN SERPL-MCNC: 8 MG/DL — SIGNIFICANT CHANGE UP (ref 7–23)
BURR CELLS BLD QL SMEAR: PRESENT — SIGNIFICANT CHANGE UP
BURR CELLS BLD QL SMEAR: PRESENT — SIGNIFICANT CHANGE UP
CA-I SERPL-SCNC: 1.26 MMOL/L — SIGNIFICANT CHANGE UP (ref 1.15–1.33)
CA-I SERPL-SCNC: 1.3 MMOL/L — SIGNIFICANT CHANGE UP (ref 1.15–1.33)
CA-I SERPL-SCNC: 1.31 MMOL/L — SIGNIFICANT CHANGE UP (ref 1.15–1.33)
CA-I SERPL-SCNC: 1.32 MMOL/L — SIGNIFICANT CHANGE UP (ref 1.15–1.33)
CA-I SERPL-SCNC: 1.35 MMOL/L — HIGH (ref 1.15–1.33)
CALCIUM SERPL-MCNC: 8.4 MG/DL — SIGNIFICANT CHANGE UP (ref 8.4–10.5)
CALCIUM SERPL-MCNC: 8.5 MG/DL — SIGNIFICANT CHANGE UP (ref 8.4–10.5)
CALCIUM SERPL-MCNC: 8.5 MG/DL — SIGNIFICANT CHANGE UP (ref 8.4–10.5)
CALCIUM SERPL-MCNC: 8.6 MG/DL — SIGNIFICANT CHANGE UP (ref 8.4–10.5)
CALCIUM SERPL-MCNC: 8.7 MG/DL — SIGNIFICANT CHANGE UP (ref 8.4–10.5)
CALCIUM SERPL-MCNC: 8.8 MG/DL — SIGNIFICANT CHANGE UP (ref 8.4–10.5)
CALCIUM SERPL-MCNC: 8.9 MG/DL — SIGNIFICANT CHANGE UP (ref 8.4–10.5)
CALCIUM SERPL-MCNC: 8.9 MG/DL — SIGNIFICANT CHANGE UP (ref 8.4–10.5)
CALCIUM SERPL-MCNC: 9 MG/DL — SIGNIFICANT CHANGE UP (ref 8.4–10.5)
CALCIUM SERPL-MCNC: 9.2 MG/DL — SIGNIFICANT CHANGE UP (ref 8.4–10.5)
CALCIUM SERPL-MCNC: 9.3 MG/DL — SIGNIFICANT CHANGE UP (ref 8.4–10.5)
CALCIUM SERPL-MCNC: 9.5 MG/DL — SIGNIFICANT CHANGE UP (ref 8.4–10.5)
CAST: 2 /LPF — SIGNIFICANT CHANGE UP (ref 0–4)
CHLORIDE BLDV-SCNC: 104 MMOL/L — SIGNIFICANT CHANGE UP (ref 96–108)
CHLORIDE BLDV-SCNC: 105 MMOL/L — SIGNIFICANT CHANGE UP (ref 96–108)
CHLORIDE BLDV-SCNC: 106 MMOL/L — SIGNIFICANT CHANGE UP (ref 96–108)
CHLORIDE BLDV-SCNC: 107 MMOL/L — SIGNIFICANT CHANGE UP (ref 96–108)
CHLORIDE BLDV-SCNC: 107 MMOL/L — SIGNIFICANT CHANGE UP (ref 96–108)
CHLORIDE SERPL-SCNC: 101 MMOL/L — SIGNIFICANT CHANGE UP (ref 96–108)
CHLORIDE SERPL-SCNC: 101 MMOL/L — SIGNIFICANT CHANGE UP (ref 96–108)
CHLORIDE SERPL-SCNC: 103 MMOL/L — SIGNIFICANT CHANGE UP (ref 96–108)
CHLORIDE SERPL-SCNC: 104 MMOL/L — SIGNIFICANT CHANGE UP (ref 96–108)
CHLORIDE SERPL-SCNC: 105 MMOL/L — SIGNIFICANT CHANGE UP (ref 96–108)
CHLORIDE SERPL-SCNC: 105 MMOL/L — SIGNIFICANT CHANGE UP (ref 96–108)
CHLORIDE SERPL-SCNC: 106 MMOL/L — SIGNIFICANT CHANGE UP (ref 96–108)
CHLORIDE SERPL-SCNC: 106 MMOL/L — SIGNIFICANT CHANGE UP (ref 96–108)
CHLORIDE SERPL-SCNC: 107 MMOL/L — SIGNIFICANT CHANGE UP (ref 96–108)
CHLORIDE SERPL-SCNC: 97 MMOL/L — SIGNIFICANT CHANGE UP (ref 96–108)
CHLORIDE SERPL-SCNC: 97 MMOL/L — SIGNIFICANT CHANGE UP (ref 96–108)
CHLORIDE SERPL-SCNC: 98 MMOL/L — SIGNIFICANT CHANGE UP (ref 96–108)
CHLORIDE SERPL-SCNC: 99 MMOL/L — SIGNIFICANT CHANGE UP (ref 96–108)
CK SERPL-CCNC: 287 U/L — HIGH (ref 30–200)
CO2 BLDV-SCNC: 16 MMOL/L — LOW (ref 22–26)
CO2 BLDV-SCNC: 22 MMOL/L — SIGNIFICANT CHANGE UP (ref 22–26)
CO2 BLDV-SCNC: 25 MMOL/L — SIGNIFICANT CHANGE UP (ref 22–26)
CO2 BLDV-SCNC: 25 MMOL/L — SIGNIFICANT CHANGE UP (ref 22–26)
CO2 SERPL-SCNC: 13 MMOL/L — LOW (ref 22–31)
CO2 SERPL-SCNC: 18 MMOL/L — LOW (ref 22–31)
CO2 SERPL-SCNC: 20 MMOL/L — LOW (ref 22–31)
CO2 SERPL-SCNC: 21 MMOL/L — LOW (ref 22–31)
CO2 SERPL-SCNC: 22 MMOL/L — SIGNIFICANT CHANGE UP (ref 22–31)
CO2 SERPL-SCNC: 23 MMOL/L — SIGNIFICANT CHANGE UP (ref 22–31)
CO2 SERPL-SCNC: 24 MMOL/L — SIGNIFICANT CHANGE UP (ref 22–31)
CO2 SERPL-SCNC: 24 MMOL/L — SIGNIFICANT CHANGE UP (ref 22–31)
COCAINE METAB.OTHER UR-MCNC: NEGATIVE — SIGNIFICANT CHANGE UP
COLOR SPEC: SIGNIFICANT CHANGE UP
CREAT SERPL-MCNC: 0.55 MG/DL — SIGNIFICANT CHANGE UP (ref 0.5–1.3)
CREAT SERPL-MCNC: 0.58 MG/DL — SIGNIFICANT CHANGE UP (ref 0.5–1.3)
CREAT SERPL-MCNC: 0.58 MG/DL — SIGNIFICANT CHANGE UP (ref 0.5–1.3)
CREAT SERPL-MCNC: 0.6 MG/DL — SIGNIFICANT CHANGE UP (ref 0.5–1.3)
CREAT SERPL-MCNC: 0.63 MG/DL — SIGNIFICANT CHANGE UP (ref 0.5–1.3)
CREAT SERPL-MCNC: 0.64 MG/DL — SIGNIFICANT CHANGE UP (ref 0.5–1.3)
CREAT SERPL-MCNC: 0.65 MG/DL — SIGNIFICANT CHANGE UP (ref 0.5–1.3)
CREAT SERPL-MCNC: 0.7 MG/DL — SIGNIFICANT CHANGE UP (ref 0.5–1.3)
CREAT SERPL-MCNC: 0.71 MG/DL — SIGNIFICANT CHANGE UP (ref 0.5–1.3)
CREAT SERPL-MCNC: 0.71 MG/DL — SIGNIFICANT CHANGE UP (ref 0.5–1.3)
CREAT SERPL-MCNC: 0.83 MG/DL — SIGNIFICANT CHANGE UP (ref 0.5–1.3)
CREAT SERPL-MCNC: 0.92 MG/DL — SIGNIFICANT CHANGE UP (ref 0.5–1.3)
CREAT SERPL-MCNC: 1.06 MG/DL — SIGNIFICANT CHANGE UP (ref 0.5–1.3)
CREAT SERPL-MCNC: 1.09 MG/DL — SIGNIFICANT CHANGE UP (ref 0.5–1.3)
CREAT SERPL-MCNC: 1.11 MG/DL — SIGNIFICANT CHANGE UP (ref 0.5–1.3)
CULTURE RESULTS: NO GROWTH — SIGNIFICANT CHANGE UP
CULTURE RESULTS: SIGNIFICANT CHANGE UP
CULTURE RESULTS: SIGNIFICANT CHANGE UP
DACRYOCYTES BLD QL SMEAR: SLIGHT — SIGNIFICANT CHANGE UP
DIFF PNL FLD: NEGATIVE — SIGNIFICANT CHANGE UP
EGFR: 102 ML/MIN/1.73M2 — SIGNIFICANT CHANGE UP
EGFR: 105 ML/MIN/1.73M2 — SIGNIFICANT CHANGE UP
EGFR: 105 ML/MIN/1.73M2 — SIGNIFICANT CHANGE UP
EGFR: 106 ML/MIN/1.73M2 — SIGNIFICANT CHANGE UP
EGFR: 107 ML/MIN/1.73M2 — SIGNIFICANT CHANGE UP
EGFR: 108 ML/MIN/1.73M2 — SIGNIFICANT CHANGE UP
EGFR: 108 ML/MIN/1.73M2 — SIGNIFICANT CHANGE UP
EGFR: 110 ML/MIN/1.73M2 — SIGNIFICANT CHANGE UP
EGFR: 74 ML/MIN/1.73M2 — SIGNIFICANT CHANGE UP
EGFR: 75 ML/MIN/1.73M2 — SIGNIFICANT CHANGE UP
EGFR: 78 ML/MIN/1.73M2 — SIGNIFICANT CHANGE UP
EGFR: 92 ML/MIN/1.73M2 — SIGNIFICANT CHANGE UP
EGFR: 97 ML/MIN/1.73M2 — SIGNIFICANT CHANGE UP
EOSINOPHIL # BLD AUTO: 0 K/UL — SIGNIFICANT CHANGE UP (ref 0–0.5)
EOSINOPHIL # BLD AUTO: 0 K/UL — SIGNIFICANT CHANGE UP (ref 0–0.5)
EOSINOPHIL # BLD AUTO: 0.08 K/UL — SIGNIFICANT CHANGE UP (ref 0–0.5)
EOSINOPHIL # BLD AUTO: 0.09 K/UL — SIGNIFICANT CHANGE UP (ref 0–0.5)
EOSINOPHIL # BLD AUTO: 0.11 K/UL — SIGNIFICANT CHANGE UP (ref 0–0.5)
EOSINOPHIL # BLD AUTO: 0.12 K/UL — SIGNIFICANT CHANGE UP (ref 0–0.5)
EOSINOPHIL NFR BLD AUTO: 0 % — SIGNIFICANT CHANGE UP (ref 0–6)
EOSINOPHIL NFR BLD AUTO: 0 % — SIGNIFICANT CHANGE UP (ref 0–6)
EOSINOPHIL NFR BLD AUTO: 0.4 % — SIGNIFICANT CHANGE UP (ref 0–6)
EOSINOPHIL NFR BLD AUTO: 0.8 % — SIGNIFICANT CHANGE UP (ref 0–6)
EOSINOPHIL NFR BLD AUTO: 1 % — SIGNIFICANT CHANGE UP (ref 0–6)
EOSINOPHIL NFR BLD AUTO: 1.6 % — SIGNIFICANT CHANGE UP (ref 0–6)
ETHANOL SERPL-MCNC: <10 MG/DL — SIGNIFICANT CHANGE UP (ref 0–10)
FLUAV AG NPH QL: SIGNIFICANT CHANGE UP
FLUBV AG NPH QL: SIGNIFICANT CHANGE UP
FOLATE SERPL-MCNC: 9.2 NG/ML — SIGNIFICANT CHANGE UP
GAS PNL BLDV: 134 MMOL/L — LOW (ref 136–145)
GAS PNL BLDV: 136 MMOL/L — SIGNIFICANT CHANGE UP (ref 136–145)
GAS PNL BLDV: 136 MMOL/L — SIGNIFICANT CHANGE UP (ref 136–145)
GAS PNL BLDV: 137 MMOL/L — SIGNIFICANT CHANGE UP (ref 136–145)
GAS PNL BLDV: 137 MMOL/L — SIGNIFICANT CHANGE UP (ref 136–145)
GAS PNL BLDV: SIGNIFICANT CHANGE UP
GLUCOSE BLDC GLUCOMTR-MCNC: 82 MG/DL — SIGNIFICANT CHANGE UP (ref 70–99)
GLUCOSE BLDV-MCNC: 100 MG/DL — HIGH (ref 70–99)
GLUCOSE BLDV-MCNC: 75 MG/DL — SIGNIFICANT CHANGE UP (ref 70–99)
GLUCOSE BLDV-MCNC: 83 MG/DL — SIGNIFICANT CHANGE UP (ref 70–99)
GLUCOSE BLDV-MCNC: 85 MG/DL — SIGNIFICANT CHANGE UP (ref 70–99)
GLUCOSE BLDV-MCNC: 86 MG/DL — SIGNIFICANT CHANGE UP (ref 70–99)
GLUCOSE SERPL-MCNC: 100 MG/DL — HIGH (ref 70–99)
GLUCOSE SERPL-MCNC: 105 MG/DL — HIGH (ref 70–99)
GLUCOSE SERPL-MCNC: 135 MG/DL — HIGH (ref 70–99)
GLUCOSE SERPL-MCNC: 80 MG/DL — SIGNIFICANT CHANGE UP (ref 70–99)
GLUCOSE SERPL-MCNC: 80 MG/DL — SIGNIFICANT CHANGE UP (ref 70–99)
GLUCOSE SERPL-MCNC: 81 MG/DL — SIGNIFICANT CHANGE UP (ref 70–99)
GLUCOSE SERPL-MCNC: 84 MG/DL — SIGNIFICANT CHANGE UP (ref 70–99)
GLUCOSE SERPL-MCNC: 86 MG/DL — SIGNIFICANT CHANGE UP (ref 70–99)
GLUCOSE SERPL-MCNC: 88 MG/DL — SIGNIFICANT CHANGE UP (ref 70–99)
GLUCOSE SERPL-MCNC: 89 MG/DL — SIGNIFICANT CHANGE UP (ref 70–99)
GLUCOSE SERPL-MCNC: 91 MG/DL — SIGNIFICANT CHANGE UP (ref 70–99)
GLUCOSE SERPL-MCNC: 92 MG/DL — SIGNIFICANT CHANGE UP (ref 70–99)
GLUCOSE SERPL-MCNC: 98 MG/DL — SIGNIFICANT CHANGE UP (ref 70–99)
GLUCOSE UR QL: NEGATIVE MG/DL — SIGNIFICANT CHANGE UP
HCO3 BLDV-SCNC: 15 MMOL/L — LOW (ref 22–29)
HCO3 BLDV-SCNC: 20 MMOL/L — LOW (ref 22–29)
HCO3 BLDV-SCNC: 20 MMOL/L — LOW (ref 22–29)
HCO3 BLDV-SCNC: 21 MMOL/L — LOW (ref 22–29)
HCO3 BLDV-SCNC: 23 MMOL/L — SIGNIFICANT CHANGE UP (ref 22–29)
HCO3 BLDV-SCNC: 24 MMOL/L — SIGNIFICANT CHANGE UP (ref 22–29)
HCT VFR BLD CALC: 26.6 % — LOW (ref 39–50)
HCT VFR BLD CALC: 27 % — LOW (ref 39–50)
HCT VFR BLD CALC: 27.2 % — LOW (ref 39–50)
HCT VFR BLD CALC: 27.6 % — LOW (ref 39–50)
HCT VFR BLD CALC: 28.4 % — LOW (ref 39–50)
HCT VFR BLD CALC: 29.2 % — LOW (ref 39–50)
HCT VFR BLD CALC: 29.3 % — LOW (ref 39–50)
HCT VFR BLD CALC: 29.4 % — LOW (ref 39–50)
HCT VFR BLD CALC: 30.9 % — LOW (ref 39–50)
HCT VFR BLDA CALC: 27 % — LOW (ref 39–51)
HCT VFR BLDA CALC: 28 % — LOW (ref 39–51)
HCT VFR BLDA CALC: 28 % — LOW (ref 39–51)
HCT VFR BLDA CALC: 29 % — LOW (ref 39–51)
HCT VFR BLDA CALC: 31 % — LOW (ref 39–51)
HGB BLD CALC-MCNC: 10.3 G/DL — LOW (ref 12.6–17.4)
HGB BLD CALC-MCNC: 9 G/DL — LOW (ref 12.6–17.4)
HGB BLD CALC-MCNC: 9.2 G/DL — LOW (ref 12.6–17.4)
HGB BLD CALC-MCNC: 9.3 G/DL — LOW (ref 12.6–17.4)
HGB BLD CALC-MCNC: 9.6 G/DL — LOW (ref 12.6–17.4)
HGB BLD-MCNC: 10.2 G/DL — LOW (ref 13–17)
HGB BLD-MCNC: 8.6 G/DL — LOW (ref 13–17)
HGB BLD-MCNC: 8.7 G/DL — LOW (ref 13–17)
HGB BLD-MCNC: 8.8 G/DL — LOW (ref 13–17)
HGB BLD-MCNC: 8.9 G/DL — LOW (ref 13–17)
HGB BLD-MCNC: 9.2 G/DL — LOW (ref 13–17)
HGB BLD-MCNC: 9.5 G/DL — LOW (ref 13–17)
HGB BLD-MCNC: 9.6 G/DL — LOW (ref 13–17)
HGB BLD-MCNC: 9.9 G/DL — LOW (ref 13–17)
HIV 1+2 AB+HIV1 P24 AG SERPL QL IA: SIGNIFICANT CHANGE UP
IMM GRANULOCYTES NFR BLD AUTO: 0.8 % — SIGNIFICANT CHANGE UP (ref 0–0.9)
IMM GRANULOCYTES NFR BLD AUTO: 0.9 % — SIGNIFICANT CHANGE UP (ref 0–0.9)
INR BLD: 1.28 RATIO — HIGH (ref 0.85–1.18)
KETONES UR-MCNC: NEGATIVE MG/DL — SIGNIFICANT CHANGE UP
LACTATE BLDV-MCNC: 2 MMOL/L — SIGNIFICANT CHANGE UP (ref 0.5–2)
LACTATE BLDV-MCNC: 2.2 MMOL/L — HIGH (ref 0.5–2)
LACTATE BLDV-MCNC: 2.4 MMOL/L — HIGH (ref 0.5–2)
LACTATE BLDV-MCNC: 3.2 MMOL/L — HIGH (ref 0.5–2)
LACTATE BLDV-MCNC: 3.7 MMOL/L — HIGH (ref 0.5–2)
LACTATE BLDV-MCNC: 7.7 MMOL/L — CRITICAL HIGH (ref 0.5–2)
LACTATE SERPL-SCNC: 2.4 MMOL/L — HIGH (ref 0.5–2)
LACTATE SERPL-SCNC: 2.8 MMOL/L — HIGH (ref 0.5–2)
LEGIONELLA AG UR QL: NEGATIVE — SIGNIFICANT CHANGE UP
LEUKOCYTE ESTERASE UR-ACNC: NEGATIVE — SIGNIFICANT CHANGE UP
LIDOCAIN IGE QN: 19 U/L — SIGNIFICANT CHANGE UP (ref 7–60)
LYMPHOCYTES # BLD AUTO: 0.11 K/UL — LOW (ref 1–3.3)
LYMPHOCYTES # BLD AUTO: 0.45 K/UL — LOW (ref 1–3.3)
LYMPHOCYTES # BLD AUTO: 0.75 K/UL — LOW (ref 1–3.3)
LYMPHOCYTES # BLD AUTO: 0.76 K/UL — LOW (ref 1–3.3)
LYMPHOCYTES # BLD AUTO: 0.85 K/UL — LOW (ref 1–3.3)
LYMPHOCYTES # BLD AUTO: 0.9 % — LOW (ref 13–44)
LYMPHOCYTES # BLD AUTO: 0.98 K/UL — LOW (ref 1–3.3)
LYMPHOCYTES # BLD AUTO: 2 % — LOW (ref 13–44)
LYMPHOCYTES # BLD AUTO: 3.7 % — LOW (ref 13–44)
LYMPHOCYTES # BLD AUTO: 7.1 % — LOW (ref 13–44)
LYMPHOCYTES # BLD AUTO: 9.2 % — LOW (ref 13–44)
LYMPHOCYTES # BLD AUTO: 9.9 % — LOW (ref 13–44)
MACROCYTES BLD QL: SIGNIFICANT CHANGE UP
MAGNESIUM SERPL-MCNC: 1.5 MG/DL — LOW (ref 1.6–2.6)
MAGNESIUM SERPL-MCNC: 1.5 MG/DL — LOW (ref 1.6–2.6)
MAGNESIUM SERPL-MCNC: 1.6 MG/DL — SIGNIFICANT CHANGE UP (ref 1.6–2.6)
MAGNESIUM SERPL-MCNC: 1.6 MG/DL — SIGNIFICANT CHANGE UP (ref 1.6–2.6)
MAGNESIUM SERPL-MCNC: 1.7 MG/DL — SIGNIFICANT CHANGE UP (ref 1.6–2.6)
MAGNESIUM SERPL-MCNC: 1.7 MG/DL — SIGNIFICANT CHANGE UP (ref 1.6–2.6)
MAGNESIUM SERPL-MCNC: 1.8 MG/DL — SIGNIFICANT CHANGE UP (ref 1.6–2.6)
MAGNESIUM SERPL-MCNC: 1.9 MG/DL — SIGNIFICANT CHANGE UP (ref 1.6–2.6)
MAGNESIUM SERPL-MCNC: 2 MG/DL — SIGNIFICANT CHANGE UP (ref 1.6–2.6)
MAGNESIUM SERPL-MCNC: 2.1 MG/DL — SIGNIFICANT CHANGE UP (ref 1.6–2.6)
MAGNESIUM SERPL-MCNC: 2.2 MG/DL — SIGNIFICANT CHANGE UP (ref 1.6–2.6)
MANUAL SMEAR VERIFICATION: SIGNIFICANT CHANGE UP
MANUAL SMEAR VERIFICATION: SIGNIFICANT CHANGE UP
MCHC RBC-ENTMCNC: 29.2 PG — SIGNIFICANT CHANGE UP (ref 27–34)
MCHC RBC-ENTMCNC: 29.3 PG — SIGNIFICANT CHANGE UP (ref 27–34)
MCHC RBC-ENTMCNC: 29.3 PG — SIGNIFICANT CHANGE UP (ref 27–34)
MCHC RBC-ENTMCNC: 29.4 PG — SIGNIFICANT CHANGE UP (ref 27–34)
MCHC RBC-ENTMCNC: 29.4 PG — SIGNIFICANT CHANGE UP (ref 27–34)
MCHC RBC-ENTMCNC: 29.5 PG — SIGNIFICANT CHANGE UP (ref 27–34)
MCHC RBC-ENTMCNC: 29.9 PG — SIGNIFICANT CHANGE UP (ref 27–34)
MCHC RBC-ENTMCNC: 30 PG — SIGNIFICANT CHANGE UP (ref 27–34)
MCHC RBC-ENTMCNC: 30.1 PG — SIGNIFICANT CHANGE UP (ref 27–34)
MCHC RBC-ENTMCNC: 31.9 GM/DL — LOW (ref 32–36)
MCHC RBC-ENTMCNC: 31.9 GM/DL — LOW (ref 32–36)
MCHC RBC-ENTMCNC: 32.3 GM/DL — SIGNIFICANT CHANGE UP (ref 32–36)
MCHC RBC-ENTMCNC: 32.4 GM/DL — SIGNIFICANT CHANGE UP (ref 32–36)
MCHC RBC-ENTMCNC: 32.7 GM/DL — SIGNIFICANT CHANGE UP (ref 32–36)
MCHC RBC-ENTMCNC: 32.7 GM/DL — SIGNIFICANT CHANGE UP (ref 32–36)
MCHC RBC-ENTMCNC: 32.8 GM/DL — SIGNIFICANT CHANGE UP (ref 32–36)
MCHC RBC-ENTMCNC: 33 GM/DL — SIGNIFICANT CHANGE UP (ref 32–36)
MCHC RBC-ENTMCNC: 33.9 GM/DL — SIGNIFICANT CHANGE UP (ref 32–36)
MCV RBC AUTO: 88.5 FL — SIGNIFICANT CHANGE UP (ref 80–100)
MCV RBC AUTO: 88.8 FL — SIGNIFICANT CHANGE UP (ref 80–100)
MCV RBC AUTO: 89.3 FL — SIGNIFICANT CHANGE UP (ref 80–100)
MCV RBC AUTO: 89.9 FL — SIGNIFICANT CHANGE UP (ref 80–100)
MCV RBC AUTO: 91.3 FL — SIGNIFICANT CHANGE UP (ref 80–100)
MCV RBC AUTO: 91.7 FL — SIGNIFICANT CHANGE UP (ref 80–100)
MCV RBC AUTO: 91.9 FL — SIGNIFICANT CHANGE UP (ref 80–100)
MCV RBC AUTO: 92.2 FL — SIGNIFICANT CHANGE UP (ref 80–100)
MCV RBC AUTO: 92.2 FL — SIGNIFICANT CHANGE UP (ref 80–100)
METAMYELOCYTES # FLD: 1 % — HIGH (ref 0–0)
METHADONE UR-MCNC: NEGATIVE — SIGNIFICANT CHANGE UP
MONOCYTES # BLD AUTO: 0 K/UL — SIGNIFICANT CHANGE UP (ref 0–0.9)
MONOCYTES # BLD AUTO: 0.3 K/UL — SIGNIFICANT CHANGE UP (ref 0–0.9)
MONOCYTES # BLD AUTO: 0.31 K/UL — SIGNIFICANT CHANGE UP (ref 0–0.9)
MONOCYTES # BLD AUTO: 0.33 K/UL — SIGNIFICANT CHANGE UP (ref 0–0.9)
MONOCYTES # BLD AUTO: 0.36 K/UL — SIGNIFICANT CHANGE UP (ref 0–0.9)
MONOCYTES # BLD AUTO: 0.36 K/UL — SIGNIFICANT CHANGE UP (ref 0–0.9)
MONOCYTES NFR BLD AUTO: 0 % — LOW (ref 2–14)
MONOCYTES NFR BLD AUTO: 1.8 % — LOW (ref 2–14)
MONOCYTES NFR BLD AUTO: 2.6 % — SIGNIFICANT CHANGE UP (ref 2–14)
MONOCYTES NFR BLD AUTO: 2.7 % — SIGNIFICANT CHANGE UP (ref 2–14)
MONOCYTES NFR BLD AUTO: 3.3 % — SIGNIFICANT CHANGE UP (ref 2–14)
MONOCYTES NFR BLD AUTO: 4 % — SIGNIFICANT CHANGE UP (ref 2–14)
MRSA PCR RESULT.: SIGNIFICANT CHANGE UP
NEUTROPHILS # BLD AUTO: 11.65 K/UL — HIGH (ref 1.8–7.4)
NEUTROPHILS # BLD AUTO: 12.19 K/UL — HIGH (ref 1.8–7.4)
NEUTROPHILS # BLD AUTO: 19.1 K/UL — HIGH (ref 1.8–7.4)
NEUTROPHILS # BLD AUTO: 21.87 K/UL — HIGH (ref 1.8–7.4)
NEUTROPHILS # BLD AUTO: 6.39 K/UL — SIGNIFICANT CHANGE UP (ref 1.8–7.4)
NEUTROPHILS # BLD AUTO: 7.88 K/UL — HIGH (ref 1.8–7.4)
NEUTROPHILS NFR BLD AUTO: 83.3 % — HIGH (ref 43–77)
NEUTROPHILS NFR BLD AUTO: 85.4 % — HIGH (ref 43–77)
NEUTROPHILS NFR BLD AUTO: 87.6 % — HIGH (ref 43–77)
NEUTROPHILS NFR BLD AUTO: 88 % — HIGH (ref 43–77)
NEUTROPHILS NFR BLD AUTO: 88.5 % — HIGH (ref 43–77)
NEUTROPHILS NFR BLD AUTO: 93 % — HIGH (ref 43–77)
NEUTS BAND # BLD: 8.8 % — HIGH (ref 0–8)
NEUTS BAND # BLD: 9 % — HIGH (ref 0–8)
NITRITE UR-MCNC: NEGATIVE — SIGNIFICANT CHANGE UP
NRBC # BLD: 0 /100 WBCS — SIGNIFICANT CHANGE UP (ref 0–0)
NRBC # BLD: 1 /100 WBCS — HIGH (ref 0–0)
OPIATES UR-MCNC: NEGATIVE — SIGNIFICANT CHANGE UP
OSMOLALITY SERPL: 270 MOSMOL/KG — LOW (ref 280–301)
OSMOLALITY UR: 405 MOS/KG — SIGNIFICANT CHANGE UP (ref 300–900)
OVALOCYTES BLD QL SMEAR: SLIGHT — SIGNIFICANT CHANGE UP
OXYCODONE UR-MCNC: NEGATIVE — SIGNIFICANT CHANGE UP
PCO2 BLDV: 28 MMHG — LOW (ref 42–55)
PCO2 BLDV: 40 MMHG — LOW (ref 42–55)
PCO2 BLDV: 41 MMHG — LOW (ref 42–55)
PCO2 BLDV: 43 MMHG — SIGNIFICANT CHANGE UP (ref 42–55)
PCO2 BLDV: 44 MMHG — SIGNIFICANT CHANGE UP (ref 42–55)
PCO2 BLDV: 49 MMHG — SIGNIFICANT CHANGE UP (ref 42–55)
PCP SPEC-MCNC: SIGNIFICANT CHANGE UP
PCP UR-MCNC: NEGATIVE — SIGNIFICANT CHANGE UP
PETH 16:0/18:1: NEGATIVE NG/ML — SIGNIFICANT CHANGE UP
PETH 16:0/18:2: NEGATIVE NG/ML — SIGNIFICANT CHANGE UP
PETH COMMENTS: SIGNIFICANT CHANGE UP
PH BLDV: 7.28 — LOW (ref 7.32–7.43)
PH BLDV: 7.29 — LOW (ref 7.32–7.43)
PH BLDV: 7.3 — LOW (ref 7.32–7.43)
PH BLDV: 7.33 — SIGNIFICANT CHANGE UP (ref 7.32–7.43)
PH UR: 5.5 — SIGNIFICANT CHANGE UP (ref 5–8)
PHOSPHATE SERPL-MCNC: 1.9 MG/DL — LOW (ref 2.5–4.5)
PHOSPHATE SERPL-MCNC: 2 MG/DL — LOW (ref 2.5–4.5)
PHOSPHATE SERPL-MCNC: 2 MG/DL — LOW (ref 2.5–4.5)
PHOSPHATE SERPL-MCNC: 2.2 MG/DL — LOW (ref 2.5–4.5)
PHOSPHATE SERPL-MCNC: 2.4 MG/DL — LOW (ref 2.5–4.5)
PHOSPHATE SERPL-MCNC: 2.5 MG/DL — SIGNIFICANT CHANGE UP (ref 2.5–4.5)
PHOSPHATE SERPL-MCNC: 3 MG/DL — SIGNIFICANT CHANGE UP (ref 2.5–4.5)
PHOSPHATE SERPL-MCNC: 3.1 MG/DL — SIGNIFICANT CHANGE UP (ref 2.5–4.5)
PHOSPHATE SERPL-MCNC: 3.6 MG/DL — SIGNIFICANT CHANGE UP (ref 2.5–4.5)
PHOSPHATE SERPL-MCNC: 3.7 MG/DL — SIGNIFICANT CHANGE UP (ref 2.5–4.5)
PHOSPHATE SERPL-MCNC: 4.2 MG/DL — SIGNIFICANT CHANGE UP (ref 2.5–4.5)
PLAT MORPH BLD: ABNORMAL
PLAT MORPH BLD: NORMAL — SIGNIFICANT CHANGE UP
PLATELET # BLD AUTO: 255 K/UL — SIGNIFICANT CHANGE UP (ref 150–400)
PLATELET # BLD AUTO: 265 K/UL — SIGNIFICANT CHANGE UP (ref 150–400)
PLATELET # BLD AUTO: 266 K/UL — SIGNIFICANT CHANGE UP (ref 150–400)
PLATELET # BLD AUTO: 302 K/UL — SIGNIFICANT CHANGE UP (ref 150–400)
PLATELET # BLD AUTO: 313 K/UL — SIGNIFICANT CHANGE UP (ref 150–400)
PLATELET # BLD AUTO: 327 K/UL — SIGNIFICANT CHANGE UP (ref 150–400)
PLATELET # BLD AUTO: 335 K/UL — SIGNIFICANT CHANGE UP (ref 150–400)
PLATELET # BLD AUTO: 337 K/UL — SIGNIFICANT CHANGE UP (ref 150–400)
PLATELET # BLD AUTO: 351 K/UL — SIGNIFICANT CHANGE UP (ref 150–400)
PO2 BLDV: 132 MMHG — HIGH (ref 25–45)
PO2 BLDV: 18 MMHG — LOW (ref 25–45)
PO2 BLDV: 28 MMHG — SIGNIFICANT CHANGE UP (ref 25–45)
PO2 BLDV: 28 MMHG — SIGNIFICANT CHANGE UP (ref 25–45)
PO2 BLDV: 35 MMHG — SIGNIFICANT CHANGE UP (ref 25–45)
PO2 BLDV: 46 MMHG — HIGH (ref 25–45)
POTASSIUM BLDV-SCNC: 2.9 MMOL/L — CRITICAL LOW (ref 3.5–5.1)
POTASSIUM BLDV-SCNC: 3.6 MMOL/L — SIGNIFICANT CHANGE UP (ref 3.5–5.1)
POTASSIUM BLDV-SCNC: 4.3 MMOL/L — SIGNIFICANT CHANGE UP (ref 3.5–5.1)
POTASSIUM BLDV-SCNC: 4.8 MMOL/L — SIGNIFICANT CHANGE UP (ref 3.5–5.1)
POTASSIUM BLDV-SCNC: 4.9 MMOL/L — SIGNIFICANT CHANGE UP (ref 3.5–5.1)
POTASSIUM SERPL-MCNC: 2.9 MMOL/L — CRITICAL LOW (ref 3.5–5.3)
POTASSIUM SERPL-MCNC: 3.1 MMOL/L — LOW (ref 3.5–5.3)
POTASSIUM SERPL-MCNC: 3.4 MMOL/L — LOW (ref 3.5–5.3)
POTASSIUM SERPL-MCNC: 3.5 MMOL/L — SIGNIFICANT CHANGE UP (ref 3.5–5.3)
POTASSIUM SERPL-MCNC: 3.6 MMOL/L — SIGNIFICANT CHANGE UP (ref 3.5–5.3)
POTASSIUM SERPL-MCNC: 3.7 MMOL/L — SIGNIFICANT CHANGE UP (ref 3.5–5.3)
POTASSIUM SERPL-MCNC: 3.8 MMOL/L — SIGNIFICANT CHANGE UP (ref 3.5–5.3)
POTASSIUM SERPL-MCNC: 4.1 MMOL/L — SIGNIFICANT CHANGE UP (ref 3.5–5.3)
POTASSIUM SERPL-MCNC: 4.3 MMOL/L — SIGNIFICANT CHANGE UP (ref 3.5–5.3)
POTASSIUM SERPL-MCNC: 4.5 MMOL/L — SIGNIFICANT CHANGE UP (ref 3.5–5.3)
POTASSIUM SERPL-MCNC: 4.7 MMOL/L — SIGNIFICANT CHANGE UP (ref 3.5–5.3)
POTASSIUM SERPL-MCNC: 4.8 MMOL/L — SIGNIFICANT CHANGE UP (ref 3.5–5.3)
POTASSIUM SERPL-MCNC: 5.1 MMOL/L — SIGNIFICANT CHANGE UP (ref 3.5–5.3)
POTASSIUM SERPL-SCNC: 2.9 MMOL/L — CRITICAL LOW (ref 3.5–5.3)
POTASSIUM SERPL-SCNC: 3.1 MMOL/L — LOW (ref 3.5–5.3)
POTASSIUM SERPL-SCNC: 3.4 MMOL/L — LOW (ref 3.5–5.3)
POTASSIUM SERPL-SCNC: 3.5 MMOL/L — SIGNIFICANT CHANGE UP (ref 3.5–5.3)
POTASSIUM SERPL-SCNC: 3.6 MMOL/L — SIGNIFICANT CHANGE UP (ref 3.5–5.3)
POTASSIUM SERPL-SCNC: 3.7 MMOL/L — SIGNIFICANT CHANGE UP (ref 3.5–5.3)
POTASSIUM SERPL-SCNC: 3.8 MMOL/L — SIGNIFICANT CHANGE UP (ref 3.5–5.3)
POTASSIUM SERPL-SCNC: 4.1 MMOL/L — SIGNIFICANT CHANGE UP (ref 3.5–5.3)
POTASSIUM SERPL-SCNC: 4.3 MMOL/L — SIGNIFICANT CHANGE UP (ref 3.5–5.3)
POTASSIUM SERPL-SCNC: 4.5 MMOL/L — SIGNIFICANT CHANGE UP (ref 3.5–5.3)
POTASSIUM SERPL-SCNC: 4.7 MMOL/L — SIGNIFICANT CHANGE UP (ref 3.5–5.3)
POTASSIUM SERPL-SCNC: 4.8 MMOL/L — SIGNIFICANT CHANGE UP (ref 3.5–5.3)
POTASSIUM SERPL-SCNC: 5.1 MMOL/L — SIGNIFICANT CHANGE UP (ref 3.5–5.3)
PROT SERPL-MCNC: 5.8 G/DL — LOW (ref 6–8.3)
PROT SERPL-MCNC: 5.9 G/DL — LOW (ref 6–8.3)
PROT SERPL-MCNC: 6.1 G/DL — SIGNIFICANT CHANGE UP (ref 6–8.3)
PROT SERPL-MCNC: 6.1 G/DL — SIGNIFICANT CHANGE UP (ref 6–8.3)
PROT SERPL-MCNC: 6.2 G/DL — SIGNIFICANT CHANGE UP (ref 6–8.3)
PROT SERPL-MCNC: 6.3 G/DL — SIGNIFICANT CHANGE UP (ref 6–8.3)
PROT SERPL-MCNC: 6.6 G/DL — SIGNIFICANT CHANGE UP (ref 6–8.3)
PROT SERPL-MCNC: 6.6 G/DL — SIGNIFICANT CHANGE UP (ref 6–8.3)
PROT UR-MCNC: SIGNIFICANT CHANGE UP MG/DL
PROTHROM AB SERPL-ACNC: 14 SEC — HIGH (ref 9.5–13)
RBC # BLD: 2.93 M/UL — LOW (ref 4.2–5.8)
RBC # BLD: 2.96 M/UL — LOW (ref 4.2–5.8)
RBC # BLD: 2.96 M/UL — LOW (ref 4.2–5.8)
RBC # BLD: 3.01 M/UL — LOW (ref 4.2–5.8)
RBC # BLD: 3.08 M/UL — LOW (ref 4.2–5.8)
RBC # BLD: 3.22 M/UL — LOW (ref 4.2–5.8)
RBC # BLD: 3.28 M/UL — LOW (ref 4.2–5.8)
RBC # BLD: 3.3 M/UL — LOW (ref 4.2–5.8)
RBC # BLD: 3.48 M/UL — LOW (ref 4.2–5.8)
RBC # FLD: 14.5 % — SIGNIFICANT CHANGE UP (ref 10.3–14.5)
RBC # FLD: 14.6 % — HIGH (ref 10.3–14.5)
RBC # FLD: 14.7 % — HIGH (ref 10.3–14.5)
RBC # FLD: 14.8 % — HIGH (ref 10.3–14.5)
RBC # FLD: 15.1 % — HIGH (ref 10.3–14.5)
RBC # FLD: 16 % — HIGH (ref 10.3–14.5)
RBC BLD AUTO: ABNORMAL
RBC BLD AUTO: SIGNIFICANT CHANGE UP
RBC CASTS # UR COMP ASSIST: 1 /HPF — SIGNIFICANT CHANGE UP (ref 0–4)
RSV RNA NPH QL NAA+NON-PROBE: SIGNIFICANT CHANGE UP
S AUREUS DNA NOSE QL NAA+PROBE: DETECTED
S PNEUM AG UR QL: NEGATIVE — SIGNIFICANT CHANGE UP
SAO2 % BLDV: 100 % — HIGH (ref 67–88)
SAO2 % BLDV: 20.1 % — LOW (ref 67–88)
SAO2 % BLDV: 28.1 % — LOW (ref 67–88)
SAO2 % BLDV: 37 % — LOW (ref 67–88)
SAO2 % BLDV: 47.1 % — LOW (ref 67–88)
SAO2 % BLDV: 71.5 % — SIGNIFICANT CHANGE UP (ref 67–88)
SARS-COV-2 RNA SPEC QL NAA+PROBE: SIGNIFICANT CHANGE UP
SODIUM SERPL-SCNC: 130 MMOL/L — LOW (ref 135–145)
SODIUM SERPL-SCNC: 131 MMOL/L — LOW (ref 135–145)
SODIUM SERPL-SCNC: 132 MMOL/L — LOW (ref 135–145)
SODIUM SERPL-SCNC: 133 MMOL/L — LOW (ref 135–145)
SODIUM SERPL-SCNC: 134 MMOL/L — LOW (ref 135–145)
SODIUM SERPL-SCNC: 136 MMOL/L — SIGNIFICANT CHANGE UP (ref 135–145)
SODIUM SERPL-SCNC: 137 MMOL/L — SIGNIFICANT CHANGE UP (ref 135–145)
SODIUM SERPL-SCNC: 137 MMOL/L — SIGNIFICANT CHANGE UP (ref 135–145)
SODIUM SERPL-SCNC: 138 MMOL/L — SIGNIFICANT CHANGE UP (ref 135–145)
SODIUM SERPL-SCNC: 138 MMOL/L — SIGNIFICANT CHANGE UP (ref 135–145)
SODIUM SERPL-SCNC: 140 MMOL/L — SIGNIFICANT CHANGE UP (ref 135–145)
SODIUM SERPL-SCNC: 140 MMOL/L — SIGNIFICANT CHANGE UP (ref 135–145)
SODIUM UR-SCNC: 112 MMOL/L — SIGNIFICANT CHANGE UP
SP GR SPEC: >1.03 — HIGH (ref 1–1.03)
SPECIMEN SOURCE: SIGNIFICANT CHANGE UP
SQUAMOUS # UR AUTO: 8 /HPF — HIGH (ref 0–5)
T PALLIDUM AB TITR SER: NEGATIVE — SIGNIFICANT CHANGE UP
THC UR QL: NEGATIVE — SIGNIFICANT CHANGE UP
TROPONIN T, HIGH SENSITIVITY RESULT: 42 NG/L — SIGNIFICANT CHANGE UP (ref 0–51)
URATE SERPL-MCNC: 1.8 MG/DL — LOW (ref 3.4–8.8)
UROBILINOGEN FLD QL: 1 MG/DL — SIGNIFICANT CHANGE UP (ref 0.2–1)
VANCOMYCIN TROUGH SERPL-MCNC: 5.1 UG/ML — LOW (ref 10–20)
VIT B1 SERPL-MCNC: 83.1 NMOL/L — SIGNIFICANT CHANGE UP (ref 66.5–200)
VIT B12 SERPL-MCNC: 1826 PG/ML — HIGH (ref 232–1245)
WBC # BLD: 12.08 K/UL — HIGH (ref 3.8–10.5)
WBC # BLD: 13.77 K/UL — HIGH (ref 3.8–10.5)
WBC # BLD: 20.52 K/UL — HIGH (ref 3.8–10.5)
WBC # BLD: 22.55 K/UL — HIGH (ref 3.8–10.5)
WBC # BLD: 25.06 K/UL — HIGH (ref 3.8–10.5)
WBC # BLD: 7.67 K/UL — SIGNIFICANT CHANGE UP (ref 3.8–10.5)
WBC # BLD: 8.33 K/UL — SIGNIFICANT CHANGE UP (ref 3.8–10.5)
WBC # BLD: 9.22 K/UL — SIGNIFICANT CHANGE UP (ref 3.8–10.5)
WBC # BLD: 9.46 K/UL — SIGNIFICANT CHANGE UP (ref 3.8–10.5)
WBC # FLD AUTO: 12.08 K/UL — HIGH (ref 3.8–10.5)
WBC # FLD AUTO: 13.77 K/UL — HIGH (ref 3.8–10.5)
WBC # FLD AUTO: 20.52 K/UL — HIGH (ref 3.8–10.5)
WBC # FLD AUTO: 22.55 K/UL — HIGH (ref 3.8–10.5)
WBC # FLD AUTO: 25.06 K/UL — HIGH (ref 3.8–10.5)
WBC # FLD AUTO: 7.67 K/UL — SIGNIFICANT CHANGE UP (ref 3.8–10.5)
WBC # FLD AUTO: 8.33 K/UL — SIGNIFICANT CHANGE UP (ref 3.8–10.5)
WBC # FLD AUTO: 9.22 K/UL — SIGNIFICANT CHANGE UP (ref 3.8–10.5)
WBC # FLD AUTO: 9.46 K/UL — SIGNIFICANT CHANGE UP (ref 3.8–10.5)
WBC UR QL: 6 /HPF — HIGH (ref 0–5)

## 2024-01-01 PROCEDURE — 94660 CPAP INITIATION&MGMT: CPT

## 2024-01-01 PROCEDURE — 87641 MR-STAPH DNA AMP PROBE: CPT

## 2024-01-01 PROCEDURE — 85610 PROTHROMBIN TIME: CPT

## 2024-01-01 PROCEDURE — 0241U: CPT

## 2024-01-01 PROCEDURE — 83880 ASSAY OF NATRIURETIC PEPTIDE: CPT

## 2024-01-01 PROCEDURE — 87640 STAPH A DNA AMP PROBE: CPT

## 2024-01-01 PROCEDURE — 82330 ASSAY OF CALCIUM: CPT

## 2024-01-01 PROCEDURE — 93005 ELECTROCARDIOGRAM TRACING: CPT

## 2024-01-01 PROCEDURE — 83935 ASSAY OF URINE OSMOLALITY: CPT

## 2024-01-01 PROCEDURE — 99232 SBSQ HOSP IP/OBS MODERATE 35: CPT | Mod: GC

## 2024-01-01 PROCEDURE — 87449 NOS EACH ORGANISM AG IA: CPT

## 2024-01-01 PROCEDURE — 87040 BLOOD CULTURE FOR BACTERIA: CPT

## 2024-01-01 PROCEDURE — 80202 ASSAY OF VANCOMYCIN: CPT

## 2024-01-01 PROCEDURE — 87389 HIV-1 AG W/HIV-1&-2 AB AG IA: CPT

## 2024-01-01 PROCEDURE — 87899 AGENT NOS ASSAY W/OPTIC: CPT

## 2024-01-01 PROCEDURE — 84550 ASSAY OF BLOOD/URIC ACID: CPT

## 2024-01-01 PROCEDURE — 82550 ASSAY OF CK (CPK): CPT

## 2024-01-01 PROCEDURE — 93971 EXTREMITY STUDY: CPT

## 2024-01-01 PROCEDURE — 80053 COMPREHEN METABOLIC PANEL: CPT

## 2024-01-01 PROCEDURE — 99291 CRITICAL CARE FIRST HOUR: CPT

## 2024-01-01 PROCEDURE — 84207 ASSAY OF VITAMIN B-6: CPT

## 2024-01-01 PROCEDURE — 84484 ASSAY OF TROPONIN QUANT: CPT

## 2024-01-01 PROCEDURE — 80307 DRUG TEST PRSMV CHEM ANLYZR: CPT

## 2024-01-01 PROCEDURE — 84425 ASSAY OF VITAMIN B-1: CPT

## 2024-01-01 PROCEDURE — 83605 ASSAY OF LACTIC ACID: CPT

## 2024-01-01 PROCEDURE — 71045 X-RAY EXAM CHEST 1 VIEW: CPT

## 2024-01-01 PROCEDURE — 84132 ASSAY OF SERUM POTASSIUM: CPT

## 2024-01-01 PROCEDURE — 83930 ASSAY OF BLOOD OSMOLALITY: CPT

## 2024-01-01 PROCEDURE — 70450 CT HEAD/BRAIN W/O DYE: CPT | Mod: MC

## 2024-01-01 PROCEDURE — 92526 ORAL FUNCTION THERAPY: CPT

## 2024-01-01 PROCEDURE — 82140 ASSAY OF AMMONIA: CPT

## 2024-01-01 PROCEDURE — 82962 GLUCOSE BLOOD TEST: CPT

## 2024-01-01 PROCEDURE — 85018 HEMOGLOBIN: CPT

## 2024-01-01 PROCEDURE — 99233 SBSQ HOSP IP/OBS HIGH 50: CPT

## 2024-01-01 PROCEDURE — 85025 COMPLETE CBC W/AUTO DIFF WBC: CPT

## 2024-01-01 PROCEDURE — 74177 CT ABD & PELVIS W/CONTRAST: CPT | Mod: MC

## 2024-01-01 PROCEDURE — 71045 X-RAY EXAM CHEST 1 VIEW: CPT | Mod: 26

## 2024-01-01 PROCEDURE — 93306 TTE W/DOPPLER COMPLETE: CPT

## 2024-01-01 PROCEDURE — 82947 ASSAY GLUCOSE BLOOD QUANT: CPT

## 2024-01-01 PROCEDURE — G0480: CPT

## 2024-01-01 PROCEDURE — 99233 SBSQ HOSP IP/OBS HIGH 50: CPT | Mod: GC

## 2024-01-01 PROCEDURE — 71275 CT ANGIOGRAPHY CHEST: CPT | Mod: 26,MC

## 2024-01-01 PROCEDURE — 87637 SARSCOV2&INF A&B&RSV AMP PRB: CPT

## 2024-01-01 PROCEDURE — 82607 VITAMIN B-12: CPT

## 2024-01-01 PROCEDURE — 70491 CT SOFT TISSUE NECK W/DYE: CPT | Mod: 26

## 2024-01-01 PROCEDURE — 84295 ASSAY OF SERUM SODIUM: CPT

## 2024-01-01 PROCEDURE — 70450 CT HEAD/BRAIN W/O DYE: CPT | Mod: 26

## 2024-01-01 PROCEDURE — 85730 THROMBOPLASTIN TIME PARTIAL: CPT

## 2024-01-01 PROCEDURE — 84100 ASSAY OF PHOSPHORUS: CPT

## 2024-01-01 PROCEDURE — 93971 EXTREMITY STUDY: CPT | Mod: 26,RT

## 2024-01-01 PROCEDURE — 96374 THER/PROPH/DIAG INJ IV PUSH: CPT

## 2024-01-01 PROCEDURE — 84300 ASSAY OF URINE SODIUM: CPT

## 2024-01-01 PROCEDURE — 86780 TREPONEMA PALLIDUM: CPT

## 2024-01-01 PROCEDURE — 74177 CT ABD & PELVIS W/CONTRAST: CPT | Mod: 26,MC

## 2024-01-01 PROCEDURE — 70491 CT SOFT TISSUE NECK W/DYE: CPT | Mod: MC

## 2024-01-01 PROCEDURE — 83690 ASSAY OF LIPASE: CPT

## 2024-01-01 PROCEDURE — 99223 1ST HOSP IP/OBS HIGH 75: CPT

## 2024-01-01 PROCEDURE — 85027 COMPLETE CBC AUTOMATED: CPT

## 2024-01-01 PROCEDURE — 85014 HEMATOCRIT: CPT

## 2024-01-01 PROCEDURE — 71275 CT ANGIOGRAPHY CHEST: CPT | Mod: MC

## 2024-01-01 PROCEDURE — 87086 URINE CULTURE/COLONY COUNT: CPT

## 2024-01-01 PROCEDURE — 93010 ELECTROCARDIOGRAM REPORT: CPT

## 2024-01-01 PROCEDURE — 93306 TTE W/DOPPLER COMPLETE: CPT | Mod: 26

## 2024-01-01 PROCEDURE — 81001 URINALYSIS AUTO W/SCOPE: CPT

## 2024-01-01 PROCEDURE — 92610 EVALUATE SWALLOWING FUNCTION: CPT

## 2024-01-01 PROCEDURE — 82435 ASSAY OF BLOOD CHLORIDE: CPT

## 2024-01-01 PROCEDURE — 82803 BLOOD GASES ANY COMBINATION: CPT

## 2024-01-01 PROCEDURE — 99285 EMERGENCY DEPT VISIT HI MDM: CPT

## 2024-01-01 PROCEDURE — 80048 BASIC METABOLIC PNL TOTAL CA: CPT

## 2024-01-01 PROCEDURE — 74177 CT ABD & PELVIS W/CONTRAST: CPT | Mod: 26

## 2024-01-01 PROCEDURE — 82746 ASSAY OF FOLIC ACID SERUM: CPT

## 2024-01-01 PROCEDURE — 83735 ASSAY OF MAGNESIUM: CPT

## 2024-01-01 PROCEDURE — 99223 1ST HOSP IP/OBS HIGH 75: CPT | Mod: GC

## 2024-01-01 PROCEDURE — 36415 COLL VENOUS BLD VENIPUNCTURE: CPT

## 2024-01-01 RX ORDER — MORPHINE SULFATE 100 MG/1
1 TABLET, EXTENDED RELEASE ORAL
Refills: 0 | Status: DISCONTINUED | OUTPATIENT
Start: 2024-01-01 | End: 2024-01-01

## 2024-01-01 RX ORDER — POTASSIUM CHLORIDE 600 MG/1
10 TABLET, FILM COATED, EXTENDED RELEASE ORAL
Refills: 0 | Status: COMPLETED | OUTPATIENT
Start: 2024-01-01 | End: 2024-01-01

## 2024-01-01 RX ORDER — VANCOMYCIN HYDROCHLORIDE 50 MG/ML
500 KIT ORAL EVERY 12 HOURS
Refills: 0 | Status: DISCONTINUED | OUTPATIENT
Start: 2024-01-01 | End: 2024-01-01

## 2024-01-01 RX ORDER — FERROUS FUMARATE 63 MG
1 TABLET ORAL
Refills: 0 | DISCHARGE

## 2024-01-01 RX ORDER — DEXTROSE MONOHYDRATE AND SODIUM CHLORIDE 5; .3 G/100ML; G/100ML
1000 INJECTION, SOLUTION INTRAVENOUS
Refills: 0 | Status: DISCONTINUED | OUTPATIENT
Start: 2024-01-01 | End: 2024-01-01

## 2024-01-01 RX ORDER — THIAMINE HCL 100 MG
500 TABLET ORAL EVERY 8 HOURS
Refills: 0 | Status: DISCONTINUED | OUTPATIENT
Start: 2024-01-01 | End: 2024-01-01

## 2024-01-01 RX ORDER — VANCOMYCIN HYDROCHLORIDE 50 MG/ML
750 KIT ORAL ONCE
Refills: 0 | Status: COMPLETED | OUTPATIENT
Start: 2024-01-01 | End: 2024-01-01

## 2024-01-01 RX ORDER — VANCOMYCIN HYDROCHLORIDE 50 MG/ML
750 KIT ORAL EVERY 12 HOURS
Refills: 0 | Status: COMPLETED | OUTPATIENT
Start: 2024-01-01 | End: 2024-01-01

## 2024-01-01 RX ORDER — SODIUM CHLORIDE 0.9 % (FLUSH) 0.9 %
2 SYRINGE (ML) INJECTION
Refills: 0 | DISCHARGE

## 2024-01-01 RX ORDER — LORAZEPAM 0.5 MG
1 TABLET ORAL
Refills: 0 | Status: DISCONTINUED | OUTPATIENT
Start: 2024-01-01 | End: 2024-01-01

## 2024-01-01 RX ORDER — TOLVAPTAN 30 MG/1
15 TABLET ORAL
Refills: 0 | Status: DISCONTINUED | OUTPATIENT
Start: 2024-01-01 | End: 2024-01-01

## 2024-01-01 RX ORDER — UREA 40 G
1 VIAL (EA) INTRAVENOUS
Refills: 0 | DISCHARGE

## 2024-01-01 RX ORDER — FOLIC ACID
1 POWDER (GRAM) MISCELLANEOUS ONCE
Refills: 0 | Status: COMPLETED | OUTPATIENT
Start: 2024-01-01 | End: 2024-01-01

## 2024-01-01 RX ORDER — PIPERACILLIN SODIUM AND TAZOBACTAM SODIUM 3; .375 G/15ML; G/15ML
3.38 INJECTION, POWDER, LYOPHILIZED, FOR SOLUTION INTRAVENOUS ONCE
Refills: 0 | Status: COMPLETED | OUTPATIENT
Start: 2024-01-01 | End: 2024-01-01

## 2024-01-01 RX ORDER — SENNOSIDES 8.6 MG
1 TABLET ORAL
Refills: 0 | DISCHARGE

## 2024-01-01 RX ORDER — POTASSIUM PHOSPHATE, MONOBASIC POTASSIUM PHOSPHATE, DIBASIC INJECTION, 236; 224 MG/ML; MG/ML
30 SOLUTION, CONCENTRATE INTRAVENOUS ONCE
Refills: 0 | Status: COMPLETED | OUTPATIENT
Start: 2024-01-01 | End: 2024-01-01

## 2024-01-01 RX ORDER — THIAMINE HCL 100 MG
500 TABLET ORAL ONCE
Refills: 0 | Status: COMPLETED | OUTPATIENT
Start: 2024-01-01 | End: 2024-01-01

## 2024-01-01 RX ORDER — POTASSIUM CHLORIDE 600 MG/1
40 TABLET, FILM COATED, EXTENDED RELEASE ORAL ONCE
Refills: 0 | Status: DISCONTINUED | OUTPATIENT
Start: 2024-01-01 | End: 2024-01-01

## 2024-01-01 RX ORDER — MORPHINE SULFATE 100 MG/1
2 TABLET, EXTENDED RELEASE ORAL
Refills: 0 | Status: DISCONTINUED | OUTPATIENT
Start: 2024-01-01 | End: 2024-01-01

## 2024-01-01 RX ORDER — DOCUSATE SODIUM 100 MG/1
2 CAPSULE, LIQUID FILLED ORAL
Refills: 0 | DISCHARGE

## 2024-01-01 RX ORDER — LEVOFLOXACIN 25 MG/ML
1 INJECTION INTRAVENOUS
Refills: 0 | DISCHARGE

## 2024-01-01 RX ORDER — VANCOMYCIN HYDROCHLORIDE 50 MG/ML
500 KIT ORAL EVERY 12 HOURS
Refills: 0 | Status: COMPLETED | OUTPATIENT
Start: 2024-01-01 | End: 2024-01-01

## 2024-01-01 RX ORDER — GLYCOPYRROLATE 0.2 MG/ML
0.4 INJECTION, SOLUTION INTRAMUSCULAR; INTRAVENOUS EVERY 6 HOURS
Refills: 0 | Status: DISCONTINUED | OUTPATIENT
Start: 2024-01-01 | End: 2024-01-01

## 2024-01-01 RX ORDER — SOD PHOS DI, MONO/K PHOS MONO 250 MG
1 TABLET ORAL ONCE
Refills: 0 | Status: COMPLETED | OUTPATIENT
Start: 2024-01-01 | End: 2024-01-01

## 2024-01-01 RX ORDER — DEXTROSE MONOHYDRATE AND SODIUM CHLORIDE 5; .3 G/100ML; G/100ML
1000 INJECTION, SOLUTION INTRAVENOUS ONCE
Refills: 0 | Status: COMPLETED | OUTPATIENT
Start: 2024-01-01 | End: 2024-01-01

## 2024-01-01 RX ORDER — MAGNESIUM SULFATE 100 %
1 POWDER (GRAM) MISCELLANEOUS ONCE
Refills: 0 | Status: COMPLETED | OUTPATIENT
Start: 2024-01-01 | End: 2024-01-01

## 2024-01-01 RX ORDER — MAGNESIUM SULFATE 100 %
2 POWDER (GRAM) MISCELLANEOUS ONCE
Refills: 0 | Status: DISCONTINUED | OUTPATIENT
Start: 2024-01-01 | End: 2024-01-01

## 2024-01-01 RX ORDER — POTASSIUM CHLORIDE 600 MG/1
40 TABLET, FILM COATED, EXTENDED RELEASE ORAL EVERY 6 HOURS
Refills: 0 | Status: DISCONTINUED | OUTPATIENT
Start: 2024-01-01 | End: 2024-01-01

## 2024-01-01 RX ORDER — BENZOCAINE/MENTHOL 6 MG-10 MG
1 LOZENGE MUCOUS MEMBRANE ONCE
Refills: 0 | Status: COMPLETED | OUTPATIENT
Start: 2024-01-01 | End: 2024-01-01

## 2024-01-01 RX ORDER — LORAZEPAM 0.5 MG
0.5 TABLET ORAL EVERY 6 HOURS
Refills: 0 | Status: DISCONTINUED | OUTPATIENT
Start: 2024-01-01 | End: 2024-01-01

## 2024-01-01 RX ORDER — THIAMINE HCL 100 MG
100 TABLET ORAL DAILY
Refills: 0 | Status: DISCONTINUED | OUTPATIENT
Start: 2024-01-01 | End: 2024-01-01

## 2024-01-01 RX ORDER — TOLVAPTAN 30 MG/1
1 TABLET ORAL
Refills: 0 | DISCHARGE

## 2024-01-01 RX ORDER — VANCOMYCIN HYDROCHLORIDE 50 MG/ML
500 KIT ORAL ONCE
Refills: 0 | Status: COMPLETED | OUTPATIENT
Start: 2024-01-01 | End: 2024-01-01

## 2024-01-01 RX ORDER — ACETAMINOPHEN 325 MG
650 TABLET ORAL EVERY 6 HOURS
Refills: 0 | Status: DISCONTINUED | OUTPATIENT
Start: 2024-01-01 | End: 2024-01-01

## 2024-01-01 RX ORDER — VANCOMYCIN HYDROCHLORIDE 50 MG/ML
750 KIT ORAL EVERY 12 HOURS
Refills: 0 | Status: DISCONTINUED | OUTPATIENT
Start: 2024-01-01 | End: 2024-01-01

## 2024-01-01 RX ORDER — VANCOMYCIN HYDROCHLORIDE 50 MG/ML
KIT ORAL
Refills: 0 | Status: COMPLETED | OUTPATIENT
Start: 2024-01-01 | End: 2024-01-01

## 2024-01-01 RX ORDER — BIOTIN/FOLIC AC/VIT BCOMP,C/ZN 3MG-0.8MG
15 TABLET ORAL DAILY
Refills: 0 | Status: DISCONTINUED | OUTPATIENT
Start: 2024-01-01 | End: 2024-01-01

## 2024-01-01 RX ORDER — SODIUM CHLORIDE 0.9 % (FLUSH) 0.9 %
1900 SYRINGE (ML) INJECTION ONCE
Refills: 0 | Status: COMPLETED | OUTPATIENT
Start: 2024-01-01 | End: 2024-01-01

## 2024-01-01 RX ORDER — PIPERACILLIN SODIUM AND TAZOBACTAM SODIUM 3; .375 G/15ML; G/15ML
3.38 INJECTION, POWDER, LYOPHILIZED, FOR SOLUTION INTRAVENOUS EVERY 8 HOURS
Refills: 0 | Status: COMPLETED | OUTPATIENT
Start: 2024-01-01 | End: 2024-01-01

## 2024-01-01 RX ORDER — SODIUM CHLORIDE 0.9 % (FLUSH) 0.9 %
2 SYRINGE (ML) INJECTION
Refills: 0 | Status: DISCONTINUED | OUTPATIENT
Start: 2024-01-01 | End: 2024-01-01

## 2024-01-01 RX ORDER — ERGOCALCIFEROL 1.25 MG/1
2.5 CAPSULE ORAL
Refills: 0 | DISCHARGE

## 2024-01-01 RX ORDER — SODIUM CHLORIDE 0.9 % (FLUSH) 0.9 %
1000 SYRINGE (ML) INJECTION
Refills: 0 | Status: DISCONTINUED | OUTPATIENT
Start: 2024-01-01 | End: 2024-01-01

## 2024-01-01 RX ORDER — BISACODYL 5 MG
10 TABLET, DELAYED RELEASE (ENTERIC COATED) ORAL DAILY
Refills: 0 | Status: DISCONTINUED | OUTPATIENT
Start: 2024-01-01 | End: 2024-01-01

## 2024-01-01 RX ORDER — POTASSIUM CHLORIDE 600 MG/1
40 TABLET, FILM COATED, EXTENDED RELEASE ORAL ONCE
Refills: 0 | Status: COMPLETED | OUTPATIENT
Start: 2024-01-01 | End: 2024-01-01

## 2024-01-01 RX ORDER — UREA 40 G
15 VIAL (EA) INTRAVENOUS DAILY
Refills: 0 | Status: DISCONTINUED | OUTPATIENT
Start: 2024-01-01 | End: 2024-01-01

## 2024-01-01 RX ORDER — GLYCOPYRROLATE 0.2 MG/ML
0.2 INJECTION, SOLUTION INTRAMUSCULAR; INTRAVENOUS EVERY 6 HOURS
Refills: 0 | Status: DISCONTINUED | OUTPATIENT
Start: 2024-01-01 | End: 2024-01-01

## 2024-01-01 RX ORDER — SODIUM CHLORIDE 0.9 % (FLUSH) 0.9 %
2 SYRINGE (ML) INJECTION THREE TIMES A DAY
Refills: 0 | Status: DISCONTINUED | OUTPATIENT
Start: 2024-01-01 | End: 2024-01-01

## 2024-01-01 RX ORDER — ENOXAPARIN SODIUM 100 MG/ML
30 INJECTION SUBCUTANEOUS EVERY 24 HOURS
Refills: 0 | Status: DISCONTINUED | OUTPATIENT
Start: 2024-01-01 | End: 2024-01-01

## 2024-01-01 RX ORDER — MAGNESIUM SULFATE 100 %
2 POWDER (GRAM) MISCELLANEOUS ONCE
Refills: 0 | Status: COMPLETED | OUTPATIENT
Start: 2024-01-01 | End: 2024-01-01

## 2024-01-01 RX ORDER — ENOXAPARIN SODIUM 100 MG/ML
40 INJECTION SUBCUTANEOUS EVERY 24 HOURS
Refills: 0 | Status: DISCONTINUED | OUTPATIENT
Start: 2024-01-01 | End: 2024-01-01

## 2024-01-01 RX ORDER — OLANZAPINE 2.5 MG/1
2.5 TABLET, FILM COATED ORAL ONCE
Refills: 0 | Status: COMPLETED | OUTPATIENT
Start: 2024-01-01 | End: 2024-01-01

## 2024-01-01 RX ADMIN — POTASSIUM CHLORIDE 100 MILLIEQUIVALENT(S): 600 TABLET, FILM COATED, EXTENDED RELEASE ORAL at 04:16

## 2024-01-01 RX ADMIN — GLYCOPYRROLATE 0.4 MILLIGRAM(S): 0.2 INJECTION, SOLUTION INTRAMUSCULAR; INTRAVENOUS at 22:52

## 2024-01-01 RX ADMIN — Medication 650 MILLIGRAM(S): at 18:58

## 2024-01-01 RX ADMIN — DEXTROSE MONOHYDRATE AND SODIUM CHLORIDE 50 MILLILITER(S): 5; .3 INJECTION, SOLUTION INTRAVENOUS at 21:03

## 2024-01-01 RX ADMIN — Medication 105 MILLIGRAM(S): at 05:32

## 2024-01-01 RX ADMIN — POTASSIUM CHLORIDE 40 MILLIEQUIVALENT(S): 600 TABLET, FILM COATED, EXTENDED RELEASE ORAL at 09:20

## 2024-01-01 RX ADMIN — Medication 12.5 MILLIGRAM(S): at 22:17

## 2024-01-01 RX ADMIN — Medication 105 MILLIGRAM(S): at 22:56

## 2024-01-01 RX ADMIN — DEXTROSE MONOHYDRATE AND SODIUM CHLORIDE 50 MILLILITER(S): 5; .3 INJECTION, SOLUTION INTRAVENOUS at 02:46

## 2024-01-01 RX ADMIN — Medication 2 GRAM(S): at 05:06

## 2024-01-01 RX ADMIN — ENOXAPARIN SODIUM 30 MILLIGRAM(S): 100 INJECTION SUBCUTANEOUS at 22:31

## 2024-01-01 RX ADMIN — Medication 1 MILLIGRAM(S): at 14:38

## 2024-01-01 RX ADMIN — Medication 1 ENEMA: at 09:14

## 2024-01-01 RX ADMIN — MORPHINE SULFATE 2 MILLIGRAM(S): 100 TABLET, EXTENDED RELEASE ORAL at 14:37

## 2024-01-01 RX ADMIN — VANCOMYCIN HYDROCHLORIDE 250 MILLIGRAM(S): KIT at 09:10

## 2024-01-01 RX ADMIN — GLYCOPYRROLATE 0.4 MILLIGRAM(S): 0.2 INJECTION, SOLUTION INTRAMUSCULAR; INTRAVENOUS at 17:44

## 2024-01-01 RX ADMIN — GLYCOPYRROLATE 0.4 MILLIGRAM(S): 0.2 INJECTION, SOLUTION INTRAMUSCULAR; INTRAVENOUS at 11:48

## 2024-01-01 RX ADMIN — DEXTROSE MONOHYDRATE AND SODIUM CHLORIDE 40 MILLILITER(S): 5; .3 INJECTION, SOLUTION INTRAVENOUS at 18:33

## 2024-01-01 RX ADMIN — POTASSIUM CHLORIDE 40 MILLIEQUIVALENT(S): 600 TABLET, FILM COATED, EXTENDED RELEASE ORAL at 17:55

## 2024-01-01 RX ADMIN — Medication 15 MILLILITER(S): at 18:02

## 2024-01-01 RX ADMIN — Medication 2 GRAM(S): at 18:04

## 2024-01-01 RX ADMIN — PIPERACILLIN SODIUM AND TAZOBACTAM SODIUM 25 GRAM(S): 3; .375 INJECTION, POWDER, LYOPHILIZED, FOR SOLUTION INTRAVENOUS at 05:16

## 2024-01-01 RX ADMIN — Medication 2 GRAM(S): at 17:01

## 2024-01-01 RX ADMIN — PIPERACILLIN SODIUM AND TAZOBACTAM SODIUM 25 GRAM(S): 3; .375 INJECTION, POWDER, LYOPHILIZED, FOR SOLUTION INTRAVENOUS at 02:41

## 2024-01-01 RX ADMIN — GLYCOPYRROLATE 0.4 MILLIGRAM(S): 0.2 INJECTION, SOLUTION INTRAMUSCULAR; INTRAVENOUS at 05:42

## 2024-01-01 RX ADMIN — Medication 2 GRAM(S): at 05:58

## 2024-01-01 RX ADMIN — Medication 63.75 MILLIMOLE(S): at 18:11

## 2024-01-01 RX ADMIN — Medication 1 PACKET(S): at 09:20

## 2024-01-01 RX ADMIN — VANCOMYCIN HYDROCHLORIDE 200 MILLIGRAM(S): KIT at 19:34

## 2024-01-01 RX ADMIN — PIPERACILLIN SODIUM AND TAZOBACTAM SODIUM 25 GRAM(S): 3; .375 INJECTION, POWDER, LYOPHILIZED, FOR SOLUTION INTRAVENOUS at 05:06

## 2024-01-01 RX ADMIN — POTASSIUM CHLORIDE 100 MILLIEQUIVALENT(S): 600 TABLET, FILM COATED, EXTENDED RELEASE ORAL at 21:36

## 2024-01-01 RX ADMIN — Medication 1 TABLET(S): at 12:28

## 2024-01-01 RX ADMIN — Medication 105 MILLIGRAM(S): at 05:17

## 2024-01-01 RX ADMIN — PIPERACILLIN SODIUM AND TAZOBACTAM SODIUM 25 GRAM(S): 3; .375 INJECTION, POWDER, LYOPHILIZED, FOR SOLUTION INTRAVENOUS at 17:54

## 2024-01-01 RX ADMIN — VANCOMYCIN HYDROCHLORIDE 250 MILLIGRAM(S): KIT at 05:02

## 2024-01-01 RX ADMIN — Medication 25 GRAM(S): at 22:56

## 2024-01-01 RX ADMIN — PIPERACILLIN SODIUM AND TAZOBACTAM SODIUM 25 GRAM(S): 3; .375 INJECTION, POWDER, LYOPHILIZED, FOR SOLUTION INTRAVENOUS at 06:39

## 2024-01-01 RX ADMIN — PIPERACILLIN SODIUM AND TAZOBACTAM SODIUM 25 GRAM(S): 3; .375 INJECTION, POWDER, LYOPHILIZED, FOR SOLUTION INTRAVENOUS at 13:42

## 2024-01-01 RX ADMIN — Medication 12.5 MILLIGRAM(S): at 21:36

## 2024-01-01 RX ADMIN — POTASSIUM CHLORIDE 100 MILLIEQUIVALENT(S): 600 TABLET, FILM COATED, EXTENDED RELEASE ORAL at 10:17

## 2024-01-01 RX ADMIN — Medication 25 GRAM(S): at 17:54

## 2024-01-01 RX ADMIN — Medication 2 GRAM(S): at 05:17

## 2024-01-01 RX ADMIN — Medication 100 GRAM(S): at 17:23

## 2024-01-01 RX ADMIN — OLANZAPINE 2.5 MILLIGRAM(S): 2.5 TABLET, FILM COATED ORAL at 01:34

## 2024-01-01 RX ADMIN — DEXTROSE MONOHYDRATE AND SODIUM CHLORIDE 50 MILLILITER(S): 5; .3 INJECTION, SOLUTION INTRAVENOUS at 13:35

## 2024-01-01 RX ADMIN — VANCOMYCIN HYDROCHLORIDE 250 MILLIGRAM(S): KIT at 18:22

## 2024-01-01 RX ADMIN — DEXTROSE MONOHYDRATE AND SODIUM CHLORIDE 50 MILLILITER(S): 5; .3 INJECTION, SOLUTION INTRAVENOUS at 21:33

## 2024-01-01 RX ADMIN — VANCOMYCIN HYDROCHLORIDE 100 MILLIGRAM(S): KIT at 17:50

## 2024-01-01 RX ADMIN — Medication 0.5 MILLIGRAM(S): at 17:45

## 2024-01-01 RX ADMIN — PIPERACILLIN SODIUM AND TAZOBACTAM SODIUM 25 GRAM(S): 3; .375 INJECTION, POWDER, LYOPHILIZED, FOR SOLUTION INTRAVENOUS at 22:31

## 2024-01-01 RX ADMIN — ENOXAPARIN SODIUM 30 MILLIGRAM(S): 100 INJECTION SUBCUTANEOUS at 17:49

## 2024-01-01 RX ADMIN — POTASSIUM CHLORIDE 100 MILLIEQUIVALENT(S): 600 TABLET, FILM COATED, EXTENDED RELEASE ORAL at 00:04

## 2024-01-01 RX ADMIN — Medication 15 MILLILITER(S): at 16:20

## 2024-01-01 RX ADMIN — ENOXAPARIN SODIUM 30 MILLIGRAM(S): 100 INJECTION SUBCUTANEOUS at 22:14

## 2024-01-01 RX ADMIN — Medication 105 MILLIGRAM(S): at 13:20

## 2024-01-01 RX ADMIN — PIPERACILLIN SODIUM AND TAZOBACTAM SODIUM 25 GRAM(S): 3; .375 INJECTION, POWDER, LYOPHILIZED, FOR SOLUTION INTRAVENOUS at 13:22

## 2024-01-01 RX ADMIN — ENOXAPARIN SODIUM 30 MILLIGRAM(S): 100 INJECTION SUBCUTANEOUS at 22:17

## 2024-01-01 RX ADMIN — PIPERACILLIN SODIUM AND TAZOBACTAM SODIUM 25 GRAM(S): 3; .375 INJECTION, POWDER, LYOPHILIZED, FOR SOLUTION INTRAVENOUS at 13:35

## 2024-01-01 RX ADMIN — POTASSIUM CHLORIDE 100 MILLIEQUIVALENT(S): 600 TABLET, FILM COATED, EXTENDED RELEASE ORAL at 20:02

## 2024-01-01 RX ADMIN — PIPERACILLIN SODIUM AND TAZOBACTAM SODIUM 25 GRAM(S): 3; .375 INJECTION, POWDER, LYOPHILIZED, FOR SOLUTION INTRAVENOUS at 21:33

## 2024-01-01 RX ADMIN — POTASSIUM PHOSPHATE, MONOBASIC POTASSIUM PHOSPHATE, DIBASIC INJECTION, 83.33 MILLIMOLE(S): 236; 224 SOLUTION, CONCENTRATE INTRAVENOUS at 21:14

## 2024-01-01 RX ADMIN — Medication 105 MILLIGRAM(S): at 22:37

## 2024-01-01 RX ADMIN — Medication 0.5 MILLIGRAM(S): at 05:42

## 2024-01-01 RX ADMIN — Medication 105 MILLIGRAM(S): at 13:10

## 2024-01-01 RX ADMIN — POTASSIUM CHLORIDE 100 MILLIEQUIVALENT(S): 600 TABLET, FILM COATED, EXTENDED RELEASE ORAL at 15:16

## 2024-01-01 RX ADMIN — VANCOMYCIN HYDROCHLORIDE 250 MILLIGRAM(S): KIT at 09:15

## 2024-01-01 RX ADMIN — Medication 1 MILLIGRAM(S): at 19:50

## 2024-01-01 RX ADMIN — VANCOMYCIN HYDROCHLORIDE 250 MILLIGRAM(S): KIT at 21:03

## 2024-01-01 RX ADMIN — PIPERACILLIN SODIUM AND TAZOBACTAM SODIUM 25 GRAM(S): 3; .375 INJECTION, POWDER, LYOPHILIZED, FOR SOLUTION INTRAVENOUS at 09:56

## 2024-01-01 RX ADMIN — Medication 0.5 MILLIGRAM(S): at 12:13

## 2024-01-01 RX ADMIN — POTASSIUM PHOSPHATE, MONOBASIC POTASSIUM PHOSPHATE, DIBASIC INJECTION, 83.33 MILLIMOLE(S): 236; 224 SOLUTION, CONCENTRATE INTRAVENOUS at 17:55

## 2024-01-01 RX ADMIN — Medication 1900 MILLILITER(S): at 18:18

## 2024-01-01 RX ADMIN — VANCOMYCIN HYDROCHLORIDE 200 MILLIGRAM(S): KIT at 07:43

## 2024-01-01 RX ADMIN — ENOXAPARIN SODIUM 30 MILLIGRAM(S): 100 INJECTION SUBCUTANEOUS at 21:40

## 2024-01-01 RX ADMIN — DEXTROSE MONOHYDRATE AND SODIUM CHLORIDE 75 MILLILITER(S): 5; .3 INJECTION, SOLUTION INTRAVENOUS at 13:42

## 2024-01-01 RX ADMIN — PIPERACILLIN SODIUM AND TAZOBACTAM SODIUM 25 GRAM(S): 3; .375 INJECTION, POWDER, LYOPHILIZED, FOR SOLUTION INTRAVENOUS at 22:14

## 2024-01-01 RX ADMIN — ENOXAPARIN SODIUM 30 MILLIGRAM(S): 100 INJECTION SUBCUTANEOUS at 22:55

## 2024-01-01 RX ADMIN — Medication 2 GRAM(S): at 18:11

## 2024-01-01 RX ADMIN — Medication 0.5 MILLIGRAM(S): at 22:50

## 2024-01-01 RX ADMIN — Medication 105 MILLIGRAM(S): at 19:49

## 2024-01-01 RX ADMIN — Medication 105 MILLIGRAM(S): at 18:02

## 2024-01-01 RX ADMIN — GLYCOPYRROLATE 0.4 MILLIGRAM(S): 0.2 INJECTION, SOLUTION INTRAMUSCULAR; INTRAVENOUS at 14:38

## 2024-01-01 RX ADMIN — ENOXAPARIN SODIUM 30 MILLIGRAM(S): 100 INJECTION SUBCUTANEOUS at 21:33

## 2024-01-01 RX ADMIN — DEXTROSE MONOHYDRATE AND SODIUM CHLORIDE 1000 MILLILITER(S): 5; .3 INJECTION, SOLUTION INTRAVENOUS at 02:10

## 2024-01-01 RX ADMIN — PIPERACILLIN SODIUM AND TAZOBACTAM SODIUM 25 GRAM(S): 3; .375 INJECTION, POWDER, LYOPHILIZED, FOR SOLUTION INTRAVENOUS at 23:11

## 2024-01-01 RX ADMIN — POTASSIUM CHLORIDE 100 MILLIEQUIVALENT(S): 600 TABLET, FILM COATED, EXTENDED RELEASE ORAL at 12:33

## 2024-01-01 RX ADMIN — PIPERACILLIN SODIUM AND TAZOBACTAM SODIUM 25 GRAM(S): 3; .375 INJECTION, POWDER, LYOPHILIZED, FOR SOLUTION INTRAVENOUS at 21:19

## 2024-01-01 RX ADMIN — Medication 0.5 MILLIGRAM(S): at 11:48

## 2024-01-01 RX ADMIN — POTASSIUM CHLORIDE 100 MILLIEQUIVALENT(S): 600 TABLET, FILM COATED, EXTENDED RELEASE ORAL at 22:13

## 2024-01-01 RX ADMIN — PIPERACILLIN SODIUM AND TAZOBACTAM SODIUM 200 GRAM(S): 3; .375 INJECTION, POWDER, LYOPHILIZED, FOR SOLUTION INTRAVENOUS at 18:45

## 2024-01-01 RX ADMIN — POTASSIUM CHLORIDE 100 MILLIEQUIVALENT(S): 600 TABLET, FILM COATED, EXTENDED RELEASE ORAL at 02:58

## 2024-01-01 RX ADMIN — Medication 2 GRAM(S): at 17:56

## 2024-01-01 RX ADMIN — POTASSIUM PHOSPHATE, MONOBASIC POTASSIUM PHOSPHATE, DIBASIC INJECTION, 83.33 MILLIMOLE(S): 236; 224 SOLUTION, CONCENTRATE INTRAVENOUS at 16:06

## 2024-01-01 RX ADMIN — Medication 100 MILLILITER(S): at 05:35

## 2024-01-01 RX ADMIN — PIPERACILLIN SODIUM AND TAZOBACTAM SODIUM 25 GRAM(S): 3; .375 INJECTION, POWDER, LYOPHILIZED, FOR SOLUTION INTRAVENOUS at 14:19

## 2024-01-01 RX ADMIN — Medication 1 MILLIGRAM(S): at 15:52

## 2024-01-01 RX ADMIN — PIPERACILLIN SODIUM AND TAZOBACTAM SODIUM 25 GRAM(S): 3; .375 INJECTION, POWDER, LYOPHILIZED, FOR SOLUTION INTRAVENOUS at 02:46

## 2024-01-01 RX ADMIN — Medication 105 MILLIGRAM(S): at 02:33

## 2024-01-01 RX ADMIN — PIPERACILLIN SODIUM AND TAZOBACTAM SODIUM 25 GRAM(S): 3; .375 INJECTION, POWDER, LYOPHILIZED, FOR SOLUTION INTRAVENOUS at 17:47

## 2024-01-01 RX ADMIN — PIPERACILLIN SODIUM AND TAZOBACTAM SODIUM 25 GRAM(S): 3; .375 INJECTION, POWDER, LYOPHILIZED, FOR SOLUTION INTRAVENOUS at 06:37

## 2024-01-01 RX ADMIN — ENOXAPARIN SODIUM 30 MILLIGRAM(S): 100 INJECTION SUBCUTANEOUS at 21:19

## 2024-02-29 NOTE — ED ADULT TRIAGE NOTE - CHIEF COMPLAINT QUOTE
found in bushes, facial/arm abrasions, trauma request EMS
Pupils equal, round and reactive to light, Extra-ocular movement intact, eyes are clear b/l

## 2024-03-24 NOTE — PHYSICAL THERAPY INITIAL EVALUATION ADULT - MANUAL MUSCLE TESTING RESULTS, REHAB EVAL
Bed/Stretcher in lowest position, wheels locked, appropriate side rails in place/Call bell, personal items and telephone in reach/Instruct patient to call for assistance before getting out of bed/chair/stretcher/Non-slip footwear applied when patient is off stretcher/[place] to call system/Physically safe environment - no spills, clutter or unnecessary equipment/Purposeful proactive rounding/Room/bathroom lighting operational, light cord in reach
BUE: 3+/5, BLE: 3+/5 Grossly

## 2024-06-02 NOTE — PROGRESS NOTE ADULT - PROBLEM SELECTOR PLAN 2
(1) Outpatient Area Altered, found down   - BAL 0  - concern for Wernicke  - s/p thiamine, start on high dose thiamine  - CTH normal  - f/u MRI head  - may have some underlying dementia/cognitive change from chronic alcohol use.     - f/u Utox to r/o other substances causing alteration in gait   - FS in normal limits, will continue with FS, f/u A1C  - Infectious workup limited to UA (negative) as patients vitals WNL and normal WBC, low -no concern for infxn Altered, found down   - BAL 0  - concern for Wernicke  - s/p high dose thiamine  - CTH normal  - may have some underlying dementia/cognitive change from chronic alcohol use.   - Infectious workup limited to UA (negative) as patients vitals WNL and normal WBC, low -no concern for infxn

## 2024-06-15 PROBLEM — Z78.9 OTHER SPECIFIED HEALTH STATUS: Chronic | Status: ACTIVE | Noted: 2021-11-29

## 2024-06-24 LAB — PYRIDOXAL PHOS SERPL-MCNC: <1 UG/L — LOW (ref 3.4–65.2)

## 2024-10-09 NOTE — PROGRESS NOTE ADULT - PROBLEM/PLAN-6
Render Risk Assessment In Note?: no Detail Level: Simple Comment: [place] 0.2 cm 07/2021 Comment: 03/2019 DISPLAY PLAN FREE TEXT

## 2024-11-10 NOTE — H&P ADULT - PROBLEM/PLAN-3
Awake, Alert, Oriented.  Good appetite.  Visible in day room and interacting with peers.  No PRN's required.   DISPLAY PLAN FREE TEXT

## 2025-03-17 NOTE — H&P ADULT - NSCORESITESY/N_GEN_A_CORE_RD
Reena Nelson DO [facility], [facility] ([facility])    PLEASE NOTE - Most insurances allow a Complete Physical once every 366 days. Please schedule accordingly.    Please arrive 15 minutes prior to your scheduled appointment. Please also bring your Insurance card, Photo ID, and your medication bottles or a list of your current medication.    If you no longer require this appointment, please contact your physician office to cancel.              [facility], [facility] [address]  [address] [place] [id number]  [phone]           Preferred lab: [facility] ([facility])     The patient has been notified to complete fasting labs prior to their physical appointment.      No

## 2025-04-06 NOTE — DISCHARGE NOTE PROVIDER - CARE PROVIDER_API CALL
[facility],   [address] [place] [address].   [place], [place]  Phone: [phone]  Fax: (   )    -  Follow Up Time: 1 week No (0)

## 2025-06-18 NOTE — PHYSICAL THERAPY INITIAL EVALUATION ADULT - SITTING BALANCE: DYNAMIC
Thank you for allowing us to be a part of your care today. We strive to provide the best care for you today and in the future. Here are a few important reminders:   Our goal is to review and report all test/imaging results within 24-48 hours (unless otherwise specified by the clinician). If you have not received your test results within this time period, please contact the clinic at [phone] and ask to speak to a RN Specialist or Cancer Care Coordinator.   You can also receive your test results on-line quickly and easily by enrolling in [employer] by visiting [email or web address].  Central Scheduling should contact you in 24-48 hours if any imaging is ordered during this visit. Please contact Central Scheduling at [phone] in case you do not receive a call.   Due to the recent change in narcotic laws and our commitment to patient safety and well-being, narcotic refills will be evaluated on a strict case by case basis. We will not address any requests for re-fills after noon on Fridays.   If you have paperwork, complete your portion of the paperwork and either drop off or fax the forms to [phone]. Make sure to leave clear instruction of where you would like the completed paperwork to go and include a valid phone number. It may take staff up to 7 days to complete.   fair balance

## 2025-08-01 NOTE — ED ADULT NURSE NOTE - TEMPLATE LIST FOR HEAD TO TOE ASSESSMENT
Call Center TCM Note      Flowsheet Row Responses   [facility] facility patient discharged from? [facility]   Does the patient have one of the following disease processes/diagnoses(primary or secondary)? Stroke   TCM attempt successful? No   Unsuccessful attempts Attempt 2  [attempted pt and sister]            SATISH Arguello Registered Nurse    8/1/2025, 15:37 EDT         General